# Patient Record
Sex: FEMALE | Race: WHITE | NOT HISPANIC OR LATINO | Employment: FULL TIME | URBAN - METROPOLITAN AREA
[De-identification: names, ages, dates, MRNs, and addresses within clinical notes are randomized per-mention and may not be internally consistent; named-entity substitution may affect disease eponyms.]

---
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
## 2018-01-10 NOTE — PROGRESS NOTES
Assessment    1  Encounter for preventive health examination (V70 0) (Z00 00)    Plan  Health Maintenance    · Follow-up visit in 1 year Evaluation and Treatment  Follow-up  Status: Hold For -  Scheduling  Requested for: 63Bax5540   · Be sure to get at least 8 hours of sleep every night ; Status:Complete;   Done: 89UYD7271   · Drink plenty of fluids ; Status:Complete;   Done: 76CUS6399   · Eat a low fat and low cholesterol diet ; Status:Complete;   Done: 56KSG2528   · There are many ways to reduce your risk of catching or spreading a sexually transmitted  Infection ; Status:Complete;   Done: 68MVM0976   · We encourage all of our patients to exercise regularly  30 minutes of exercise or physical  activity five or more days a week is recommended for children and adults ;  Status:Complete;   Done: 35AHR7143   · You need to stop smoking  Though it is not easy, more than half of all adult smokers  have quit  We encourage you to write down all the reasons you should quit smoking and  set a quit date for yourself  Ask us how we can help  You may also call  [facility] for free resources and assistance ; Status:Complete;   Done:  31QDJ5875   · Call [phone] if: You find a new or different kind of lump in your breast ;  Status:Complete;   Done: 88LVO5343   · Call [phone] if: You have any warning signs of skin cancer ; Status:Complete;    Done: 40KVS7889    Discussion/Summary  health maintenance visit Currently, she eats an adequate diet and has an adequate exercise regimen  has appt with GYN in [place], [place] next week Breast cancer screening: monthly self breast exam was advised  Advice and education were given regarding nutrition, aerobic exercise, weight loss and sunscreen use  Patient discussion: discussed with the patient  Fatoumata Zavala in good health  college forms completed     The patient was counseled regarding instructions for management, risk factor reductions, impressions, risks and benefits of treatment options, importance of compliance with treatment  The treatment plan was reviewed with the patient/guardian  The patient/guardian understands and agrees with the treatment plan      Chief Complaint  PE with form see's eye dr mora  ck/lpn      History of Present Illness  HM, Adult Female: The patient is being seen for a health maintenance evaluation  The last health maintenance visit was 3 year(s) ago  General Health: The patient's health since the last visit is described as good  She has regular dental visits  She denies vision problems  She denies hearing loss  Immunizations status: up to date  Lifestyle:  She consumes a diverse and healthy diet  She does not have any weight concerns  She exercises regularly  She does not use tobacco  She denies alcohol use  She denies drug use  Reproductive health: the patient is perimenopausal   she reports normal menses  she is not sexually active  Screening:   HPI: here for college PE  Georgina Odor doing well today  attends [address] [place] [address] U[employer]-Called former pediatrician (Dr Charanjit Bosch) for record  allergic cough better since taking singulair  no new c/o today      Review of Systems    Constitutional: No fever, no chills, feels well, no tiredness, no recent weight gain or weight loss  Eyes: No complaints of eye pain, no red eyes, no eyesight problems, no discharge, no dry eyes, no itching of eyes  ENT: no complaints of earache, no loss of hearing, no nose bleeds, no nasal discharge, no sore throat, no hoarseness  Cardiovascular: No complaints of slow heart rate, no fast heart rate, no chest pain, no palpitations, no leg claudication, no lower extremity edema  Respiratory: cough, but as noted in HPI, no shortness of breath and no wheezing  Gastrointestinal: No complaints of abdominal pain, no constipation, no nausea or vomiting, no diarrhea, no bloody stools     Genitourinary: No complaints of dysuria, no incontinence, no pelvic pain, no dysmenorrhea, no vaginal discharge or bleeding  Musculoskeletal: No complaints of arthralgias, no myalgias, no joint swelling or stiffness, no limb pain or swelling  Integumentary: No complaints of skin rash or lesions, no itching, no skin wounds, no breast pain or lump  Neurological: No complaints of headache, no confusion, no convulsions, no numbness, no dizziness or fainting, no tingling, no limb weakness, no difficulty walking  Psychiatric: Not suicidal, no sleep disturbance, no anxiety or depression, no change in personality, no emotional problems  Endocrine: No complaints of proptosis, no hot flashes, no muscle weakness, no deepening of the voice, no feelings of weakness  Hematologic/Lymphatic: No complaints of swollen glands, no swollen glands in the neck, does not bleed easily, does not bruise easily  Active Problems    1  Allergic cough (786 2) (R05)   2  Bladder pain (788 99) (R39 89)   3  Body mass index (BMI) 24 0-24 9, adult (V85 1) (Z68 24)   4   Mild intermittent asthma with acute exacerbation (493 92) (J45 21)    Past Medical History    · History of A Premature Birth   · History of Muscle Aches   · History of Shoulder joint pain, unspecified laterality   · History of Thrombophlebitis (V12 52)    Surgical History    · History of Bladder Surgery    Family History  Mother    · Family history of hypertension (V17 49) (Z82 49)   · Family history of High cholesterol  Grandparent    · Family history of Diabetes Mellitus (V18 0)  Maternal Grandmother    · Family history of malignant neoplasm of uterus (V16 49) (Z80 49)  Family History    · Family history of Arthritis (V17 7)   · Family history of Asthma (V17 5)   · Family history of Cancer   · Family history of Cervical Cancer   · Family history of Congestive Heart Failure   · Family history of Hyperlipidemia   · Family history of Hypertension (V17 49)    Social History    · Denied: History of Alcohol Use (History)   · Never A Smoker    Current Meds   1  Advair Diskus 100-50 MCG/DOSE Inhalation Aerosol Powder Breath Activated; INHALE   1 PUFF EVERY 12 HOURS; Therapy: 83HDZ0908 to (Last Rx:17Sed6764)  Requested for: 13Wof3142 Ordered   2  Bromfed DM 30-2-10 MG/5ML Oral Syrup; TAKE 5 ML Bedtime; Therapy: 10CWW1299 to (Evaluate:12Obc9823)  Requested for: 96RSL1403; Last   Rx:49Nje0432 Ordered   3  Maxair Autohaler AERB Recorded   4  Montelukast Sodium 10 MG Oral Tablet; TAKE 1 TABLET DAILY; Therapy: 54BXZ5212 to (Ghada Brass)  Requested for: 16TWP3838; Last   Rx:17Gmp9458 Ordered   5  ZyrTEC Allergy 10 MG Oral Tablet; Take 1 tablet daily; Therapy: 82XKZ3095 to (Evaluate:83Ceu2119); Last Rx:28Csg8255 Ordered    Allergies    1  Amoxicillin TABS    Vitals   Recorded: 03Aug2017 12:57PM   Temperature 98 5 F, Tympanic   Heart Rate 84, L Radial   Pulse Quality Regular, L Radial   Respiration Quality Normal   Respiration 14   Systolic 745, LUE, Sitting   Diastolic 70, LUE, Sitting   Height 5 ft 5 in   Weight 187 lb    BMI Calculated 31 12   BSA Calculated 1 92   BMI Percentile 95 %   2-20 Stature Percentile 61 %   2-20 Weight Percentile 96 %     Physical Exam    Constitutional   General appearance: No acute distress, well appearing and well nourished  Head and Face   Head and face: Normal     Eyes   Conjunctiva and lids: No swelling, erythema or discharge  Pupils and irises: Equal, round, reactive to light  Ears, Nose, Mouth, and Throat   Otoscopic examination: Tympanic membranes translucent with normal light reflex  Canals patent without erythema  Hearing: Normal     Nasal mucosa, septum, and turbinates: Normal without edema or erythema  Lips, teeth, and gums: Normal, good dentition  Oropharynx: Normal with no erythema, edema, exudate or lesions  Neck   Thyroid: Normal, no thyromegaly  Pulmonary   Respiratory effort: No increased work of breathing or signs of respiratory distress      Auscultation of lungs: Clear to auscultation  Cardiovascular   Auscultation of heart: Normal rate and rhythm, normal S1 and S2, no murmurs  Pedal pulses: 2+ bilaterally  Peripheral vascular exam: Normal     Examination of extremities for edema and/or varicosities: Normal     Abdomen   Abdomen: Non-tender, no masses  Liver and spleen: No hepatomegaly or splenomegaly  Lymphatic   Palpation of lymph nodes in neck: No lymphadenopathy  Musculoskeletal   Gait and station: Normal     Range of motion: Normal     Stability: Normal     Muscle strength/tone: Normal     Skin   Skin and subcutaneous tissue: Normal without rashes or lesions  Neurologic   Cranial nerves: Cranial nerves II-XII intact  Psychiatric   Judgment and insight: Normal     Mood and affect: Normal        Attending Note  Collaborating Physician Note: Collaborating Physician: I agree with the Advanced Practitioner note  Signatures   Electronically signed by : TYRA Hurtado;  Aug  3 2017  2:51PM EST                       (Author)    Electronically signed by : Raghu Ward DO; Aug  3 2017  4:57PM EST                       (Author)

## 2018-01-11 NOTE — RESULT NOTES
Verified Results  (1) CBC/ PLT (NO DIFF) 29HLP2339 12:00AM [employer]     Test Name Result Flag Reference   WBC 10 2 x10E3/uL  3 4-10 8   RBC 4 49 x10E6/uL  3 77-5 28   Hemoglobin 14 1 g/dL  11 1-15 9   **Effective December 4, 2017 the reference interval**                   for Hemoglobin MALES only will be changing to: Males 13-15 years: 12 6 - 17 7                                         Males   >15 years: 13 0 - 17 7   Hematocrit 41 5 %  34 0-46  6   MCV 92 fL  79-97   MCH 31 4 pg  26 6-33 0   MCHC 34 0 g/dL  31 5-35 7   RDW 13 2 %  12 3-15 4   Platelets 369 H95S7/EH  150-379     (1) COMPREHENSIVE METABOLIC PANEL 60LEL3069 51:05TN [employer]     Test Name Result Flag Reference   Glucose, Serum 92 mg/dL  65-99   BUN 5 mg/dL L 6-20   Creatinine, Serum 0 77 mg/dL  0 57-1 00   BUN/Creatinine Ratio 6 L 9-23   Sodium, Serum 139 mmol/L  134-144   Potassium, Serum 4 8 mmol/L  3 5-5 2   Chloride, Serum 103 mmol/L     Carbon Dioxide, Total 25 mmol/L  18-29   Calcium, Serum 9 2 mg/dL  8 7-10 2   Protein, Total, Serum 6 9 g/dL  6 0-8 5   Albumin, Serum 4 1 g/dL  3 5-5 5   Globulin, Total 2 8 g/dL  1 5-4 5   A/G Ratio 1 5  1 2-2 2   Bilirubin, Total 0 3 mg/dL  0 0-1 2   Alkaline Phosphatase, S 86 IU/L     AST (SGOT) 14 IU/L  0-40   ALT (SGPT) 13 IU/L  0-32   eGFR If NonAfricn Am 112 mL/min/1 73  >59   eGFR If Africn Am 129 mL/min/1 73  >59     (1) MONO TEST 01HJC5746 12:00AM [employer]     Test Name Result Flag Reference   Mononucleosis Test, Qual Negative  Negative   The sensitivity of Heterophile antibody testing is 80-90%  [place] IgM testing offers higher sensitivity       (1) THROAT CULTURE (CULTURE, UPPER RESPIRATORY) 37VHQ8142 12:00AM Louana Lesia     Test Name Result Flag Reference   Upper Respiratory Culture Final report A    Result 1 (Report) A    Beta hemolytic Streptococcus, group C  Moderate growth  Penicillin and ampicillin are drugs of choice for treatment of  beta-hemolytic streptococcal infections  Susceptibility testing of  penicillins and other beta-lactam agents approved by the FDA for  treatment of beta-hemolytic streptococcal infections need not be  performed routinely because nonsusceptible isolates are extremely  rare in any beta-hemolytic streptococcus and have not been reported  for Streptococcus pyogenes (group A)  (CLSI 2011)   Beta hemolytic Streptococcus, group C  Moderate growth  Penicillin and ampicillin are drugs of choice for treatment of  beta-hemolytic streptococcal infections  Susceptibility testing of  penicillins and other beta-lactam agents approved by the FDA for  treatment of beta-hemolytic streptococcal infections need not be  performed routinely because nonsusceptible isolates are extremely  rare in any beta-hemolytic streptococcus and have not been reported  for Streptococcus pyogenes (group A)   (CLSI 2011)       Signatures   Electronically signed by : TYRA Segura; Nov 27 2017 10:16AM EST                       (Author)

## 2018-01-12 VITALS
HEIGHT: 65 IN | RESPIRATION RATE: 16 BRPM | BODY MASS INDEX: 31.49 KG/M2 | WEIGHT: 189 LBS | TEMPERATURE: 99.2 F | SYSTOLIC BLOOD PRESSURE: 108 MMHG | HEART RATE: 66 BPM | DIASTOLIC BLOOD PRESSURE: 70 MMHG

## 2018-01-13 VITALS
HEART RATE: 72 BPM | RESPIRATION RATE: 14 BRPM | HEIGHT: 65 IN | WEIGHT: 186 LBS | DIASTOLIC BLOOD PRESSURE: 70 MMHG | TEMPERATURE: 98.5 F | SYSTOLIC BLOOD PRESSURE: 110 MMHG | BODY MASS INDEX: 30.99 KG/M2

## 2018-01-14 VITALS
BODY MASS INDEX: 31.16 KG/M2 | HEIGHT: 65 IN | DIASTOLIC BLOOD PRESSURE: 70 MMHG | SYSTOLIC BLOOD PRESSURE: 110 MMHG | TEMPERATURE: 98.5 F | RESPIRATION RATE: 14 BRPM | HEART RATE: 84 BPM | WEIGHT: 187 LBS

## 2018-01-14 NOTE — RESULT NOTES
Verified Results  * XR ABDOMEN 1 VIEW KUB 29Apr2016 08:30AM Tyron Thurman Order Number: [id number]     Test Name Result Flag Reference   XR ABDOMEN 1 VIEW KUB (Report)     ABDOMEN     INDICATION: Lower abdominal pain     COMPARISON: Ultrasound April 29, 2016     VIEWS: AP supine; 2 images     FINDINGS:     There is a nonobstructive bowel gas pattern  No discernible free air on this supine study  Upright or left lateral decubitus imaging is more sensitive to detect subtle free air in the appropriate setting  No abnormal calcifications are seen projected over the kidneys  There is no evidence of ureteric calculi  Visualized lung bases are clear  Visualized osseous structures are unremarkable for the patient's age  IMPRESSION:     Unremarkable examination         Workstation performed: [id number]     Signed by:   Jesus Grey MD   4/29/16

## 2018-01-15 NOTE — RESULT NOTES
Verified Results  * US ABDOMEN COMPLETE 29Apr2016 08:26AM Gracia Box Order Number: [id number]     Test Name Result Flag Reference   US ABDOMEN COMPLETE (Report)     ABDOMEN ULTRASOUND, COMPLETE     INDICATION: Abdominal pain  Partial left nephrectomy     COMPARISON: 09/20/2010     TECHNIQUE:  Real-time ultrasound of the abdomen was performed with a curvilinear transducer with both volumetric sweeps and still imaging techniques  FINDINGS:     PANCREAS: Visualized portions of the pancreas are within normal limits  AORTA AND IVC: Visualized portions are normal for patient age  LIVER:   Size: Within normal range  The liver measures 13 4 cm in the midclavicular line  Contour: Surface contour is smooth  Parenchyma: Echogenicity and echotexture are within normal limits  No evidence of suspicious mass  The main portal vein is patent and hepatopetal       BILIARY:   The gallbladder is normal in caliber  No wall thickening or pericholecystic fluid  No stones or sludge identified  Sonographic Dwana Nose sign is negative  No intrahepatic biliary dilatation  CBD measures 4 mm  No choledocholithiasis  KIDNEY:    Right kidney measures 12 2 x 5 6 cm  The renal cortex measures 1 5 cm  Within normal limits  Left kidney measures 6 2 x 4 8 cm  The renal cortex measures 1 4 cm  Within normal limits  SPLEEN:    Measures 11 3 x 11 8 x 5 1 cm  Splenic volume is 355 mL   Within normal limits  ASCITES: None  IMPRESSION:     Left kidney is smaller than the right  Presumably this is postoperative in origin  Mild prominence of the spleen         Workstation performed: [id number]     Signed by:   Chato Thompson MD   4/29/16

## 2018-01-16 NOTE — RESULT NOTES
Verified Results  (1) URINE CULTURE 27Apr2016 12:00AM Efraín Hardy     Test Name Result Flag Reference   Urine Culture,Comprehensive Final report     Result 1 Comment     No growth in 36 - 48 hours

## 2018-01-22 VITALS
SYSTOLIC BLOOD PRESSURE: 92 MMHG | WEIGHT: 192 LBS | HEART RATE: 78 BPM | RESPIRATION RATE: 14 BRPM | DIASTOLIC BLOOD PRESSURE: 60 MMHG | BODY MASS INDEX: 31.99 KG/M2 | HEIGHT: 65 IN | TEMPERATURE: 98.3 F

## 2018-10-25 ENCOUNTER — TELEPHONE (OUTPATIENT)
Dept: FAMILY MEDICINE CLINIC | Facility: CLINIC | Age: 18
End: 2018-10-25

## 2018-10-25 DIAGNOSIS — J45.21 MILD INTERMITTENT ASTHMA WITH ACUTE EXACERBATION: Primary | ICD-10-CM

## 2018-10-25 PROBLEM — R05.8 ALLERGIC COUGH: Status: ACTIVE | Noted: 2017-07-31

## 2018-10-25 PROBLEM — N13.70 VUR (VESICOURETERIC REFLUX): Status: ACTIVE | Noted: 2017-10-10

## 2018-10-25 PROBLEM — N39.46 MIXED STRESS AND URGE URINARY INCONTINENCE: Status: ACTIVE | Noted: 2017-07-14

## 2018-10-25 PROBLEM — D68.51 FACTOR V LEIDEN (HCC): Status: ACTIVE | Noted: 2017-10-10

## 2018-10-25 PROBLEM — D68.51 FACTOR V LEIDEN MUTATION (HCC): Status: ACTIVE | Noted: 2017-08-22

## 2018-10-25 RX ORDER — ALBUTEROL SULFATE 90 UG/1
1 AEROSOL, METERED RESPIRATORY (INHALATION) EVERY 6 HOURS
Qty: 1 INHALER | Refills: 0 | Status: SHIPPED | OUTPATIENT
Start: 2018-10-25 | End: 2020-07-08

## 2018-10-25 RX ORDER — MONTELUKAST SODIUM 10 MG/1
1 TABLET ORAL DAILY
COMMUNITY
Start: 2017-07-31 | End: 2020-07-08

## 2018-10-25 RX ORDER — ALBUTEROL SULFATE 90 UG/1
1 AEROSOL, METERED RESPIRATORY (INHALATION) EVERY 6 HOURS
COMMUNITY
End: 2018-10-25 | Stop reason: SDUPTHER

## 2018-10-25 NOTE — TELEPHONE ENCOUNTER
Kal Moreland,     Patient is requesting that her inhaler be refilled but I do not see it on her med list  I actually do not see a med list at all  Please refill for her when you can  She needs it to go [employer] in [facility]  Thank you

## 2019-03-11 ENCOUNTER — OFFICE VISIT (OUTPATIENT)
Dept: FAMILY MEDICINE CLINIC | Facility: CLINIC | Age: 19
End: 2019-03-11
Payer: COMMERCIAL

## 2019-03-11 VITALS
RESPIRATION RATE: 14 BRPM | HEIGHT: 65 IN | WEIGHT: 207 LBS | HEART RATE: 72 BPM | DIASTOLIC BLOOD PRESSURE: 70 MMHG | SYSTOLIC BLOOD PRESSURE: 102 MMHG | BODY MASS INDEX: 34.49 KG/M2 | TEMPERATURE: 98.9 F

## 2019-03-11 DIAGNOSIS — J02.9 SORE THROAT: Primary | ICD-10-CM

## 2019-03-11 DIAGNOSIS — B37.0 THRUSH: ICD-10-CM

## 2019-03-11 DIAGNOSIS — J02.9 EXUDATIVE PHARYNGITIS: ICD-10-CM

## 2019-03-11 LAB — S PYO AG THROAT QL: NEGATIVE

## 2019-03-11 PROCEDURE — 99213 OFFICE O/P EST LOW 20 MIN: CPT | Performed by: NURSE PRACTITIONER

## 2019-03-11 PROCEDURE — 87880 STREP A ASSAY W/OPTIC: CPT | Performed by: NURSE PRACTITIONER

## 2019-03-11 RX ORDER — SERTRALINE HYDROCHLORIDE 100 MG/1
150 TABLET, FILM COATED ORAL DAILY
COMMUNITY
Start: 2018-10-19 | End: 2019-06-24 | Stop reason: SDUPTHER

## 2019-03-11 RX ORDER — BUSPIRONE HYDROCHLORIDE 10 MG/1
10 TABLET ORAL AS NEEDED
Refills: 0 | COMMUNITY
Start: 2019-01-31 | End: 2021-02-05 | Stop reason: SDUPTHER

## 2019-03-11 RX ORDER — SULFAMETHOXAZOLE AND TRIMETHOPRIM 400; 80 MG/1; MG/1
80 TABLET ORAL DAILY
COMMUNITY
Start: 2019-02-15 | End: 2020-07-01

## 2019-03-11 NOTE — PROGRESS NOTES
Assessment:     Exudative pharyngitis    Query thrush of throat/tonsils r/t frequent antibiotic use and steroid inhaler       Plan:     1  Nystatin swish and swallow 4 times per day   2  Throat culture to confirm that yeast is causative agent; sent out----will call with results  3  POCT strep A swab negative today in office  4  Follow up on Saturday 3/16/2019     The patient was counseled regarding instructions for management,-- risk factor reductions,-- prognosis,-- impressions,-- risks and benefits of treatment options,-- importance of compliance with treatment  I have reviewed the instructions with the patient, answering all questions to her satisfaction  Subjective:       History was provided by the patient  Bladimir Driscoll is a 22 y o  female who presents for evaluation of a sore throat  Associated symptoms include nasal blockage, sinus and nasal congestion, sore throat, white spots in throat and fever  Onset of symptoms was 1 month ago  The fever, nasal congestion, and sinus symptoms have improved but the sore throat and exudate remain since that time  Since onset she was treated by her [facility] clinic for presumed strep A infection with a course of azithromycin and clindamycin which did not completely resolve symptoms  She denies cough, chest pain, shortness of breath, or syncope  She denies nausea, vomiting, or diarrhea  She is drinking plenty of fluids  She has not had recent close exposure to someone with proven streptococcal pharyngitis   On chronic ICS for asthma, chronic low dose bactrim for uti prophylaxis    The following portions of the patient's history were reviewed and updated as appropriate: allergies, current medications, past family history, past medical history, past social history, past surgical history and problem list     Review of Systems  Constitutional: negative for fatigue and fevers  Eyes: negative for visual disturbance  Ears, nose, mouth, throat, and face: positive for sore throat, negative for ear drainage, hearing loss and nasal congestion  Respiratory: negative for cough, dyspnea on exertion and sputum  Cardiovascular: negative for chest pain and orthopnea  Gastrointestinal: negative for diarrhea, nausea and vomiting      Objective:      /70 (BP Location: Left arm, Patient Position: Sitting, Cuff Size: Adult)   Pulse 72   Temp 98 9 °F (37 2 °C) (Tympanic)   Resp 14   Ht 5' 5" (1 651 m)   Wt 93 9 kg (207 lb)   BMI 34 45 kg/m²   General appearance: alert and oriented, in no acute distress  Head: Normocephalic, without obvious abnormality, atraumatic  Eyes: conjunctivae and corneas are clear  Ears: normal TM's and external ear canals both ears  Nose: mild congestion, turbinates pink, inflamed  Throat: abnormal findings: thrush--white exudate to tonsils and pharynx   Neck: no adenopathy and supple, symmetrical, trachea midline  Lungs: clear to auscultation bilaterally  Heart: regular rate and rhythm, S1, S2 normal, no murmur, click, rub or gallop  Abdomen: soft, non-tender; bowel sounds normal; no masses,  no organomegaly

## 2019-03-11 NOTE — PATIENT INSTRUCTIONS
Oral Candidiasis   WHAT YOU NEED TO KNOW:   Oral candidiasis, or thrush, is a fungal infection that affects the inside of your mouth  DISCHARGE INSTRUCTIONS:   Return to the emergency department if:   · You have trouble swallowing and your jaw and neck are stiff  · You are dizzy, thirsty, or have a dry mouth  · You are urinating little or not at all  · You cannot eat or drink because of the pain  Contact your healthcare provider if:   · You have a fever  · You have nausea, vomiting, or diarrhea  · Your signs and symptoms get worse, even after treatment  · You have questions or concerns about your condition or care  Medicines:   · Antifungal medicine  helps kill the fungus that caused your oral candidiasis  This medicine may be a pill or a solution that you gargle  Remove dentures before you gargle  · Take your medicine as directed  Contact your healthcare provider if you think your medicine is not helping or if you have side effects  Tell him of her if you are allergic to any medicine  Keep a list of the medicines, vitamins, and herbs you take  Include the amounts, and when and why you take them  Bring the list or the pill bottles to follow-up visits  Carry your medicine list with you in case of an emergency  Prevent oral candidiasis:  Brush your teeth, gums, and tongue after you eat and before you go to sleep  Use a toothbrush with soft bristles  See your dentist for regular exams  Remove your dentures when you sleep, or at least 6 hours each day  Clean your dentures and soak them in denture   Let them air dry after soaking  Follow up with your healthcare provider as directed:  Write down your questions so you remember to ask them during your visits  © 2017 [address] [employer] Information is for End User's use only and may not be sold, redistributed or otherwise used for commercial purposes   All illustrations and images included in CareNotes® are the copyrighted property of [employer]  or Georges Tamayo  The above information is an  only  It is not intended as medical advice for individual conditions or treatments  Talk to your doctor, nurse or pharmacist before following any medical regimen to see if it is safe and effective for you

## 2019-03-13 LAB
BACTERIA SPEC RESP CULT: NORMAL
Lab: NORMAL

## 2019-03-16 ENCOUNTER — OFFICE VISIT (OUTPATIENT)
Dept: FAMILY MEDICINE CLINIC | Facility: CLINIC | Age: 19
End: 2019-03-16
Payer: COMMERCIAL

## 2019-03-16 VITALS
DIASTOLIC BLOOD PRESSURE: 62 MMHG | RESPIRATION RATE: 14 BRPM | TEMPERATURE: 98.8 F | BODY MASS INDEX: 34.99 KG/M2 | WEIGHT: 210 LBS | HEART RATE: 72 BPM | SYSTOLIC BLOOD PRESSURE: 102 MMHG | HEIGHT: 65 IN

## 2019-03-16 DIAGNOSIS — B37.0 THRUSH: Primary | ICD-10-CM

## 2019-03-16 PROCEDURE — 3008F BODY MASS INDEX DOCD: CPT | Performed by: NURSE PRACTITIONER

## 2019-03-16 PROCEDURE — 99213 OFFICE O/P EST LOW 20 MIN: CPT | Performed by: NURSE PRACTITIONER

## 2019-03-16 PROCEDURE — 1036F TOBACCO NON-USER: CPT | Performed by: NURSE PRACTITIONER

## 2019-03-16 NOTE — PROGRESS NOTES
Assessment:     Exudative pharyngitis-->>thrush-->> resolved after 2 days of nystatin    Swollen glands likely reactive 2' thrush   Plan:     Cont plan of care  Use nystatin for one more week  Call with update in 1 week  Will consider ENT referral if glands no better, she wants to wait for now    The patient was counseled regarding instructions for management,-- risk factor reductions,-- prognosis,-- impressions,-- risks and benefits of treatment options,-- importance of compliance with treatment  I have reviewed the instructions with the patient, answering all questions to her satisfaction  Subjective:       History was provided by the patient  Ari Canales is a 22 y o  female who presents for recheck of thrush  Symptoms resolved since started nystatin  "I feel good"  Only c/o swollen tender glands in neck  Onset of symptoms was 1 month ago  The fever, nasal congestion, and sinus symptoms have improved but the sore throat and exudate remain since that time  Since onset she was treated by her [facility] clinic for presumed strep A infection with a course of azithromycin and clindamycin which did not completely resolve symptoms  She denies cough, chest pain, shortness of breath, or syncope  She denies nausea, vomiting, or diarrhea  She is drinking plenty of fluids  She has not had recent close exposure to someone with proven streptococcal pharyngitis   On chronic ICS for asthma, chronic low dose bactrim for uti prophylaxis    The following portions of the patient's history were reviewed and updated as appropriate: allergies, current medications, past family history, past medical history, past social history, past surgical history and problem list     Review of Systems  Constitutional: negative for fatigue and fevers  Eyes: negative for visual disturbance  Ears, nose, mouth, throat, and face: positive for sore throat, negative for ear drainage, hearing loss and nasal congestion  Respiratory: negative for cough, dyspnea on exertion and sputum  Cardiovascular: negative for chest pain and orthopnea  Gastrointestinal: negative for diarrhea, nausea and vomiting      Objective:      /62 (BP Location: Left arm, Patient Position: Sitting, Cuff Size: Adult)   Pulse 72   Temp 98 8 °F (37 1 °C) (Tympanic)   Resp 14   Ht 5' 5" (1 651 m)   Wt 95 3 kg (210 lb)   BMI 34 95 kg/m²   General appearance: alert and oriented, in no acute distress  Head: Normocephalic, without obvious abnormality, atraumatic  Eyes: conjunctivae and corneas are clear  Ears: normal TM's and external ear canals both ears  Nose: mild congestion, turbinates pink, inflamed  Throat: clear  Neck: + cervical adenopathy  Lungs: clear to auscultation bilaterally  Heart: regular rate and rhythm, S1, S2 normal, no murmur, click, rub or gallop  Abdomen: soft, non-tender; bowel sounds normal; no masses,  no organomegaly

## 2019-06-17 ENCOUNTER — OFFICE VISIT (OUTPATIENT)
Dept: URGENT CARE | Facility: CLINIC | Age: 19
End: 2019-06-17
Payer: COMMERCIAL

## 2019-06-17 VITALS
OXYGEN SATURATION: 97 % | BODY MASS INDEX: 31.65 KG/M2 | TEMPERATURE: 99.3 F | WEIGHT: 190 LBS | HEIGHT: 65 IN | DIASTOLIC BLOOD PRESSURE: 72 MMHG | SYSTOLIC BLOOD PRESSURE: 157 MMHG | RESPIRATION RATE: 16 BRPM | HEART RATE: 70 BPM

## 2019-06-17 DIAGNOSIS — K11.20 SIALOADENITIS, UNSPECIFIED: Primary | ICD-10-CM

## 2019-06-17 PROCEDURE — 99213 OFFICE O/P EST LOW 20 MIN: CPT | Performed by: FAMILY MEDICINE

## 2019-06-17 RX ORDER — TIZANIDINE HYDROCHLORIDE 2 MG/1
2 CAPSULE, GELATIN COATED ORAL 3 TIMES DAILY
COMMUNITY
End: 2020-07-08

## 2019-06-17 RX ORDER — CLINDAMYCIN HYDROCHLORIDE 300 MG/1
300 CAPSULE ORAL 3 TIMES DAILY
Qty: 16 CAPSULE | Refills: 0 | Status: SHIPPED | OUTPATIENT
Start: 2019-06-17 | End: 2019-06-22

## 2019-06-24 ENCOUNTER — OFFICE VISIT (OUTPATIENT)
Dept: FAMILY MEDICINE CLINIC | Facility: CLINIC | Age: 19
End: 2019-06-24
Payer: COMMERCIAL

## 2019-06-24 VITALS
SYSTOLIC BLOOD PRESSURE: 118 MMHG | TEMPERATURE: 97.4 F | WEIGHT: 201.2 LBS | HEIGHT: 65 IN | BODY MASS INDEX: 33.52 KG/M2 | RESPIRATION RATE: 14 BRPM | HEART RATE: 78 BPM | DIASTOLIC BLOOD PRESSURE: 82 MMHG

## 2019-06-24 DIAGNOSIS — N39.0 RECURRENT UTI: ICD-10-CM

## 2019-06-24 DIAGNOSIS — K11.20 SIALADENITIS: Primary | ICD-10-CM

## 2019-06-24 DIAGNOSIS — F32.89 OTHER DEPRESSION: ICD-10-CM

## 2019-06-24 LAB
S PYO AG THROAT QL: NEGATIVE
SL AMB  POCT GLUCOSE, UA: NORMAL
SL AMB LEUKOCYTE ESTERASE,UA: NORMAL
SL AMB POCT BILIRUBIN,UA: NORMAL
SL AMB POCT BLOOD,UA: NORMAL
SL AMB POCT CLARITY,UA: NORMAL
SL AMB POCT COLOR,UA: YELLOW
SL AMB POCT KETONES,UA: NORMAL
SL AMB POCT NITRITE,UA: NORMAL
SL AMB POCT PH,UA: 8
SL AMB POCT SPECIFIC GRAVITY,UA: 1.01
SL AMB POCT URINE PROTEIN: NORMAL
SL AMB POCT UROBILINOGEN: NORMAL

## 2019-06-24 PROCEDURE — 81003 URINALYSIS AUTO W/O SCOPE: CPT | Performed by: NURSE PRACTITIONER

## 2019-06-24 PROCEDURE — 87880 STREP A ASSAY W/OPTIC: CPT | Performed by: NURSE PRACTITIONER

## 2019-06-24 PROCEDURE — 99213 OFFICE O/P EST LOW 20 MIN: CPT | Performed by: NURSE PRACTITIONER

## 2019-06-24 RX ORDER — CEFDINIR 300 MG/1
300 CAPSULE ORAL EVERY 12 HOURS SCHEDULED
Qty: 20 CAPSULE | Refills: 0 | Status: SHIPPED | OUTPATIENT
Start: 2019-06-24 | End: 2019-06-24 | Stop reason: SDUPTHER

## 2019-06-24 RX ORDER — SERTRALINE HYDROCHLORIDE 100 MG/1
150 TABLET, FILM COATED ORAL DAILY
Qty: 45 TABLET | Refills: 1 | Status: SHIPPED | OUTPATIENT
Start: 2019-06-24 | End: 2020-07-16

## 2019-06-24 RX ORDER — METHYLPREDNISOLONE 4 MG/1
TABLET ORAL
Qty: 21 EACH | Refills: 0 | Status: SHIPPED | OUTPATIENT
Start: 2019-06-24 | End: 2019-07-01

## 2019-06-24 RX ORDER — METHYLPREDNISOLONE 4 MG/1
TABLET ORAL
Qty: 21 EACH | Refills: 0 | Status: SHIPPED | OUTPATIENT
Start: 2019-06-24 | End: 2019-06-24 | Stop reason: SDUPTHER

## 2019-06-24 RX ORDER — CEFDINIR 300 MG/1
300 CAPSULE ORAL EVERY 12 HOURS SCHEDULED
Qty: 20 CAPSULE | Refills: 0 | Status: SHIPPED | OUTPATIENT
Start: 2019-06-24 | End: 2019-07-01

## 2019-06-24 RX ORDER — BUSPIRONE HYDROCHLORIDE 10 MG/1
TABLET ORAL 3 TIMES DAILY
COMMUNITY
End: 2019-06-24 | Stop reason: SDUPTHER

## 2019-07-01 ENCOUNTER — OFFICE VISIT (OUTPATIENT)
Dept: FAMILY MEDICINE CLINIC | Facility: CLINIC | Age: 19
End: 2019-07-01
Payer: COMMERCIAL

## 2019-07-01 VITALS
RESPIRATION RATE: 14 BRPM | SYSTOLIC BLOOD PRESSURE: 124 MMHG | HEIGHT: 65 IN | DIASTOLIC BLOOD PRESSURE: 86 MMHG | BODY MASS INDEX: 33.32 KG/M2 | HEART RATE: 88 BPM | TEMPERATURE: 98.8 F | WEIGHT: 200 LBS

## 2019-07-01 DIAGNOSIS — J03.90 TONSILLITIS WITH EXUDATE: Primary | ICD-10-CM

## 2019-07-01 PROBLEM — Q62.5 DUPLICATED LEFT RENAL COLLECTING SYSTEM: Status: ACTIVE | Noted: 2019-07-01

## 2019-07-01 PROBLEM — N32.81 OVERACTIVE BLADDER: Status: ACTIVE | Noted: 2019-07-01

## 2019-07-01 PROBLEM — N39.0 RECURRENT UTI: Status: ACTIVE | Noted: 2019-07-01

## 2019-07-01 PROBLEM — N20.0 KIDNEY STONE: Status: ACTIVE | Noted: 2019-07-01

## 2019-07-01 PROCEDURE — 99214 OFFICE O/P EST MOD 30 MIN: CPT | Performed by: FAMILY MEDICINE

## 2019-07-01 RX ORDER — HYDROXYZINE 50 MG/1
1 TABLET, FILM COATED ORAL 3 TIMES DAILY PRN
Refills: 1 | COMMUNITY
Start: 2019-06-26 | End: 2020-07-08

## 2019-07-01 NOTE — PROGRESS NOTES
Chief Complaint   Patient presents with    Sore Throat    Earache        Patient ID: Bill Hendricks is a 22 y o  female  HPI  Pt is seeing for recurrent episodes of tonsillitis  -  Last 3 m ago -  Was seen by school provider  - this episode lasting 2 wks -  Was Tx by UC with Clindamycin first, then with PCP 1 wk ago -  Ab was changed to MARISSA IQBALIS, was also Rx oral steroids  -  Minimally improved -  Was advised to see ENT after last OV-  Did not call for saida yet     The following portions of the patient's history were reviewed and updated as appropriate: allergies, current medications, past family history, past medical history, past social history, past surgical history and problem list     Review of Systems   Constitutional: Negative  HENT: Positive for sore throat and trouble swallowing  Negative for voice change  Respiratory: Negative  Gastrointestinal: Negative          Current Outpatient Medications   Medication Sig Dispense Refill    albuterol (PROVENTIL HFA,VENTOLIN HFA) 90 mcg/act inhaler Inhale 1 puff every 6 (six) hours 1 Inhaler 0    busPIRone (BUSPAR) 10 mg tablet Take 10 mg by mouth 3 (three) times a day   0    levonorgestrel (KYLEENA) 19 5 MG intrauterine device 1 each by Intrauterine route      methylPREDNISolone 4 MG tablet therapy pack Use as directed on package 21 each 0    montelukast (SINGULAIR) 10 mg tablet Take 1 tablet by mouth daily      nystatin (MYCOSTATIN) 100,000 units/mL suspension Apply 5 mL (500,000 Units total) to the mouth or throat 4 (four) times a day 473 mL 1    sertraline (ZOLOFT) 100 mg tablet Take 1 5 tablets (150 mg total) by mouth daily 45 tablet 1    sulfamethoxazole-trimethoprim (BACTRIM) 400-80 mg per tablet Take 80 mg by mouth daily       TiZANidine (ZANAFLEX) 2 MG capsule Take 2 mg by mouth 3 (three) times a day      fluticasone-salmeterol (ADVAIR DISKUS) 100-50 mcg/dose inhaler Inhale 1 puff every 12 (twelve) hours (Patient not taking: Reported on 7/1/2019) 1 Inhaler 0    fluticasone-salmeterol (ADVAIR DISKUS) 100-50 mcg/dose inhaler Inhale       No current facility-administered medications for this visit  Objective:    /86 (BP Location: Right arm, Patient Position: Sitting, Cuff Size: Large)   Pulse 88   Temp 98 8 °F (37 1 °C) (Tympanic)   Resp 14   Ht 5' 5" (1 651 m)   Wt 90 7 kg (200 lb)   LMP 06/03/2019 (Approximate)   BMI 33 28 kg/m²        Physical Exam   Constitutional: She does not appear ill  HENT:   Mouth/Throat: Mucous membranes are not pale  No oropharyngeal exudate, posterior oropharyngeal erythema or tonsillar abscesses  Tonsils are 4+ on the right  Tonsils are 4+ on the left  Tonsillar exudate  Neck: No thyromegaly present  Cardiovascular: Normal rate  Lymphadenopathy:     She has no cervical adenopathy  Assessment/Plan:         Diagnoses and all orders for this visit:    Tonsillitis with exudate  -     Ambulatory Referral to Otolaryngology; Future  -     EBV acute panel;  Future            rto patel Naik MD

## 2019-07-02 ENCOUNTER — OFFICE VISIT (OUTPATIENT)
Dept: OTOLARYNGOLOGY | Facility: CLINIC | Age: 19
End: 2019-07-02
Payer: COMMERCIAL

## 2019-07-02 VITALS
DIASTOLIC BLOOD PRESSURE: 80 MMHG | WEIGHT: 200 LBS | BODY MASS INDEX: 33.32 KG/M2 | HEIGHT: 65 IN | SYSTOLIC BLOOD PRESSURE: 128 MMHG

## 2019-07-02 DIAGNOSIS — J35.01 CHRONIC TONSILLITIS: Primary | ICD-10-CM

## 2019-07-02 DIAGNOSIS — J35.1 CHRONIC TONSILLAR HYPERTROPHY: ICD-10-CM

## 2019-07-02 PROCEDURE — 99242 OFF/OP CONSLTJ NEW/EST SF 20: CPT | Performed by: SPECIALIST

## 2019-07-02 RX ORDER — METHYLPREDNISOLONE 4 MG/1
TABLET ORAL
Qty: 1 EACH | Refills: 0 | Status: SHIPPED | OUTPATIENT
Start: 2019-07-02 | End: 2019-12-24 | Stop reason: ALTCHOICE

## 2019-07-02 NOTE — PROGRESS NOTES
Assessment/Plan:    Chronic tonsillitis  On exam noted enlarged 4+ tonsils with white exudate  Approx 10 episodes of tonsillitis over past year, worsening over past 3 years  Offered options of acceptance, or surgical intervention of T&A  Reviewed the procedure of tonsillectomy and possible adenoidectomy including risks of infection, bleeding, anesthesia  Discussed post operative expectations including bad breath, diet, and pain  Discussed concerns with breathing and bleeding risk (2 8%) during post operative period  Agreed another dose of Medrol due to persistent inflammation  To call if develops another acute episode  Follow up with surgical scheduling if they choose  Diagnoses and all orders for this visit:    Chronic tonsillitis  -     methylPREDNISolone 4 MG tablet therapy pack; Use as directed on package    Chronic tonsillar hypertrophy  -     methylPREDNISolone 4 MG tablet therapy pack; Use as directed on package          Subjective:      Patient ID: Cathy White is a 22 y o  female  Presents today as a new patient consultation per Dr Senait Cannon due to sore throats  Frequent sore throat episodes that occur about 10 times this past year, 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  Current episode lasting for past two weeks  Current voice raspy and weak  Painful sore throat currently with white spots on tonsils  Difficulty sleeping due choking sensation when lying down  Seen by PCP twice and an urgent care over past two weeks  The following portions of the patient's history were reviewed and updated as appropriate: allergies, current medications, past family history, past medical history, past social history, past surgical history and problem list     Review of Systems   Constitutional: Negative  HENT: Positive for sore throat   Negative for congestion, ear discharge, ear pain, hearing loss, nosebleeds, postnasal drip, rhinorrhea, sinus pressure, sinus pain, tinnitus and voice change  Eyes: Negative  Respiratory: Negative for chest tightness and shortness of breath  Cardiovascular: Negative  Gastrointestinal: Negative  Endocrine: Negative  Musculoskeletal: Negative  Skin: Negative for color change  Neurological: Negative for dizziness, numbness and headaches  Psychiatric/Behavioral: Negative  Objective:      /80 (BP Location: Left arm, Patient Position: Sitting, Cuff Size: Adult)   Ht 5' 5" (1 651 m)   Wt 90 7 kg (200 lb)   LMP 06/03/2019 (Approximate)   BMI 33 28 kg/m²          Physical Exam   Constitutional: She is oriented to person, place, and time  She appears well-developed and well-nourished  She is cooperative  HENT:   Head: Normocephalic  Right Ear: Hearing, tympanic membrane, external ear and ear canal normal  No drainage or tenderness  Tympanic membrane is not perforated and not erythematous  No decreased hearing is noted  Left Ear: Hearing, tympanic membrane, external ear and ear canal normal  No drainage or tenderness  Tympanic membrane is not perforated and not erythematous  No decreased hearing is noted  Nose: Nose normal  No sinus tenderness, nasal deformity or septal deviation  Mouth/Throat: Uvula is midline, oropharynx is clear and moist and mucous membranes are normal  Mucous membranes are not pale and not dry  No oral lesions  Normal dentition  No oropharyngeal exudate  Tonsils are 4+ on the right  Tonsils are 4+ on the left  Tonsillar exudate  Neck: Normal range of motion and full passive range of motion without pain  Neck supple  No tracheal deviation present  Cardiovascular: Normal rate  Pulmonary/Chest: Effort normal  No accessory muscle usage  No respiratory distress  Musculoskeletal:        Right shoulder: She exhibits normal range of motion  Lymphadenopathy:     She has no cervical adenopathy  Neurological: She is alert and oriented to person, place, and time   No cranial nerve deficit or sensory deficit  Skin: Skin is warm, dry and intact  Psychiatric: She has a normal mood and affect

## 2019-07-02 NOTE — ASSESSMENT & PLAN NOTE
On exam noted enlarged 4+ tonsils with white exudate  Approx 10 episodes of tonsillitis over past year, worsening over past 3 years  Offered options of acceptance, or surgical intervention of T&A  Reviewed the procedure of tonsillectomy and possible adenoidectomy including risks of infection, bleeding, anesthesia  Discussed post operative expectations including bad breath, diet, and pain  Discussed concerns with breathing and bleeding risk (2 8%) during post operative period  Agreed another dose of Medrol due to persistent inflammation  To call if develops another acute episode  Follow up with surgical scheduling if they choose

## 2019-07-10 LAB
EBV VCA IGG SER IA-ACNC: <18 U/ML (ref 0–17.9)
EBV VCA IGM SER IA-ACNC: >160 U/ML (ref 0–35.9)

## 2019-07-23 ENCOUNTER — OFFICE VISIT (OUTPATIENT)
Dept: OTOLARYNGOLOGY | Facility: CLINIC | Age: 19
End: 2019-07-23

## 2019-07-23 VITALS
BODY MASS INDEX: 33.28 KG/M2 | WEIGHT: 200 LBS | SYSTOLIC BLOOD PRESSURE: 118 MMHG | TEMPERATURE: 98.5 F | DIASTOLIC BLOOD PRESSURE: 78 MMHG

## 2019-07-23 DIAGNOSIS — J35.01 CHRONIC TONSILLITIS: Primary | ICD-10-CM

## 2019-07-23 PROCEDURE — 99024 POSTOP FOLLOW-UP VISIT: CPT | Performed by: SPECIALIST

## 2019-07-23 RX ORDER — OXYCODONE HCL 5 MG/5 ML
SOLUTION, ORAL ORAL
Refills: 0 | COMMUNITY
Start: 2019-07-10 | End: 2020-07-01

## 2019-07-23 NOTE — PROGRESS NOTES
Assessment/Plan:    Chronic tonsillitis   presents today for post operative visit due to T&A on 07/16/2019  Overall she is doing very well  No evidence of bleeding on exam today  Encouraged to continue normal diet and activity  Discussed risk of bleeding and potential strep infections status post T&A  Discussed follow up as needed  Diagnoses and all orders for this visit:    Chronic tonsillitis    Other orders  -     oxyCODONE (ROXICODONE) 5 mg/5 mL solution          Subjective:      Patient ID: Eddie Diaz is a 22 y o  female  Presents today for POV due to tonsillectomy two weeks ago  Overall doing well  Slight pain left ear and throat  Worsens with yawning and sneezing  No bleeding  Tolerating regular diet  The following portions of the patient's history were reviewed and updated as appropriate: allergies, current medications, past family history, past medical history, past social history, past surgical history and problem list     Review of Systems   Constitutional: Negative  HENT: Positive for sore throat  Negative for congestion, ear discharge, ear pain, hearing loss, nosebleeds, postnasal drip, rhinorrhea, sinus pressure, sinus pain, tinnitus and voice change  Eyes: Negative  Respiratory: Negative for chest tightness and shortness of breath  Cardiovascular: Negative  Gastrointestinal: Negative  Endocrine: Negative  Musculoskeletal: Negative  Skin: Negative for color change  Neurological: Negative for dizziness, numbness and headaches  Psychiatric/Behavioral: Negative  Objective:      /78 (BP Location: Left arm, Patient Position: Sitting, Cuff Size: Adult)   Temp 98 5 °F (36 9 °C) (Oral)   Wt 90 7 kg (200 lb)   BMI 33 28 kg/m²          Physical Exam   Constitutional: She is oriented to person, place, and time  She appears well-developed  HENT:   Head: Normocephalic     Right Ear: Tympanic membrane, external ear and ear canal normal    Left Ear: Tympanic membrane, external ear and ear canal normal    Nose: Nose normal    Mouth/Throat: Mucous membranes are normal  Oropharyngeal exudate, posterior oropharyngeal edema and posterior oropharyngeal erythema present  No tonsillar abscesses  Normal post tonsillectomy exudate in oropharynx, no active bleeding     Neck: Normal range of motion  Cardiovascular: Normal rate  Pulmonary/Chest: Effort normal    Neurological: She is alert and oriented to person, place, and time  Skin: Skin is warm and dry

## 2019-07-23 NOTE — ASSESSMENT & PLAN NOTE
presents today for post operative visit due to T&A on 07/16/2019  Overall she is doing very well  No evidence of bleeding on exam today  Encouraged to continue normal diet and activity  Discussed risk of bleeding and potential strep infections status post T&A  Discussed follow up as needed

## 2019-12-24 ENCOUNTER — OFFICE VISIT (OUTPATIENT)
Dept: FAMILY MEDICINE CLINIC | Facility: CLINIC | Age: 19
End: 2019-12-24

## 2019-12-24 VITALS
DIASTOLIC BLOOD PRESSURE: 64 MMHG | BODY MASS INDEX: 35.32 KG/M2 | SYSTOLIC BLOOD PRESSURE: 98 MMHG | HEART RATE: 64 BPM | TEMPERATURE: 97.3 F | HEIGHT: 65 IN | WEIGHT: 212 LBS

## 2019-12-24 DIAGNOSIS — J02.9 SORE THROAT: Primary | ICD-10-CM

## 2019-12-24 DIAGNOSIS — J45.21 MILD INTERMITTENT ASTHMA WITH ACUTE EXACERBATION: ICD-10-CM

## 2019-12-24 PROCEDURE — 99212 OFFICE O/P EST SF 10 MIN: CPT | Performed by: FAMILY MEDICINE

## 2019-12-24 RX ORDER — PREDNISONE 20 MG/1
TABLET ORAL
Qty: 10 TABLET | Refills: 0 | Status: SHIPPED | OUTPATIENT
Start: 2019-12-24 | End: 2020-07-01

## 2019-12-27 ENCOUNTER — OFFICE VISIT (OUTPATIENT)
Dept: FAMILY MEDICINE CLINIC | Facility: CLINIC | Age: 19
End: 2019-12-27

## 2019-12-27 VITALS
WEIGHT: 213.2 LBS | RESPIRATION RATE: 16 BRPM | SYSTOLIC BLOOD PRESSURE: 108 MMHG | HEART RATE: 80 BPM | DIASTOLIC BLOOD PRESSURE: 86 MMHG | TEMPERATURE: 99.4 F | BODY MASS INDEX: 35.52 KG/M2 | HEIGHT: 65 IN | OXYGEN SATURATION: 97 %

## 2019-12-27 DIAGNOSIS — R68.89 FLU-LIKE SYMPTOMS: Primary | ICD-10-CM

## 2019-12-27 PROCEDURE — 99213 OFFICE O/P EST LOW 20 MIN: CPT | Performed by: NURSE PRACTITIONER

## 2019-12-27 RX ORDER — AZITHROMYCIN 250 MG/1
TABLET, FILM COATED ORAL
Qty: 6 TABLET | Refills: 0 | Status: SHIPPED | OUTPATIENT
Start: 2019-12-27 | End: 2019-12-31

## 2019-12-27 RX ORDER — AZITHROMYCIN 250 MG/1
TABLET, FILM COATED ORAL
Qty: 6 TABLET | Refills: 0 | Status: SHIPPED | OUTPATIENT
Start: 2019-12-27 | End: 2019-12-27 | Stop reason: SDUPTHER

## 2019-12-27 NOTE — PROGRESS NOTES
Suad Ledbetter is a 22 y o  female who presents for evaluation of influenza like symptoms  Symptoms include chills, headache, myalgias, productive cough, sinus and nasal congestion and fever and have been present for 5 days  She has tried to alleviate the symptoms with acetaminophen and rest with minimal relief  High risk factors for influenza complications: none  +sick contacts to similar illness  The following portions of the patient's history were reviewed and updated as appropriate: allergies, current medications, past family history, past medical history, past social history, past surgical history and problem list     Review of Systems  Pertinent items are noted in HPI       Objective     /86 (BP Location: Left arm, Patient Position: Sitting, Cuff Size: Large)   Pulse 80   Temp 99 4 °F (37 4 °C)   Resp 16   Ht 5' 5" (1 651 m)   Wt 96 7 kg (213 lb 3 2 oz)   SpO2 97%   BMI 35 48 kg/m²   General appearance: alert and oriented, in no acute distress  Head: Normocephalic, without obvious abnormality, sinuses nontender to percussion  Ears: normal TM's and external ear canals both ears  Nose: no discharge, turbinates red  Throat: abnormal findings: moderate oropharyngeal erythema  Lungs: rhonchi  Heart: regular rate and rhythm, S1, S2 normal, no murmur, click, rub or gallop  Lymph nodes: Cervical, supraclavicular, and axillary nodes normal     Assessment/Plan     Influenza like syndrome  The case discussed with patient using patient centered shared decision making  The patient was counseled regarding instructions for management,-- risk factor reductions,-- prognosis,-- impressions,-- risks and benefits of treatment options,-- importance of compliance with treatment  I have reviewed the instructions with the patient, answering all questions to her satisfaction  Supportive care with appropriate antipyretics and fluids  Educational material distributed and questions answered    Follow up in 1 week or as needed  Bart Greenberg BMI Counseling: Body mass index is 35 48 kg/m²  The BMI is above normal  Nutrition recommendations include reducing portion sizes, decreasing overall calorie intake, consuming healthier snacks and moderation in carbohydrate intake  Exercise recommendations include exercising 3-5 times per week

## 2019-12-27 NOTE — LETTER
December 27, 2019     Patient: Geremias Zepeda   YOB: 2000   Date of Visit: 12/27/2019       To Whom it May Concern:    Norris Melendez is under my professional care  She was seen in my office on 12/27/2019  She may return to work on 12/30/19  excused for flu like illness  If you have any questions or concerns, please don't hesitate to call           Sincerely,          OLEG Sanchez        CC: No Recipients

## 2020-01-19 PROBLEM — J45.990 EXERCISE-INDUCED ASTHMA: Status: ACTIVE | Noted: 2019-08-29

## 2020-01-20 NOTE — PROGRESS NOTES
Assessment/Plan:   Diagnoses and all orders for this visit:    Sore throat  Comments:  probable viral origin  otc tylenol, throat lzenges/gargles  rx prednsione taper  Orders:  -     predniSONE 20 mg tablet; 2 tabs po x2d, 1 tab po x2d, 1/2 tab po x2d--take w food    Mild intermittent asthma with acute exacerbation  Comments:  rx Prednsione taper; Inhaler use as directed prn  Subjective:      Patient ID: Nahed Marcelo is a 23 y o  female  Chief Complaint   Patient presents with    Cough    Cold Like Symptoms       Cough   This is a new problem  The current episode started in the past 7 days  The cough is non-productive  Associated symptoms include nasal congestion, postnasal drip, rhinorrhea and a sore throat  Pertinent negatives include no fever, hemoptysis, rash or wheezing  She has tried rest and OTC cough suppressant for the symptoms  Her past medical history is significant for asthma, bronchitis and environmental allergies  The following portions of the patient's history were reviewed and updated as appropriate: allergies, current medications, past family history, past medical history, past social history, past surgical history and problem list      Review of Systems   Constitutional: Negative for fever  HENT: Positive for postnasal drip, rhinorrhea and sore throat  Respiratory: Positive for cough  Negative for hemoptysis and wheezing  Skin: Negative for rash  Allergic/Immunologic: Positive for environmental allergies  Objective:    BP 98/64 (BP Location: Left arm, Patient Position: Sitting, Cuff Size: Extra-Large)   Pulse 64   Temp (!) 97 3 °F (36 3 °C) (Tympanic)   Ht 5' 5" (1 651 m)   Wt 96 2 kg (212 lb)   BMI 35 28 kg/m²        Physical Exam   Constitutional: She is oriented to person, place, and time  OW, acutely ill   HENT:   Nasal bogginess, pngtt  Throat erythematous, no exudate   Eyes: Conjunctivae are normal  No scleral icterus  Neck: Neck supple  Cardiovascular: Normal rate and regular rhythm  Pulmonary/Chest: Effort normal  No respiratory distress  She has wheezes (occ, scattered exp wheeze, no localization)  Abdominal: Soft  There is no tenderness  Neurological: She is alert and oriented to person, place, and time  No cranial nerve deficit  Skin: Skin is warm and dry  No rash noted  Nursing note and vitals reviewed          Silvano Lea MD

## 2020-05-26 ENCOUNTER — TRANSITIONAL CARE MANAGEMENT (OUTPATIENT)
Dept: FAMILY MEDICINE CLINIC | Facility: CLINIC | Age: 20
End: 2020-05-26

## 2020-05-29 ENCOUNTER — TRANSCRIBE ORDERS (OUTPATIENT)
Dept: ADMINISTRATIVE | Facility: HOSPITAL | Age: 20
End: 2020-05-29

## 2020-05-29 DIAGNOSIS — Q61.4 CONGENITAL RENAL DYSPLASIA: Primary | ICD-10-CM

## 2020-06-08 ENCOUNTER — TRANSITIONAL CARE MANAGEMENT (OUTPATIENT)
Dept: FAMILY MEDICINE CLINIC | Facility: CLINIC | Age: 20
End: 2020-06-08

## 2020-06-30 ENCOUNTER — TELEPHONE (OUTPATIENT)
Dept: FAMILY MEDICINE CLINIC | Facility: CLINIC | Age: 20
End: 2020-06-30

## 2020-06-30 ENCOUNTER — TRANSITIONAL CARE MANAGEMENT (OUTPATIENT)
Dept: FAMILY MEDICINE CLINIC | Facility: CLINIC | Age: 20
End: 2020-06-30

## 2020-06-30 RX ORDER — LIDOCAINE 50 MG/G
1 PATCH TOPICAL
COMMUNITY
Start: 2020-06-27 | End: 2020-07-01 | Stop reason: SDUPTHER

## 2020-06-30 RX ORDER — GABAPENTIN 300 MG/1
300 CAPSULE ORAL 3 TIMES DAILY
COMMUNITY
Start: 2020-06-07 | End: 2020-07-01 | Stop reason: SDUPTHER

## 2020-06-30 RX ORDER — ACETAMINOPHEN 500 MG
500 TABLET ORAL EVERY 6 HOURS PRN
COMMUNITY
Start: 2020-06-07 | End: 2020-07-08

## 2020-06-30 RX ORDER — DOCUSATE SODIUM 100 MG/1
100 CAPSULE, LIQUID FILLED ORAL
COMMUNITY
Start: 2020-06-26 | End: 2020-07-08

## 2020-06-30 RX ORDER — LANOLIN ALCOHOL/MO/W.PET/CERES
6 CREAM (GRAM) TOPICAL
COMMUNITY
Start: 2020-06-26 | End: 2022-05-17

## 2020-06-30 RX ORDER — HYDROMORPHONE HYDROCHLORIDE 2 MG/1
TABLET ORAL
COMMUNITY
Start: 2020-06-26 | End: 2020-07-08

## 2020-06-30 NOTE — TELEPHONE ENCOUNTER
Patient called and scheduled TCM appointment for tomorrow with Dr Sanchez(pt requested Dr Caterina Hernandez as new PCP)  Patient was discharged 6/19 from [facility] where she was admitted for 8 days for lung clots  Please call patient to do TCM

## 2020-07-01 ENCOUNTER — TELEPHONE (OUTPATIENT)
Dept: FAMILY MEDICINE CLINIC | Facility: CLINIC | Age: 20
End: 2020-07-01

## 2020-07-01 ENCOUNTER — OFFICE VISIT (OUTPATIENT)
Dept: FAMILY MEDICINE CLINIC | Facility: CLINIC | Age: 20
End: 2020-07-01
Payer: COMMERCIAL

## 2020-07-01 ENCOUNTER — TELEPHONE (OUTPATIENT)
Dept: SURGICAL ONCOLOGY | Facility: CLINIC | Age: 20
End: 2020-07-01

## 2020-07-01 VITALS
WEIGHT: 198.8 LBS | HEIGHT: 65 IN | TEMPERATURE: 99.3 F | OXYGEN SATURATION: 98 % | BODY MASS INDEX: 33.12 KG/M2 | HEART RATE: 118 BPM | RESPIRATION RATE: 20 BRPM | DIASTOLIC BLOOD PRESSURE: 76 MMHG | SYSTOLIC BLOOD PRESSURE: 106 MMHG

## 2020-07-01 DIAGNOSIS — G89.29 OTHER CHRONIC PAIN: ICD-10-CM

## 2020-07-01 DIAGNOSIS — I26.99 OTHER ACUTE PULMONARY EMBOLISM WITHOUT ACUTE COR PULMONALE (HCC): Primary | ICD-10-CM

## 2020-07-01 DIAGNOSIS — F41.9 ANXIETY: ICD-10-CM

## 2020-07-01 DIAGNOSIS — D68.51 FACTOR V LEIDEN MUTATION (HCC): ICD-10-CM

## 2020-07-01 DIAGNOSIS — Q62.5 DUPLICATED LEFT RENAL COLLECTING SYSTEM: ICD-10-CM

## 2020-07-01 PROCEDURE — 99495 TRANSJ CARE MGMT MOD F2F 14D: CPT | Performed by: FAMILY MEDICINE

## 2020-07-01 RX ORDER — LIDOCAINE 50 MG/G
1 PATCH TOPICAL DAILY
Qty: 30 PATCH | Refills: 0 | Status: SHIPPED | OUTPATIENT
Start: 2020-07-01 | End: 2020-07-01 | Stop reason: SDUPTHER

## 2020-07-01 RX ORDER — GABAPENTIN 300 MG/1
300 CAPSULE ORAL 3 TIMES DAILY
Qty: 90 CAPSULE | Refills: 0 | Status: SHIPPED | OUTPATIENT
Start: 2020-07-01 | End: 2020-08-31

## 2020-07-01 RX ORDER — LIDOCAINE 50 MG/G
1 PATCH TOPICAL DAILY
Qty: 30 PATCH | Refills: 0 | Status: SHIPPED | OUTPATIENT
Start: 2020-07-01 | End: 2020-07-16 | Stop reason: SDUPTHER

## 2020-07-01 RX ORDER — GABAPENTIN 300 MG/1
300 CAPSULE ORAL 3 TIMES DAILY
Qty: 90 CAPSULE | Refills: 0 | Status: SHIPPED | OUTPATIENT
Start: 2020-07-01 | End: 2020-07-01 | Stop reason: SDUPTHER

## 2020-07-01 NOTE — TELEPHONE ENCOUNTER
New Patient Encounter    New Patient Intake Form   Patient Details:  Carolyn Farnsworth  2000  [id number]    Background Information:  [address] starts by opening a telephone encounter and gathering the following information   Who is calling to schedule? If not self, relationship to patient? self   Referring Provider Lupe Gutierrez   What is the diagnosis? PE's  Was in [facility]   Is this diagnosis confirmed? Yes   When was the diagnosis? 6/2020   Is there a confirmed diagnosis from a biopsy/tissue reviewed by pathology? Is patient aware of diagnosis? Yes   Is there a personal history of cancer and what kind? Is there a family history of cancer and what kind? Reason for visit? New Diagnosis   Have you had any imaging or labs done? If so: when, where? Yes     Are records in EPIC? Yes  In Care Everywhere   Was the patient told to bring a disk? no   Does the patient smoke or Vape? no   If yes, how many packs or cartridges per day? Scheduling Information:   Preferred [place]:  [facility] Haile     Are there any dates/time the patient cannot be seen? Miscellaneous:    After completing the above information, please route to Financial Counselor and the appropriate Nurse Navigator for review

## 2020-07-01 NOTE — PROGRESS NOTES
TRANSITION OF CARE OFFICE VISIT  [facility] - [facility]    NAME: Mary Ann Blackmon  AGE: 23 y o  SEX: female  : 2000     DATE: 2020     Assessment and Plan:     Diagnoses and all orders for this visit:    Other acute pulmonary embolism without acute cor pulmonale ([facility] [address] )  -     Ambulatory referral to Hematology / Oncology; Future    Duplicated left renal collecting system    Factor V Leiden mutation [facility])  -     Ambulatory referral to Hematology / Oncology; Future    Anxiety    Other chronic pain  -     Discontinue: gabapentin (NEURONTIN) 300 mg capsule; Take 1 capsule (300 mg total) by mouth 3 (three) times a day  -     Discontinue: lidocaine (LIDODERM) 5 %; Apply 1 patch topically daily  -     lidocaine (LIDODERM) 5 %; Apply 1 patch topically daily  -     gabapentin (NEURONTIN) 300 mg capsule; Take 1 capsule (300 mg total) by mouth 3 (three) times a day      At this time highly recommend that the patient establish care with a hematologist   Referral given to her today  Continue on Xarelto 15 mg twice a day and transition next week to 20 mg daily  Patient is to continue gabapentin 300 mg t i d  We will not be making any adjustments till her next follow-up  Continue lidocaine patch as well  Patient's anxiety was controlled on BuSpar 10 mg t i d  For some reason it was held  Would recommend her to discuss with her urologist if it is okay to resume this medication  From my standpoint I do believe the patient would be a better candidate on BuSpar  If patient has any worsening pain she is to report directly to emergency room  However at this time she is stable     Counseling:     · Medication Side Effects: Adverse side effects of medications were reviewed with the patient/guardian today    · I have spent 30 minutes with Patient  today in which greater than 50% of this time was spent in counseling/coordination of care regarding Diagnostic results, Prognosis, Risks and benefits of tx options, Intructions for management, Patient and family education, Importance of tx compliance, Risk factor reductions and Impressions  · Barriers to treatment include: No identified barriers     Transitional Care Management Review:     Nadir Witt is a 23 y o  female here for TCM follow-up    During the TCM phone call patient stated:    TCM Call (since 5/31/2020)     Date and time call was made  6/30/2020  3:45 PM    Patient was hospitialized at  Other (comment)    [address] [place] [address]  Date of Admission  06/11/20    Date of discharge  06/26/20    Diagnosis  Blood clots rt lung    Disposition  Home    Current Symptoms  Leg pain - left side; Leg pain - right side; Chest pain    Left side leg pain severity  Mild    Leg pain, left side, onset  Ongoing    Right side leg pain severity  Mild    Leg pain, right side, onset  Ongoing    Chest pain severity  Moderate    Chest pain onset  Sudden <img src='C:FILES (X86)      TCM Call (since 5/31/2020)     Scheduled for follow up?   Yes    Did you obtain your prescribed medications  Yes    Do you need help managing your prescriptions or medications  No    Is transportation to your appointment needed  No    I have advised the patient to call PCP with any new or worsening symptoms  [id number]    Living Arrangements  Spouse or Significiant other    Are you recieving any outpatient services  No    Are you recieving home care services  No    Are you using any community resources  No    Current waiver services  No    Have you fallen in the last 12 months  No    Interperter language line needed  No    Comments  Pt advised ER if leg pain/chest pain is worse           HPI:     3-year-old with a significant history of factor 5 Leiden, obesity, atrophic duplex left kidney and Ureterocele s/p multiple interventions that led to most recent left nephrectomy on May 21, 2020 at [facility]   Unfortunately the patient had a complication of the left nephrectomy of a left-sided retroperitoneal hematoma for which she was admitted from June 2nd to June 7th however she continued to have significant pain  Patient was receiving Lovenox prophylactically however was discontinued  Patient unfortunately reported back to the emergency room on June 18th through June 22nd for worsening shortness of breath and was found to have multiple right-sided pulmonary emboli  Her risk factors at that time were factor 5 laden, obesity, recent surgery and decreased mobility after surgery  She was started on Lovenox and transition to Xarelto  Currently taking Xarelto 15 mg twice a day and will transition to 20 mg daily  Patient had significant pain secondary to the hematoma and was placed on gabapentin 300 mg t i d  Lidocaine patches and was advised to taper down at her follow-up with her PCP  Patient was given rescue Dilaudid but has not needed any of the pain pills  Patient is set to follow-up with her urologist Dr Mariam Funez for transitional care  Currently patient only has left-sided pain that comes and goes and sometimes comes down her legs  Denies any fevers, chest pain  Does have some shortness of breath with scapula pain  The following portions of the patient's history were reviewed and updated as appropriate: allergies, current medications, past family history, past medical history, past social history, past surgical history and problem list      Review of Systems:     Review of Systems   Constitutional: Positive for activity change, appetite change and fatigue  Negative for chills and fever  HENT: Negative for congestion  Respiratory: Positive for shortness of breath  Negative for cough and chest tightness  Cardiovascular: Negative for chest pain and leg swelling  Gastrointestinal: Negative for abdominal distention, abdominal pain, constipation, diarrhea, nausea and vomiting     Musculoskeletal:        Shooting pain down bilateral legs    Pain between both scapulas   All other systems reviewed and are negative  Problem List:     Patient Active Problem List   Diagnosis    Allergic cough    Factor V Leiden ([facility] [address] )    Factor V Leiden mutation ([facility] [address] )    Mild intermittent asthma with acute exacerbation    Mixed stress and urge urinary incontinence    VUR (vesicoureteric reflux)    Recurrent UTI    Overactive bladder    Kidney stone    Duplicated left renal collecting system    Chronic tonsillitis    Chronic tonsillar hypertrophy    Exercise-induced asthma        Objective:     /76 (BP Location: Left arm, Patient Position: Sitting, Cuff Size: Standard)   Pulse (!) 118   Temp 99 3 °F (37 4 °C) (Tympanic)   Resp 20   Ht 5' 5" (1 651 m)   Wt 90 2 kg (198 lb 12 8 oz)   SpO2 98%   BMI 33 08 kg/m²     Physical Exam   Constitutional: She is oriented to person, place, and time  She appears well-developed and well-nourished  HENT:   Head: Normocephalic and atraumatic  Right Ear: External ear normal    Left Ear: External ear normal    Nose: Nose normal    Mouth/Throat: Oropharynx is clear and moist    Eyes: Pupils are equal, round, and reactive to light  Conjunctivae and EOM are normal    Neck: Normal range of motion  Neck supple  Cardiovascular: Normal rate, regular rhythm, normal heart sounds and intact distal pulses  Pulmonary/Chest: Effort normal and breath sounds normal  No respiratory distress  Abdominal:   Laparoscopic umbilical slight slight discharged with no signs of infection    Left flank completely healed   Musculoskeletal: Normal range of motion  She exhibits no edema  Neurological: She is alert and oriented to person, place, and time  Skin: Skin is warm  Capillary refill takes less than 2 seconds  Vitals reviewed  Laboratory Results: I have personally reviewed the pertinent laboratory results/reports     Radiology/Other Diagnostic Testing Results: I have personally reviewed pertinent reports        Fl Vcug Voiding Urethrocystogram    Result Date: 12/28/2016  VOIDING CYSTOURETHROGRAM INDICATION:  UTI  COMPARISON:  None IMAGES: 50 FLUOROSCOPY TIME:   1 43mft TECHNIQUE: After catheterizing the urinary bladder using sterile technique, approximately 250 mL Cysto-Conray II was instilled by gravity  Under fluoroscopic guidance, evaluation of the urinary tract was performed during bladder filling and voiding  FINDINGS: A preliminary radiograph of the abdomen is unremarkable  Early filling of the urinary bladder demonstrates no filling defect to suggest a ureterocele  Urinary bladder contour and capacity are normal    There is no vesicoureteral reflux with bladder filling or voiding  Voiding delineates normal urethra  There is a normal post-void residual volume  Normal VCUG   Workstation performed: [id number]        Current Medications:     Outpatient Medications Prior to Visit   Medication Sig Dispense Refill    acetaminophen (TYLENOL) 500 mg tablet Take 500 mg by mouth      albuterol (PROVENTIL HFA,VENTOLIN HFA) 90 mcg/act inhaler Inhale 1 puff every 6 (six) hours 1 Inhaler 0    busPIRone (BUSPAR) 10 mg tablet Take 10 mg by mouth 3 (three) times a day   0    docusate sodium (COLACE) 100 mg capsule Take 100 mg by mouth      gabapentin (NEURONTIN) 300 mg capsule Take 300 mg by mouth Three times a day      hydrOXYzine HCL (ATARAX) 50 mg tablet Take 1 tablet by mouth 3 (three) times a day as needed  1    levonorgestrel (KYLEENA) 19 5 MG intrauterine device 1 each by Intrauterine route      lidocaine (LIDODERM) 5 % Place 1 patch on the skin      melatonin 3 mg Take 6 mg by mouth      sertraline (ZOLOFT) 100 mg tablet Take 1 5 tablets (150 mg total) by mouth daily 45 tablet 1    fluticasone-salmeterol (ADVAIR DISKUS) 100-50 mcg/dose inhaler Inhale 1 puff every 12 (twelve) hours (Patient not taking: Reported on 7/1/2020) 1 Inhaler 0    HYDROmorphone (DILAUDID) 2 mg tablet       montelukast (SINGULAIR) 10 mg tablet Take 1 tablet by mouth daily      nystatin (MYCOSTATIN) 100,000 units/mL suspension Apply 5 mL (500,000 Units total) to the mouth or throat 4 (four) times a day (Patient not taking: Reported on 12/27/2019) 473 mL 1    oxyCODONE (ROXICODONE) 5 mg/5 mL solution   0    predniSONE 20 mg tablet 2 tabs po x2d, 1 tab po x2d, 1/2 tab po x2d--take w food (Patient not taking: Reported on 6/30/2020) 10 tablet 0    sulfamethoxazole-trimethoprim (BACTRIM) 400-80 mg per tablet Take 80 mg by mouth daily       TiZANidine (ZANAFLEX) 2 MG capsule Take 2 mg by mouth 3 (three) times a day       No facility-administered medications prior to visit          Stefan Montes De Oca MD  [facility]

## 2020-07-08 ENCOUNTER — APPOINTMENT (EMERGENCY)
Dept: RADIOLOGY | Facility: HOSPITAL | Age: 20
End: 2020-07-08
Payer: COMMERCIAL

## 2020-07-08 ENCOUNTER — HOSPITAL ENCOUNTER (EMERGENCY)
Facility: HOSPITAL | Age: 20
Discharge: HOME | End: 2020-07-08
Attending: EMERGENCY MEDICINE | Admitting: EMERGENCY MEDICINE
Payer: COMMERCIAL

## 2020-07-08 ENCOUNTER — TELEPHONE (OUTPATIENT)
Dept: FAMILY MEDICINE CLINIC | Facility: CLINIC | Age: 20
End: 2020-07-08

## 2020-07-08 VITALS
DIASTOLIC BLOOD PRESSURE: 55 MMHG | OXYGEN SATURATION: 97 % | RESPIRATION RATE: 24 BRPM | WEIGHT: 202 LBS | SYSTOLIC BLOOD PRESSURE: 104 MMHG | BODY MASS INDEX: 33.61 KG/M2 | TEMPERATURE: 99 F | HEART RATE: 70 BPM

## 2020-07-08 DIAGNOSIS — R42 DIZZINESS: Primary | ICD-10-CM

## 2020-07-08 LAB
ALBUMIN SERPL BCP-MCNC: 3.2 G/DL (ref 3.5–5)
ALP SERPL-CCNC: 83 U/L (ref 46–116)
ALT SERPL W P-5'-P-CCNC: 31 U/L (ref 12–78)
AMPHETAMINES SERPL QL SCN: NEGATIVE
ANION GAP SERPL CALCULATED.3IONS-SCNC: 8 MMOL/L (ref 4–13)
APTT PPP: 37 SECONDS (ref 23–37)
AST SERPL W P-5'-P-CCNC: 18 U/L (ref 5–45)
ATRIAL RATE: 67 BPM
BACTERIA UR QL AUTO: ABNORMAL /HPF
BARBITURATES UR QL: NEGATIVE
BASOPHILS # BLD AUTO: 0.02 THOUSANDS/ΜL (ref 0–0.1)
BASOPHILS NFR BLD AUTO: 0 % (ref 0–1)
BENZODIAZ UR QL: POSITIVE
BILIRUB SERPL-MCNC: 0.4 MG/DL (ref 0.2–1)
BILIRUB UR QL STRIP: NEGATIVE
BUN SERPL-MCNC: 7 MG/DL (ref 5–25)
CALCIUM SERPL-MCNC: 8.6 MG/DL (ref 8.3–10.1)
CHLORIDE SERPL-SCNC: 105 MMOL/L (ref 100–108)
CLARITY UR: ABNORMAL
CO2 SERPL-SCNC: 26 MMOL/L (ref 21–32)
COCAINE UR QL: NEGATIVE
COLOR UR: ABNORMAL
CREAT SERPL-MCNC: 0.79 MG/DL (ref 0.6–1.3)
EOSINOPHIL # BLD AUTO: 0.08 THOUSAND/ΜL (ref 0–0.61)
EOSINOPHIL NFR BLD AUTO: 1 % (ref 0–6)
ERYTHROCYTE [DISTWIDTH] IN BLOOD BY AUTOMATED COUNT: 14.9 % (ref 11.6–15.1)
EXT PREG TEST URINE: NEGATIVE
EXT. CONTROL ED NAV: NORMAL
GFR SERPL CREATININE-BSD FRML MDRD: 107 ML/MIN/1.73SQ M
GLUCOSE SERPL-MCNC: 110 MG/DL (ref 65–140)
GLUCOSE UR STRIP-MCNC: NEGATIVE MG/DL
HCT VFR BLD AUTO: 34.3 % (ref 34.8–46.1)
HGB BLD-MCNC: 11.2 G/DL (ref 11.5–15.4)
HGB UR QL STRIP.AUTO: ABNORMAL
IMM GRANULOCYTES # BLD AUTO: 0.02 THOUSAND/UL (ref 0–0.2)
IMM GRANULOCYTES NFR BLD AUTO: 0 % (ref 0–2)
INR PPP: 1.74 (ref 0.84–1.19)
KETONES UR STRIP-MCNC: NEGATIVE MG/DL
LEUKOCYTE ESTERASE UR QL STRIP: NEGATIVE
LYMPHOCYTES # BLD AUTO: 2.45 THOUSANDS/ΜL (ref 0.6–4.47)
LYMPHOCYTES NFR BLD AUTO: 33 % (ref 14–44)
MCH RBC QN AUTO: 30.9 PG (ref 26.8–34.3)
MCHC RBC AUTO-ENTMCNC: 32.7 G/DL (ref 31.4–37.4)
MCV RBC AUTO: 95 FL (ref 82–98)
METHADONE UR QL: NEGATIVE
MONOCYTES # BLD AUTO: 0.49 THOUSAND/ΜL (ref 0.17–1.22)
MONOCYTES NFR BLD AUTO: 7 % (ref 4–12)
NEUTROPHILS # BLD AUTO: 4.4 THOUSANDS/ΜL (ref 1.85–7.62)
NEUTS SEG NFR BLD AUTO: 59 % (ref 43–75)
NITRITE UR QL STRIP: NEGATIVE
NON-SQ EPI CELLS URNS QL MICRO: ABNORMAL /HPF
NRBC BLD AUTO-RTO: 0 /100 WBCS
OPIATES UR QL SCN: NEGATIVE
OXYCODONE+OXYMORPHONE UR QL SCN: NEGATIVE
P AXIS: 38 DEGREES
PCP UR QL: NEGATIVE
PH UR STRIP.AUTO: 6 [PH]
PLATELET # BLD AUTO: 301 THOUSANDS/UL (ref 149–390)
PMV BLD AUTO: 9.7 FL (ref 8.9–12.7)
POTASSIUM SERPL-SCNC: 4.2 MMOL/L (ref 3.5–5.3)
PR INTERVAL: 122 MS
PROT SERPL-MCNC: 6.8 G/DL (ref 6.4–8.2)
PROT UR STRIP-MCNC: NEGATIVE MG/DL
PROTHROMBIN TIME: 20.1 SECONDS (ref 11.6–14.5)
QRS AXIS: 32 DEGREES
QRSD INTERVAL: 64 MS
QT INTERVAL: 376 MS
QTC INTERVAL: 397 MS
RBC # BLD AUTO: 3.63 MILLION/UL (ref 3.81–5.12)
RBC #/AREA URNS AUTO: ABNORMAL /HPF
SODIUM SERPL-SCNC: 139 MMOL/L (ref 136–145)
SP GR UR STRIP.AUTO: 1.02 (ref 1–1.03)
T WAVE AXIS: 26 DEGREES
THC UR QL: NEGATIVE
TROPONIN I SERPL-MCNC: <0.02 NG/ML
TSH SERPL DL<=0.05 MIU/L-ACNC: 0.75 UIU/ML (ref 0.36–3.74)
UROBILINOGEN UR QL STRIP.AUTO: 0.2 E.U./DL
VENTRICULAR RATE: 67 BPM
WBC # BLD AUTO: 7.46 THOUSAND/UL (ref 4.31–10.16)
WBC #/AREA URNS AUTO: ABNORMAL /HPF

## 2020-07-08 PROCEDURE — 85025 COMPLETE CBC W/AUTO DIFF WBC: CPT | Performed by: EMERGENCY MEDICINE

## 2020-07-08 PROCEDURE — 85730 THROMBOPLASTIN TIME PARTIAL: CPT | Performed by: EMERGENCY MEDICINE

## 2020-07-08 PROCEDURE — 99284 EMERGENCY DEPT VISIT MOD MDM: CPT | Performed by: EMERGENCY MEDICINE

## 2020-07-08 PROCEDURE — 81001 URINALYSIS AUTO W/SCOPE: CPT | Performed by: EMERGENCY MEDICINE

## 2020-07-08 PROCEDURE — 36415 COLL VENOUS BLD VENIPUNCTURE: CPT | Performed by: EMERGENCY MEDICINE

## 2020-07-08 PROCEDURE — 99284 EMERGENCY DEPT VISIT MOD MDM: CPT

## 2020-07-08 PROCEDURE — 84443 ASSAY THYROID STIM HORMONE: CPT | Performed by: EMERGENCY MEDICINE

## 2020-07-08 PROCEDURE — 80307 DRUG TEST PRSMV CHEM ANLYZR: CPT | Performed by: EMERGENCY MEDICINE

## 2020-07-08 PROCEDURE — 84484 ASSAY OF TROPONIN QUANT: CPT | Performed by: EMERGENCY MEDICINE

## 2020-07-08 PROCEDURE — 70450 CT HEAD/BRAIN W/O DYE: CPT

## 2020-07-08 PROCEDURE — 93010 ELECTROCARDIOGRAM REPORT: CPT | Performed by: INTERNAL MEDICINE

## 2020-07-08 PROCEDURE — 81025 URINE PREGNANCY TEST: CPT | Performed by: EMERGENCY MEDICINE

## 2020-07-08 PROCEDURE — 93005 ELECTROCARDIOGRAM TRACING: CPT

## 2020-07-08 PROCEDURE — 80053 COMPREHEN METABOLIC PANEL: CPT | Performed by: EMERGENCY MEDICINE

## 2020-07-08 PROCEDURE — 85610 PROTHROMBIN TIME: CPT | Performed by: EMERGENCY MEDICINE

## 2020-07-08 RX ORDER — ALBUTEROL SULFATE 90 UG/1
2 AEROSOL, METERED RESPIRATORY (INHALATION) EVERY 6 HOURS PRN
COMMUNITY
End: 2020-12-09 | Stop reason: SDUPTHER

## 2020-07-08 NOTE — ED NOTES
Pt updated on plan of care  Questions reviewed  Verbalized understanding       Sheldon Maya RN  07/08/20 0998

## 2020-07-10 ENCOUNTER — TELEPHONE (OUTPATIENT)
Dept: FAMILY MEDICINE CLINIC | Facility: CLINIC | Age: 20
End: 2020-07-10

## 2020-07-10 NOTE — TELEPHONE ENCOUNTER
Michelle Martin is one day short on her xarelto 15mg bid  She is supposed to start taking one 20mg a day tomorrow  Should she start new dose or can someone call in 2 15mg pills? Please advise

## 2020-07-10 NOTE — TELEPHONE ENCOUNTER
Spoke with Dr Liz Riojas  Patient should start 20 mg daily today  Please call patient to let her know

## 2020-07-16 ENCOUNTER — OFFICE VISIT (OUTPATIENT)
Dept: FAMILY MEDICINE CLINIC | Facility: CLINIC | Age: 20
End: 2020-07-16
Payer: COMMERCIAL

## 2020-07-16 VITALS
WEIGHT: 202.2 LBS | DIASTOLIC BLOOD PRESSURE: 68 MMHG | OXYGEN SATURATION: 97 % | SYSTOLIC BLOOD PRESSURE: 92 MMHG | RESPIRATION RATE: 16 BRPM | HEART RATE: 80 BPM | HEIGHT: 65 IN | TEMPERATURE: 98.6 F | BODY MASS INDEX: 33.69 KG/M2

## 2020-07-16 DIAGNOSIS — Z90.5 S/P NEPHRECTOMY: ICD-10-CM

## 2020-07-16 DIAGNOSIS — I26.99 OTHER PULMONARY EMBOLISM WITHOUT ACUTE COR PULMONALE, UNSPECIFIED CHRONICITY (HCC): Primary | ICD-10-CM

## 2020-07-16 DIAGNOSIS — D68.51 FACTOR V LEIDEN MUTATION (HCC): ICD-10-CM

## 2020-07-16 DIAGNOSIS — G89.29 OTHER CHRONIC PAIN: ICD-10-CM

## 2020-07-16 DIAGNOSIS — F32.89 OTHER DEPRESSION: ICD-10-CM

## 2020-07-16 PROCEDURE — 1036F TOBACCO NON-USER: CPT | Performed by: FAMILY MEDICINE

## 2020-07-16 PROCEDURE — 99214 OFFICE O/P EST MOD 30 MIN: CPT | Performed by: FAMILY MEDICINE

## 2020-07-16 PROCEDURE — 3008F BODY MASS INDEX DOCD: CPT | Performed by: FAMILY MEDICINE

## 2020-07-16 RX ORDER — LIDOCAINE 50 MG/G
1 PATCH TOPICAL DAILY
Qty: 30 PATCH | Refills: 0 | Status: SHIPPED | OUTPATIENT
Start: 2020-07-16 | End: 2020-07-20 | Stop reason: SDUPTHER

## 2020-07-16 RX ORDER — LIDOCAINE 50 MG/G
1 PATCH TOPICAL DAILY
Qty: 30 PATCH | Refills: 0 | Status: SHIPPED | OUTPATIENT
Start: 2020-07-16 | End: 2020-07-16 | Stop reason: SDUPTHER

## 2020-07-16 RX ORDER — SERTRALINE HYDROCHLORIDE 100 MG/1
100 TABLET, FILM COATED ORAL DAILY
Start: 2020-07-16 | End: 2020-11-30

## 2020-07-17 NOTE — PROGRESS NOTES
Cassidy Bull 2000 female MRN: [id number]    [facility] PRACTICE OFFICE VISIT  [facility] - [address]      ASSESSMENT/PLAN  Cassidy Bull is a 23 y o  female presents to the office for    Diagnoses and all orders for this visit:    Other pulmonary embolism without acute cor pulmonale, unspecified chronicity (HCC)    Other chronic pain  -     Discontinue: lidocaine (LIDODERM) 5 %; Apply 1 patch topically daily  -     lidocaine (LIDODERM) 5 %; Apply 1 patch topically daily    Other depression  -     sertraline (ZOLOFT) 100 mg tablet; Take 1 tablet (100 mg total) by mouth daily    Factor V Leiden mutation [facility])    S/p nephrectomy    Other orders  -     rivaroxaban (XARELTO) 20 mg tablet; Take 20 mg by mouth daily     At this time continue the patient on Xarelto  I did advise her that chest tightness is usually demonstrated in pulmonary embolism setting  Would recommend her to follow-up with her hematologist as originally scheduled  As well as establish with a local hematologist   In the setting of factor 5 Leiden unsure if the patient will need long-term medications  Status post nephrectomy with hematoma being followed by [facility]  Called pharmacy to have medications released, error on my part by sending to the wrong pharmacy in the first place  Depression continue on medicaiton as prescribed  Future Appointments   Date Time Provider Renetta Zayas   8/19/2020 10:00 AM Leonard Mendoza MD [facility]          SUBJECTIVE  CC: Transition of Care Management (pt here for ER f/u for dizziness, does not feel herself and it continues on/off)      HPI:  Cassidy Bull is a 23 y o  female who presents for a follow up appointment  Dizziness comes and goes, doesn't feel liek herself since PEs  Patient being followed by [facility]  Hasn't established with Hematology here  Will be seeing [facility] this month   Pain radiaiton to legs thinks its 2/2 to hematoma of the left nehrectomy  Patient has been taking her medications as prescribed  Hx of Facto V leidien  Depression taking medications as prescribed, doesn't believe its fullycontrolled  Review of Systems   Constitutional: Negative for activity change, appetite change, chills, fatigue and fever  HENT: Negative for congestion  Respiratory: Positive for chest tightness  Negative for cough and shortness of breath  Cardiovascular: Negative for chest pain and leg swelling  Gastrointestinal: Negative for abdominal distention, abdominal pain, constipation, diarrhea, nausea and vomiting  Musculoskeletal: Positive for arthralgias (b/l leg pain with radation to ankle)  All other systems reviewed and are negative        Historical Information   The patient history was reviewed as follows:  Past Medical History:   Diagnosis Date    Anxiety and depression     Asthma     Clotting disorder (HCC)     Factor V Leiden ([facility] [address] )     Pulmonary embolism (HCC)     Thrombophlebitis     UTI (urinary tract infection) due to Enterococcus          Medications:     Current Outpatient Medications:     albuterol (PROVENTIL HFA,VENTOLIN HFA) 90 mcg/act inhaler, Inhale 2 puffs every 6 (six) hours as needed for wheezing, Disp: , Rfl:     busPIRone (BUSPAR) 10 mg tablet, Take 10 mg by mouth as needed , Disp: , Rfl: 0    gabapentin (NEURONTIN) 300 mg capsule, Take 1 capsule (300 mg total) by mouth 3 (three) times a day, Disp: 90 capsule, Rfl: 0    levonorgestrel (KYLEENA) 19 5 MG intrauterine device, 1 each by Intrauterine route, Disp: , Rfl:     lidocaine (LIDODERM) 5 %, Apply 1 patch topically daily, Disp: 30 patch, Rfl: 0    melatonin 3 mg, Take 6 mg by mouth, Disp: , Rfl:     rivaroxaban (XARELTO) 20 mg tablet, Take 20 mg by mouth daily, Disp: , Rfl:     sertraline (ZOLOFT) 100 mg tablet, Take 1 tablet (100 mg total) by mouth daily, Disp: , Rfl:     Allergies   Allergen Reactions    Amoxicillin Anaphylaxis    Cinnamon OBJECTIVE  Vitals:   Vitals:    07/16/20 1453   BP: 92/68   BP Location: Left arm   Patient Position: Sitting   Cuff Size: Standard   Pulse: 80   Resp: 16   Temp: 98 6 °F (37 °C)   TempSrc: Tympanic   SpO2: 97%   Weight: 91 7 kg (202 lb 3 2 oz)   Height: 5' 5" (1 651 m)         Physical Exam   Constitutional: She is oriented to person, place, and time  Vital signs are normal  She appears well-developed and well-nourished  HENT:   Head: Normocephalic and atraumatic  Eyes: Pupils are equal, round, and reactive to light  Conjunctivae and EOM are normal    Neck: Normal range of motion  Neck supple  Cardiovascular: Normal rate, regular rhythm, S1 normal, S2 normal, normal heart sounds and intact distal pulses  No murmur heard  Pulmonary/Chest: Effort normal and breath sounds normal  No respiratory distress  She has no wheezes  Musculoskeletal: Normal range of motion  She exhibits no edema  Neurological: She is alert and oriented to person, place, and time  She has normal strength  Skin: Skin is warm  Psychiatric: She has a normal mood and affect  Her speech is normal and behavior is normal  Judgment and thought content normal    Vitals reviewed                   Lui Paulino MD,   [facility]  7/16/2020

## 2020-07-20 DIAGNOSIS — G89.29 OTHER CHRONIC PAIN: ICD-10-CM

## 2020-07-20 RX ORDER — LIDOCAINE 50 MG/G
1 PATCH TOPICAL DAILY
Qty: 30 PATCH | Refills: 0 | Status: CANCELLED | OUTPATIENT
Start: 2020-07-20

## 2020-07-20 RX ORDER — LIDOCAINE 50 MG/G
1 PATCH TOPICAL DAILY
Qty: 30 PATCH | Refills: 0 | Status: SHIPPED | OUTPATIENT
Start: 2020-07-20 | End: 2020-07-29 | Stop reason: SDUPTHER

## 2020-07-29 DIAGNOSIS — I26.99 OTHER PULMONARY EMBOLISM WITHOUT ACUTE COR PULMONALE, UNSPECIFIED CHRONICITY (HCC): Primary | ICD-10-CM

## 2020-07-29 DIAGNOSIS — G89.29 OTHER CHRONIC PAIN: ICD-10-CM

## 2020-07-29 RX ORDER — LIDOCAINE 50 MG/G
1 PATCH TOPICAL DAILY
Qty: 30 PATCH | Refills: 0 | Status: SHIPPED | OUTPATIENT
Start: 2020-07-29 | End: 2020-08-05 | Stop reason: SDUPTHER

## 2020-07-29 NOTE — TELEPHONE ENCOUNTER
Elsi:    Patient needs a refill of rivaroxban and lidocaine patches sent to [address] [place] [address] in [place]

## 2020-08-03 ENCOUNTER — TELEPHONE (OUTPATIENT)
Dept: FAMILY MEDICINE CLINIC | Facility: CLINIC | Age: 20
End: 2020-08-03

## 2020-08-05 ENCOUNTER — TELEPHONE (OUTPATIENT)
Dept: FAMILY MEDICINE CLINIC | Facility: CLINIC | Age: 20
End: 2020-08-05

## 2020-08-05 DIAGNOSIS — G89.29 OTHER CHRONIC PAIN: ICD-10-CM

## 2020-08-05 RX ORDER — LIDOCAINE 50 MG/G
1 PATCH TOPICAL DAILY
Qty: 30 PATCH | Refills: 2 | Status: SHIPPED | OUTPATIENT
Start: 2020-08-05 | End: 2020-11-30

## 2020-08-18 ENCOUNTER — DOCUMENTATION (OUTPATIENT)
Dept: HEMATOLOGY ONCOLOGY | Facility: MEDICAL CENTER | Age: 20
End: 2020-08-18

## 2020-08-19 ENCOUNTER — CONSULT (OUTPATIENT)
Dept: HEMATOLOGY ONCOLOGY | Facility: MEDICAL CENTER | Age: 20
End: 2020-08-19
Payer: COMMERCIAL

## 2020-08-19 VITALS
OXYGEN SATURATION: 98 % | HEART RATE: 96 BPM | TEMPERATURE: 98.3 F | DIASTOLIC BLOOD PRESSURE: 84 MMHG | WEIGHT: 207.4 LBS | SYSTOLIC BLOOD PRESSURE: 120 MMHG | RESPIRATION RATE: 18 BRPM | HEIGHT: 65 IN | BODY MASS INDEX: 34.55 KG/M2

## 2020-08-19 DIAGNOSIS — I26.99 OTHER ACUTE PULMONARY EMBOLISM WITHOUT ACUTE COR PULMONALE (HCC): ICD-10-CM

## 2020-08-19 DIAGNOSIS — D68.51 FACTOR V LEIDEN MUTATION (HCC): ICD-10-CM

## 2020-08-19 PROCEDURE — 1036F TOBACCO NON-USER: CPT | Performed by: INTERNAL MEDICINE

## 2020-08-19 PROCEDURE — 99244 OFF/OP CNSLTJ NEW/EST MOD 40: CPT | Performed by: INTERNAL MEDICINE

## 2020-08-19 PROCEDURE — 3008F BODY MASS INDEX DOCD: CPT | Performed by: INTERNAL MEDICINE

## 2020-08-19 NOTE — PROGRESS NOTES
Harika Vargas  2000  [facility] [place] [facility] [address] [address]  HEMATOLOGY/ONCOLOGY CONSULTATION REPORT    DISCUSSION/SUMMARY:    18-year-old female with factor 5 Leiden mutation and recent PE after surgery  Presently Ms Blackmon states feeling okay, better than before, no acute respiratory issues  The plan is to continue with the Eliquis; patient will monitor for any excessive bruising or bleeding  Ms Tania Douglass understands that she needs to call the Hematology office if she is scheduled for any invasive procedure or surgery (anticoagulation manipulation)  We will eventually discuss the possibility of maintenance Xarelto in the future (after 6 months of treatment)  We also discussed what patient needs to monitor for as far as any acute pulmonary issues, lower extremity swelling, pain, cords etc   Ms Tania Douglass has no plans for children at this time but patient will let hematology know in the future when she plans to begin having children  Patient will continue to be active, exercising, taking walks etc   Patient also understands that she needs to lose weight  Patient will also follow up with renal/urology as directed  Patient is to return in 3 months  Afterwards, the follow-up interval can be lengthened  Patient knows to call the hematology/oncology office if there are any other questions or concerns  Carefully review your medication list and verify that the list is accurate and up-to-date  Please call the hematology/oncology office if there are medications missing from the list, medications on the list that you are not currently taking or if there is a dosage or instruction that is different from how you're taking that medication  Patient goals and areas of care:   Anticoagulation  Barriers to care:  None  Patient is able to self-care   ______________________________________________________________________________________    Chief Complaint   Patient presents with    Consult     Thrombophilia     Oncology History    No history exists  History of Present Illness:  22-year-old female with history of factor 5 Leiden mutation, obesity, atrophic duplex left kidney status post recent left-sided nephrectomy (05/21/2020) admitted to [facility] with chest pain and fevers  Workup demonstrated multiple pulmonary emboli  The left-sided nephrectomy was complicated by a left-sided retroperitoneal hematoma for which patient required readmission in early June 2020  Patient's mother was diagnosed with factor 5 Leiden years ago, patient was found to have the factor 5 mutation in 2017 but had never had any clotting issues  Although the specifics are not entirely clear, it is thought that the recent PE was due to immobility (from the recent surgical complications) as well as the factor 5 Leiden mutation  Patient is presently on Xarelto 20 mg a day  Ms Sully Scott states feeling okay, better than before  No shortness of breath, minimal dyspnea with significant exertion  No cough, sputum or hemoptysis  No headaches, blurred vision or dizziness  No lower extremity swelling or pain, no cords  Appetite is good, weight is stable  No other GI,  or GYN issues  Review of Systems   Constitutional: Negative  HENT: Negative  Eyes: Negative  Respiratory: Negative  Cardiovascular: Negative  Gastrointestinal: Negative  Endocrine: Negative  Genitourinary: Negative  Musculoskeletal: Negative  Skin: Negative  Allergic/Immunologic: Negative  Neurological: Negative  Hematological: Negative  Psychiatric/Behavioral: Negative  All other systems reviewed and are negative      Patient Active Problem List   Diagnosis    Allergic cough    Factor V Leiden ([address]    Factor V Leiden mutation ([address]    Mild intermittent asthma with acute exacerbation    Mixed stress and urge urinary incontinence    VUR (vesicoureteric reflux)    Recurrent UTI    Overactive bladder    Kidney stone    Duplicated left renal collecting system    Chronic tonsillitis    Chronic tonsillar hypertrophy    Exercise-induced asthma    Pulmonary embolus (HCC)    S/p nephrectomy     Past Medical History:   Diagnosis Date    Anxiety and depression     Asthma     Clotting disorder ([facility] )     Factor V Leiden ([facility] )     Pulmonary embolism (HCC)     Thrombophlebitis     UTI (urinary tract infection) due to Enterococcus      Past Surgical History:   Procedure Laterality Date    BLADDER SURGERY      NEPHRECTOMY Left     URETER REVISION     Past surgical history:  No prior blood transfusions    Ob gyn:  No breast pathology, patient has an IUD in place, has menstrual periods, interval and amount of bleeding seems to be normal    Family History   Problem Relation Age of Onset    Hypertension Mother     Hyperlipidemia Mother     Diabetes Mother     Factor V Leiden deficiency Mother     Ovarian cancer Maternal Grandmother     Arthritis Family     Asthma Family     Cancer Family     Cervical cancer Family     Heart failure Family     Hyperlipidemia Family     Hypertension Family     Diabetes Other     Heart disease Father    Family history:  No children, patient's mother with factor 5 Leiden    Social History     Socioeconomic History    Marital status: Single     Spouse name: Not on file    Number of children: Not on file    Years of education: Not on file    Highest education level: Not on file   Occupational History    Not on file   Social Needs    Financial resource strain: Not on file    Food insecurity     Worry: Not on file     Inability: Not on file    Transportation needs     Medical: Not on file     Non-medical: Not on file   Tobacco Use    Smoking status: Never Smoker    Smokeless tobacco: Never Used   Substance and Sexual Activity    Alcohol use: Yes     Frequency: 2-4 times a month     Comment: social    Drug use: Never    Sexual activity: Not on file   Lifestyle    Physical activity     Days per week: Not on file     Minutes per session: Not on file    Stress: Not on file   Relationships    Social connections     Talks on phone: Not on file     Gets together: Not on file     Attends Sikhism service: Not on file     Active member of club or organization: Not on file     Attends meetings of clubs or organizations: Not on file     Relationship status: Not on file    Intimate partner violence     Fear of current or ex partner: Not on file     Emotionally abused: Not on file     Physically abused: Not on file     Forced sexual activity: Not on file   Other Topics Concern    Not on file   Social History Narrative    Not on file   Social history:  Patient recently graduated from [employer] 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, no tobacco or drug abuse, social alcohol, no toxic exposure    Current Outpatient Medications:     albuterol (PROVENTIL HFA,VENTOLIN HFA) 90 mcg/act inhaler, Inhale 2 puffs every 6 (six) hours as needed for wheezing, Disp: , Rfl:     busPIRone (BUSPAR) 10 mg tablet, Take 10 mg by mouth as needed , Disp: , Rfl: 0    gabapentin (NEURONTIN) 300 mg capsule, Take 1 capsule (300 mg total) by mouth 3 (three) times a day, Disp: 90 capsule, Rfl: 0    levonorgestrel (KYLEENA) 19 5 MG intrauterine device, 1 each by Intrauterine route, Disp: , Rfl:     lidocaine (LIDODERM) 5 %, Apply 1 patch topically daily, Disp: 30 patch, Rfl: 2    melatonin 3 mg, Take 6 mg by mouth, Disp: , Rfl:     rivaroxaban (XARELTO) 20 mg tablet, Take 1 tablet (20 mg total) by mouth daily, Disp: 30 tablet, Rfl: 2    sertraline (ZOLOFT) 100 mg tablet, Take 1 tablet (100 mg total) by mouth daily, Disp: , Rfl:     Probiotic Product (PROBIOTIC-10 PO), Take by mouth, Disp: , Rfl:     Allergies   Allergen Reactions    Amoxicillin Anaphylaxis    Cinnamon      Vitals:    08/19/20 1023   BP: 120/84   Pulse: 96   Resp: 18   Temp: 98 3 °F (36 8 °C)   SpO2: 98%     Physical Exam  Constitutional:       Appearance: She is well-developed  HENT:      Head: Normocephalic and atraumatic  Right Ear: External ear normal       Left Ear: External ear normal    Eyes:      Conjunctiva/sclera: Conjunctivae normal       Pupils: Pupils are equal, round, and reactive to light  Neck:      Musculoskeletal: Normal range of motion and neck supple  Cardiovascular:      Rate and Rhythm: Normal rate and regular rhythm  Heart sounds: Normal heart sounds  Pulmonary:      Effort: Pulmonary effort is normal       Breath sounds: Normal breath sounds  Comments: Good air entry bilaterally, clear  Abdominal:      General: Bowel sounds are normal       Palpations: Abdomen is soft  Comments: Soft, nontender, obese, cannot palpate liver or spleen, well-healed suture line in umbilicus and left lower quadrant   Musculoskeletal: Normal range of motion  Skin:     General: Skin is warm  Comments: Warm, moist, good color, no petechiae or ecchymoses   Neurological:      Mental Status: She is alert and oriented to person, place, and time  Deep Tendon Reflexes: Reflexes are normal and symmetric  Psychiatric:         Behavior: Behavior normal          Thought Content:  Thought content normal          Judgment: Judgment normal      Extremities:  No lower extremity edema bilaterally, no cords, pulses are 1+  Lymphatics:  No adenopathy in the neck, supraclavicular region or axilla bilaterally    Labs    07/31/2020 WBC = 9 8 hemoglobin = 13 3 hematocrit = 40 7 MCV = 94 platelet = 017 neutrophil = 74% lymphocytes = 20% monocyte = 5% BUN = 10 creatinine = 0 7 calcium = 9 5  07/31/2020 serum pregnancy = negative    07/08/2020 rapid drug screen, urine:  Benzodiazepine positive    Imaging    07/31/2020 CTA chest    [facility]  [facility] of [facility] embolism in the right  lower lobe segmental arteries, now nonocclusive and mostly wall  adherent  2   Improved right lower lobe opacities with residual posterior  dependent consolidation, consistent with pulmonary infarct     06/19/2020 lower extremity Doppler    Sluggish flow and meniscus appearance of the greater saphenous  vein and common femoral vein was discussed with and acknowledged  by Dr Calderón Neighbours by telephone by Dr Jennifer Aguilar on 6/19/2020  12:14 PM     07/08/2020 CT scan head without contrast   Impression stated normal head CT     06/18/2020 CTA chest    1   Pulmonary embolism at the level of the right interlobar  pulmonary artery with likely associated pulmonary infarction  2   Postsurgical findings in the left renal fossa include fluid  and gas      Pathology    07/31/2020 D-dimer = 0 475    06/23/2020 thrombophilia evaluation ([facility])  Factor 2 = 140 %  Factor 5 = 120%  Factor 7 = 81%  Factor 9 = 171% = elevated  Factor 10 = 134%  Factor 8 = 385% = elevated  Protein C functional = 92%  Protein S functional = 100%  DRVVT screen = 56 7 seconds = elevated  DRVVT confirm = 1 1 = WNL  Antithrombin 3 functional = 94%  Prothrombin gene mutation negative    06/02/2020 PT = 18 6 INR = 1 56 PTT = 27 2    08/14/2017 factor 5 Leiden mutation heterozygous [id number]    Surgical Pathology CaseResulted: 5/28/2020 5:05 PM  [facility]  Component Name Value Ref Range   Pathology Surgical Left kidney and ureter, nephrectomy and total ureterectomy:  - 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, 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) with segmental   interstitial fibrosis, chronic inflammation, and calcification  - Nephrolithiasis  - Duplicated collecting system with mild chronic inflammation  Microscopic Description:  Microscopic sections of the kidney show normally developed renal parenchyma with   focal areas of interstitial fibrosis and calcification of the renal papillae     There is chronic inflammation and denuded urothelial mucosa at the renal pelvis  The lower pole contains areas of scar with cystic tubules containing   eosinophilic proteinaceous material and vessels with fibrointimal hypertrophy     The ureters are duplicated along their entire length and show patchy mild   chronic inflammation of the urothelial mucosa

## 2020-08-31 DIAGNOSIS — G89.29 OTHER CHRONIC PAIN: ICD-10-CM

## 2020-08-31 RX ORDER — GABAPENTIN 300 MG/1
CAPSULE ORAL
Qty: 90 CAPSULE | Refills: 0 | Status: SHIPPED | OUTPATIENT
Start: 2020-08-31 | End: 2020-11-30

## 2020-09-08 ENCOUNTER — HOSPITAL ENCOUNTER (EMERGENCY)
Facility: HOSPITAL | Age: 20
Discharge: HOME | End: 2020-09-08
Attending: EMERGENCY MEDICINE | Admitting: EMERGENCY MEDICINE
Payer: COMMERCIAL

## 2020-09-08 ENCOUNTER — APPOINTMENT (EMERGENCY)
Dept: CT IMAGING | Facility: HOSPITAL | Age: 20
End: 2020-09-08
Payer: COMMERCIAL

## 2020-09-08 ENCOUNTER — APPOINTMENT (EMERGENCY)
Dept: RADIOLOGY | Facility: HOSPITAL | Age: 20
End: 2020-09-08
Payer: COMMERCIAL

## 2020-09-08 ENCOUNTER — OFFICE VISIT (OUTPATIENT)
Dept: FAMILY MEDICINE CLINIC | Facility: CLINIC | Age: 20
End: 2020-09-08
Payer: COMMERCIAL

## 2020-09-08 VITALS
WEIGHT: 211.2 LBS | BODY MASS INDEX: 35.15 KG/M2 | DIASTOLIC BLOOD PRESSURE: 83 MMHG | SYSTOLIC BLOOD PRESSURE: 122 MMHG | RESPIRATION RATE: 17 BRPM | OXYGEN SATURATION: 98 % | TEMPERATURE: 98.1 F | HEART RATE: 77 BPM

## 2020-09-08 VITALS
WEIGHT: 211.2 LBS | OXYGEN SATURATION: 98 % | HEIGHT: 65 IN | BODY MASS INDEX: 35.19 KG/M2 | DIASTOLIC BLOOD PRESSURE: 86 MMHG | TEMPERATURE: 98.3 F | SYSTOLIC BLOOD PRESSURE: 116 MMHG | RESPIRATION RATE: 16 BRPM | HEART RATE: 69 BPM

## 2020-09-08 DIAGNOSIS — I26.99 OTHER PULMONARY EMBOLISM WITHOUT ACUTE COR PULMONALE, UNSPECIFIED CHRONICITY (HCC): ICD-10-CM

## 2020-09-08 DIAGNOSIS — R07.89 CHEST PRESSURE: Primary | ICD-10-CM

## 2020-09-08 DIAGNOSIS — R07.9 CHEST PAIN: Primary | ICD-10-CM

## 2020-09-08 DIAGNOSIS — F41.9 ANXIETY: ICD-10-CM

## 2020-09-08 LAB
ALBUMIN SERPL BCP-MCNC: 4 G/DL (ref 3.5–5)
ALP SERPL-CCNC: 82 U/L (ref 46–116)
ALT SERPL W P-5'-P-CCNC: 23 U/L (ref 12–78)
ANION GAP SERPL CALCULATED.3IONS-SCNC: 8 MMOL/L (ref 4–13)
APTT PPP: 33 SECONDS (ref 23–37)
AST SERPL W P-5'-P-CCNC: 13 U/L (ref 5–45)
BACTERIA UR QL AUTO: ABNORMAL /HPF
BASOPHILS # BLD AUTO: 0.01 THOUSANDS/ΜL (ref 0–0.1)
BASOPHILS NFR BLD AUTO: 0 % (ref 0–1)
BILIRUB SERPL-MCNC: 0.26 MG/DL (ref 0.2–1)
BILIRUB UR QL STRIP: NEGATIVE
BUN SERPL-MCNC: 9 MG/DL (ref 5–25)
CALCIUM SERPL-MCNC: 8.9 MG/DL (ref 8.3–10.1)
CHLORIDE SERPL-SCNC: 103 MMOL/L (ref 100–108)
CLARITY UR: CLEAR
CO2 SERPL-SCNC: 28 MMOL/L (ref 21–32)
COLOR UR: YELLOW
CREAT SERPL-MCNC: 0.88 MG/DL (ref 0.6–1.3)
EOSINOPHIL # BLD AUTO: 0.05 THOUSAND/ΜL (ref 0–0.61)
EOSINOPHIL NFR BLD AUTO: 1 % (ref 0–6)
ERYTHROCYTE [DISTWIDTH] IN BLOOD BY AUTOMATED COUNT: 12.9 % (ref 11.6–15.1)
EXT PREG TEST URINE: NEGATIVE
EXT. CONTROL ED NAV: NORMAL
GFR SERPL CREATININE-BSD FRML MDRD: 94 ML/MIN/1.73SQ M
GLUCOSE SERPL-MCNC: 95 MG/DL (ref 65–140)
GLUCOSE UR STRIP-MCNC: NEGATIVE MG/DL
HCT VFR BLD AUTO: 45.3 % (ref 34.8–46.1)
HGB BLD-MCNC: 14.7 G/DL (ref 11.5–15.4)
HGB UR QL STRIP.AUTO: ABNORMAL
IMM GRANULOCYTES # BLD AUTO: 0.02 THOUSAND/UL (ref 0–0.2)
IMM GRANULOCYTES NFR BLD AUTO: 0 % (ref 0–2)
INR PPP: 1.38 (ref 0.84–1.19)
KETONES UR STRIP-MCNC: NEGATIVE MG/DL
LEUKOCYTE ESTERASE UR QL STRIP: NEGATIVE
LYMPHOCYTES # BLD AUTO: 1.78 THOUSANDS/ΜL (ref 0.6–4.47)
LYMPHOCYTES NFR BLD AUTO: 27 % (ref 14–44)
MCH RBC QN AUTO: 30.6 PG (ref 26.8–34.3)
MCHC RBC AUTO-ENTMCNC: 32.5 G/DL (ref 31.4–37.4)
MCV RBC AUTO: 94 FL (ref 82–98)
MONOCYTES # BLD AUTO: 0.35 THOUSAND/ΜL (ref 0.17–1.22)
MONOCYTES NFR BLD AUTO: 5 % (ref 4–12)
NEUTROPHILS # BLD AUTO: 4.35 THOUSANDS/ΜL (ref 1.85–7.62)
NEUTS SEG NFR BLD AUTO: 67 % (ref 43–75)
NITRITE UR QL STRIP: NEGATIVE
NON-SQ EPI CELLS URNS QL MICRO: ABNORMAL /HPF
NRBC BLD AUTO-RTO: 0 /100 WBCS
PH UR STRIP.AUTO: 7 [PH] (ref 4.5–8)
PLATELET # BLD AUTO: 280 THOUSANDS/UL (ref 149–390)
PMV BLD AUTO: 10.1 FL (ref 8.9–12.7)
POTASSIUM SERPL-SCNC: 3.8 MMOL/L (ref 3.5–5.3)
PROT SERPL-MCNC: 8.2 G/DL (ref 6.4–8.2)
PROT UR STRIP-MCNC: NEGATIVE MG/DL
PROTHROMBIN TIME: 17.1 SECONDS (ref 11.6–14.5)
RBC # BLD AUTO: 4.8 MILLION/UL (ref 3.81–5.12)
RBC #/AREA URNS AUTO: ABNORMAL /HPF
SODIUM SERPL-SCNC: 139 MMOL/L (ref 136–145)
SP GR UR STRIP.AUTO: 1.02 (ref 1–1.03)
TROPONIN I SERPL-MCNC: <0.02 NG/ML
UROBILINOGEN UR QL STRIP.AUTO: 0.2 E.U./DL
WBC # BLD AUTO: 6.56 THOUSAND/UL (ref 4.31–10.16)
WBC #/AREA URNS AUTO: ABNORMAL /HPF

## 2020-09-08 PROCEDURE — 99214 OFFICE O/P EST MOD 30 MIN: CPT | Performed by: FAMILY MEDICINE

## 2020-09-08 PROCEDURE — 96361 HYDRATE IV INFUSION ADD-ON: CPT

## 2020-09-08 PROCEDURE — 96360 HYDRATION IV INFUSION INIT: CPT

## 2020-09-08 PROCEDURE — 85730 THROMBOPLASTIN TIME PARTIAL: CPT | Performed by: EMERGENCY MEDICINE

## 2020-09-08 PROCEDURE — G1004 CDSM NDSC: HCPCS

## 2020-09-08 PROCEDURE — 80053 COMPREHEN METABOLIC PANEL: CPT | Performed by: EMERGENCY MEDICINE

## 2020-09-08 PROCEDURE — 85025 COMPLETE CBC W/AUTO DIFF WBC: CPT | Performed by: EMERGENCY MEDICINE

## 2020-09-08 PROCEDURE — 81025 URINE PREGNANCY TEST: CPT | Performed by: PHYSICIAN ASSISTANT

## 2020-09-08 PROCEDURE — 99285 EMERGENCY DEPT VISIT HI MDM: CPT | Performed by: PHYSICIAN ASSISTANT

## 2020-09-08 PROCEDURE — 93005 ELECTROCARDIOGRAM TRACING: CPT

## 2020-09-08 PROCEDURE — 71275 CT ANGIOGRAPHY CHEST: CPT

## 2020-09-08 PROCEDURE — 36415 COLL VENOUS BLD VENIPUNCTURE: CPT | Performed by: EMERGENCY MEDICINE

## 2020-09-08 PROCEDURE — 1036F TOBACCO NON-USER: CPT | Performed by: FAMILY MEDICINE

## 2020-09-08 PROCEDURE — 81001 URINALYSIS AUTO W/SCOPE: CPT

## 2020-09-08 PROCEDURE — 84484 ASSAY OF TROPONIN QUANT: CPT | Performed by: EMERGENCY MEDICINE

## 2020-09-08 PROCEDURE — 85610 PROTHROMBIN TIME: CPT | Performed by: EMERGENCY MEDICINE

## 2020-09-08 PROCEDURE — 99285 EMERGENCY DEPT VISIT HI MDM: CPT

## 2020-09-08 PROCEDURE — 71045 X-RAY EXAM CHEST 1 VIEW: CPT

## 2020-09-08 RX ORDER — SODIUM CHLORIDE 9 MG/ML
125 INJECTION, SOLUTION INTRAVENOUS CONTINUOUS
Status: DISCONTINUED | OUTPATIENT
Start: 2020-09-08 | End: 2020-09-08 | Stop reason: HOSPADM

## 2020-09-08 RX ADMIN — SODIUM CHLORIDE 125 ML/HR: 0.9 INJECTION, SOLUTION INTRAVENOUS at 13:12

## 2020-09-08 RX ADMIN — IOHEXOL 95 ML: 350 INJECTION, SOLUTION INTRAVENOUS at 15:05

## 2020-09-08 NOTE — PROGRESS NOTES
Brett Correa 2000 female MRN: [id number]    [address] [place] [address]      ASSESSMENT/PLAN  Brett Correa is a 23 y o  female presents to the office for    Diagnoses and all orders for this visit:    Chest pressure  -     CTA chest pe study; Future  -     Basic metabolic panel; Future  -     Heparin level; Future    Other pulmonary embolism without acute cor pulmonale, unspecified chronicity (HCC)  -     CTA chest pe study; Future  -     Basic metabolic panel; Future  -     Heparin level; Future    Anxiety       Concerning patient's worsening chest pressure with chest tightness  EKG performed today was within normal limits  Will obtain a CT of the chest   Patient is to continue taking Xarelto 20 mg daily  Reached out to chop no return of call  Would like them to be in the loop of what is going on with her patient  Patient will obtain a BMP prior to see TIA  Currently patient's anxiety is very well controlled on Zoloft    ADDENDUM: [facility] called they want her to go to the ER for a full evaluation of CTA/ DDIMER, and Heparin level ( to be sure patient is taking her medications )  They stated they don't want her to go for any outpatient testing  Future Appointments   Date Time Provider Renetta Zayas   9/9/2020  8:00 AM BE [facility]   11/18/2020 11:00 AM Kari Banerjee MD [facility]          SUBJECTIVE  CC: Follow-up (pt here f/u anxiety, feels chest pressure, upper back pain, hx of clots) and Anxiety      HPI:  Brett Correa is a 23 y o  female past medical history of factor 5 laden mutation, atrophic duplex left kidney status post nephrectomy on May 21, 2020  Being managed fully by [facility]; and our local hematologist   Patient had a pulmonary embolism just recently diagnosed and was placed on Xarelto 20 mg daily    She had a scan that was performed on July 31st that showed improvement of the pulmonary embolism  Unfortunately the patient has had on and off for 2 weeks chest tightness and pressure  States that his become more persistent and she wakes up and goes to bed with the same pain  She states she has not missed any doses  Believes that her anxiety is very well controlled in takes her medications as prescribed  Patient states that she has not done anything exercising just to be sure that clot does not get worse  Review of Systems   Constitutional: Negative for activity change, appetite change, chills, fatigue and fever  HENT: Negative for congestion  Respiratory: Positive for chest tightness and shortness of breath (On exertion)  Negative for cough  Cardiovascular: Positive for chest pain  Negative for leg swelling  Gastrointestinal: Negative for abdominal distention, abdominal pain, constipation, diarrhea, nausea and vomiting  All other systems reviewed and are negative        Historical Information   The patient history was reviewed as follows:  Past Medical History:   Diagnosis Date    Anxiety and depression     Asthma     Clotting disorder (HCC)     [facility]     [facility]     Thrombophlebitis     UTI (urinary tract infection) due to Enterococcus          Medications:     Current Outpatient Medications:     albuterol (PROVENTIL HFA,VENTOLIN HFA) 90 mcg/act inhaler, Inhale 2 puffs every 6 (six) hours as needed for wheezing, Disp: , Rfl:     busPIRone (BUSPAR) 10 mg tablet, Take 10 mg by mouth as needed , Disp: , Rfl: 0    gabapentin (NEURONTIN) 300 mg capsule, take 1 capsule by mouth three times a day, Disp: 90 capsule, Rfl: 0    levonorgestrel (KYLEENA) 19 5 MG intrauterine device, 1 each by Intrauterine route, Disp: , Rfl:     lidocaine (LIDODERM) 5 %, Apply 1 patch topically daily, Disp: 30 patch, Rfl: 2    melatonin 3 mg, Take 6 mg by mouth, Disp: , Rfl:     Probiotic Product (PROBIOTIC-10 PO), Take by mouth, Disp: , Rfl:     rivaroxaban (XARELTO) 20 mg tablet, Take 1 tablet (20 mg total) by mouth daily, Disp: 30 tablet, Rfl: 2    sertraline (ZOLOFT) 100 mg tablet, Take 1 tablet (100 mg total) by mouth daily, Disp: , Rfl:     Allergies   Allergen Reactions    Amoxicillin Anaphylaxis    Cinnamon        OBJECTIVE  Vitals:   Vitals:    09/08/20 0842   BP: 116/86   BP Location: Left arm   Patient Position: Sitting   Cuff Size: Standard   Pulse: 69   Resp: 16   Temp: 98 3 °F (36 8 °C)   TempSrc: Temporal   SpO2: 98%   Weight: 95 8 kg (211 lb 3 2 oz)   Height: 5' 5" (1 651 m)         Physical Exam  Vitals signs reviewed  Constitutional:       Appearance: She is well-developed  HENT:      Head: Normocephalic and atraumatic  Eyes:      Conjunctiva/sclera: Conjunctivae normal       Pupils: Pupils are equal, round, and reactive to light  Neck:      Musculoskeletal: Normal range of motion and neck supple  Cardiovascular:      Rate and Rhythm: Normal rate and regular rhythm  Heart sounds: Normal heart sounds  Comments: Not reproducible chest pain  Pulmonary:      Effort: Pulmonary effort is normal  No respiratory distress  Breath sounds: Normal breath sounds  Musculoskeletal: Normal range of motion  Skin:     General: Skin is warm  Capillary Refill: Capillary refill takes less than 2 seconds  Neurological:      General: No focal deficit present  Mental Status: She is alert and oriented to person, place, and time  Mental status is at baseline  Psychiatric:         Mood and Affect: Mood normal          Behavior: Behavior normal          Thought Content:  Thought content normal          Judgment: Judgment normal                     Lv Muhammad MD,   [facility]  9/8/2020

## 2020-09-08 NOTE — ED PROVIDER NOTES
History  Chief Complaint   Patient presents with    Chest Pain     PT presents to ED with c/o "stabbing back pain and a feeling of someone sitting on my chest, and SOB when I do too much  Dr sent me here because I have 5 PEs in right lung since June" PT is taking xeralto     Patient presents emergency room with a 2 week history of intermittent episodes of sharp pleuritic chest pain  She has associated dyspnea as well  She recently had a nephrectomy performed in May and postoperatively developed pulmonary emboli  She was placed on Xarelto  She was also diagnosed with the factor 5 Leiden deficiency  She has been compliant with her medications  She denies any fever chills  She denies any coughing, postnasal drip, nasal congestion, sore throat  She denies any abdominal pain, black tarry stools, bright red blood per rectum  She denies any urinary symptoms of frequency, urgency, dysuria  She is on [place] birth control  Past medical history is positive for anxiety and depression, asthma, factor 5 Leiden deficiency, pulmonary emboli, thrombophlebitis, urinary tract infections        History provided by:  Patient  Chest Pain   Pain location:  Substernal area  Pain quality: sharp and stabbing    Pain radiates to:  Does not radiate  Pain radiates to the back: no    Pain severity:  Moderate  Onset quality:  Sudden  Duration:  2 weeks  Timing:  Intermittent  Progression:  Worsening  Chronicity:  New  Context: at rest    Context: not breathing, no drug use, not eating, not lifting, no movement, not raising an arm, no stress and no trauma    Relieved by:  Nothing  Worsened by:  Deep breathing and movement  Associated symptoms: shortness of breath    Associated symptoms: no abdominal pain, no AICD problem, no altered mental status, no anorexia, no anxiety, no back pain, no claudication, no cough, no diaphoresis, no dizziness, no dysphagia, no fatigue, no fever, no headache, no heartburn, no lower extremity edema, no nausea, no near-syncope, no numbness, no orthopnea, no palpitations, no PND, no syncope, not vomiting and no weakness    Risk factors: birth control    Risk factors: no aortic disease, no coronary artery disease, no diabetes mellitus, no James-Danlos syndrome, no high cholesterol, no hypertension, no immobilization, no Marfan's syndrome, not obese, not pregnant, no prior DVT/PE, no smoking and no surgery        Prior to Admission Medications   Prescriptions Last Dose Informant Patient Reported? Taking?    Probiotic Product (PROBIOTIC-10 PO)   Yes No   Sig: Take by mouth   albuterol (PROVENTIL HFA,VENTOLIN HFA) 90 mcg/act inhaler   Yes No   Sig: Inhale 2 puffs every 6 (six) hours as needed for wheezing   busPIRone (BUSPAR) 10 mg tablet  Self Yes No   Sig: Take 10 mg by mouth as needed    gabapentin (NEURONTIN) 300 mg capsule   No No   Sig: take 1 capsule by mouth three times a day   levonorgestrel (KYLEENA) 19 5 MG intrauterine device  Self Yes No   Si each by Intrauterine route   lidocaine (LIDODERM) 5 %   No No   Sig: Apply 1 patch topically daily   melatonin 3 mg   Yes No   Sig: Take 6 mg by mouth   rivaroxaban (XARELTO) 20 mg tablet   No No   Sig: Take 1 tablet (20 mg total) by mouth daily   sertraline (ZOLOFT) 100 mg tablet   No No   Sig: Take 1 tablet (100 mg total) by mouth daily      Facility-Administered Medications: None       Past Medical History:   Diagnosis Date    Anxiety and depression     Asthma     Clotting disorder (HCC)     Factor V Leiden ([address] )     Pulmonary embolism (HCC)     Thrombophlebitis     UTI (urinary tract infection) due to Enterococcus        Past Surgical History:   Procedure Laterality Date    BLADDER SURGERY      NEPHRECTOMY Left     URETER REVISION         Family History   Problem Relation Age of Onset    Hypertension Mother     Hyperlipidemia Mother     Diabetes Mother     Factor V Leiden deficiency Mother     Ovarian cancer Maternal Grandmother     Arthritis Family     Asthma Family     Cancer Family     Cervical cancer Family     Heart failure Family     Hyperlipidemia Family     Hypertension Family     Diabetes Other     Heart disease Father      I have reviewed and agree with the history as documented  E-Cigarette/Vaping    E-Cigarette Use Never User      E-Cigarette/Vaping Substances     Social History     Tobacco Use    Smoking status: Never Smoker    Smokeless tobacco: Never Used   Substance Use Topics    Alcohol use: Yes     Frequency: 2-4 times a month     Comment: social    Drug use: Never       Review of Systems   Constitutional: Positive for activity change  Negative for appetite change, chills, diaphoresis, fatigue and fever  HENT: Negative for congestion, dental problem, ear discharge, ear pain, postnasal drip, rhinorrhea, sore throat and trouble swallowing  Eyes: Negative for pain, discharge, redness and itching  Respiratory: Positive for shortness of breath  Negative for cough  Cardiovascular: Positive for chest pain  Negative for palpitations, orthopnea, claudication, syncope, PND and near-syncope  Gastrointestinal: Negative for abdominal pain, anorexia, heartburn, nausea and vomiting  Genitourinary: Negative for dysuria, frequency, hematuria and urgency  Musculoskeletal: Negative for back pain  Skin: Negative for color change, pallor, rash and wound  Neurological: Negative for dizziness, weakness, numbness and headaches  Psychiatric/Behavioral: Negative for confusion  All other systems reviewed and are negative  Physical Exam  Physical Exam  Vitals signs and nursing note reviewed  Constitutional:       General: She is not in acute distress  Appearance: She is well-developed  She is not ill-appearing, toxic-appearing or diaphoretic  HENT:      Head: Normocephalic and atraumatic  Neck:      Musculoskeletal: Neck supple  Cardiovascular:      Rate and Rhythm: Regular rhythm  Tachycardia present  Heart sounds: Normal heart sounds  No murmur  Pulmonary:      Effort: Pulmonary effort is normal  No tachypnea, accessory muscle usage or respiratory distress  Breath sounds: Normal breath sounds  No stridor  Abdominal:      Palpations: Abdomen is soft  There is no hepatomegaly  Tenderness: There is no guarding or rebound  Musculoskeletal:      Right lower leg: No edema  Left lower leg: No edema  Skin:     General: Skin is warm  Capillary Refill: Capillary refill takes less than 2 seconds  Neurological:      Mental Status: She is alert  She is disoriented     Psychiatric:         Mood and Affect: Mood normal          Behavior: Behavior normal          Vital Signs  ED Triage Vitals [09/08/20 1101]   Temperature Pulse Respirations Blood Pressure SpO2   98 1 °F (36 7 °C) 103 16 147/96 97 %      Temp Source Heart Rate Source Patient Position - Orthostatic VS BP Location FiO2 (%)   Oral Monitor Sitting Left arm --      Pain Score       6           Vitals:    09/08/20 1101 09/08/20 1322 09/08/20 1606   BP: 147/96 119/64 122/83   Pulse: 103 73 77   Patient Position - Orthostatic VS: Sitting Lying Sitting         Visual Acuity      ED Medications  Medications   sodium chloride 0 9 % infusion (0 mL/hr Intravenous Stopped 9/8/20 1608)   iohexol (OMNIPAQUE) 350 MG/ML injection (MULTI-DOSE) 95 mL (95 mL Intravenous Given 9/8/20 1505)       Diagnostic Studies  Results Reviewed     Procedure Component Value Units Date/Time    Urine Microscopic [[id number]]  (Abnormal) Collected:  09/08/20 1315    Lab Status:  Final result Specimen:  Urine, Clean Catch Updated:  09/08/20 1411     RBC, UA 1-2 /hpf      WBC, UA 2-4 /hpf      Epithelial Cells Occasional /hpf      Bacteria, UA Occasional /hpf     Comprehensive metabolic panel [[id number]] Collected:  09/08/20 1310    Lab Status:  Final result Specimen:  Blood from Arm, Right Updated:  09/08/20 1358     Sodium 139 mmol/L      Potassium 3 8 mmol/L Chloride 103 mmol/L      CO2 28 mmol/L      ANION GAP 8 mmol/L      BUN 9 mg/dL      Creatinine 0 88 mg/dL      Glucose 95 mg/dL      Calcium 8 9 mg/dL      AST 13 U/L      ALT 23 U/L      Alkaline Phosphatase 82 U/L      Total Protein 8 2 g/dL      Albumin 4 0 g/dL      Total Bilirubin 0 26 mg/dL      eGFR 94 ml/min/1 73sq m     Narrative:       National Kidney Disease Foundation guidelines for Chronic Kidney Disease (CKD):     Stage 1 with normal or high GFR (GFR > 90 mL/min/1 73 square meters)    Stage 2 Mild CKD (GFR = 60-89 mL/min/1 73 square meters)    Stage 3A Moderate CKD (GFR = 45-59 mL/min/1 73 square meters)    Stage 3B Moderate CKD (GFR = 30-44 mL/min/1 73 square meters)    Stage 4 Severe CKD (GFR = 15-29 mL/min/1 73 square meters)    Stage 5 End Stage CKD (GFR <15 mL/min/1 73 square meters)  Note: GFR calculation is accurate only with a steady state creatinine    Troponin I [[id number]]  (Normal) Collected:  09/08/20 1310    Lab Status:  Final result Specimen:  Blood from Arm, Right Updated:  09/08/20 1350     Troponin I <0 02 ng/mL     Protime-INR [[id number]]  (Abnormal) Collected:  09/08/20 1310    Lab Status:  Final result Specimen:  Blood from Arm, Right Updated:  09/08/20 1343     Protime 17 1 seconds      INR 1 38    APTT [[id number]]  (Normal) Collected:  09/08/20 1310    Lab Status:  Final result Specimen:  Blood from Arm, Right Updated:  09/08/20 1343     PTT 33 seconds     CBC and differential [[id number]] Collected:  09/08/20 1310    Lab Status:  Final result Specimen:  Blood from Arm, Right Updated:  09/08/20 1327     WBC 6 56 Thousand/uL      RBC 4 80 Million/uL      Hemoglobin 14 7 g/dL      Hematocrit 45 3 %      MCV 94 fL      MCH 30 6 pg      MCHC 32 5 g/dL      RDW 12 9 %      MPV 10 1 fL      Platelets 316 Thousands/uL      nRBC 0 /100 WBCs      Neutrophils Relative 67 %      Immat GRANS % 0 %      Lymphocytes Relative 27 %      Monocytes Relative 5 %      Eosinophils Relative 1 % Basophils Relative 0 %      Neutrophils Absolute 4 35 Thousands/µL      Immature Grans Absolute 0 02 Thousand/uL      Lymphocytes Absolute 1 78 Thousands/µL      Monocytes Absolute 0 35 Thousand/µL      Eosinophils Absolute 0 05 Thousand/µL      Basophils Absolute 0 01 Thousands/µL     POCT pregnancy, urine [[id number]]  (Normal) Resulted:  09/08/20 1322    Lab Status:  Final result Updated:  09/08/20 1322     EXT PREG TEST UR (Ref: Negative) NEGATIVE     Control VALID    Urine Macroscopic, POC [[id number]]  (Abnormal) Collected:  09/08/20 1315    Lab Status:  Final result Specimen:  Urine Updated:  09/08/20 1317     Color, UA Yellow     Clarity, UA Clear     pH, UA 7 0     Leukocytes, UA Negative     Nitrite, UA Negative     Protein, UA Negative mg/dl      Glucose, UA Negative mg/dl      Ketones, UA Negative mg/dl      Urobilinogen, UA 0 2 E U /dl      Bilirubin, UA Negative     Blood, UA Trace     Specific Concepcion, UA 1 025    Narrative:       CLINITEK RESULT                 CTA ED chest PE Study   ED Interpretation by Princess Morales PA-C (09/08 1621)   No acute pulmonary embolism  Resolving pulmonary infarct in the right lower lobe, corresponding with report from the chest CT from July 2020  Final Result by Gregory Shankar MD (09/08 1537)      No pulmonary embolus  Resolving pulmonary infarct in the right lower lobe, corresponding with report from the chest CT from July 2020 from the [facility]  Workstation performed: [id number]         XR chest 1 view portable   ED Interpretation by Princess Morales PA-C (09/08 1331)   No acute cardiopulmonary disease      Final Result by Gergory Shankar MD (09/08 1236)      No acute cardiopulmonary disease              Workstation performed: [id number]                    Procedures  ECG 12 Lead Documentation Only    Date/Time: 9/8/2020 1:54 PM  Performed by: Princess Morales PA-C  Authorized by: Princess Morales PA-C Indications / Diagnosis:  Pleuritic chest pain  ECG reviewed by me, the ED Provider: yes    Patient location:  ED  Previous ECG:     Previous ECG:  Compared to current    Comparison ECG info:  7/8/20    Similarity:  No change    Comparison to cardiac monitor: Yes    Interpretation:     Interpretation: non-specific    Rate:     ECG rate:  95    ECG rate assessment: normal    Rhythm:     Rhythm: sinus rhythm    Ectopy:     Ectopy: none    QRS:     QRS axis:  Normal    QRS intervals:  Normal  Conduction:     Conduction: normal    ST segments:     ST segments:  Normal  T waves:     T waves: normal    Comments:      Poor R-wave progression, cannot rule out anterior infarct, age undetermined  ED Course                               Wells' Criteria for PE      Most Recent Value   Wells' Criteria for PE   Clinical signs and symptoms of DVT  0 Filed at: 09/08/2020 1310   PE is primary diagnosis or equally likely  3 Filed at: 09/08/2020 1310   HR >100  1 5 Filed at: 09/08/2020 1310   Immobilization at least 3 days or Surgery in the previous 4 weeks  0 Filed at: 09/08/2020 1310   Previous, objectively diagnosed PE or DVT  1 5 Filed at: 09/08/2020 1310   Hemoptysis  0 Filed at: 09/08/2020 1310   Malignancy with treatment within 6 months or palliative  0 Filed at: 09/08/2020 1310   Wells' Criteria Total  6 Filed at: 09/08/2020 1310              MDM  Number of Diagnoses or Management Options  Chest pain: new and requires workup     Amount and/or Complexity of Data Reviewed  Clinical lab tests: ordered and reviewed  Tests in the radiology section of CPT®: ordered and reviewed  Tests in the medicine section of CPT®: ordered and reviewed    Risk of Complications, Morbidity, and/or Mortality  Presenting problems: high  Diagnostic procedures: high  Management options: high  General comments: Medical decision making-patient presented to the emergency room with complaints of pleuritic chest pain    She was recently diagnosed with pulmonary emboli and is on Xarelto  She complains of associated is shortness of breath  She was seen and examined  Laboratory studies were taken and were reviewed  An EKG did not demonstrate any acute signs of ischemia or evidence of a right heart strain  A repeat CTA of her chest was performed which demonstrated a resolving right lower lobe pulmonary embolism but no acute pulmonary emboli  Patient was Silvio Miranda sure that her scan was negative  She was instructed that she may take Tylenol 650 mg every 6 hours as needed for her pain  She will make a follow-up appointment with her family physician in 2 days for recheck exam   She was given reasons to return to the emergency room should her symptoms worsen  Patient Progress  Patient progress: stable      Disposition  Final diagnoses:   Chest pain     Time reflects when diagnosis was documented in both MDM as applicable and the Disposition within this note     Time User Action Codes Description Comment    9/8/2020  4:21 PM Wilmra Kirby Add [R07 9] Chest pain       ED Disposition     ED Disposition Condition Date/Time Comment    Discharge Stable Tue Sep 8, 2020  4:21 PM Val[place] 26 discharge to home/self care              Follow-up Information     Follow up With Specialties Details Why Contact Info    Moe Romero MD Family Medicine Schedule an appointment as soon as possible for a visit   [address] [place] [address]  [phone]            Discharge Medication List as of 9/8/2020  4:22 PM      CONTINUE these medications which have NOT CHANGED    Details   albuterol (PROVENTIL HFA,VENTOLIN HFA) 90 mcg/act inhaler Inhale 2 puffs every 6 (six) hours as needed for wheezing, Historical Med      busPIRone (BUSPAR) 10 mg tablet Take 10 mg by mouth as needed , Starting Thu 1/31/2019, Historical Med      gabapentin (NEURONTIN) 300 mg capsule take 1 capsule by mouth three times a day, Normal      levonorgestrel (KYLEENA) 19 5 MG intrauterine device 1 each by Intrauterine route, Historical Med      lidocaine (LIDODERM) 5 % Apply 1 patch topically daily, Starting Wed 8/5/2020, Normal      melatonin 3 mg Take 6 mg by mouth, Starting Fri 6/26/2020, Historical Med      Probiotic Product (PROBIOTIC-10 PO) Take by mouth, Historical Med      rivaroxaban (XARELTO) 20 mg tablet Take 1 tablet (20 mg total) by mouth daily, Starting Wed 7/29/2020, Normal      sertraline (ZOLOFT) 100 mg tablet Take 1 tablet (100 mg total) by mouth daily, Starting Thu 7/16/2020, No Print           No discharge procedures on file      PDMP Review     None          ED Provider  Electronically Signed by           Ariana Sutherland PA-C  09/08/20 5694

## 2020-09-09 ENCOUNTER — VBI (OUTPATIENT)
Dept: FAMILY MEDICINE CLINIC | Facility: CLINIC | Age: 20
End: 2020-09-09

## 2020-09-09 LAB
ATRIAL RATE: 95 BPM
P AXIS: 39 DEGREES
PR INTERVAL: 126 MS
QRS AXIS: 59 DEGREES
QRSD INTERVAL: 66 MS
QT INTERVAL: 320 MS
QTC INTERVAL: 402 MS
T WAVE AXIS: 33 DEGREES
VENTRICULAR RATE: 95 BPM

## 2020-09-09 PROCEDURE — 93010 ELECTROCARDIOGRAM REPORT: CPT | Performed by: INTERNAL MEDICINE

## 2020-09-09 NOTE — TELEPHONE ENCOUNTER
No she doesn't need to be seen because she is following up with hematology  So please advise her to continue as followed   Unless something worsens she can 100% come to us

## 2020-09-09 NOTE — TELEPHONE ENCOUNTER
Patient was sent to the ED yesterday by Dr Ketan Martin - ED instructions are to F/U with Dr Ketan Martin -    (I wanted to run this by the Dr  Before I call)      Please ask Dr  If patient should be called for a follow up appointment by [facility]  or Clinical staff      Thank you -

## 2020-09-10 ENCOUNTER — TRANSITIONAL CARE MANAGEMENT (OUTPATIENT)
Dept: FAMILY MEDICINE CLINIC | Facility: CLINIC | Age: 20
End: 2020-09-10

## 2020-11-30 ENCOUNTER — LAB (OUTPATIENT)
Dept: LAB | Facility: HOSPITAL | Age: 20
End: 2020-11-30
Attending: FAMILY MEDICINE
Payer: COMMERCIAL

## 2020-11-30 ENCOUNTER — OFFICE VISIT (OUTPATIENT)
Dept: FAMILY MEDICINE CLINIC | Facility: CLINIC | Age: 20
End: 2020-11-30
Payer: COMMERCIAL

## 2020-11-30 ENCOUNTER — HOSPITAL ENCOUNTER (OUTPATIENT)
Dept: RADIOLOGY | Facility: HOSPITAL | Age: 20
Discharge: HOME | End: 2020-11-30
Attending: FAMILY MEDICINE
Payer: COMMERCIAL

## 2020-11-30 ENCOUNTER — TRANSCRIBE ORDERS (OUTPATIENT)
Dept: ADMINISTRATIVE | Facility: HOSPITAL | Age: 20
End: 2020-11-30

## 2020-11-30 ENCOUNTER — TELEPHONE (OUTPATIENT)
Dept: FAMILY MEDICINE CLINIC | Facility: CLINIC | Age: 20
End: 2020-11-30

## 2020-11-30 VITALS
WEIGHT: 226 LBS | TEMPERATURE: 97.7 F | RESPIRATION RATE: 14 BRPM | HEART RATE: 72 BPM | OXYGEN SATURATION: 98 % | HEIGHT: 65 IN | BODY MASS INDEX: 37.65 KG/M2 | SYSTOLIC BLOOD PRESSURE: 110 MMHG | DIASTOLIC BLOOD PRESSURE: 72 MMHG

## 2020-11-30 DIAGNOSIS — D68.51 FACTOR V LEIDEN (HCC): ICD-10-CM

## 2020-11-30 DIAGNOSIS — R07.89 CHEST TIGHTNESS: ICD-10-CM

## 2020-11-30 DIAGNOSIS — R07.89 CHEST TIGHTNESS: Primary | ICD-10-CM

## 2020-11-30 DIAGNOSIS — Z86.711 HX OF PULMONARY EMBOLUS: ICD-10-CM

## 2020-11-30 LAB
ANION GAP SERPL CALCULATED.3IONS-SCNC: 9 MMOL/L (ref 4–13)
BUN SERPL-MCNC: 8 MG/DL (ref 5–25)
CALCIUM SERPL-MCNC: 8.2 MG/DL (ref 8.3–10.1)
CHLORIDE SERPL-SCNC: 101 MMOL/L (ref 100–108)
CO2 SERPL-SCNC: 24 MMOL/L (ref 21–32)
CREAT SERPL-MCNC: 0.78 MG/DL (ref 0.6–1.3)
D DIMER PPP FEU-MCNC: 0.33 UG/ML FEU
GFR SERPL CREATININE-BSD FRML MDRD: 108 ML/MIN/1.73SQ M
GLUCOSE SERPL-MCNC: 75 MG/DL (ref 65–140)
POTASSIUM SERPL-SCNC: 4.3 MMOL/L (ref 3.5–5.3)
SODIUM SERPL-SCNC: 134 MMOL/L (ref 136–145)

## 2020-11-30 PROCEDURE — 80048 BASIC METABOLIC PNL TOTAL CA: CPT

## 2020-11-30 PROCEDURE — G1004 CDSM NDSC: HCPCS

## 2020-11-30 PROCEDURE — 99214 OFFICE O/P EST MOD 30 MIN: CPT | Performed by: FAMILY MEDICINE

## 2020-11-30 PROCEDURE — 85379 FIBRIN DEGRADATION QUANT: CPT

## 2020-11-30 PROCEDURE — 71275 CT ANGIOGRAPHY CHEST: CPT

## 2020-11-30 PROCEDURE — 36415 COLL VENOUS BLD VENIPUNCTURE: CPT

## 2020-11-30 RX ADMIN — IOHEXOL 80 ML: 350 INJECTION, SOLUTION INTRAVENOUS at 12:48

## 2020-11-30 RX ADMIN — IOHEXOL 85 ML: 350 INJECTION, SOLUTION INTRAVENOUS at 12:35

## 2020-12-09 ENCOUNTER — TELEMEDICINE (OUTPATIENT)
Dept: FAMILY MEDICINE CLINIC | Facility: CLINIC | Age: 20
End: 2020-12-09
Payer: COMMERCIAL

## 2020-12-09 DIAGNOSIS — Z82.49 FAMILY HISTORY OF PULMONARY EMBOLISM: ICD-10-CM

## 2020-12-09 DIAGNOSIS — R06.2 WHEEZING: ICD-10-CM

## 2020-12-09 DIAGNOSIS — D68.51 FACTOR V LEIDEN MUTATION (HCC): ICD-10-CM

## 2020-12-09 DIAGNOSIS — R07.89 CHEST TIGHTNESS: ICD-10-CM

## 2020-12-09 DIAGNOSIS — R07.89 CHEST TIGHTNESS: Primary | ICD-10-CM

## 2020-12-09 PROCEDURE — U0003 INFECTIOUS AGENT DETECTION BY NUCLEIC ACID (DNA OR RNA); SEVERE ACUTE RESPIRATORY SYNDROME CORONAVIRUS 2 (SARS-COV-2) (CORONAVIRUS DISEASE [COVID-19]), AMPLIFIED PROBE TECHNIQUE, MAKING USE OF HIGH THROUGHPUT TECHNOLOGIES AS DESCRIBED BY CMS-2020-01-R: HCPCS | Performed by: FAMILY MEDICINE

## 2020-12-09 PROCEDURE — 99214 OFFICE O/P EST MOD 30 MIN: CPT | Performed by: FAMILY MEDICINE

## 2020-12-09 RX ORDER — ALBUTEROL SULFATE 90 UG/1
2 AEROSOL, METERED RESPIRATORY (INHALATION) EVERY 6 HOURS PRN
Qty: 1 INHALER | Refills: 2 | Status: SHIPPED | OUTPATIENT
Start: 2020-12-09 | End: 2022-03-23 | Stop reason: SDUPTHER

## 2020-12-09 RX ORDER — METHYLPREDNISOLONE 4 MG/1
TABLET ORAL
Qty: 21 EACH | Refills: 0 | Status: SHIPPED | OUTPATIENT
Start: 2020-12-09 | End: 2021-02-23

## 2020-12-09 RX ORDER — AZITHROMYCIN 250 MG/1
TABLET, FILM COATED ORAL
Qty: 6 TABLET | Refills: 0 | Status: SHIPPED | OUTPATIENT
Start: 2020-12-09 | End: 2020-12-14

## 2020-12-10 ENCOUNTER — TELEPHONE (OUTPATIENT)
Dept: FAMILY MEDICINE CLINIC | Facility: CLINIC | Age: 20
End: 2020-12-10

## 2020-12-10 LAB — SARS-COV-2 RNA SPEC QL NAA+PROBE: NOT DETECTED

## 2020-12-11 ENCOUNTER — TELEPHONE (OUTPATIENT)
Dept: HEMATOLOGY ONCOLOGY | Facility: MEDICAL CENTER | Age: 20
End: 2020-12-11

## 2020-12-11 ENCOUNTER — TELEMEDICINE (OUTPATIENT)
Dept: HEMATOLOGY ONCOLOGY | Facility: MEDICAL CENTER | Age: 20
End: 2020-12-11
Payer: COMMERCIAL

## 2020-12-11 DIAGNOSIS — D68.51 FACTOR V LEIDEN (HCC): Primary | ICD-10-CM

## 2020-12-11 PROCEDURE — 99213 OFFICE O/P EST LOW 20 MIN: CPT | Performed by: INTERNAL MEDICINE

## 2021-02-05 ENCOUNTER — TELEMEDICINE (OUTPATIENT)
Dept: FAMILY MEDICINE CLINIC | Facility: CLINIC | Age: 21
End: 2021-02-05
Payer: COMMERCIAL

## 2021-02-05 ENCOUNTER — TELEPHONE (OUTPATIENT)
Dept: FAMILY MEDICINE CLINIC | Facility: CLINIC | Age: 21
End: 2021-02-05

## 2021-02-05 DIAGNOSIS — F41.8 DEPRESSION WITH ANXIETY: Primary | ICD-10-CM

## 2021-02-05 PROCEDURE — 99214 OFFICE O/P EST MOD 30 MIN: CPT | Performed by: FAMILY MEDICINE

## 2021-02-05 RX ORDER — BUSPIRONE HYDROCHLORIDE 10 MG/1
10 TABLET ORAL AS NEEDED
Qty: 90 TABLET | Refills: 0 | Status: SHIPPED | OUTPATIENT
Start: 2021-02-05 | End: 2021-04-19 | Stop reason: SDUPTHER

## 2021-02-05 NOTE — PROGRESS NOTES
Virtual Regular Visit      Assessment/Plan:    Problem List Items Addressed This Visit     None      Visit Diagnoses     Depression with anxiety    -  Primary    Relevant Medications    sertraline (ZOLOFT) 50 mg tablet    busPIRone (BUSPAR) 10 mg tablet        Patient has uncontrolled anxiety  We have restarted her on  Zoloft 50 mg  Patient has a history of taking BuSpar as needed for anxiety  At this time recommend her to use it the same way she was on a prior but in the future there might be a chance that we will schedule her to have 7 5 mg of BuSpar twice a day  At this time monitor for 4 weeks    BMI Counseling: There is no height or weight on file to calculate BMI  The BMI is above normal  Nutrition recommendations include decreasing portion sizes, encouraging healthy choices of fruits and vegetables and consuming healthier snacks  Exercise recommendations include exercising 3-5 times per week  Reason for visit is   Chief Complaint   Patient presents with    Virtual Regular Visit        Encounter provider Endy Steinberg MD    Provider located at [address] [place] [phone]      Recent Visits  No visits were found meeting these conditions  Showing recent visits within past 7 days and meeting all other requirements     Today's Visits  Date Type Provider Dept   02/05/21 Telemedicine Endy Steinberg MD [address] [place] [address] today's visits and meeting all other requirements     Future Appointments  No visits were found meeting these conditions  Showing future appointments within next 150 days and meeting all other requirements        The patient was identified by name and date of birth  Wilder Patel was informed that this is a telemedicine visit and that the visit is being conducted through [facility] and patient was informed that this is a secure, HIPAA-compliant platform  She agrees to proceed     My office door was closed  No one else was in the room  She acknowledged consent and understanding of privacy and security of the video platform  The patient has agreed to participate and understands they can discontinue the visit at any time  Patient is aware this is a billable service  Subjective  Landon Clark is a 19 y o  female  Presents via video  Patient has a history of being on anxiety medications  She was on Zoloft 100 mg and BuSpar 10 mg as needed  Patient states she is requesting to be put back on given that she feels that her anxiety has only been worsening  And prefers to be started on medication before becomes a concern  History of therapy  Does have a script for atarx as needed for insomnia but hasn't used it yet  Just feels that she would be better with medications on board  Past Medical History:   Diagnosis Date    Anxiety and depression     Asthma     Clotting disorder ([facility] )     Factor V Leiden ([facility] )     Pulmonary embolism (HCC)     Thrombophlebitis     UTI (urinary tract infection) due to Enterococcus        Past Surgical History:   Procedure Laterality Date    BLADDER SURGERY      NEPHRECTOMY Left     URETER REVISION         Current Outpatient Medications   Medication Sig Dispense Refill    albuterol (PROVENTIL HFA,VENTOLIN HFA) 90 mcg/act inhaler Inhale 2 puffs every 6 (six) hours as needed for wheezing 1 Inhaler 2    busPIRone (BUSPAR) 10 mg tablet Take 1 tablet (10 mg total) by mouth as needed (anxiety) 90 tablet 0    levonorgestrel (KYLEENA) 19 5 MG intrauterine device 1 each by Intrauterine route      melatonin 3 mg Take 6 mg by mouth      methylPREDNISolone 4 MG tablet therapy pack Use as directed on package 21 each 0    Probiotic Product (PROBIOTIC-10 PO) Take by mouth      sertraline (ZOLOFT) 50 mg tablet Take 1 tablet (50 mg total) by mouth daily at bedtime for 7 days, THEN 2 tablets (100 mg total) daily at bedtime   67 tablet 1     No current facility-administered medications for this visit  Allergies   Allergen Reactions    Amoxicillin Anaphylaxis    Cinnamon        Review of Systems   Constitutional: Negative for activity change, appetite change, chills, fatigue and fever  HENT: Negative for congestion  Respiratory: Negative for cough, chest tightness and shortness of breath  Cardiovascular: Negative for chest pain and leg swelling  Gastrointestinal: Negative for abdominal distention, abdominal pain, constipation, diarrhea, nausea and vomiting  Psychiatric/Behavioral: Positive for sleep disturbance  The patient is nervous/anxious  All other systems reviewed and are negative  Video Exam    There were no vitals filed for this visit  Physical Exam  Constitutional:       Appearance: Normal appearance  Pulmonary:      Effort: Pulmonary effort is normal    Musculoskeletal: Normal range of motion  Neurological:      General: No focal deficit present  Mental Status: She is alert and oriented to person, place, and time  Psychiatric:         Mood and Affect: Mood normal          Behavior: Behavior normal          Thought Content: Thought content normal          Judgment: Judgment normal           I spent 15 minutes directly with the patient during this visit      [facility] acknowledges that she has consented to an online visit or consultation  She understands that the online visit is based solely on information provided by her, and that, in the absence of a face-to-face physical evaluation by the physician, the diagnosis she receives is both limited and provisional in terms of accuracy and completeness  This is not intended to replace a full medical face-to-face evaluation by the physician  Jason Aguillon understands and accepts these terms

## 2021-02-06 ENCOUNTER — TELEPHONE (OUTPATIENT)
Dept: FAMILY MEDICINE CLINIC | Facility: CLINIC | Age: 21
End: 2021-02-06

## 2021-02-06 NOTE — TELEPHONE ENCOUNTER
----- Message from Tiara Johnson sent at 2/5/2021 11:59 AM EST -----    ----- Message -----  From: Jaskaran Bryant MD  Sent: 2/5/2021  10:08 AM EST  To: Justine Taylor [place]    Please schedule virtual f/u for anxiety in 4 week

## 2021-02-06 NOTE — TELEPHONE ENCOUNTER
----- Message from Ruiz Le MD sent at 2/5/2021 10:53 PM EST -----      ----- Message -----  From: Tera Abraham MA  Sent: 2/5/2021  11:59 AM EST  To: Ruiz Le MD      ----- Message -----  From: Ruiz Le MD  Sent: 2/5/2021  10:08 AM EST  To: Justine Taylor Clerical    Please schedule virtual f/u for anxiety in 4 week

## 2021-02-09 ENCOUNTER — TELEPHONE (OUTPATIENT)
Dept: FAMILY MEDICINE CLINIC | Facility: CLINIC | Age: 21
End: 2021-02-09

## 2021-02-09 NOTE — TELEPHONE ENCOUNTER
Was she note responding to Atarax? That is what I was going to start her on at nighttime first? If no improvement was going to add on trazodone

## 2021-02-09 NOTE — TELEPHONE ENCOUNTER
Dr Donald Lemus      Patient is calling to see if you can call in a medication for sleeping that she spoke to you about on her last virtual    Patient also wanted to let you know that she found her old pill bottle of Zoloft and it was 200mg  Pharmacy is [address] [place] [address]

## 2021-02-10 DIAGNOSIS — F41.8 DEPRESSION WITH ANXIETY: Primary | ICD-10-CM

## 2021-02-10 RX ORDER — HYDROXYZINE 50 MG/1
50 TABLET, FILM COATED ORAL
Qty: 30 TABLET | Refills: 0 | Status: SHIPPED | OUTPATIENT
Start: 2021-02-10 | End: 2021-03-17 | Stop reason: SDUPTHER

## 2021-02-10 NOTE — TELEPHONE ENCOUNTER
Pt returned call, explained Dr Trip Ellington note, she is ok with starting Atarax, will give 4 weeks and let us know how she is doing, please send med to [facility] aide in [place]

## 2021-02-20 ENCOUNTER — TELEPHONE (OUTPATIENT)
Dept: FAMILY MEDICINE CLINIC | Facility: CLINIC | Age: 21
End: 2021-02-20

## 2021-02-20 ENCOUNTER — OFFICE VISIT (OUTPATIENT)
Dept: URGENT CARE | Facility: CLINIC | Age: 21
End: 2021-02-20
Payer: COMMERCIAL

## 2021-02-20 VITALS
HEIGHT: 65 IN | RESPIRATION RATE: 16 BRPM | HEART RATE: 70 BPM | SYSTOLIC BLOOD PRESSURE: 127 MMHG | TEMPERATURE: 98.2 F | OXYGEN SATURATION: 100 % | BODY MASS INDEX: 39.15 KG/M2 | WEIGHT: 235 LBS | DIASTOLIC BLOOD PRESSURE: 72 MMHG

## 2021-02-20 DIAGNOSIS — R30.0 DYSURIA: Primary | ICD-10-CM

## 2021-02-20 LAB
SL AMB  POCT GLUCOSE, UA: ABNORMAL
SL AMB LEUKOCYTE ESTERASE,UA: ABNORMAL
SL AMB POCT BILIRUBIN,UA: ABNORMAL
SL AMB POCT BLOOD,UA: ABNORMAL
SL AMB POCT CLARITY,UA: ABNORMAL
SL AMB POCT COLOR,UA: YELLOW
SL AMB POCT KETONES,UA: 40
SL AMB POCT NITRITE,UA: ABNORMAL
SL AMB POCT PH,UA: 5
SL AMB POCT SPECIFIC GRAVITY,UA: 1030
SL AMB POCT URINE PROTEIN: ABNORMAL
SL AMB POCT UROBILINOGEN: ABNORMAL

## 2021-02-20 PROCEDURE — 81002 URINALYSIS NONAUTO W/O SCOPE: CPT | Performed by: PHYSICIAN ASSISTANT

## 2021-02-20 PROCEDURE — 99213 OFFICE O/P EST LOW 20 MIN: CPT | Performed by: PHYSICIAN ASSISTANT

## 2021-02-20 PROCEDURE — 87086 URINE CULTURE/COLONY COUNT: CPT | Performed by: PHYSICIAN ASSISTANT

## 2021-02-20 RX ORDER — SULFAMETHOXAZOLE AND TRIMETHOPRIM 800; 160 MG/1; MG/1
1 TABLET ORAL EVERY 12 HOURS SCHEDULED
Qty: 6 TABLET | Refills: 0 | Status: SHIPPED | OUTPATIENT
Start: 2021-02-20 | End: 2021-02-22 | Stop reason: SDUPTHER

## 2021-02-20 NOTE — PROGRESS NOTES
[facility]        NAME: Landon Clark is a 19 y o  female  : 2000    MRN: [id number]  DATE: 2021  TIME: 3:54 PM    Assessment and Plan   Dysuria [R30 0]  1  Dysuria  Urine culture    sulfamethoxazole-trimethoprim (BACTRIM DS) 800-160 mg per tablet    POCT urine dip     Patient Instructions   Take the antibiotic as directed with food and water  Take a probiotic while taking this medication  Drink plenty of water  Call in 48-72 hours for the result of your urine culture  Changes to your treatment plan will be made at this time if necessary  Follow up with your family doctor in 3-5 days  Proceed to the ER if symptoms worsen  Chief Complaint     Chief Complaint   Patient presents with    Possible UTI     hematuria, dysuria, pessure     History of Present Illness       18 y/o female with hx of vesicoureteric reflux, recurrent UTIs, overactive bladder, and s/p left sided nephrectomy presenting with c/o dysuria x 3 days  She reports burning with urination, urinary urgency, and frequency  Two episodes of blood on tissue when wiping  Denies any vaginal pain, bleeding, or discharge  Reports intermittent suprapubic pressure  No F/C/S, nausea, vomiting, or flank pain  No tx tried  Unsure of last date of UTI, but denies any recent  Denies hx of antibiotic resistance or complications relating to UTIs  Review of Systems   Review of Systems   Constitutional: Negative for chills, diaphoresis and fever  Gastrointestinal: Positive for abdominal pain  Negative for nausea and vomiting  Genitourinary: Positive for dysuria, frequency and urgency  Negative for decreased urine volume, difficulty urinating, flank pain, vaginal bleeding, vaginal discharge and vaginal pain  Neurological: Negative for headaches       Current Medications       Current Outpatient Medications:     busPIRone (BUSPAR) 10 mg tablet, Take 1 tablet (10 mg total) by mouth as needed (anxiety), Disp: 90 tablet, Rfl: 0   hydrOXYzine HCL (ATARAX) 50 mg tablet, Take 1 tablet (50 mg total) by mouth daily at bedtime, Disp: 30 tablet, Rfl: 0    levonorgestrel (KYLEENA) 19 5 MG intrauterine device, 1 each by Intrauterine route, Disp: , Rfl:     Probiotic Product (PROBIOTIC-10 PO), Take by mouth, Disp: , Rfl:     sertraline (ZOLOFT) 50 mg tablet, Take 1 tablet (50 mg total) by mouth daily at bedtime for 7 days, THEN 2 tablets (100 mg total) daily at bedtime  , Disp: 67 tablet, Rfl: 1    albuterol (PROVENTIL HFA,VENTOLIN HFA) 90 mcg/act inhaler, Inhale 2 puffs every 6 (six) hours as needed for wheezing (Patient not taking: Reported on 2/20/2021), Disp: 1 Inhaler, Rfl: 2    melatonin 3 mg, Take 6 mg by mouth, Disp: , Rfl:     methylPREDNISolone 4 MG tablet therapy pack, Use as directed on package (Patient not taking: Reported on 2/20/2021), Disp: 21 each, Rfl: 0    sulfamethoxazole-trimethoprim (BACTRIM DS) 800-160 mg per tablet, Take 1 tablet by mouth every 12 (twelve) hours for 3 days, Disp: 6 tablet, Rfl: 0    Current Allergies     Allergies as of 02/20/2021 - Reviewed 02/20/2021   Allergen Reaction Noted    Amoxicillin Anaphylaxis 06/21/2017    Cinnamon  07/01/2020            The following portions of the patient's history were reviewed and updated as appropriate: allergies, current medications, past family history, past medical history, past social history, past surgical history and problem list      Past Medical History:   Diagnosis Date    Anxiety and depression     Asthma     [facility]     [facility] Leiden [facility]     [facility]     Thrombophlebitis     UTI (urinary tract infection) due to Enterococcus        Past Surgical History:   Procedure Laterality Date    BLADDER SURGERY      NEPHRECTOMY Left     URETER REVISION         Family History   Problem Relation Age of Onset    Hypertension Mother     Hyperlipidemia Mother     Diabetes Mother    Megan Pruitt Factor V Leiden deficiency Mother     Ovarian cancer Maternal Grandmother     Arthritis Family     Asthma Family     Cancer Family     Cervical cancer Family     Heart failure Family     Hyperlipidemia Family     Hypertension Family     Diabetes Other     Heart disease Father          Medications have been verified  Objective   /72   Pulse 70   Temp 98 2 °F (36 8 °C)   Resp 16   Ht 5' 5" (1 651 m)   Wt 107 kg (235 lb)   SpO2 100%   BMI 39 11 kg/m²   No LMP recorded  Patient has had an implant  Urine Dip:   Component 2/20/21 3:46 PM   LEUKOCYTE ESTERASE,UA neg    NITRITE,UA neg    SL AMB POCT UROBILINOGEN neg    POCT URINE PROTEIN neg     PH,UA 5    BLOOD,UA neg    SPECIFIC GRAVITY,UA 1,030    KETONES,UA 40    BILIRUBIN,UA neg    GLUCOSE, UA neg     COLOR,UA yellow    CLARITY,UA hazy      Physical Exam     Physical Exam  Vitals signs and nursing note reviewed  Constitutional:       General: She is not in acute distress  Appearance: She is well-developed  She is not ill-appearing or diaphoretic  HENT:      Head: Normocephalic and atraumatic  Eyes:      General: Lids are normal       Conjunctiva/sclera: Conjunctivae normal    Cardiovascular:      Rate and Rhythm: Normal rate and regular rhythm  Pulmonary:      Effort: Pulmonary effort is normal  No respiratory distress  Breath sounds: Normal breath sounds  No decreased breath sounds, wheezing, rhonchi or rales  Abdominal:      General: Bowel sounds are normal  There is no distension  Palpations: Abdomen is soft  Abdomen is not rigid  There is no mass  Tenderness: There is abdominal tenderness in the suprapubic area  There is no right CVA tenderness, left CVA tenderness, guarding or rebound  Skin:     General: Skin is warm and dry  Neurological:      General: No focal deficit present  Mental Status: She is alert  She is not disoriented  Cranial Nerves: Cranial nerves are intact        Coordination: Coordination normal       Gait: Gait normal    Psychiatric:         Behavior: Behavior normal  Behavior is cooperative  Thought Content:  Thought content normal          Judgment: Judgment normal

## 2021-02-20 NOTE — PATIENT INSTRUCTIONS
Take the antibiotic as directed with food and water  Take a probiotic while taking this medication  Drink plenty of water  Call in 48-72 hours for the result of your urine culture  Changes to your treatment plan will be made at this time if necessary  Follow up with your family doctor in 3-5 days  Proceed to the ER if symptoms worsen

## 2021-02-20 NOTE — TELEPHONE ENCOUNTER
Pt of dr Valencia Salomon  has uti and would like something called in drug store cant come in today but willing to come in [address] [facility]

## 2021-02-20 NOTE — TELEPHONE ENCOUNTER
Is patient able to go to urgent care for urine testing? Ours are open until 4  Some are open longer  With all her urinary problems, I'd like her to have a culture prior to taking an antibiotic   Will her history, I'd like to be cautious ty

## 2021-02-20 NOTE — TELEPHONE ENCOUNTER
Spoke with pt advised urgent care in [place] for urine dip and culture she will f/u with DR Sanchez on Monday 11:15

## 2021-02-22 ENCOUNTER — OFFICE VISIT (OUTPATIENT)
Dept: FAMILY MEDICINE CLINIC | Facility: CLINIC | Age: 21
End: 2021-02-22
Payer: COMMERCIAL

## 2021-02-22 VITALS
RESPIRATION RATE: 16 BRPM | OXYGEN SATURATION: 96 % | BODY MASS INDEX: 39.29 KG/M2 | DIASTOLIC BLOOD PRESSURE: 72 MMHG | HEART RATE: 65 BPM | TEMPERATURE: 97.8 F | HEIGHT: 65 IN | WEIGHT: 235.8 LBS | SYSTOLIC BLOOD PRESSURE: 118 MMHG

## 2021-02-22 DIAGNOSIS — D68.51 FACTOR V LEIDEN MUTATION (HCC): ICD-10-CM

## 2021-02-22 DIAGNOSIS — J45.21 MILD INTERMITTENT ASTHMA WITH ACUTE EXACERBATION: ICD-10-CM

## 2021-02-22 DIAGNOSIS — Z90.5 S/P NEPHRECTOMY: ICD-10-CM

## 2021-02-22 DIAGNOSIS — R10.9 ABDOMINAL PAIN, UNSPECIFIED ABDOMINAL LOCATION: ICD-10-CM

## 2021-02-22 DIAGNOSIS — R30.0 DYSURIA: Primary | ICD-10-CM

## 2021-02-22 PROCEDURE — 3725F SCREEN DEPRESSION PERFORMED: CPT | Performed by: FAMILY MEDICINE

## 2021-02-22 PROCEDURE — 99214 OFFICE O/P EST MOD 30 MIN: CPT | Performed by: FAMILY MEDICINE

## 2021-02-22 RX ORDER — SULFAMETHOXAZOLE AND TRIMETHOPRIM 800; 160 MG/1; MG/1
1 TABLET ORAL EVERY 12 HOURS SCHEDULED
Qty: 4 TABLET | Refills: 0 | Status: SHIPPED | OUTPATIENT
Start: 2021-02-22 | End: 2021-02-24

## 2021-02-22 RX ORDER — FLUCONAZOLE 150 MG/1
150 TABLET ORAL
Qty: 2 TABLET | Refills: 0 | Status: SHIPPED | OUTPATIENT
Start: 2021-02-22 | End: 2021-02-26

## 2021-02-22 RX ORDER — PANTOPRAZOLE SODIUM 40 MG/1
40 TABLET, DELAYED RELEASE ORAL
Qty: 30 TABLET | Refills: 0 | Status: SHIPPED | OUTPATIENT
Start: 2021-02-22 | End: 2021-03-30 | Stop reason: SDUPTHER

## 2021-02-22 RX ORDER — NYSTATIN 100000 U/G
CREAM TOPICAL 2 TIMES DAILY
Qty: 30 G | Refills: 0 | Status: SHIPPED | OUTPATIENT
Start: 2021-02-22 | End: 2021-04-21

## 2021-02-22 NOTE — PROGRESS NOTES
Saintclair Konig 2000 female MRN: [id number]    [address] [place] [address]      ASSESSMENT/PLAN  Saintclair Konig is a 19 y o  female presents to the office for    1  Dysuria   pending urine cultures however extend Bactrim for total of 5 days  Recommend starting Diflucan every 3 days for 2 doses  Nystatin cream given  I do believe the patient has a mixed to be yeast infection with the urinary cystitis  - fluconazole (DIFLUCAN) 150 mg tablet; Take 1 tablet (150 mg total) by mouth every 3 (three) days for 2 doses  Dispense: 2 tablet; Refill: 0  - nystatin (MYCOSTATIN) cream; Apply topically 2 (two) times a day  Dispense: 30 g; Refill: 0  - sulfamethoxazole-trimethoprim (BACTRIM DS) 800-160 mg per tablet; Take 1 tablet by mouth every 12 (twelve) hours for 2 days  Dispense: 4 tablet; Refill: 0    2  Factor V Leiden mutation ([address] )    Currently off blood thinners  Will be seeing her hematologist in the future  3  Abdominal pain, unspecified abdominal location  - Gluten Sensitivity Screen; Future  - pantoprazole (PROTONIX) 40 mg tablet; Take 1 tablet (40 mg total) by mouth daily before breakfast  Dispense: 30 tablet; Refill: 0  - Ambulatory referral to Gastroenterology; Future    4  Mild intermittent asthma with acute exacerbation    Currently well controlled  Continue inhalers as prescribed    5   S/p nephrectomy       return to the office if symptoms do not improve            Future Appointments   Date Time Provider Renetta Zayas   3/9/2021  9:15 AM Parish Ramos MD [facility]   3/30/2021  1:20 PM Rosanna Daley MD [facility]   6/11/2021  8:20 AM Benjy Paulino MD [facility]          SUBJECTIVE  CC: Urinary Tract Infection (pt here w/ uti sx's for 1 week, frequency, burning and some blood)      HPI:  Saintclair Konig is a 19 y o  female who presents for   1 week of dsyuria, frequency, some blood, pain in right flank, No vagina discharge  But uncomfortable when just sitting  Has had a history of yeast infections in the past     Patient complicated with a history of nephrectomy was also found to have factor 5 Leiden at that time  Which led to pulmonary embolisms  She was on a short course of blood thinners and now has been off of it  She is keeping follow-up with her specialist   Patient has abdominal pain that has come and gone  She has never been evaluated by specialist   Patient believe she might have a gluten allergy given that it is very strong in her family  Does complain of epigastric pain  Does not drink a lot of NSAIDs  Review of Systems   Constitutional: Negative for activity change, appetite change, chills, fatigue and fever  HENT: Negative for congestion  Respiratory: Negative for cough, chest tightness and shortness of breath  Cardiovascular: Negative for chest pain and leg swelling  Gastrointestinal: Positive for abdominal pain  Negative for abdominal distention and vomiting  Genitourinary: Positive for dysuria and vaginal pain  All other systems reviewed and are negative        Historical Information   The patient history was reviewed as follows:  Past Medical History:   Diagnosis Date    Anxiety and depression     Asthma     Clotting disorder (HCC)     [facility]     [facility]     Thrombophlebitis     UTI (urinary tract infection) due to Enterococcus          Medications:     Current Outpatient Medications:     albuterol (PROVENTIL HFA,VENTOLIN HFA) 90 mcg/act inhaler, Inhale 2 puffs every 6 (six) hours as needed for wheezing, Disp: 1 Inhaler, Rfl: 2    busPIRone (BUSPAR) 10 mg tablet, Take 1 tablet (10 mg total) by mouth as needed (anxiety), Disp: 90 tablet, Rfl: 0    hydrOXYzine HCL (ATARAX) 50 mg tablet, Take 1 tablet (50 mg total) by mouth daily at bedtime, Disp: 30 tablet, Rfl: 0    levonorgestrel (KYLEENA) 19 5 MG intrauterine device, 1 each by Intrauterine route, Disp: , Rfl:     Probiotic Product (PROBIOTIC-10 PO), Take by mouth, Disp: , Rfl:     sertraline (ZOLOFT) 50 mg tablet, Take 1 tablet (50 mg total) by mouth daily at bedtime for 7 days, THEN 2 tablets (100 mg total) daily at bedtime  , Disp: 67 tablet, Rfl: 1    sulfamethoxazole-trimethoprim (BACTRIM DS) 800-160 mg per tablet, Take 1 tablet by mouth every 12 (twelve) hours for 2 days, Disp: 4 tablet, Rfl: 0    fluconazole (DIFLUCAN) 150 mg tablet, Take 1 tablet (150 mg total) by mouth every 3 (three) days for 2 doses, Disp: 2 tablet, Rfl: 0    melatonin 3 mg, Take 6 mg by mouth, Disp: , Rfl:     nystatin (MYCOSTATIN) cream, Apply topically 2 (two) times a day, Disp: 30 g, Rfl: 0    pantoprazole (PROTONIX) 40 mg tablet, Take 1 tablet (40 mg total) by mouth daily before breakfast, Disp: 30 tablet, Rfl: 0    Allergies   Allergen Reactions    Amoxicillin Anaphylaxis    Cinnamon        OBJECTIVE  Vitals:   Vitals:    02/22/21 1113   BP: 118/72   BP Location: Right arm   Patient Position: Sitting   Cuff Size: Standard   Pulse: 65   Resp: 16   Temp: 97 8 °F (36 6 °C)   TempSrc: Temporal   SpO2: 96%   Weight: 107 kg (235 lb 12 8 oz)   Height: 5' 5" (1 651 m)         Physical Exam  Vitals signs reviewed  Constitutional:       Appearance: She is well-developed  HENT:      Head: Normocephalic and atraumatic  Eyes:      Conjunctiva/sclera: Conjunctivae normal       Pupils: Pupils are equal, round, and reactive to light  Neck:      Musculoskeletal: Normal range of motion and neck supple  Cardiovascular:      Rate and Rhythm: Normal rate and regular rhythm  Heart sounds: Normal heart sounds  Pulmonary:      Effort: Pulmonary effort is normal  No respiratory distress  Breath sounds: Normal breath sounds  Musculoskeletal: Normal range of motion  Skin:     General: Skin is warm  Capillary Refill: Capillary refill takes less than 2 seconds     Neurological:      Mental Status: She is alert and oriented to person, place, and time                      Endy Steinberg MD,   [facility]  2/23/2021

## 2021-02-23 LAB
BACTERIA UR CULT: ABNORMAL
BACTERIA UR CULT: ABNORMAL

## 2021-02-26 ENCOUNTER — TELEPHONE (OUTPATIENT)
Dept: FAMILY MEDICINE CLINIC | Facility: CLINIC | Age: 21
End: 2021-02-26

## 2021-03-01 ENCOUNTER — TELEPHONE (OUTPATIENT)
Dept: FAMILY MEDICINE CLINIC | Facility: CLINIC | Age: 21
End: 2021-03-01

## 2021-03-01 DIAGNOSIS — R30.0 DYSURIA: Primary | ICD-10-CM

## 2021-03-01 RX ORDER — FLUCONAZOLE 150 MG/1
150 TABLET ORAL
Qty: 2 TABLET | Refills: 0 | Status: SHIPPED | OUTPATIENT
Start: 2021-03-01 | End: 2021-03-05

## 2021-03-01 NOTE — TELEPHONE ENCOUNTER
Called in [facility] [facility]   Please let me know if any improvement if non then she will need another UA collection

## 2021-03-01 NOTE — TELEPHONE ENCOUNTER
Pt  Sam Nettles to say that yeast infection has not improved and requesting more medication to treat

## 2021-03-05 LAB
GLIADIN IGG SER IA-ACNC: 2 UNITS (ref 0–19)
GLIADIN PEPTIDE+TTG IGA+IGG SER QL IA: NEGATIVE
Lab: NORMAL
NOTE: NORMAL
WHEAT IGE QN: <0.1 KU/L

## 2021-03-09 ENCOUNTER — TELEMEDICINE (OUTPATIENT)
Dept: FAMILY MEDICINE CLINIC | Facility: CLINIC | Age: 21
End: 2021-03-09
Payer: COMMERCIAL

## 2021-03-09 ENCOUNTER — TELEPHONE (OUTPATIENT)
Dept: FAMILY MEDICINE CLINIC | Facility: CLINIC | Age: 21
End: 2021-03-09

## 2021-03-09 DIAGNOSIS — F41.8 DEPRESSION WITH ANXIETY: ICD-10-CM

## 2021-03-09 PROCEDURE — 99214 OFFICE O/P EST MOD 30 MIN: CPT | Performed by: FAMILY MEDICINE

## 2021-03-09 NOTE — TELEPHONE ENCOUNTER
----- Message from Kaity Bo MD sent at 3/9/2021  8:21 AM EST -----  Advise patient that Gluten allergy was not detached

## 2021-03-10 NOTE — PROGRESS NOTES
Virtual Regular Visit      Assessment/Plan:    Problem List Items Addressed This Visit     None      Visit Diagnoses     Depression with anxiety        Relevant Medications    sertraline (ZOLOFT) 50 mg tablet    Other Relevant Orders    Comprehensive metabolic panel          We will be increasing the patient's Zoloft to 150 mg  Advised her to continue BuSpar at the same dosage  I recommend that the patient establish with a therapist to help give her dual therapy  If there is no improvement then will need to make adjustments to medications  Would like her to get a liver enzymes checked to be sure that her LFTs stay stable  Reason for visit is No chief complaint on file  Encounter provider Eliu Mosqueda MD    Provider located at [address] [place] [id number]      Recent Visits  No visits were found meeting these conditions  Showing recent visits within past 7 days and meeting all other requirements     Today's Visits  Date Type Provider Dept   03/09/21 Telephone Yue Parks, [facility] today's visits and meeting all other requirements     Future Appointments  No visits were found meeting these conditions  Showing future appointments within next 150 days and meeting all other requirements        The patient was identified by name and date of birth  Landon Clark was informed that this is a telemedicine visit and that the visit is being conducted through [facility] and patient was informed that this is a secure, HIPAA-compliant platform  She agrees to proceed     My office door was closed  No one else was in the room  She acknowledged consent and understanding of privacy and security of the video platform  The patient has agreed to participate and understands they can discontinue the visit at any time  Patient is aware this is a billable service       Subjective  Landon Clark is a 19 y o  female Presents via video  Patient states that she has been taking her medication as prescribed  She states that her anxiety is improving but not 100% feeling like herself  She can not quite explain it  She states that she has a history with seeing a therapist but has not seen 1 in years  Is willing to see 1 after discussion today  Patient does not feel like she is able to act like herself like she normally used to  Is sleeping okay with the medication has not had any side effects with it            Past Medical History:   Diagnosis Date    Anxiety and depression     Asthma     [facility] ([facility] )     Factor V Leiden ([facility] )     Pulmonary embolism (HCC)     Thrombophlebitis     UTI (urinary tract infection) due to Enterococcus        Past Surgical History:   Procedure Laterality Date    BLADDER SURGERY      NEPHRECTOMY Left     URETER REVISION         Current Outpatient Medications   Medication Sig Dispense Refill    albuterol (PROVENTIL HFA,VENTOLIN HFA) 90 mcg/act inhaler Inhale 2 puffs every 6 (six) hours as needed for wheezing 1 Inhaler 2    busPIRone (BUSPAR) 10 mg tablet Take 1 tablet (10 mg total) by mouth as needed (anxiety) 90 tablet 0    hydrOXYzine HCL (ATARAX) 50 mg tablet Take 1 tablet (50 mg total) by mouth daily at bedtime 30 tablet 0    levonorgestrel (KYLEENA) 19 5 MG intrauterine device 1 each by Intrauterine route      melatonin 3 mg Take 6 mg by mouth      nystatin (MYCOSTATIN) cream Apply topically 2 (two) times a day 30 g 0    pantoprazole (PROTONIX) 40 mg tablet Take 1 tablet (40 mg total) by mouth daily before breakfast 30 tablet 0    Probiotic Product (PROBIOTIC-10 PO) Take by mouth      sertraline (ZOLOFT) 50 mg tablet Take 3 tablets (150 mg total) by mouth daily at bedtime 90 tablet 0     No current facility-administered medications for this visit           Allergies   Allergen Reactions    Amoxicillin Anaphylaxis    Cinnamon        Review of Systems   Constitutional: Negative for activity change, appetite change, chills, fatigue and fever  HENT: Negative for congestion  Respiratory: Negative for cough, chest tightness and shortness of breath  Cardiovascular: Negative for chest pain and leg swelling  Gastrointestinal: Negative for abdominal distention, abdominal pain, constipation, diarrhea, nausea and vomiting  Psychiatric/Behavioral: The patient is nervous/anxious  All other systems reviewed and are negative  Video Exam    There were no vitals filed for this visit  Physical Exam  Constitutional:       General: She is not in acute distress  Appearance: She is well-developed  She is not ill-appearing  Pulmonary:      Effort: Pulmonary effort is normal    Musculoskeletal: Normal range of motion  Skin:     Capillary Refill: Capillary refill takes less than 2 seconds  Neurological:      Mental Status: She is alert and oriented to person, place, and time  Psychiatric:         Behavior: Behavior normal          Thought Content: Thought content normal          Judgment: Judgment normal           I spent 15 minutes directly with the patient during this visit      [id number] acknowledges that she has consented to an online visit or consultation  She understands that the online visit is based solely on information provided by her, and that, in the absence of a face-to-face physical evaluation by the physician, the diagnosis she receives is both limited and provisional in terms of accuracy and completeness  This is not intended to replace a full medical face-to-face evaluation by the physician  Wendy Lr understands and accepts these terms

## 2021-03-17 DIAGNOSIS — F41.8 DEPRESSION WITH ANXIETY: ICD-10-CM

## 2021-03-17 RX ORDER — HYDROXYZINE 50 MG/1
50 TABLET, FILM COATED ORAL
Qty: 30 TABLET | Refills: 0 | Status: SHIPPED | OUTPATIENT
Start: 2021-03-17 | End: 2021-04-19 | Stop reason: SDUPTHER

## 2021-03-17 NOTE — TELEPHONE ENCOUNTER
Dr Lamont Barone,    Patient needs a refill on hydroxyzine 50mg sent to [facility] in [place]

## 2021-03-30 ENCOUNTER — OFFICE VISIT (OUTPATIENT)
Dept: GASTROENTEROLOGY | Facility: CLINIC | Age: 21
End: 2021-03-30
Payer: COMMERCIAL

## 2021-03-30 VITALS
SYSTOLIC BLOOD PRESSURE: 130 MMHG | BODY MASS INDEX: 39.65 KG/M2 | WEIGHT: 238 LBS | TEMPERATURE: 96.8 F | DIASTOLIC BLOOD PRESSURE: 72 MMHG | HEIGHT: 65 IN

## 2021-03-30 DIAGNOSIS — Z23 ENCOUNTER FOR IMMUNIZATION: ICD-10-CM

## 2021-03-30 DIAGNOSIS — R10.9 ABDOMINAL PAIN, UNSPECIFIED ABDOMINAL LOCATION: ICD-10-CM

## 2021-03-30 DIAGNOSIS — R10.13 EPIGASTRIC ABDOMINAL PAIN: Primary | ICD-10-CM

## 2021-03-30 PROCEDURE — 3008F BODY MASS INDEX DOCD: CPT | Performed by: INTERNAL MEDICINE

## 2021-03-30 PROCEDURE — 99244 OFF/OP CNSLTJ NEW/EST MOD 40: CPT | Performed by: INTERNAL MEDICINE

## 2021-03-30 PROCEDURE — 1036F TOBACCO NON-USER: CPT | Performed by: INTERNAL MEDICINE

## 2021-03-30 RX ORDER — PANTOPRAZOLE SODIUM 40 MG/1
40 TABLET, DELAYED RELEASE ORAL
Qty: 30 TABLET | Refills: 3 | Status: SHIPPED | OUTPATIENT
Start: 2021-03-30 | End: 2021-05-19

## 2021-03-30 RX ORDER — DIPHENOXYLATE HYDROCHLORIDE AND ATROPINE SULFATE 2.5; .025 MG/1; MG/1
1 TABLET ORAL DAILY
COMMUNITY

## 2021-03-30 NOTE — LETTER
March 30, 2021     Jeff Ko MD  [address] [facility]    Patient: Sanjana De La Vega   YOB: 2000   Date of Visit: 3/30/2021       Dear Dr Diogo Walsh: Thank you for referring Mery Ace to me for evaluation  Below are my notes for this consultation  If you have questions, please do not hesitate to call me  I look forward to following your patient along with you  Sincerely,        Slaly Rasmussen MD        CC: No Recipients  Sally Rasmussen MD  3/30/2021  1:42 PM  Sign when Signing Visit  Consultation - [facility]  Sanjana De La Vega 19 y o  female MRN: [id number]          Assessment & Plan:     13-year-old female, status post nephrectomy due to ureteral reflux, blood cot post operatively with pulmonary embolism, diagnosed with factor 5 Leiden, with several months of postprandial epigastric pain, improved with PPI therapy  1  Postprandial epigastric pain:  Given NSAID use and symptoms, most likely secondary to peptic ulcer disease versus reflux esophagitis   -celiac panel was negative, she has a history of celiac in her brother   -continue PPI therapy as she is improving, refills were sent   -if symptoms completely resolved for 1 to 2 months, most likely secondary to peptic ulcer disease, no further workup needed   -if symptoms persist we will consider endoscopic evaluation after 2 to 3 months of PPI therapy  -at this point we will also check a right upper quadrant ultrasound to exclude biliary etiology for her pain  -she was instructed to give us call with any change or worsening symptoms            _____________________________________________________________        CC:   Epigastric pain    HPI:  Sanjana De La Vega is a 19 y  o female who was referred for evaluation of  Epigastric pain    This is a 13-year-old female, has a history of urinary reflux, underwent nephrectomy, complicated by pulmonary embolism, was on anticoagulation, recently diagnosed with factor 5 Leiden mutation  She is off anticoagulation  She graduated college, 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  Patient has had postprandial epigastric pain since August occurring about 1 to 2 times per week  No specific foods which exacerbate her stomach symptoms  She was placed on PPI therapy in the last 4 weeks and reports that the frequency of symptoms has reduced though the severity still about the same  There is some associated nausea but no vomiting  Denies any typical acid reflux symptoms, denies any dysphagia, melena, rectal bleeding  No significant change in appetite or change in weight  Patient does take NSAIDs occasionally for headaches  Patient surgical history is notable for nephrectomy, tonsillectomy, wisdom teeth surgery  Family history is notable for brother with celiac sprue  Patient denies any tobacco, rarely drinks  She is a teacher  ROS:  The remainder of the ROS was negative except for the pertinent positives mentioned in HPI           Allergies: Amoxicillin and Cinnamon - food allergy    Medications:   Current Outpatient Medications:     albuterol (PROVENTIL HFA,VENTOLIN HFA) 90 mcg/act inhaler, Inhale 2 puffs every 6 (six) hours as needed for wheezing, Disp: 1 Inhaler, Rfl: 2    busPIRone (BUSPAR) 10 mg tablet, Take 1 tablet (10 mg total) by mouth as needed (anxiety), Disp: 90 tablet, Rfl: 0    hydrOXYzine HCL (ATARAX) 50 mg tablet, Take 1 tablet (50 mg total) by mouth daily at bedtime, Disp: 30 tablet, Rfl: 0    levonorgestrel (KYLEENA) 19 5 MG intrauterine device, 1 each by Intrauterine route, Disp: , Rfl:     multivitamin (THERAGRAN) TABS, Take 1 tablet by mouth daily, Disp: , Rfl:     pantoprazole (PROTONIX) 40 mg tablet, Take 1 tablet (40 mg total) by mouth daily before breakfast, Disp: 30 tablet, Rfl: 3    Probiotic Product (PROBIOTIC-10 PO), Take by mouth, Disp: , Rfl:     sertraline (ZOLOFT) 50 mg tablet, Take 3 tablets (150 mg total) by mouth daily at bedtime, Disp: 90 tablet, Rfl: 0    melatonin 3 mg, Take 6 mg by mouth, Disp: , Rfl:     nystatin (MYCOSTATIN) cream, Apply topically 2 (two) times a day (Patient not taking: Reported on 3/30/2021), Disp: 30 g, Rfl: 0'    Past Medical History:   Diagnosis Date    Anxiety and depression     Asthma     Clotting disorder ([facility] )     Factor V Leiden ([facility] )     Pulmonary embolism ([place] [address] )     Thrombophlebitis     UTI (urinary tract infection) due to Enterococcus        Past Surgical History:   Procedure Laterality Date    BLADDER SURGERY      NEPHRECTOMY Left     URETER REVISION         Family History   Problem Relation Age of Onset    Hypertension Mother     Hyperlipidemia Mother     Diabetes Mother     Factor V Leiden deficiency Mother     Ovarian cancer Maternal Grandmother     Arthritis Family     Asthma Family     Cancer Family     Cervical cancer Family     Heart failure Family     Hyperlipidemia Family     Hypertension Family     Diabetes Other     Heart disease Father         reports that she has never smoked  She has never used smokeless tobacco  She reports current alcohol use  She reports that she does not use drugs            Physical Exam:     /72 (BP Location: Right arm, Patient Position: Sitting, Cuff Size: Standard)   Temp (!) 96 8 °F (36 °C) (Temporal)   Ht 5' 5" (1 651 m)   Wt 108 kg (238 lb)   BMI 39 61 kg/m²     Gen: wn/wd, NAD , obese  HEENT: anicteric, MMM, no cervical LAD  CVS: RRR, no m/r/g  CHEST: CTA b/l  ABD: +BS, soft,  Mild right upper quadrant discomfort with deep palpation, no hepatosplenomegaly  EXT: no c/c/e  NEURO: aaox3  SKIN: NO rashes

## 2021-04-07 ENCOUNTER — HOSPITAL ENCOUNTER (OUTPATIENT)
Dept: RADIOLOGY | Facility: HOSPITAL | Age: 21
Discharge: HOME | End: 2021-04-07
Attending: INTERNAL MEDICINE
Payer: COMMERCIAL

## 2021-04-07 ENCOUNTER — TELEPHONE (OUTPATIENT)
Dept: GASTROENTEROLOGY | Facility: AMBULARY SURGERY CENTER | Age: 21
End: 2021-04-07

## 2021-04-07 DIAGNOSIS — R10.13 EPIGASTRIC ABDOMINAL PAIN: ICD-10-CM

## 2021-04-07 DIAGNOSIS — R10.9 ABDOMINAL PAIN, UNSPECIFIED ABDOMINAL LOCATION: ICD-10-CM

## 2021-04-07 PROCEDURE — 76705 ECHO EXAM OF ABDOMEN: CPT

## 2021-04-07 NOTE — TELEPHONE ENCOUNTER
Patients GI provider:  Dr Valencia Garcia    Number to return call: (    Reason for call: [place] [facility] [place] [facility] dept just wanted to notify Dr Destiny Guevara to order RUQ ultrasound with doppler when he wants to vein hepatic vein because it requires more time    Scheduled procedure/appointment date if applicable: Apt/procedure 6-24-21

## 2021-04-19 DIAGNOSIS — F41.8 DEPRESSION WITH ANXIETY: ICD-10-CM

## 2021-04-19 RX ORDER — BUSPIRONE HYDROCHLORIDE 10 MG/1
10 TABLET ORAL AS NEEDED
Qty: 90 TABLET | Refills: 0 | Status: SHIPPED | OUTPATIENT
Start: 2021-04-19 | End: 2021-05-19 | Stop reason: SDUPTHER

## 2021-04-19 RX ORDER — HYDROXYZINE 50 MG/1
50 TABLET, FILM COATED ORAL
Qty: 30 TABLET | Refills: 0 | Status: SHIPPED | OUTPATIENT
Start: 2021-04-19 | End: 2021-05-19 | Stop reason: SDUPTHER

## 2021-04-21 ENCOUNTER — TELEMEDICINE (OUTPATIENT)
Dept: FAMILY MEDICINE CLINIC | Facility: CLINIC | Age: 21
End: 2021-04-21
Payer: COMMERCIAL

## 2021-04-21 ENCOUNTER — TELEPHONE (OUTPATIENT)
Dept: FAMILY MEDICINE CLINIC | Facility: CLINIC | Age: 21
End: 2021-04-21

## 2021-04-21 VITALS — WEIGHT: 238 LBS | BODY MASS INDEX: 39.65 KG/M2 | TEMPERATURE: 98.3 F | HEIGHT: 65 IN

## 2021-04-21 DIAGNOSIS — R07.89 CHEST TIGHTNESS: ICD-10-CM

## 2021-04-21 DIAGNOSIS — F41.8 DEPRESSION WITH ANXIETY: Primary | ICD-10-CM

## 2021-04-21 PROCEDURE — 1036F TOBACCO NON-USER: CPT | Performed by: FAMILY MEDICINE

## 2021-04-21 PROCEDURE — 3008F BODY MASS INDEX DOCD: CPT | Performed by: FAMILY MEDICINE

## 2021-04-21 PROCEDURE — 99213 OFFICE O/P EST LOW 20 MIN: CPT | Performed by: FAMILY MEDICINE

## 2021-04-21 NOTE — TELEPHONE ENCOUNTER
----- Message from Deysi Ball MD sent at 4/21/2021  4:05 PM EDT -----  6 months f/u virtually or person for anxiety

## 2021-04-21 NOTE — PROGRESS NOTES
Virtual Regular Visit      Assessment/Plan:    Problem List Items Addressed This Visit        Other    Anxiety - Primary      Other Visit Diagnoses     Chest tightness           anxiety very pleased with with the patient's that today  We are going to continue on all medications and not make any adjustments  Patient is to get blood work in 6 months to be sure that the levels are normal   Chest tightness just recently started would recommend using Zyrtec if no improvement would recommend contacting our office for an evaluation  If persistent would recommend ED eval given history  Reason for visit is   Chief Complaint   Patient presents with    Follow-up     follow up on meds, patient feels much better    Virtual Regular Visit        Encounter provider Jacklyn Krishnan MD    Provider located at [address] [place] [id number]      Recent Visits  No visits were found meeting these conditions  Showing recent visits within past 7 days and meeting all other requirements     Today's Visits  Date Type Provider Dept   04/21/21 Telephone [address] [facility]   04/21/21 Telemedicine Jacklyn Krishnan MD [facility] today's visits and meeting all other requirements     Future Appointments  No visits were found meeting these conditions  Showing future appointments within next 150 days and meeting all other requirements        The patient was identified by name and date of birth  Charly Colon was informed that this is a telemedicine visit and that the visit is being conducted through [facility] and patient was informed that this is a secure, HIPAA-compliant platform  She agrees to proceed     My office door was closed  No one else was in the room  She acknowledged consent and understanding of privacy and security of the video platform   The patient has agreed to participate and understands they can discontinue the visit at any time  Patient is aware this is a billable service  Subjective  Carolyn Farnsworth is a 19 y o  female    Presents for an follow-up appointment  Patient states that she has been doing extremely well on her depression anxiety medications  She states she has not felt this good in a very long time  Started w/ chest tightness; does not have any short of breath  Patient does not know if it is allergy induced  She is not taking any allergy medications  Patient has a history of pulmonary embolism  Currently no longer on blood thinners per her hematologist         Past Medical History:   Diagnosis Date    Anxiety and depression     Asthma     Clotting disorder ([place] [address] )     Factor V Leiden ([place] [address] )     Pulmonary embolism ([place] [address] )     Thrombophlebitis     UTI (urinary tract infection) due to Enterococcus        Past Surgical History:   Procedure Laterality Date    BLADDER SURGERY      NEPHRECTOMY Left     URETER REVISION         Current Outpatient Medications   Medication Sig Dispense Refill    albuterol (PROVENTIL HFA,VENTOLIN HFA) 90 mcg/act inhaler Inhale 2 puffs every 6 (six) hours as needed for wheezing 1 Inhaler 2    busPIRone (BUSPAR) 10 mg tablet Take 1 tablet (10 mg total) by mouth as needed (anxiety) 90 tablet 0    hydrOXYzine HCL (ATARAX) 50 mg tablet Take 1 tablet (50 mg total) by mouth daily at bedtime 30 tablet 0    levonorgestrel (KYLEENA) 19 5 MG intrauterine device 1 each by Intrauterine route      multivitamin (THERAGRAN) TABS Take 1 tablet by mouth daily      pantoprazole (PROTONIX) 40 mg tablet Take 1 tablet (40 mg total) by mouth daily before breakfast 30 tablet 3    Probiotic Product (PROBIOTIC-10 PO) Take by mouth      sertraline (ZOLOFT) 50 mg tablet Take 3 tablets (150 mg total) by mouth daily at bedtime 90 tablet 0    melatonin 3 mg Take 6 mg by mouth       No current facility-administered medications for this visit           Allergies   Allergen Reactions    Amoxicillin Anaphylaxis    Cinnamon - Food Allergy        Review of Systems   Constitutional: Negative for activity change, appetite change, chills, fatigue and fever  HENT: Negative for congestion  Respiratory: Positive for chest tightness  Negative for cough and shortness of breath  Cardiovascular: Negative for chest pain and leg swelling  Gastrointestinal: Negative for abdominal distention, abdominal pain, constipation, diarrhea, nausea and vomiting  Psychiatric/Behavioral: Negative for sleep disturbance  The patient is not nervous/anxious  All other systems reviewed and are negative  Video Exam    Vitals:    04/21/21 1529   Temp: 98 3 °F (36 8 °C)   TempSrc: Temporal   Weight: 108 kg (238 lb)   Height: 5' 5" (1 651 m)       Physical Exam  Constitutional:       General: She is not in acute distress  Appearance: She is well-developed  She is not ill-appearing  Pulmonary:      Effort: Pulmonary effort is normal    Musculoskeletal: Normal range of motion  Skin:     Capillary Refill: Capillary refill takes less than 2 seconds  Neurological:      Mental Status: She is alert and oriented to person, place, and time  Psychiatric:         Behavior: Behavior normal          Thought Content: Thought content normal          Judgment: Judgment normal           I spent 15 minutes directly with the patient during this visit      [address] [place] [address] acknowledges that she has consented to an online visit or consultation  She understands that the online visit is based solely on information provided by her, and that, in the absence of a face-to-face physical evaluation by the physician, the diagnosis she receives is both limited and provisional in terms of accuracy and completeness  This is not intended to replace a full medical face-to-face evaluation by the physician  Harris Souza understands and accepts these terms

## 2021-04-23 ENCOUNTER — TELEPHONE (OUTPATIENT)
Dept: GASTROENTEROLOGY | Facility: AMBULARY SURGERY CENTER | Age: 21
End: 2021-04-23

## 2021-04-23 NOTE — TELEPHONE ENCOUNTER
----- Message from Jacques Parker MD sent at 4/15/2021  1:54 PM EDT -----  Please inform the patient that recent ultrasound was normal, no evidence of gallstones, normal appearing liver  Please have her follow up as needed, continue PPI therapy, if she has continued symptoms we will proceed with an upper endoscopy

## 2021-05-19 ENCOUNTER — OFFICE VISIT (OUTPATIENT)
Dept: FAMILY MEDICINE CLINIC | Facility: CLINIC | Age: 21
End: 2021-05-19
Payer: COMMERCIAL

## 2021-05-19 VITALS
TEMPERATURE: 98.8 F | DIASTOLIC BLOOD PRESSURE: 74 MMHG | OXYGEN SATURATION: 98 % | WEIGHT: 242.2 LBS | RESPIRATION RATE: 16 BRPM | HEART RATE: 92 BPM | SYSTOLIC BLOOD PRESSURE: 108 MMHG | HEIGHT: 65 IN | BODY MASS INDEX: 40.35 KG/M2

## 2021-05-19 DIAGNOSIS — R10.9 ABDOMINAL PAIN, UNSPECIFIED ABDOMINAL LOCATION: ICD-10-CM

## 2021-05-19 DIAGNOSIS — F41.8 DEPRESSION WITH ANXIETY: Primary | ICD-10-CM

## 2021-05-19 DIAGNOSIS — D68.51 FACTOR V LEIDEN (HCC): ICD-10-CM

## 2021-05-19 PROCEDURE — 99214 OFFICE O/P EST MOD 30 MIN: CPT | Performed by: FAMILY MEDICINE

## 2021-05-19 PROCEDURE — 3725F SCREEN DEPRESSION PERFORMED: CPT | Performed by: FAMILY MEDICINE

## 2021-05-19 RX ORDER — HYDROXYZINE 50 MG/1
50 TABLET, FILM COATED ORAL
Qty: 90 TABLET | Refills: 0 | Status: SHIPPED | OUTPATIENT
Start: 2021-05-19 | End: 2021-08-19 | Stop reason: SDUPTHER

## 2021-05-19 RX ORDER — BUSPIRONE HYDROCHLORIDE 10 MG/1
10 TABLET ORAL 3 TIMES DAILY
Qty: 270 TABLET | Refills: 0 | Status: SHIPPED | OUTPATIENT
Start: 2021-05-19 | End: 2022-01-18 | Stop reason: SDUPTHER

## 2021-05-19 NOTE — PROGRESS NOTES
Brett Correa 2000 female MRN: [id number]    [address] [facility]  Follow-up Visit    ASSESSMENT/PLAN  Brett Correa is a 19 y o  female  presents to the office for     Diagnoses and all orders for this visit:    Depression with anxiety  -     busPIRone (BUSPAR) 10 mg tablet; Take 1 tablet (10 mg total) by mouth 3 (three) times a day  -     hydrOXYzine HCL (ATARAX) 50 mg tablet; Take 1 tablet (50 mg total) by mouth daily at bedtime  -     sertraline (ZOLOFT) 50 mg tablet; Take 3 tablets (150 mg total) by mouth daily at bedtime    Factor V Leiden ([address] )    Abdominal pain, unspecified abdominal location       would like to trial the patient on increased dose of BuSpar to 10 mg 3 times a day rather than twice a day  If there is no improvement would recommend increasing to 15 mg  I do believe that maybe if this does not work then would like her to have a formal evaluation by Psychiatry  Continue Zoloft at 150 mg     Recommend her speaking with her hematologist   Patient is very uncomfortable knowing that she has factor 5 Leiden with a history of pulmonary embolism  Currently she is very active in walking daily  Advised her to ask hematologist if it would be okay for her to restart her anticoagulation to make her feel more comfortable  However if it is not medical necessary then would recommend not to use it  Abdominal pain continues to have epigastric pain with no improvement with Protonix    Already scheduled to see Gastroenterology    Disposition: Return to the office in 2 months    Health Maintence:             Future Appointments   Date Time Provider Renetta Zayas   6/11/2021  8:20 AM Kari Banerjee  [facility]   6/24/2021 11:00 AM Gladys Valencia   10/20/2021  2:45 PM Frank Willis MD [address] [place] [address] [facility]          SUBJECTIVE  CC: Follow-up (patient here to follow up med check, and she has been getting random chest pains and wants testing due to her blood clots history)      HPI:  Maxie Phoenix is a 19 y o  female presenting to the office for a follow up on  Epigastric pain after she eats and the rest of the day  Not responding to Protonix  Clot History->  Patient has a history of pulmonary embolism status post surgery  She was on blood thinners for short period of time  Patient states that she gets very concerned every time she has chest tightness  She worries that she will develop another clogged given her history of factor 5 Leiden  She saw her hematologist several months ago and stated that she did not require  Has an upcoming appointment with him next month  Patient currently has a little bit of chest tightness that would like to be evaluated  Depression with anxiety improving but not at the patient's baseline  Patient is taking Zoloft and BuSpar as prescribed without any difficulties  Does have multiple years of therapy which she felt was not successful  Review of Systems   Constitutional: Negative for activity change, appetite change, chills, fatigue and fever  HENT: Negative for congestion  Respiratory: Positive for chest tightness  Negative for cough and shortness of breath  Cardiovascular: Negative for chest pain and leg swelling  Gastrointestinal: Positive for abdominal pain  Negative for abdominal distention, constipation, diarrhea, nausea and vomiting  Psychiatric/Behavioral: The patient is nervous/anxious  Depression   All other systems reviewed and are negative        Historical Information   The patient history was reviewed as follows:    Past Medical History:   Diagnosis Date    Anxiety and depression     Asthma     [facility] disorder ([facility] )     Factor V Leiden ([facility] )     Pulmonary embolism ([facility])     Thrombophlebitis     UTI (urinary tract infection) due to Enterococcus      Past Surgical History:   Procedure Laterality Date    BLADDER SURGERY      NEPHRECTOMY Left     URETER REVISION       Family History   Problem Relation Age of Onset    Hypertension Mother     Hyperlipidemia Mother     Diabetes Mother     Factor V Leiden deficiency Mother     Ovarian cancer Maternal Grandmother     Arthritis Family     Asthma Family     Cancer Family     Cervical cancer Family     Heart failure Family     Hyperlipidemia Family     Hypertension Family     Diabetes Other     Heart disease Father       Social History   Social History     Substance and Sexual Activity   Alcohol Use Yes    Frequency: 2-4 times a month    Comment: social     Social History     Substance and Sexual Activity   Drug Use Never     Social History     Tobacco Use   Smoking Status Never Smoker   Smokeless Tobacco Never Used       Medications:     Current Outpatient Medications:     albuterol (PROVENTIL HFA,VENTOLIN HFA) 90 mcg/act inhaler, Inhale 2 puffs every 6 (six) hours as needed for wheezing, Disp: 1 Inhaler, Rfl: 2    busPIRone (BUSPAR) 10 mg tablet, Take 1 tablet (10 mg total) by mouth as needed (anxiety), Disp: 90 tablet, Rfl: 0    hydrOXYzine HCL (ATARAX) 50 mg tablet, Take 1 tablet (50 mg total) by mouth daily at bedtime, Disp: 30 tablet, Rfl: 0    levonorgestrel (KYLEENA) 19 5 MG intrauterine device, 1 each by Intrauterine route, Disp: , Rfl:     multivitamin (THERAGRAN) TABS, Take 1 tablet by mouth daily, Disp: , Rfl:     sertraline (ZOLOFT) 50 mg tablet, Take 3 tablets (150 mg total) by mouth daily at bedtime, Disp: 90 tablet, Rfl: 0    melatonin 3 mg, Take 6 mg by mouth, Disp: , Rfl:     pantoprazole (PROTONIX) 40 mg tablet, Take 1 tablet (40 mg total) by mouth daily before breakfast (Patient not taking: Reported on 5/19/2021), Disp: 30 tablet, Rfl: 3    Probiotic Product (PROBIOTIC-10 PO), Take by mouth, Disp: , Rfl:   Allergies   Allergen Reactions    Amoxicillin Anaphylaxis    Cinnamon - Food Allergy        OBJECTIVE    Vitals:   Vitals:    05/19/21 1557   BP: 108/74   BP Location: Left arm   Patient Position: Sitting   Cuff Size: Large   Pulse: 92   Resp: 16   Temp: 98 8 °F (37 1 °C)   TempSrc: Temporal   SpO2: 98%   Weight: 110 kg (242 lb 3 2 oz)   Height: 5' 5" (1 651 m)           Physical Exam  Vitals signs reviewed  Constitutional:       Appearance: She is well-developed  HENT:      Head: Normocephalic and atraumatic  Eyes:      Conjunctiva/sclera: Conjunctivae normal       Pupils: Pupils are equal, round, and reactive to light  Neck:      Musculoskeletal: Normal range of motion and neck supple  Cardiovascular:      Rate and Rhythm: Normal rate and regular rhythm  Heart sounds: Normal heart sounds  Pulmonary:      Effort: Pulmonary effort is normal  No respiratory distress  Breath sounds: Normal breath sounds  Abdominal:      Tenderness: There is abdominal tenderness (points to the epigastric with tenderness)  Musculoskeletal: Normal range of motion  Skin:     General: Skin is warm  Capillary Refill: Capillary refill takes less than 2 seconds  Neurological:      Mental Status: She is alert and oriented to person, place, and time              Brigitte Petit MD  [facility] [address]

## 2021-06-02 ENCOUNTER — OFFICE VISIT (OUTPATIENT)
Dept: FAMILY MEDICINE CLINIC | Facility: CLINIC | Age: 21
End: 2021-06-02
Payer: COMMERCIAL

## 2021-06-02 ENCOUNTER — TELEPHONE (OUTPATIENT)
Dept: NEUROLOGY | Facility: CLINIC | Age: 21
End: 2021-06-02

## 2021-06-02 VITALS
SYSTOLIC BLOOD PRESSURE: 128 MMHG | HEART RATE: 79 BPM | WEIGHT: 243 LBS | HEIGHT: 65 IN | DIASTOLIC BLOOD PRESSURE: 82 MMHG | TEMPERATURE: 98.2 F | BODY MASS INDEX: 40.48 KG/M2 | OXYGEN SATURATION: 98 % | RESPIRATION RATE: 16 BRPM

## 2021-06-02 DIAGNOSIS — D68.51 FACTOR V LEIDEN MUTATION (HCC): ICD-10-CM

## 2021-06-02 DIAGNOSIS — Z90.5 S/P NEPHRECTOMY: ICD-10-CM

## 2021-06-02 DIAGNOSIS — G43.109 MIGRAINE WITH AURA AND WITHOUT STATUS MIGRAINOSUS, NOT INTRACTABLE: Primary | ICD-10-CM

## 2021-06-02 PROCEDURE — 99213 OFFICE O/P EST LOW 20 MIN: CPT | Performed by: FAMILY MEDICINE

## 2021-06-02 PROCEDURE — 3008F BODY MASS INDEX DOCD: CPT | Performed by: FAMILY MEDICINE

## 2021-06-02 RX ORDER — BUTALBITAL, ACETAMINOPHEN AND CAFFEINE 50; 325; 40 MG/1; MG/1; MG/1
TABLET ORAL
Qty: 30 TABLET | Refills: 0 | Status: SHIPPED | OUTPATIENT
Start: 2021-06-02

## 2021-06-02 NOTE — TELEPHONE ENCOUNTER
Best contact number for patient:      confirmed  Emergency Contact name and number:    Referring provider and telephone number:       PCP    Primary Care Provider Name and if affiliated with [facility]:       [facility]    Reason for Appointment/Dx:      Migraines    Have you seen and followed up with a pediatric Neurologist for this disease in the past?      NO   (If yes ok to schedule with Dr Jon Saxena)    Neurology Location patient would like to be seen:     [facility] or Cordie Levels received? Yes                                                Records Received? PCP notes    Have you ever seen another Neurologist?       [address] [place] [address] Name:      Kallie Sanchez  ID/Policy #:    Secondary Insurance:    ID/Policy#: Workman's Comp/ Accident/ School  Information      Workman's Comp/Accident/School related?            NO    If yes name of Insurance company:    Claim #:    Date of Injury:    Type of Injury:     Name and Telephone Number:    Notes:                   Appointment date:   Thursday 7/1/21  8a  Suzy barrett Resident  Rao ANDUJAR packet    Put on a WL for SOONER

## 2021-06-02 NOTE — PROGRESS NOTES
Danie Moss 2000 female MRN: [id number]    [address]      ASSESSMENT/PLAN  Danie Moss is a 19 y o  female presents to the office for     Diagnoses and all orders for this visit:    Migraine with aura and without status migrainosus, not intractable  -     Ambulatory referral to Neurology; Future  -     butalbital-acetaminophen-caffeine (FIORICET,ESGIC) -40 mg per tablet; Take 1 tablet now, and then repeat in 4 hrs if no relief  Don't take more then 3 in a week  S/p nephrectomy    Factor V Leiden mutation ([facility] )    Other orders  -     calcium-vitamin D (OSCAL) 250-125 MG-UNIT per tablet; Take 1 tablet by mouth 2 (two) times a day        Unable to give patient Toradol injection today given that the patient took ibuprofen 800 mg  She is status post nephrectomy  Will give the patient Fioricet to be used 1 dose now and 1 dose in 4 hours  Avoid more then 3 in a week to avoid rebound headache  If no improvement call us in the AM and Toradol will be given  But avoid NSAIDs  Neurology referral given  Recommend talking to Heme about IUD placement given risk of factor V       Health Maintence:         Disposition: Return to the office in 1 week if no improvement    Future Appointments   Date Time Provider Renetta Zayas   6/2/2021  4:00 PM Sonia Mckeon MD [address] [place] [address]   6/11/2021  8:20 AM [facility]   6/24/2021 11:00 AM Piyush Salgado PA-C [facility]STR[facility]   8/19/2021  9:45 AM Sonia Mckeon MD [address] [place] [address]   10/20/2021  2:45 PM Sonia Mckeon MD [address] [place] [address]          SUBJECTIVE  CC: Headache (patient here with migraine on/off since Friday, reminded patient she is due for p e  & pap)      HPI:  Danie Moss is a 19 y o  femalepresenting to the office for an acute appointment  Patient since Friday has had a migraine    She states it has been persistent with over-the-counter Advil 800 mg with no improvement  Patient has been having more frequent migraines in the last several months  Patient states that she does have photosensitivity at times  History of nephrectomy  With occult normal kidney function  Patient does have a history of factor 5 Leiden currently not on any blood thinners  Patient has an IUD placed she doesn't know if with her history of clots if she should continue with IUD  Review of Systems   Constitutional: Negative for activity change, appetite change, chills, fatigue and fever  HENT: Negative for congestion  Respiratory: Negative for cough, chest tightness and shortness of breath  Cardiovascular: Negative for chest pain and leg swelling  Gastrointestinal: Negative for abdominal distention, abdominal pain, constipation, diarrhea, nausea and vomiting  Neurological: Positive for headaches  All other systems reviewed and are negative        Historical Information   The patient history was reviewed as follows:    Past Medical History:   Diagnosis Date    Anxiety and depression     Asthma     Cl[facility] disorder ([facility] )     Factor V Leiden ([facility] )     Pulmonary embolism (HCC)     Thrombophlebitis     UTI (urinary tract infection) due to Enterococcus      Past Surgical History:   Procedure Laterality Date    BLADDER SURGERY      NEPHRECTOMY Left     URETER REVISION       Family History   Problem Relation Age of Onset    Hypertension Mother     Hyperlipidemia Mother     Diabetes Mother     Factor V Leiden deficiency Mother     Ovarian cancer Maternal Grandmother     Arthritis Family     Asthma Family     Cancer Family     Cervical cancer Family     Heart failure Family     Hyperlipidemia Family     Hypertension Family     Diabetes Other     Heart disease Father       Social History   Social History     Substance and Sexual Activity   Alcohol Use Yes    Frequency: 2-4 times a month    Comment: social     Social History     Substance and Sexual Activity   Drug Use Never     Social History     Tobacco Use   Smoking Status Never Smoker   Smokeless Tobacco Never Used       Medications:     Current Outpatient Medications:     albuterol (PROVENTIL HFA,VENTOLIN HFA) 90 mcg/act inhaler, Inhale 2 puffs every 6 (six) hours as needed for wheezing, Disp: 1 Inhaler, Rfl: 2    busPIRone (BUSPAR) 10 mg tablet, Take 1 tablet (10 mg total) by mouth 3 (three) times a day, Disp: 270 tablet, Rfl: 0    calcium-vitamin D (OSCAL) 250-125 MG-UNIT per tablet, Take 1 tablet by mouth 2 (two) times a day, Disp: , Rfl:     hydrOXYzine HCL (ATARAX) 50 mg tablet, Take 1 tablet (50 mg total) by mouth daily at bedtime, Disp: 90 tablet, Rfl: 0    levonorgestrel (KYLEENA) 19 5 MG intrauterine device, 1 each by Intrauterine route, Disp: , Rfl:     multivitamin (THERAGRAN) TABS, Take 1 tablet by mouth daily, Disp: , Rfl:     Probiotic Product (PROBIOTIC-10 PO), Take by mouth, Disp: , Rfl:     sertraline (ZOLOFT) 50 mg tablet, Take 3 tablets (150 mg total) by mouth daily at bedtime, Disp: 90 tablet, Rfl: 6    melatonin 3 mg, Take 6 mg by mouth, Disp: , Rfl:   Allergies   Allergen Reactions    Amoxicillin Anaphylaxis    Cinnamon - Food Allergy        OBJECTIVE    Vitals:   Vitals:    06/02/21 1546   BP: 128/82   BP Location: Left arm   Patient Position: Sitting   Cuff Size: Standard   Pulse: 79   Resp: 16   Temp: 98 2 °F (36 8 °C)   TempSrc: Temporal   SpO2: 98%   Weight: 110 kg (243 lb)   Height: 5' 5" (1 651 m)           Physical Exam  Vitals signs reviewed  Constitutional:       Appearance: She is well-developed  HENT:      Head: Normocephalic and atraumatic  Eyes:      Conjunctiva/sclera: Conjunctivae normal       Pupils: Pupils are equal, round, and reactive to light  Neck:      Musculoskeletal: Normal range of motion and neck supple  Cardiovascular:      Rate and Rhythm: Normal rate and regular rhythm  Heart sounds: Normal heart sounds     Pulmonary:      Effort: Pulmonary effort is normal  No respiratory distress  Breath sounds: Normal breath sounds  Musculoskeletal: Normal range of motion  Skin:     General: Skin is warm  Capillary Refill: Capillary refill takes less than 2 seconds  Neurological:      General: No focal deficit present  Mental Status: She is alert and oriented to person, place, and time  Mental status is at baseline  Cranial Nerves: No cranial nerve deficit              Ofelia Wallace MD  [place] [address]

## 2021-06-03 ENCOUNTER — CLINICAL SUPPORT (OUTPATIENT)
Dept: FAMILY MEDICINE CLINIC | Facility: CLINIC | Age: 21
End: 2021-06-03
Payer: COMMERCIAL

## 2021-06-03 DIAGNOSIS — G43.109 MIGRAINE WITH AURA AND WITHOUT STATUS MIGRAINOSUS, NOT INTRACTABLE: Primary | ICD-10-CM

## 2021-06-03 RX ORDER — KETOROLAC TROMETHAMINE 30 MG/ML
30 INJECTION, SOLUTION INTRAMUSCULAR; INTRAVENOUS ONCE
Status: COMPLETED | OUTPATIENT
Start: 2021-06-03 | End: 2021-06-03

## 2021-06-03 RX ADMIN — KETOROLAC TROMETHAMINE 30 MG: 30 INJECTION, SOLUTION INTRAMUSCULAR; INTRAVENOUS at 16:01

## 2021-06-04 ENCOUNTER — TELEPHONE (OUTPATIENT)
Dept: HEMATOLOGY ONCOLOGY | Facility: CLINIC | Age: 21
End: 2021-06-04

## 2021-06-04 NOTE — TELEPHONE ENCOUNTER
Reschedule Appointment     Who is calling in Patient   Doctor Appointment Scheduled with Dr Tomas Cain date and time 06/11 at 8:20am   New date and time 06/30 at 9:20am   Location [facility]   Patient verbalized understanding    yes

## 2021-06-08 ENCOUNTER — TELEMEDICINE (OUTPATIENT)
Dept: FAMILY MEDICINE CLINIC | Facility: CLINIC | Age: 21
End: 2021-06-08
Payer: COMMERCIAL

## 2021-06-08 ENCOUNTER — HOSPITAL ENCOUNTER (OUTPATIENT)
Dept: VASCULAR ULTRASOUND | Facility: HOSPITAL | Age: 21
Discharge: HOME | End: 2021-06-08
Payer: COMMERCIAL

## 2021-06-08 ENCOUNTER — TELEPHONE (OUTPATIENT)
Dept: FAMILY MEDICINE CLINIC | Facility: CLINIC | Age: 21
End: 2021-06-08

## 2021-06-08 DIAGNOSIS — M79.662 PAIN OF LEFT CALF: Primary | ICD-10-CM

## 2021-06-08 DIAGNOSIS — M79.662 PAIN OF LEFT CALF: ICD-10-CM

## 2021-06-08 DIAGNOSIS — D68.51 FACTOR V LEIDEN MUTATION (HCC): ICD-10-CM

## 2021-06-08 PROCEDURE — 99213 OFFICE O/P EST LOW 20 MIN: CPT | Performed by: FAMILY MEDICINE

## 2021-06-08 PROCEDURE — 1036F TOBACCO NON-USER: CPT | Performed by: FAMILY MEDICINE

## 2021-06-08 PROCEDURE — 93971 EXTREMITY STUDY: CPT

## 2021-06-08 PROCEDURE — 93971 EXTREMITY STUDY: CPT | Performed by: SURGERY

## 2021-06-08 NOTE — PROGRESS NOTES
Virtual Regular Visit      Assessment/Plan:    Problem List Items Addressed This Visit        Hematopoietic and Hemostatic    Factor V Leiden mutation ([address] )      Other Visit Diagnoses     Pain of left calf    -  Primary    Relevant Orders    VAS lower limb venous duplex study, unilateral/limited        Left leg pain:  Given patient's history of factor 5 Leiden I am concerned that this might be a clot  We are sending her for stat ultrasound unfortunately the patient is unable to get to the hospital till after 3:00 p m  therefore I have personally scheduled it for her  Reason for visit is   Chief Complaint   Patient presents with    Leg Pain        Encounter provider Johnson Garcia MD    Provider located at [address] [place] [id number]      Recent Visits  Date Type Provider Dept   06/02/21 Office Visit Johnson Garcia MD [address] recent visits within past 7 days and meeting all other requirements     Today's Visits  Date Type Provider Dept   06/08/21 Telemedicine Johnson Garcia MD [address] [place] [address] Road today's visits and meeting all other requirements     Future Appointments  No visits were found meeting these conditions  Showing future appointments within next 150 days and meeting all other requirements        The patient was identified by name and date of birth  Cassidy Bull was informed that this is a telemedicine visit and that the visit is being conducted through [address] [place] [address] Now and patient was informed that this is a secure, HIPAA-compliant platform  She agrees to proceed     My office door was closed  No one else was in the room  She acknowledged consent and understanding of privacy and security of the video platform  The patient has agreed to participate and understands they can discontinue the visit at any time  Patient is aware this is a billable service       Subjective  Cassidy Bull is a 19 y o  female present with acute appointment   patient has a history of factor 5 Leiden mutation  She was taken off of anticoagulation per her hematologist   2 weeks Left calf pain, really bad sharp pain ( rochelle horse),  Warm to touch with limited range of motion secondary to pain      Past Medical History:   Diagnosis Date    Anxiety and depression     Asthma     Clotting disorder ([facility] )     Factor V Leiden ([facility] )     Pulmonary embolism ([place] [address] )     Thrombophlebitis     UTI (urinary tract infection) due to Enterococcus        Past Surgical History:   Procedure Laterality Date    BLADDER SURGERY      NEPHRECTOMY Left     URETER REVISION         Current Outpatient Medications   Medication Sig Dispense Refill    albuterol (PROVENTIL HFA,VENTOLIN HFA) 90 mcg/act inhaler Inhale 2 puffs every 6 (six) hours as needed for wheezing 1 Inhaler 2    busPIRone (BUSPAR) 10 mg tablet Take 1 tablet (10 mg total) by mouth 3 (three) times a day 270 tablet 0    butalbital-acetaminophen-caffeine (FIORICET,ESGIC) -40 mg per tablet Take 1 tablet now, and then repeat in 4 hrs if no relief  Don't take more then 3 in a week  30 tablet 0    calcium-vitamin D (OSCAL) 250-125 MG-UNIT per tablet Take 1 tablet by mouth 2 (two) times a day      hydrOXYzine HCL (ATARAX) 50 mg tablet Take 1 tablet (50 mg total) by mouth daily at bedtime 90 tablet 0    levonorgestrel (KYLEENA) 19 5 MG intrauterine device 1 each by Intrauterine route      melatonin 3 mg Take 6 mg by mouth      multivitamin (THERAGRAN) TABS Take 1 tablet by mouth daily      Probiotic Product (PROBIOTIC-10 PO) Take by mouth      sertraline (ZOLOFT) 50 mg tablet Take 3 tablets (150 mg total) by mouth daily at bedtime 90 tablet 6     No current facility-administered medications for this visit  Allergies   Allergen Reactions    Amoxicillin Anaphylaxis    Cinnamon - Food Allergy        Review of Systems   Constitutional: Negative for fatigue  Respiratory: Negative for chest tightness and shortness of breath  Cardiovascular: Negative for chest pain  Musculoskeletal:        Left leg pain         Video Exam    There were no vitals filed for this visit  Physical Exam  Constitutional:       General: She is not in acute distress  Appearance: She is well-developed  She is not ill-appearing  Pulmonary:      Effort: Pulmonary effort is normal    Musculoskeletal: Normal range of motion  Comments: Left leg tender over calf per patient   Skin:     Capillary Refill: Capillary refill takes less than 2 seconds  Neurological:      Mental Status: She is alert and oriented to person, place, and time  Psychiatric:         Behavior: Behavior normal          Thought Content: Thought content normal          Judgment: Judgment normal           I spent 15 minutes directly with the patient during this visit      [facility] acknowledges that she has consented to an online visit or consultation  She understands that the online visit is based solely on information provided by her, and that, in the absence of a face-to-face physical evaluation by the physician, the diagnosis she receives is both limited and provisional in terms of accuracy and completeness  This is not intended to replace a full medical face-to-face evaluation by the physician  Baciloi Clark understands and accepts these terms

## 2021-06-08 NOTE — TELEPHONE ENCOUNTER
Dr Monique Burch:    Originally wrote in mother's chart in error  Call came in on 6/7/2021:       Patient called stating that she feels she has a blood clot in her leg  I tried to offer her an appointment with any provider in office, patient refused and only wanted to see Dr Monique Burch  Advised patient if symptoms got worse that she needs to go to ED

## 2021-06-09 ENCOUNTER — TELEPHONE (OUTPATIENT)
Dept: FAMILY MEDICINE CLINIC | Facility: CLINIC | Age: 21
End: 2021-06-09

## 2021-06-28 ENCOUNTER — TELEPHONE (OUTPATIENT)
Dept: NEUROLOGY | Facility: CLINIC | Age: 21
End: 2021-06-28

## 2021-06-29 ENCOUNTER — TELEPHONE (OUTPATIENT)
Dept: HEMATOLOGY ONCOLOGY | Facility: CLINIC | Age: 21
End: 2021-06-29

## 2021-06-29 NOTE — TELEPHONE ENCOUNTER
Appointment Cancellation Or Reschedule     Person calling in Patient     Provider Dr Miah Dunn    Office Visit Date and Time 6/30   Office Visit Location [facility]    Did patient want to reschedule their office appointment? If so, when was it scheduled to? Yes 8/11 9am    Is this patient calling to reschedule an infusion appointment? no   Is this patient a Chemo patient? no   When is their next infusion visit? N/a    Reason for Cancellation or Reschedule Scheduling conflict      If the patient is a treatment patient, please route this to the office nurse  If the patient is not on treatment, please route to the office MA

## 2021-07-21 ENCOUNTER — OFFICE VISIT (OUTPATIENT)
Dept: GASTROENTEROLOGY | Facility: CLINIC | Age: 21
End: 2021-07-21
Payer: COMMERCIAL

## 2021-07-21 VITALS
TEMPERATURE: 96 F | DIASTOLIC BLOOD PRESSURE: 74 MMHG | HEART RATE: 77 BPM | BODY MASS INDEX: 40.89 KG/M2 | SYSTOLIC BLOOD PRESSURE: 119 MMHG | HEIGHT: 65 IN | WEIGHT: 245.4 LBS

## 2021-07-21 DIAGNOSIS — R10.13 POSTPRANDIAL EPIGASTRIC PAIN: Primary | ICD-10-CM

## 2021-07-21 DIAGNOSIS — K21.9 GASTROESOPHAGEAL REFLUX DISEASE, UNSPECIFIED WHETHER ESOPHAGITIS PRESENT: ICD-10-CM

## 2021-07-21 PROCEDURE — 99214 OFFICE O/P EST MOD 30 MIN: CPT | Performed by: PHYSICIAN ASSISTANT

## 2021-07-21 PROCEDURE — 3008F BODY MASS INDEX DOCD: CPT | Performed by: PHYSICIAN ASSISTANT

## 2021-07-21 PROCEDURE — 1036F TOBACCO NON-USER: CPT | Performed by: PHYSICIAN ASSISTANT

## 2021-07-21 RX ORDER — PANTOPRAZOLE SODIUM 40 MG/1
40 TABLET, DELAYED RELEASE ORAL DAILY
COMMUNITY
End: 2021-08-19 | Stop reason: SDUPTHER

## 2021-07-21 NOTE — ASSESSMENT & PLAN NOTE
Clinically most likely represents gastritis/peptic ulcer disease given the response to PPI therapy, may or may not be with H pylori infection; her ultrasound showed no evidence of biliary pathology although dyskinesia of the gallbladder is in the differential, if felt less likely     She also endorses more recent onset of GERD symptomatology, rule out hiatal hernia, Luis lesions, Lane's esophagus or malignancy    -will schedule patient for EGD     - patient was advised regarding risks and benefits of the procedure, risks including but not limited to infection, perforation and bleeding     - I encouraged patient to take the pantoprazole on a daily basis rather than on an as needed basis, and continue this consistently at least until her upcoming procedure, at which point we can decide about continuation verses alteration of therapy pending her clinical course and findings from the procedure     - I also advised patient regarding dietary lifestyle modification strategies for the mitigation of GERD, also advised about avoiding NSAIDs or alcohol as these are gastric irritants    -  If EGD is unremarkable and symptomatology persists despite PPI therapy, then we can consider CCK HIDA scan to evaluate for biliary dyskinesia

## 2021-07-21 NOTE — PROGRESS NOTES
Follow-up Note -  [facility]  Coni Carolina 2000 19 y o  female         Reason:    Follow-up; postprandial epigastric pain, new GERD symptoms    HPI:   To be 1year-old female with history of factor 5 Leiden and pulmonary embolism, following with Hematology and not currently on anticoagulation, who presents for follow-up; she was seen in our office with Dr Kristie Marcos in March for evaluation of postprandial epigastric pain, which was noted to be improving somewhat with recent introduction of PPI therapy  She was ordered for right upper quadrant ultrasound which was normal, she was encouraged to continue PPI therapy and follow up in a couple of months  At this time, the patient admits that she has only been using the pantoprazole sporadically, she says that she does still occasionally get stabbing epigastric pain which tends to get worse about half an hour after eating, she also says that more recently she has developed acid reflux with burning sensation in the throat, she does not specifically tie this to any time of day or with any specific aggravating factors  She says that these symptoms are better on the days that she does take the pantoprazole  She says she occasionally uses NSAIDs but not regularly  Reports social use of alcohol, denies daily use of alcohol  Minimal caffeine intake  She otherwise denies any changes to her bowel habits, any diarrhea or constipation, any blood or mucus in her stools  REVIEW OF SYSTEMS:      CONSTITUTIONAL: Denies any fever, chills, or rigors  Good appetite, and no recent weight loss  HEENT: No earache or tinnitus  Denies hearing loss or visual disturbances  CARDIOVASCULAR: No chest pain or palpitations  RESPIRATORY: Denies any cough, hemoptysis, shortness of breath or dyspnea on exertion  GASTROINTESTINAL: As noted in the History of Present Illness  GENITOURINARY: No problems with urination  Denies any hematuria or dysuria    NEUROLOGIC: No dizziness or vertigo, denies headaches  MUSCULOSKELETAL: Denies any muscle or joint pain  SKIN: Denies skin rashes or itching  ENDOCRINE: Denies excessive thirst  Denies intolerance to heat or cold  PSYCHOSOCIAL: Denies depression or anxiety  Denies any recent memory loss  Past Medical History:   Diagnosis Date    Anxiety and depression     Asthma     Clotting disorder ([place] [address] )     Factor V Leiden ([place] [address] )     Pulmonary embolism (HCC)     Thrombophlebitis     UTI (urinary tract infection) due to Enterococcus       Past Surgical History:   Procedure Laterality Date    BLADDER SURGERY      NEPHRECTOMY Left     URETER REVISION       Social History     Socioeconomic History    Marital status: Single     Spouse name: Not on file    Number of children: Not on file    Years of education: Not on file    Highest education level: Not on file   Occupational History    Not on file   Tobacco Use    Smoking status: Never Smoker    Smokeless tobacco: Never Used   Vaping Use    Vaping Use: Never used   Substance and Sexual Activity    Alcohol use: Yes     Comment: social    Drug use: Never    Sexual activity: Not on file   Other Topics Concern    Not on file   Social History Narrative    Not on file     Social Determinants of Health     Financial Resource Strain:     Difficulty of Paying Living Expenses:    Food Insecurity:     Worried About Running Out of Food in the Last Year:     Ran Out of Food in the Last Year:    Transportation Needs:     Lack of Transportation (Medical):      Lack of Transportation (Non-Medical):    Physical Activity:     Days of Exercise per Week:     Minutes of Exercise per Session:    Stress:     Feeling of Stress :    Social Connections:     Frequency of Communication with Friends and Family:     Frequency of Social Gatherings with Friends and Family:     Attends [employer] Services:     Active Member of Clubs or Organizations:     Attends Club or Organization Meetings:     Marital Status:    Intimate Partner Violence:     Fear of Current or Ex-Partner:     Emotionally Abused:     Physically Abused:     Sexually Abused:      Family History   Problem Relation Age of Onset    Hypertension Mother     Hyperlipidemia Mother     Diabetes Mother     Factor V Leiden deficiency Mother     Ovarian cancer Maternal Grandmother     Arthritis Family     Asthma Family     Cancer Family     Cervical cancer Family     Heart failure Family     Hyperlipidemia Family     Hypertension Family     Diabetes Other     Heart disease Father      Amoxicillin and Cinnamon - food allergy  Current Outpatient Medications   Medication Sig Dispense Refill    albuterol (PROVENTIL HFA,VENTOLIN HFA) 90 mcg/act inhaler Inhale 2 puffs every 6 (six) hours as needed for wheezing 1 Inhaler 2    busPIRone (BUSPAR) 10 mg tablet Take 1 tablet (10 mg total) by mouth 3 (three) times a day 270 tablet 0    butalbital-acetaminophen-caffeine (FIORICET,ESGIC) -40 mg per tablet Take 1 tablet now, and then repeat in 4 hrs if no relief  Don't take more then 3 in a week  30 tablet 0    calcium-vitamin D (OSCAL) 250-125 MG-UNIT per tablet Take 1 tablet by mouth 2 (two) times a day      hydrOXYzine HCL (ATARAX) 50 mg tablet Take 1 tablet (50 mg total) by mouth daily at bedtime 90 tablet 0    levonorgestrel (KYLEENA) 19 5 MG intrauterine device 1 each by Intrauterine route      melatonin 3 mg Take 6 mg by mouth      multivitamin (THERAGRAN) TABS Take 1 tablet by mouth daily      pantoprazole (PROTONIX) 40 mg tablet Take 40 mg by mouth daily      Probiotic Product (PROBIOTIC-10 PO) Take by mouth      sertraline (ZOLOFT) 50 mg tablet Take 3 tablets (150 mg total) by mouth daily at bedtime 90 tablet 6     No current facility-administered medications for this visit         Blood pressure 119/74, pulse 77, temperature (!) 96 °F (35 6 °C), temperature source Temporal, height 5' 5" (1 651 m), weight 111 kg (245 lb 6 4 oz)  PHYSICAL EXAM:      General Appearance:   Alert, cooperative, no distress, appears stated age    HEENT:   Normocephalic, atraumatic, anicteric      Neck:  Supple, symmetrical, trachea midline, no adenopathy;    thyroid: no enlargement/tenderness/nodules; no carotid  bruit or JVD    Lungs:   Clear to auscultation bilaterally; no rales, rhonchi or wheezing; respirations unlabored    Heart[de-identified]   S1 and S2 normal; regular rate and rhythm; no murmur, rub, or gallop  Abdomen:   Soft, non-tender, non-distended; normal bowel sounds; no masses, no organomegaly    Extremities: No edema, erythema, wounds, rashes   Rectal:   Deferred                      Lab Results   Component Value Date    WBC 6 56 09/08/2020    HGB 14 7 09/08/2020    HCT 45 3 09/08/2020    MCV 94 09/08/2020     09/08/2020     Lab Results   Component Value Date    GLUCOSE 92 11/24/2017    CALCIUM 8 2 (L) 11/30/2020     11/24/2017    K 4 3 11/30/2020    CO2 24 11/30/2020     11/30/2020    BUN 8 11/30/2020    CREATININE 0 78 11/30/2020     Lab Results   Component Value Date    ALT 23 09/08/2020    AST 13 09/08/2020    ALKPHOS 82 09/08/2020    BILITOT 0 3 11/24/2017     Lab Results   Component Value Date    INR 1 38 (H) 09/08/2020    INR 1 74 (H) 07/08/2020    PROTIME 17 1 (H) 09/08/2020    PROTIME 20 1 (H) 07/08/2020       No results found  ASSESSMENT & PLAN:    Gastroesophageal reflux disease  See "postprandial epigastric pain"    Postprandial epigastric pain  Clinically most likely represents gastritis/peptic ulcer disease given the response to PPI therapy, may or may not be with H pylori infection; her ultrasound showed no evidence of biliary pathology although dyskinesia of the gallbladder is in the differential, if felt less likely     She also endorses more recent onset of GERD symptomatology, rule out hiatal hernia, Luis lesions, Lane's esophagus or malignancy    -will schedule patient for EGD     - patient was advised regarding risks and benefits of the procedure, risks including but not limited to infection, perforation and bleeding     - I encouraged patient to take the pantoprazole on a daily basis rather than on an as needed basis, and continue this consistently at least until her upcoming procedure, at which point we can decide about continuation verses alteration of therapy pending her clinical course and findings from the procedure     - I also advised patient regarding dietary lifestyle modification strategies for the mitigation of GERD, also advised about avoiding NSAIDs or alcohol as these are gastric irritants    -  If EGD is unremarkable and symptomatology persists despite PPI therapy, then we can consider CCK HIDA scan to evaluate for biliary dyskinesia

## 2021-08-11 ENCOUNTER — OFFICE VISIT (OUTPATIENT)
Dept: HEMATOLOGY ONCOLOGY | Facility: MEDICAL CENTER | Age: 21
End: 2021-08-11
Payer: COMMERCIAL

## 2021-08-11 VITALS
RESPIRATION RATE: 16 BRPM | HEART RATE: 80 BPM | WEIGHT: 246 LBS | HEIGHT: 65 IN | TEMPERATURE: 97.9 F | BODY MASS INDEX: 40.98 KG/M2 | SYSTOLIC BLOOD PRESSURE: 122 MMHG | DIASTOLIC BLOOD PRESSURE: 78 MMHG

## 2021-08-11 DIAGNOSIS — I26.99 PULMONARY EMBOLISM, UNSPECIFIED CHRONICITY, UNSPECIFIED PULMONARY EMBOLISM TYPE, UNSPECIFIED WHETHER ACUTE COR PULMONALE PRESENT (HCC): Primary | ICD-10-CM

## 2021-08-11 PROCEDURE — 99214 OFFICE O/P EST MOD 30 MIN: CPT | Performed by: INTERNAL MEDICINE

## 2021-08-11 NOTE — PROGRESS NOTES
Eyal Carpenter  2000  [facility] [address] [address]    DISCUSSION/SUMMARY:    52-year-old female with factor 5 Leiden mutation and recent PE after surgery  Ms Miriam Wu feels well and clinically there are no concerning signs  Patient has asthma, some wheezing especially in the hot weather but no acute pulmonary issues  No lower extremity swelling or pain  Patient continues to walk 5 miles a day  We rediscussed options  After suffering the PE at [facility] (after the nephrectomy), patient was treated with Xarelto for 3 months - anticoagulation was then discontinued by the hematologist at [address]  Patient has a history of irregular menstrual periods and has a Haroldine Redo IUD in place  Drugs  com lists arterial and venous thrombotic events, PEs and DVTs as well as strokes as post marketing complications ([employer]  com/sfx/kyleena-side-effects  html#professional)  As discussed previously, patient has a number of risk factors for another thrombotic complication (obesity, factor 5 Leiden, prior PE)  I have asked the patient to return to her gynecologist [facility]) to see if there are any other options besides the Haroldine Redo  We rediscussed what to monitor for as far as lower extremity swelling, cords, pain, acute respiratory issues  Patient will call the office if she is scheduled for any invasive procedure or surgery  Patient is to return in 6 months but this may change depending upon the above  Patient will also follow up with renal/urology as directed  Patient knows to call the hematology/oncology office if there are any other questions or concerns  Carefully review your medication list and verify that the list is accurate and up-to-date   Please call the hematology/oncology office if there are medications missing from the list, medications on the list that you are not currently taking or if there is a dosage or instruction that is different from how you're taking that medication  Patient goals and areas of care: Follow up with Gynecology  Barriers to care:  None  Patient is able to self-care   ______________________________________________________________________________________    Chief Complaint   Patient presents with    Follow-up     Prior PE     Oncology History    No history exists  History of Present Illness:  78-year-old female with history of factor 5 Leiden mutation, obesity, atrophic duplex left kidney status post recent left-sided nephrectomy (05/21/2020) admitted to [facility] with chest pain and fevers  Workup demonstrated multiple pulmonary emboli  The left-sided nephrectomy was complicated by a left-sided retroperitoneal hematoma for which patient required readmission in early June 2020  Patient's mother was diagnosed with factor 5 Leiden years ago, patient was found to have the factor 5 mutation in 2017 but had never had any clotting issues  Although the specifics are not entirely clear, it is thought that the recent PE was due to immobility (from the recent surgical complications) as well as the factor 5 Leiden mutation  Patient was treated with Xarelto for approximately 3 months; discontinued by a [facility] hematologist in the outpatient setting  The plan since that time has been surveillance  Patient returns for follow-up  Ms Glenys Robertson states feeling okay, baseline  No lower extremity swelling or any acute respiratory issues  Asthma is the same as before but patient continues to be active including walking every day  No problems with excessive bruising or bleeding  No other active medical issues other than irregular menstrual periods  Patient has a [place] IUD in place - gyn issues have been stable recently  Patient has received her COVID vaccination  Review of Systems   Constitutional: Negative  HENT: Negative  Eyes: Negative  Respiratory: Negative  Cardiovascular: Negative  Gastrointestinal: Negative  Endocrine: Negative  Genitourinary: Negative  Musculoskeletal: Negative  Skin: Negative  Allergic/Immunologic: Negative  Neurological: Negative  Hematological: Negative  Psychiatric/Behavioral: Negative  All other systems reviewed and are negative      Patient Active Problem List   Diagnosis    Allergic cough    Factor V Leiden ([facility] )    Factor V Leiden mutation ([facility] )    Mild intermittent asthma with acute exacerbation    Mixed stress and urge urinary incontinence    VUR (vesicoureteric reflux)    Recurrent UTI    Overactive bladder    Kidney stone    Duplicated left renal collecting system    Chronic tonsillitis    Chronic tonsillar hypertrophy    Exercise-induced asthma    Pulmonary embolus (HCC)    S/p nephrectomy    Anxiety    Postprandial epigastric pain    Gastroesophageal reflux disease     Past Medical History:   Diagnosis Date    Anxiety and depression     Asthma     Clotting disorder ([place] [address] )     Factor V Leiden ([place] [address] )     Pulmonary embolism (HCC)     Thrombophlebitis     UTI (urinary tract infection) due to Enterococcus      Past Surgical History:   Procedure Laterality Date    BLADDER SURGERY      NEPHRECTOMY Left     URETER REVISION     Past surgical history:  No prior blood transfusions    Ob gyn:  No breast pathology, patient has an IUD in place, has menstrual periods, interval and amount of bleeding seems to be normal    Family History   Problem Relation Age of Onset    Hypertension Mother     Hyperlipidemia Mother     Diabetes Mother     Factor V Leiden deficiency Mother     Ovarian cancer Maternal Grandmother     Arthritis Family     Asthma Family     Cancer Family     Cervical cancer Family     Heart failure Family     Hyperlipidemia Family     Hypertension Family     Diabetes Other     Heart disease Father    Family history:  No children, patient's mother with factor 5 Twyla    Social History     Socioeconomic History    Marital status: Single     Spouse name: Not on file    Number of children: Not on file    Years of education: Not on file    Highest education level: Not on file   Occupational History    Not on file   Tobacco Use    Smoking status: Never Smoker    Smokeless tobacco: Never Used   Vaping Use    Vaping Use: Never used   Substance and Sexual Activity    Alcohol use: Yes     Comment: social    Drug use: Never    Sexual activity: Not on file   Other Topics Concern    Not on file   Social History Narrative    Not on file     Social Determinants of Health     Financial Resource Strain:     Difficulty of Paying Living Expenses:    Food Insecurity:     Worried About Running Out of Food in the Last Year:     [address] in the Last Year:    Transportation Needs:     Lack of Transportation (Medical):      Lack of Transportation (Non-Medical):    Physical Activity:     Days of Exercise per Week:     Minutes of Exercise per Session:    Stress:     Feeling of Stress :    Social Connections:     Frequency of Communication with Friends and Family:     Frequency of Social Gatherings with Friends and Family:     Attends Sabianism Services:     Active Member of Clubs or Organizations:     Attends Club or Organization Meetings:     Marital Status:    Intimate Partner Violence:     Fear of Current or Ex-Partner:     Emotionally Abused:     Physically Abused:     Sexually Abused:    Social history:  Patient recently graduated from [employer] 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, no tobacco or drug abuse, social alcohol, no toxic exposure    Current Outpatient Medications:     albuterol (PROVENTIL HFA,VENTOLIN HFA) 90 mcg/act inhaler, Inhale 2 puffs every 6 (six) hours as needed for wheezing, Disp: 1 Inhaler, Rfl: 2    busPIRone (BUSPAR) 10 mg tablet, Take 1 tablet (10 mg total) by mouth 3 (three) times a day, Disp: 270 tablet, Rfl: 0    butalbital-acetaminophen-caffeine (FIORICET,ESGIC) -40 mg per tablet, Take 1 tablet now, and then repeat in 4 hrs if no relief  Don't take more then 3 in a week , Disp: 30 tablet, Rfl: 0    calcium-vitamin D (OSCAL) 250-125 MG-UNIT per tablet, Take 1 tablet by mouth 2 (two) times a day, Disp: , Rfl:     hydrOXYzine HCL (ATARAX) 50 mg tablet, Take 1 tablet (50 mg total) by mouth daily at bedtime, Disp: 90 tablet, Rfl: 0    levonorgestrel (KYLEENA) 19 5 MG intrauterine device, 1 each by Intrauterine route, Disp: , Rfl:     melatonin 3 mg, Take 6 mg by mouth, Disp: , Rfl:     multivitamin (THERAGRAN) TABS, Take 1 tablet by mouth daily, Disp: , Rfl:     pantoprazole (PROTONIX) 40 mg tablet, Take 40 mg by mouth daily, Disp: , Rfl:     Probiotic Product (PROBIOTIC-10 PO), Take by mouth, Disp: , Rfl:     sertraline (ZOLOFT) 50 mg tablet, Take 3 tablets (150 mg total) by mouth daily at bedtime, Disp: 90 tablet, Rfl: 6    Allergies   Allergen Reactions    Amoxicillin Anaphylaxis    Cinnamon - Food Allergy      Vitals:    08/11/21 0857   BP: 122/78   Pulse: 80   Resp: 16   Temp: 97 9 °F (36 6 °C)     Physical Exam  Constitutional:       Appearance: She is well-developed  HENT:      Head: Normocephalic and atraumatic  Right Ear: External ear normal       Left Ear: External ear normal    Eyes:      Conjunctiva/sclera: Conjunctivae normal       Pupils: Pupils are equal, round, and reactive to light  Cardiovascular:      Rate and Rhythm: Normal rate and regular rhythm  Heart sounds: Normal heart sounds  Pulmonary:      Effort: Pulmonary effort is normal       Breath sounds: Normal breath sounds  Comments: Good air entry bilaterally, clear  Abdominal:      General: Bowel sounds are normal       Palpations: Abdomen is soft        Comments: Soft, nontender, obese, cannot palpate liver or spleen, well-healed suture line in umbilicus and left lower quadrant   Musculoskeletal: General: Normal range of motion  Cervical back: Normal range of motion and neck supple  Skin:     General: Skin is warm  Comments: Warm, moist, good color, no petechiae or ecchymoses   Neurological:      Mental Status: She is alert and oriented to person, place, and time  Deep Tendon Reflexes: Reflexes are normal and symmetric  Psychiatric:         Behavior: Behavior normal          Thought Content: Thought content normal          Judgment: Judgment normal      Extremities:  No lower extremity edema bilaterally, no cords, pulses are 1+  Lymphatics:  No adenopathy in the neck, supraclavicular region or axilla bilaterally    Labs    07/31/2020 WBC = 9 8 hemoglobin = 13 3 hematocrit = 40 7 MCV = 94 platelet = 416 neutrophil = 74% lymphocytes = 20% monocyte = 5% BUN = 10 creatinine = 0 7 calcium = 9 5  07/31/2020 serum pregnancy = negative    07/08/2020 rapid drug screen, urine:  Benzodiazepine positive    Imaging    07/31/2020 CTA chest    1   Markedly decreased burden of pulmonary embolism in the right  lower lobe segmental arteries, now nonocclusive and mostly wall  adherent  2   Improved right lower lobe opacities with residual posterior  dependent consolidation, consistent with pulmonary infarct     06/19/2020 lower extremity Doppler    Sluggish flow and meniscus appearance of the greater saphenous  vein and common femoral vein was discussed with and acknowledged  by Dr Hosea Mckeon by telephone by Dr Vicki Thomas on 6/19/2020  12:14 PM     07/08/2020 CT scan head without contrast   Impression stated normal head CT     06/18/2020 CTA chest    1   Pulmonary embolism at the level of the right interlobar  pulmonary artery with likely associated pulmonary infarction  2   Postsurgical findings in the left renal fossa include fluid  and gas      Pathology    07/31/2020 D-dimer = 0 475    06/23/2020 thrombophilia evaluation ([facility])  Factor 2 = 140 %  Factor 5 = 120%  Factor 7 = 81%  Factor 9 = 171% = elevated  Factor 10 = 134%  Factor 8 = 385% = elevated  Protein C functional = 92%  Protein S functional = 100%  DRVVT screen = 56 7 seconds = elevated  DRVVT confirm = 1 1 = WNL  Antithrombin 3 functional = 94%  Prothrombin gene mutation negative    06/02/2020 PT = 18 6 INR = 1 56 PTT = 27 2    08/14/2017 factor 5 Leiden mutation heterozygous [id number]    Surgical Pathology CaseResulted: 5/28/2020 5:05 PM  [facility] [address] [facility]  Component Name Value Ref Range   Pathology Surgical Left kidney and ureter, nephrectomy and total ureterectomy:  - 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, 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) with segmental   interstitial fibrosis, chronic inflammation, and calcification  - Nephrolithiasis  - Duplicated collecting system with mild chronic inflammation  Microscopic Description:  Microscopic sections of the kidney show normally developed renal parenchyma with   focal areas of interstitial fibrosis and calcification of the renal papillae     There is chronic inflammation and denuded urothelial mucosa at the renal pelvis  The lower pole contains areas of scar with cystic tubules containing   eosinophilic proteinaceous material and vessels with fibrointimal hypertrophy     The ureters are duplicated along their entire length and show patchy mild   chronic inflammation of the urothelial mucosa

## 2021-08-19 ENCOUNTER — OFFICE VISIT (OUTPATIENT)
Dept: FAMILY MEDICINE CLINIC | Facility: CLINIC | Age: 21
End: 2021-08-19
Payer: COMMERCIAL

## 2021-08-19 VITALS
TEMPERATURE: 98 F | SYSTOLIC BLOOD PRESSURE: 110 MMHG | WEIGHT: 245 LBS | RESPIRATION RATE: 14 BRPM | BODY MASS INDEX: 40.82 KG/M2 | HEART RATE: 78 BPM | HEIGHT: 65 IN | DIASTOLIC BLOOD PRESSURE: 78 MMHG

## 2021-08-19 DIAGNOSIS — D68.51 FACTOR V LEIDEN MUTATION (HCC): ICD-10-CM

## 2021-08-19 DIAGNOSIS — G47.00 INSOMNIA, UNSPECIFIED TYPE: ICD-10-CM

## 2021-08-19 DIAGNOSIS — F41.8 DEPRESSION WITH ANXIETY: ICD-10-CM

## 2021-08-19 DIAGNOSIS — Z86.711 HX OF PULMONARY EMBOLUS: ICD-10-CM

## 2021-08-19 DIAGNOSIS — K21.9 GASTROESOPHAGEAL REFLUX DISEASE, UNSPECIFIED WHETHER ESOPHAGITIS PRESENT: Primary | ICD-10-CM

## 2021-08-19 PROCEDURE — 99214 OFFICE O/P EST MOD 30 MIN: CPT | Performed by: FAMILY MEDICINE

## 2021-08-19 RX ORDER — PANTOPRAZOLE SODIUM 40 MG/1
40 TABLET, DELAYED RELEASE ORAL DAILY
Qty: 90 TABLET | Refills: 1 | Status: SHIPPED | OUTPATIENT
Start: 2021-08-19 | End: 2021-09-29 | Stop reason: SDUPTHER

## 2021-08-19 RX ORDER — HYDROXYZINE 50 MG/1
50 TABLET, FILM COATED ORAL
Qty: 90 TABLET | Refills: 0 | Status: SHIPPED | OUTPATIENT
Start: 2021-08-19 | End: 2021-12-17 | Stop reason: SDUPTHER

## 2021-08-19 NOTE — PROGRESS NOTES
Nathan Jiménez 2000 female MRN: [id number]    [address] [place] [address]      ASSESSMENT/PLAN  Nathan Jiménez is a 19 y o  female presents to the office for    1  Gastroesophageal reflux disease, unspecified whether esophagitis present   continue on Protonix till endoscopy is performed  - pantoprazole (PROTONIX) 40 mg tablet; Take 1 tablet (40 mg total) by mouth daily  Dispense: 90 tablet; Refill: 1    2  Depression with anxiety    Refilled Atarax  Recommend that the patient take this with melatonin to see if there is improvement with insomnia  - hydrOXYzine HCL (ATARAX) 50 mg tablet; Take 1 tablet (50 mg total) by mouth daily at bedtime  Dispense: 90 tablet; Refill: 0    3  Insomnia, unspecified type      4  Hx of pulmonary embolus with a history of factor 5 Leiden    Currently not on any anticoagulation per Hematology; given she has been very well controlled  Return to the office in 6 months              Future Appointments   Date Time Provider Renetta Zayas   8/24/2021  4:00 PM Anton Bautista [facility]   9/29/2021 11:30 AM [address]   2/10/2022  8:40 AM Cherry Valencia MD [facility]          SUBJECTIVE  CC: Follow-up (med renewal hydroxyzine and prtonix )      HPI:  Nathan Jiménez is a 19 y o  female who presents for  A follow-up appointment  Patient would like a renewal of her hydroxyzine to Protonix  Patient states that she does not believe that the hydroxyzine is fully helping her sleep  Has not been taking melatonin with it  Patient is scheduled for an endoscopy  Was advised to continue the Protonix till the endoscopy on September 29th  Patient is scheduled to see her neurologist for her migraines  Patient just recently saw her  Hematologist who advised her that she does not need to be on blood thinners given that she has been stable    Patient is taking all her medications as prescribed without any difficulties  Denies any other concerns today        Review of Systems   Constitutional: Negative for activity change, appetite change, chills, fatigue and fever  HENT: Negative for congestion  Respiratory: Negative for cough, chest tightness and shortness of breath  Cardiovascular: Negative for chest pain and leg swelling  Gastrointestinal: Negative for abdominal distention, abdominal pain, constipation, diarrhea, nausea and vomiting  Acid reflux   Psychiatric/Behavioral: Positive for sleep disturbance  All other systems reviewed and are negative  Historical Information   The patient history was reviewed as follows:  Past Medical History:   Diagnosis Date    Anxiety and depression     Asthma     Clotting disorder ([place] [address] )     Factor V Leiden ([place] [address] )     Pulmonary embolism (HCC)     Thrombophlebitis     UTI (urinary tract infection) due to Enterococcus          Medications:     Current Outpatient Medications:     albuterol (PROVENTIL HFA,VENTOLIN HFA) 90 mcg/act inhaler, Inhale 2 puffs every 6 (six) hours as needed for wheezing, Disp: 1 Inhaler, Rfl: 2    butalbital-acetaminophen-caffeine (FIORICET,ESGIC) -40 mg per tablet, Take 1 tablet now, and then repeat in 4 hrs if no relief   Don't take more then 3 in a week , Disp: 30 tablet, Rfl: 0    calcium-vitamin D (OSCAL) 250-125 MG-UNIT per tablet, Take 1 tablet by mouth 2 (two) times a day, Disp: , Rfl:     hydrOXYzine HCL (ATARAX) 50 mg tablet, Take 1 tablet (50 mg total) by mouth daily at bedtime, Disp: 90 tablet, Rfl: 0    levonorgestrel (KYLEENA) 19 5 MG intrauterine device, 1 each by Intrauterine route, Disp: , Rfl:     multivitamin (THERAGRAN) TABS, Take 1 tablet by mouth daily, Disp: , Rfl:     Probiotic Product (PROBIOTIC-10 PO), Take by mouth, Disp: , Rfl:     sertraline (ZOLOFT) 50 mg tablet, Take 3 tablets (150 mg total) by mouth daily at bedtime, Disp: 90 tablet, Rfl: 6   busPIRone (BUSPAR) 10 mg tablet, Take 1 tablet (10 mg total) by mouth 3 (three) times a day, Disp: 270 tablet, Rfl: 0    melatonin 3 mg, Take 6 mg by mouth (Patient not taking: Reported on 8/19/2021), Disp: , Rfl:     pantoprazole (PROTONIX) 40 mg tablet, Take 1 tablet (40 mg total) by mouth daily, Disp: 90 tablet, Rfl: 1    Allergies   Allergen Reactions    Amoxicillin Anaphylaxis    Cinnamon - Food Allergy        OBJECTIVE  Vitals:   Vitals:    08/19/21 0959   BP: 110/78   BP Location: Left arm   Patient Position: Sitting   Cuff Size: Adult   Pulse: 78   Resp: 14   Temp: 98 °F (36 7 °C)   TempSrc: Temporal   Weight: 111 kg (245 lb)   Height: 5' 5" (1 651 m)         Physical Exam  Vitals reviewed  Constitutional:       Appearance: She is well-developed  HENT:      Head: Normocephalic and atraumatic  Eyes:      Conjunctiva/sclera: Conjunctivae normal       Pupils: Pupils are equal, round, and reactive to light  Cardiovascular:      Rate and Rhythm: Normal rate and regular rhythm  Heart sounds: Normal heart sounds  Pulmonary:      Effort: Pulmonary effort is normal  No respiratory distress  Breath sounds: Normal breath sounds  Musculoskeletal:         General: Normal range of motion  Cervical back: Normal range of motion and neck supple  Skin:     General: Skin is warm  Capillary Refill: Capillary refill takes less than 2 seconds  Neurological:      Mental Status: She is alert and oriented to person, place, and time     Psychiatric:         Mood and Affect: Mood normal                     Jose Bravo MD,   [facility]  8/19/2021

## 2021-08-24 ENCOUNTER — CONSULT (OUTPATIENT)
Dept: NEUROLOGY | Facility: CLINIC | Age: 21
End: 2021-08-24
Payer: COMMERCIAL

## 2021-08-24 VITALS
DIASTOLIC BLOOD PRESSURE: 70 MMHG | HEART RATE: 105 BPM | SYSTOLIC BLOOD PRESSURE: 110 MMHG | WEIGHT: 246.6 LBS | HEIGHT: 65 IN | BODY MASS INDEX: 41.09 KG/M2

## 2021-08-24 DIAGNOSIS — G43.109 MIGRAINE WITH AURA AND WITHOUT STATUS MIGRAINOSUS, NOT INTRACTABLE: Primary | ICD-10-CM

## 2021-08-24 PROCEDURE — 99204 OFFICE O/P NEW MOD 45 MIN: CPT | Performed by: PSYCHIATRY & NEUROLOGY

## 2021-08-24 PROCEDURE — 3008F BODY MASS INDEX DOCD: CPT | Performed by: PSYCHIATRY & NEUROLOGY

## 2021-08-24 PROCEDURE — 1036F TOBACCO NON-USER: CPT | Performed by: PSYCHIATRY & NEUROLOGY

## 2021-08-24 RX ORDER — SUMATRIPTAN 100 MG/1
100 TABLET, FILM COATED ORAL ONCE AS NEEDED
Qty: 15 TABLET | Refills: 0 | Status: SHIPPED | OUTPATIENT
Start: 2021-08-24 | End: 2022-06-27

## 2021-08-24 RX ORDER — TOPIRAMATE 50 MG/1
50 TABLET, FILM COATED ORAL
Qty: 90 TABLET | Refills: 0 | Status: SHIPPED | OUTPATIENT
Start: 2021-08-24 | End: 2021-12-01

## 2021-08-24 NOTE — PROGRESS NOTES
Patient ID: Deberah Hashimoto is a 19 y o  female  Assessment/Plan:    Migraine with aura and without status migrainosus, not intractable  Deberah Hashimoto is a 19 y o  female who presents for conultation for  worsening migraines over the last 1 5 years  While the patient reports that she gets a headache every couple of weeks, the duration of her headaches are significant as they can last up to a week and a half  She has been prescribed Fioricet but this only helps if taken right away and headache comes back the next day  No family history of migraines  The patient is on contraceptives, and has a history of factor V Leiden deficiency which does predispose her to thrombi formation and headaches could be due to sinus thrombosis but the timeline does not fit this diagnosis  The patient is obese and this does raise concern for idiopathic intracranial hypertension as a possible cause of her headaches  Plan  - Start topamax 50mg daily  - Start Sumatriptan for abortive therapy  - MRI Brain       Diagnoses and all orders for this visit:    Migraine with aura and without status migrainosus, not intractable  -     Ambulatory referral to Neurology  -     SUMAtriptan (IMITREX) 100 mg tablet; Take 1 tablet (100 mg total) by mouth once as needed for migraine for up to 15 doses May repeat one dose 6 hours later, but no more 3 days a week  -     topiramate (TOPAMAX) 50 MG tablet; Take 1 tablet (50 mg total) by mouth daily at bedtime  -     Ambulatory referral to Physical Therapy; Future  -     MRI brain without contrast; Future         Subjective:  Deberah Hashimoto is a 19 y o  female with PMHx of factor V Leiden deficiency and PE (not currently on [facility]) who presents for consultation for migraines  Of note the patient reports that she sees double but that this has been present her whole life  Pt reports that she has experienced worsening migraines over the last 1 5 years   She reports that they are associated with pain bilaterally in her temple that then shoots to the back of her head  Described as a dull pain at first and becomes a sharper pain as it worsens  The patient reports that her headaches are also associated with black spot, nausea, sleepiness, light sensitivity, dizziness, and photophobia  The patient reports that she gets a headache every couple of weeks, however her headaches can last up to a week and a half  Sleep does not help, as the last time she had one she slept for 18 hours straight and it did not help her  She has been prescribed Fioricet but this only helps if taken right away and headache comes back the next day  No family history of migraines  works as a   The following portions of the patient's history were reviewed and updated as appropriate: allergies, current medications, past family history, past medical history, past social history, past surgical history and problem list     Ros reviewed and as per below  Objective:    Blood pressure 110/70, pulse 105, height 5' 5" (1 651 m), weight 112 kg (246 lb 9 6 oz)  Physical Exam  Vitals and nursing note reviewed  Constitutional:       General: She is not in acute distress  Appearance: She is obese  She is not ill-appearing, toxic-appearing or diaphoretic  HENT:      Head: Normocephalic and atraumatic  Nose: Nose normal       Mouth/Throat:      Mouth: Mucous membranes are moist       Pharynx: Oropharynx is clear  No oropharyngeal exudate  Eyes:      General: Lids are normal  No scleral icterus  Right eye: No discharge  Left eye: No discharge  Extraocular Movements: Extraocular movements intact  Pupils: Pupils are equal, round, and reactive to light  Cardiovascular:      Rate and Rhythm: Normal rate  Pulses: Normal pulses  Pulmonary:      Effort: Pulmonary effort is normal    Abdominal:      General: Abdomen is flat  Musculoskeletal:         General: No swelling or deformity     Skin: General: Skin is warm and moist    Psychiatric:         Mood and Affect: Mood normal          Speech: Speech normal          Behavior: Behavior normal          Neurological Exam  Mental Status  Awake, alert and oriented to person, place and time  Recent and remote memory are intact  Speech is normal  Language is fluent with no aphasia  Attention and concentration are normal     Cranial Nerves  CN II: Visual acuity is normal  Visual fields full to confrontation  CN III, IV, VI: Extraocular movements intact bilaterally  Normal lids and orbits bilaterally  Pupils equal round and reactive to light bilaterally  CN V: Facial sensation is normal   CN VII: Full and symmetric facial movement  CN VIII: Hearing is normal   CN IX, X: Palate elevates symmetrically  Normal gag reflex  CN XI: Shoulder shrug strength is normal   CN XII: Tongue midline without atrophy or fasciculations  Motor  Normal muscle bulk throughout  Normal muscle tone  No abnormal involuntary movements  Strength is 5/5 in all four extremities except as noted  Sensory  Sensation is intact to light touch, pinprick, vibration and proprioception in all four extremities  Reflexes  Deep tendon reflexes are 2+ and symmetric except as noted  Coordination  Right: Finger-to-nose normal  Heel-to-shin normal   Left: Finger-to-nose normal  Heel-to-shin normal         ROS:    Review of Systems   Constitutional: Negative  Negative for appetite change and fever  HENT: Negative  Negative for hearing loss, tinnitus, trouble swallowing and voice change  Eyes: Negative  Negative for photophobia and pain  Respiratory: Negative  Negative for shortness of breath  Cardiovascular: Negative  Negative for palpitations  Gastrointestinal: Negative  Negative for nausea and vomiting  Endocrine: Negative  Negative for cold intolerance  Genitourinary: Negative  Negative for dysuria, frequency and urgency     Musculoskeletal: Positive for myalgias and neck pain  Skin: Negative  Negative for rash  Neurological: Positive for dizziness, light-headedness (Sometimes) and headaches  Negative for tremors, seizures, syncope, facial asymmetry, speech difficulty, weakness and numbness  Hematological: Bruises/bleeds easily (Bruises easily)  Psychiatric/Behavioral: Positive for sleep disturbance  Negative for confusion and hallucinations

## 2021-08-25 NOTE — ASSESSMENT & PLAN NOTE
Stacey Arellano is a 19 y o  female who presents for conultation for  worsening migraines over the last 1 5 years  While the patient reports that she gets a headache every couple of weeks, the duration of her headaches are significant as they can last up to a week and a half  She has been prescribed Fioricet but this only helps if taken right away and headache comes back the next day  No family history of migraines  The patient is on contraceptives, and has a history of factor V Leiden deficiency which does predispose her to thrombi formation and headaches could be due to sinus thrombosis but the timeline does not fit this diagnosis  The patient is obese and this does raise concern for idiopathic intracranial hypertension as a possible cause of her headaches       Plan  - Start topamax 50mg daily  - Start Sumatriptan for abortive therapy  - MRI Brain

## 2021-09-09 ENCOUNTER — HOSPITAL ENCOUNTER (OUTPATIENT)
Dept: RADIOLOGY | Facility: HOSPITAL | Age: 21
Discharge: HOME | End: 2021-09-09
Payer: COMMERCIAL

## 2021-09-09 DIAGNOSIS — G43.109 MIGRAINE WITH AURA AND WITHOUT STATUS MIGRAINOSUS, NOT INTRACTABLE: ICD-10-CM

## 2021-09-09 PROCEDURE — 70551 MRI BRAIN STEM W/O DYE: CPT

## 2021-09-09 PROCEDURE — G1004 CDSM NDSC: HCPCS

## 2021-09-14 ENCOUNTER — TELEPHONE (OUTPATIENT)
Dept: NEUROLOGY | Facility: CLINIC | Age: 21
End: 2021-09-14

## 2021-09-14 NOTE — TELEPHONE ENCOUNTER
----- Message from Ronald Craig DO sent at 9/14/2021  7:59 AM EDT -----  Would you please call the patient and let her know that the MRI was normal which is expected and we will follow up with her regarding her migraines at the next visit    ----- Message -----  From: Annette, Radiology Results In  Sent: 9/13/2021  12:50 PM EDT  To: Ronald Craig DO

## 2021-09-16 NOTE — TELEPHONE ENCOUNTER
Called patient, made her aware of MRI results   Patient verbalized understanding and denies further questions

## 2021-09-23 ENCOUNTER — TELEPHONE (OUTPATIENT)
Dept: PREADMISSION TESTING | Facility: HOSPITAL | Age: 21
End: 2021-09-23

## 2021-09-25 ENCOUNTER — TELEMEDICINE (OUTPATIENT)
Dept: FAMILY MEDICINE CLINIC | Facility: CLINIC | Age: 21
End: 2021-09-25
Payer: COMMERCIAL

## 2021-09-25 ENCOUNTER — OFFICE VISIT (OUTPATIENT)
Dept: FAMILY MEDICINE CLINIC | Facility: CLINIC | Age: 21
End: 2021-09-25
Payer: COMMERCIAL

## 2021-09-25 ENCOUNTER — TELEPHONE (OUTPATIENT)
Dept: FAMILY MEDICINE CLINIC | Facility: CLINIC | Age: 21
End: 2021-09-25

## 2021-09-25 VITALS
SYSTOLIC BLOOD PRESSURE: 100 MMHG | DIASTOLIC BLOOD PRESSURE: 62 MMHG | OXYGEN SATURATION: 97 % | WEIGHT: 242.2 LBS | BODY MASS INDEX: 40.35 KG/M2 | HEART RATE: 99 BPM | HEIGHT: 65 IN | TEMPERATURE: 97.8 F | RESPIRATION RATE: 16 BRPM

## 2021-09-25 DIAGNOSIS — J06.9 URI, ACUTE: Primary | ICD-10-CM

## 2021-09-25 DIAGNOSIS — J06.9 UPPER RESPIRATORY TRACT INFECTION, UNSPECIFIED TYPE: Primary | ICD-10-CM

## 2021-09-25 PROCEDURE — 1036F TOBACCO NON-USER: CPT | Performed by: FAMILY MEDICINE

## 2021-09-25 PROCEDURE — 99213 OFFICE O/P EST LOW 20 MIN: CPT | Performed by: FAMILY MEDICINE

## 2021-09-25 PROCEDURE — 3008F BODY MASS INDEX DOCD: CPT | Performed by: FAMILY MEDICINE

## 2021-09-25 NOTE — TELEPHONE ENCOUNTER
Pt mother called upset that the pt was told to take tylenol for her URI  She stated pt has medical issues

## 2021-09-25 NOTE — TELEPHONE ENCOUNTER
D/w pt and her mother -  was offered in office saida for reeval -  pt is at work until 12 pm - was advised to go to [facility] after office is closed

## 2021-09-25 NOTE — PROGRESS NOTES
Virtual Brief Visit    Verification of patient location:    Patient is located in the following state in which I hold an active license [place]      Assessment/Plan:    Problem List Items Addressed This Visit     None      Visit Diagnoses     URI, acute    -  Primary      was offered  COVID test -  Declined     OTC Tylenol/Motrin     rto prn           Reason for visit is   Chief Complaint   Patient presents with    URI        Encounter provider Dusty Tate MD    Provider located at [address] [place] [id number]    Recent Visits  No visits were found meeting these conditions  Showing recent visits within past 7 days and meeting all other requirements  Today's Visits  Date Type Provider Dept   09/25/21 Telemedicine Dusty Tate  [facility] today's visits and meeting all other requirements  Future Appointments  No visits were found meeting these conditions  Showing future appointments within next 150 days and meeting all other requirements       After connecting through Telephone, the patient was identified by name and date of birth  Cathy White was informed that this is a telemedicine visit and that the visit is being conducted through Telephone  My office door was closed  No one else was in the room  She acknowledged consent and understanding of privacy and security of the platform  The patient has agreed to participate and understands she can discontinue the visit at any time  Patient is aware this is a billable service  Subjective    Cathy White is a 19 y o  female   URI   This is a new problem  The current episode started yesterday  The problem has been gradually worsening  There has been no fever  The fever has been present for less than 1 day  Associated symptoms include congestion, rhinorrhea and a sore throat   Pertinent negatives include no abdominal pain, chest pain, coughing, diarrhea, dysuria, ear pain, headaches, joint pain, joint swelling, nausea, neck pain, plugged ear sensation, rash, sinus pain, sneezing, swollen glands, vomiting or wheezing  She has tried nothing for the symptoms  The treatment provided no relief  fully vaccinated with COVID vaccine, no COVID exposure    Past Medical History:   Diagnosis Date    Anxiety and depression     Asthma     Clotting disorder (HCC)     Factor V Leiden ([address] )     Pulmonary embolism (HCC)     Thrombophlebitis     UTI (urinary tract infection) due to Enterococcus        Past Surgical History:   Procedure Laterality Date    BLADDER SURGERY      NEPHRECTOMY Left     URETER REVISION         Current Outpatient Medications   Medication Sig Dispense Refill    albuterol (PROVENTIL HFA,VENTOLIN HFA) 90 mcg/act inhaler Inhale 2 puffs every 6 (six) hours as needed for wheezing 1 Inhaler 2    busPIRone (BUSPAR) 10 mg tablet Take 1 tablet (10 mg total) by mouth 3 (three) times a day 270 tablet 0    butalbital-acetaminophen-caffeine (FIORICET,ESGIC) -40 mg per tablet Take 1 tablet now, and then repeat in 4 hrs if no relief  Don't take more then 3 in a week   30 tablet 0    calcium-vitamin D (OSCAL) 250-125 MG-UNIT per tablet Take 1 tablet by mouth 2 (two) times a day      hydrOXYzine HCL (ATARAX) 50 mg tablet Take 1 tablet (50 mg total) by mouth daily at bedtime 90 tablet 0    levonorgestrel (KYLEENA) 19 5 MG intrauterine device 1 each by Intrauterine route      melatonin 3 mg Take 6 mg by mouth (Patient not taking: Reported on 8/19/2021)      multivitamin (THERAGRAN) TABS Take 1 tablet by mouth daily      pantoprazole (PROTONIX) 40 mg tablet Take 1 tablet (40 mg total) by mouth daily 90 tablet 1    Probiotic Product (PROBIOTIC-10 PO) Take by mouth      sertraline (ZOLOFT) 50 mg tablet Take 3 tablets (150 mg total) by mouth daily at bedtime 90 tablet 6    SUMAtriptan (IMITREX) 100 mg tablet Take 1 tablet (100 mg total) by mouth once as needed for migraine for up to 15 doses May repeat one dose 6 hours later, but no more 3 days a week  15 tablet 0    topiramate (TOPAMAX) 50 MG tablet Take 1 tablet (50 mg total) by mouth daily at bedtime 90 tablet 0     No current facility-administered medications for this visit  Allergies   Allergen Reactions    Amoxicillin Anaphylaxis    Cinnamon - Food Allergy        Review of Systems   HENT: Positive for congestion, rhinorrhea and sore throat  Negative for ear pain, sinus pain and sneezing  Respiratory: Negative for cough and wheezing  Cardiovascular: Negative for chest pain  Gastrointestinal: Negative for abdominal pain, diarrhea, nausea and vomiting  Genitourinary: Negative for dysuria  Musculoskeletal: Negative for joint pain and neck pain  Skin: Negative for rash  Neurological: Negative for headaches  There were no vitals filed for this visit  I spent 5 minutes directly with the patient during this visit    [address] verbally agrees to participate in [employer]  Pt is aware that [employer] could be limited without vital signs or the ability to perform a full hands-on physical Vipin Toro understands she or the provider may request at any time to terminate the video visit and request the patient to seek care or treatment in person

## 2021-09-25 NOTE — PROGRESS NOTES
Chief Complaint   Patient presents with    Cough     pt c/o productive cough and says i hurts to breath    Sore Throat        Patient ID: Avtar Vasquez is a 19 y o  female  URI   This is a new problem  The current episode started yesterday  The problem has been unchanged  There has been no fever  The fever has been present for less than 1 day  Associated symptoms include congestion, coughing, rhinorrhea and a sore throat  Pertinent negatives include no abdominal pain, chest pain, diarrhea, dysuria, ear pain, headaches, joint pain, joint swelling, nausea, neck pain, plugged ear sensation, rash, sinus pain, sneezing, swollen glands, vomiting or wheezing  She has tried nothing for the symptoms  The treatment provided no relief  fully vaccinated with COVID vaccines -  Works in  -  Was exposed to RSV ( 2 children )       The following portions of the patient's history were reviewed and updated as appropriate: allergies, current medications, past family history, past medical history, past social history, past surgical history and problem list     Review of Systems   HENT: Positive for congestion, rhinorrhea and sore throat  Negative for ear pain, sinus pain and sneezing  Respiratory: Positive for cough  Negative for wheezing  Cardiovascular: Negative for chest pain  Gastrointestinal: Negative for abdominal pain, diarrhea, nausea and vomiting  Genitourinary: Negative for dysuria  Musculoskeletal: Negative for joint pain and neck pain  Skin: Negative for rash  Neurological: Negative for headaches  Current Outpatient Medications   Medication Sig Dispense Refill    albuterol (PROVENTIL HFA,VENTOLIN HFA) 90 mcg/act inhaler Inhale 2 puffs every 6 (six) hours as needed for wheezing 1 Inhaler 2    butalbital-acetaminophen-caffeine (FIORICET,ESGIC) -40 mg per tablet Take 1 tablet now, and then repeat in 4 hrs if no relief  Don't take more then 3 in a week   30 tablet 0    calcium-vitamin D (OSCAL) 250-125 MG-UNIT per tablet Take 1 tablet by mouth 2 (two) times a day      hydrOXYzine HCL (ATARAX) 50 mg tablet Take 1 tablet (50 mg total) by mouth daily at bedtime 90 tablet 0    levonorgestrel (KYLEENA) 19 5 MG intrauterine device 1 each by Intrauterine route      multivitamin (THERAGRAN) TABS Take 1 tablet by mouth daily      pantoprazole (PROTONIX) 40 mg tablet Take 1 tablet (40 mg total) by mouth daily 90 tablet 1    Probiotic Product (PROBIOTIC-10 PO) Take by mouth      SUMAtriptan (IMITREX) 100 mg tablet Take 1 tablet (100 mg total) by mouth once as needed for migraine for up to 15 doses May repeat one dose 6 hours later, but no more 3 days a week  15 tablet 0    topiramate (TOPAMAX) 50 MG tablet Take 1 tablet (50 mg total) by mouth daily at bedtime 90 tablet 0    busPIRone (BUSPAR) 10 mg tablet Take 1 tablet (10 mg total) by mouth 3 (three) times a day 270 tablet 0    melatonin 3 mg Take 6 mg by mouth (Patient not taking: Reported on 8/19/2021)      sertraline (ZOLOFT) 50 mg tablet Take 3 tablets (150 mg total) by mouth daily at bedtime 90 tablet 6     No current facility-administered medications for this visit  Objective:    /62 (BP Location: Left arm, Patient Position: Sitting, Cuff Size: Large)   Pulse 99   Temp 97 8 °F (36 6 °C)   Resp 16   Ht 5' 5" (1 651 m)   Wt 110 kg (242 lb 3 2 oz)   LMP 07/27/2021 (LMP Unknown)   SpO2 97%   BMI 40 30 kg/m²        Physical Exam  Constitutional:       Appearance: She is obese  She is not ill-appearing  HENT:      Right Ear: Tympanic membrane and ear canal normal       Left Ear: Tympanic membrane and ear canal normal       Nose: Congestion present  Mouth/Throat:      Pharynx: Posterior oropharyngeal erythema (minimal posterior pharynx ) present  No oropharyngeal exudate  Cardiovascular:      Rate and Rhythm: Normal rate and regular rhythm  Heart sounds: No murmur heard       Pulmonary:      Effort: Pulmonary effort is normal  No respiratory distress  Breath sounds: No wheezing, rhonchi or rales  Neurological:      Mental Status: She is alert                   Assessment/Plan:         Diagnoses and all orders for this visit:    Upper respiratory tract infection, unspecified type        Supportive care     rto prmorena Mohan MD

## 2021-09-29 ENCOUNTER — HOSPITAL ENCOUNTER (OUTPATIENT)
Dept: GASTROENTEROLOGY | Facility: AMBULARY SURGERY CENTER | Age: 21
Setting detail: OUTPATIENT SURGERY
Discharge: HOME | End: 2021-09-29
Attending: INTERNAL MEDICINE | Admitting: INTERNAL MEDICINE
Payer: COMMERCIAL

## 2021-09-29 ENCOUNTER — ANESTHESIA EVENT (OUTPATIENT)
Dept: GASTROENTEROLOGY | Facility: AMBULARY SURGERY CENTER | Age: 21
End: 2021-09-29

## 2021-09-29 ENCOUNTER — ANESTHESIA (OUTPATIENT)
Dept: GASTROENTEROLOGY | Facility: AMBULARY SURGERY CENTER | Age: 21
End: 2021-09-29

## 2021-09-29 VITALS
TEMPERATURE: 97.5 F | HEART RATE: 77 BPM | DIASTOLIC BLOOD PRESSURE: 59 MMHG | SYSTOLIC BLOOD PRESSURE: 123 MMHG | OXYGEN SATURATION: 99 % | RESPIRATION RATE: 18 BRPM

## 2021-09-29 DIAGNOSIS — R10.13 POSTPRANDIAL EPIGASTRIC PAIN: ICD-10-CM

## 2021-09-29 DIAGNOSIS — K21.9 GASTROESOPHAGEAL REFLUX DISEASE, UNSPECIFIED WHETHER ESOPHAGITIS PRESENT: ICD-10-CM

## 2021-09-29 PROCEDURE — 43239 EGD BIOPSY SINGLE/MULTIPLE: CPT | Performed by: INTERNAL MEDICINE

## 2021-09-29 PROCEDURE — 88342 IMHCHEM/IMCYTCHM 1ST ANTB: CPT | Performed by: PATHOLOGY

## 2021-09-29 PROCEDURE — 88305 TISSUE EXAM BY PATHOLOGIST: CPT | Performed by: PATHOLOGY

## 2021-09-29 RX ORDER — SODIUM CHLORIDE, SODIUM LACTATE, POTASSIUM CHLORIDE, CALCIUM CHLORIDE 600; 310; 30; 20 MG/100ML; MG/100ML; MG/100ML; MG/100ML
125 INJECTION, SOLUTION INTRAVENOUS CONTINUOUS
Status: DISCONTINUED | OUTPATIENT
Start: 2021-09-29 | End: 2021-10-03 | Stop reason: HOSPADM

## 2021-09-29 RX ORDER — GLYCOPYRROLATE 0.2 MG/ML
INJECTION INTRAMUSCULAR; INTRAVENOUS AS NEEDED
Status: DISCONTINUED | OUTPATIENT
Start: 2021-09-29 | End: 2021-09-29

## 2021-09-29 RX ORDER — SODIUM CHLORIDE, SODIUM LACTATE, POTASSIUM CHLORIDE, CALCIUM CHLORIDE 600; 310; 30; 20 MG/100ML; MG/100ML; MG/100ML; MG/100ML
INJECTION, SOLUTION INTRAVENOUS CONTINUOUS PRN
Status: DISCONTINUED | OUTPATIENT
Start: 2021-09-29 | End: 2021-09-29

## 2021-09-29 RX ORDER — PANTOPRAZOLE SODIUM 40 MG/1
40 TABLET, DELAYED RELEASE ORAL DAILY
Qty: 90 TABLET | Refills: 3 | Status: SHIPPED | OUTPATIENT
Start: 2021-09-29

## 2021-09-29 RX ORDER — LIDOCAINE HYDROCHLORIDE 10 MG/ML
0.5 INJECTION, SOLUTION EPIDURAL; INFILTRATION; INTRACAUDAL; PERINEURAL ONCE AS NEEDED
Status: DISCONTINUED | OUTPATIENT
Start: 2021-09-29 | End: 2021-10-03 | Stop reason: HOSPADM

## 2021-09-29 RX ORDER — PROPOFOL 10 MG/ML
INJECTION, EMULSION INTRAVENOUS AS NEEDED
Status: DISCONTINUED | OUTPATIENT
Start: 2021-09-29 | End: 2021-09-29

## 2021-09-29 RX ORDER — FENTANYL CITRATE 50 UG/ML
INJECTION, SOLUTION INTRAMUSCULAR; INTRAVENOUS AS NEEDED
Status: DISCONTINUED | OUTPATIENT
Start: 2021-09-29 | End: 2021-09-29

## 2021-09-29 RX ADMIN — FENTANYL CITRATE 75 MCG: 50 INJECTION, SOLUTION INTRAMUSCULAR; INTRAVENOUS at 10:44

## 2021-09-29 RX ADMIN — SODIUM CHLORIDE, SODIUM LACTATE, POTASSIUM CHLORIDE, AND CALCIUM CHLORIDE: .6; .31; .03; .02 INJECTION, SOLUTION INTRAVENOUS at 10:41

## 2021-09-29 RX ADMIN — PROPOFOL 50 MG: 10 INJECTION, EMULSION INTRAVENOUS at 10:45

## 2021-09-29 RX ADMIN — GLYCOPYRROLATE 0.2 MG: 0.2 INJECTION, SOLUTION INTRAMUSCULAR; INTRAVENOUS at 10:42

## 2021-09-29 RX ADMIN — PROPOFOL 100 MG: 10 INJECTION, EMULSION INTRAVENOUS at 10:44

## 2021-09-29 RX ADMIN — FENTANYL CITRATE 25 MCG: 50 INJECTION, SOLUTION INTRAMUSCULAR; INTRAVENOUS at 10:48

## 2021-09-29 NOTE — ANESTHESIA POSTPROCEDURE EVALUATION
Post-Op Assessment Note    CV Status:  Stable  Pain Score: 0    Pain management: adequate     Mental Status:  Alert and awake   Hydration Status:  Euvolemic   PONV Controlled:  Controlled   Airway Patency:  Patent      Post Op Vitals Reviewed: Yes      Staff: CRNA         No complications documented      BP   118/76   Temp      Pulse 68   Resp 12   SpO2 99%

## 2021-09-29 NOTE — ANESTHESIA PREPROCEDURE EVALUATION
Procedure:  EGD    Relevant Problems   CARDIO   (+) Migraine with aura and without status migrainosus, not intractable      GI/HEPATIC   (+) Gastroesophageal reflux disease      /RENAL   (+) Duplicated left renal collecting system   (+) Kidney stone      NEURO/PSYCH   (+) Anxiety   (+) Migraine with aura and without status migrainosus, not intractable      PULMONARY   (+) Exercise-induced asthma   (+) Mild intermittent asthma with acute exacerbation      Other   (+) Chronic tonsillar hypertrophy   (+) Chronic tonsillitis      Morbid obesity, BMI 40    Woke up during wisdom tooth extraction (pt only received sedation)  Physical Exam    Airway    Mallampati score: III  TM Distance: >3 FB  Neck ROM: full     Dental   No notable dental hx     Cardiovascular  Cardiovascular exam normal    Pulmonary  Pulmonary exam normal     Other Findings        Anesthesia Plan  ASA Score- 3     Anesthesia Type- IV sedation with anesthesia with ASA Monitors  Additional Monitors:   Airway Plan:           Plan Factors-    Chart reviewed  Patient summary reviewed  Induction- intravenous  Postoperative Plan-     Informed Consent- Anesthetic plan and risks discussed with patient  I personally reviewed this patient with the CRNA  Discussed and agreed on the Anesthesia Plan with the CRNA  Nova Hernandez

## 2021-10-19 ENCOUNTER — OFFICE VISIT (OUTPATIENT)
Dept: FAMILY MEDICINE CLINIC | Facility: CLINIC | Age: 21
End: 2021-10-19
Payer: COMMERCIAL

## 2021-10-19 VITALS
SYSTOLIC BLOOD PRESSURE: 108 MMHG | RESPIRATION RATE: 18 BRPM | HEIGHT: 65 IN | HEART RATE: 87 BPM | OXYGEN SATURATION: 98 % | DIASTOLIC BLOOD PRESSURE: 72 MMHG | WEIGHT: 241.2 LBS | TEMPERATURE: 98 F | BODY MASS INDEX: 40.19 KG/M2

## 2021-10-19 DIAGNOSIS — R10.9 FLANK PAIN: Primary | ICD-10-CM

## 2021-10-19 DIAGNOSIS — Z90.5 S/P NEPHRECTOMY: ICD-10-CM

## 2021-10-19 DIAGNOSIS — R31.9 HEMATURIA, UNSPECIFIED TYPE: ICD-10-CM

## 2021-10-19 DIAGNOSIS — T78.40XS ALLERGY, SEQUELA: ICD-10-CM

## 2021-10-19 LAB
SL AMB  POCT GLUCOSE, UA: ABNORMAL
SL AMB LEUKOCYTE ESTERASE,UA: 75
SL AMB POCT BILIRUBIN,UA: ABNORMAL
SL AMB POCT BLOOD,UA: 250
SL AMB POCT CLARITY,UA: ABNORMAL
SL AMB POCT COLOR,UA: YELLOW
SL AMB POCT KETONES,UA: ABNORMAL
SL AMB POCT NITRITE,UA: ABNORMAL
SL AMB POCT PH,UA: 6
SL AMB POCT SPECIFIC GRAVITY,UA: 1.02
SL AMB POCT URINE PROTEIN: ABNORMAL
SL AMB POCT UROBILINOGEN: ABNORMAL

## 2021-10-19 PROCEDURE — 3008F BODY MASS INDEX DOCD: CPT | Performed by: FAMILY MEDICINE

## 2021-10-19 PROCEDURE — 1036F TOBACCO NON-USER: CPT | Performed by: FAMILY MEDICINE

## 2021-10-19 PROCEDURE — 99214 OFFICE O/P EST MOD 30 MIN: CPT | Performed by: FAMILY MEDICINE

## 2021-10-19 PROCEDURE — 81002 URINALYSIS NONAUTO W/O SCOPE: CPT | Performed by: FAMILY MEDICINE

## 2021-10-19 RX ORDER — SULFAMETHOXAZOLE AND TRIMETHOPRIM 800; 160 MG/1; MG/1
1 TABLET ORAL EVERY 12 HOURS SCHEDULED
Qty: 6 TABLET | Refills: 0 | Status: SHIPPED | OUTPATIENT
Start: 2021-10-19 | End: 2021-10-22

## 2021-10-19 RX ORDER — LIDOCAINE 4 G/G
1 PATCH TOPICAL EVERY 12 HOURS PRN
Qty: 15 PATCH | Refills: 2 | Status: SHIPPED | OUTPATIENT
Start: 2021-10-19 | End: 2022-05-17

## 2021-10-19 RX ORDER — EPINEPHRINE 0.3 MG/.3ML
0.3 INJECTION SUBCUTANEOUS ONCE
Qty: 0.6 ML | Refills: 0 | Status: SHIPPED | OUTPATIENT
Start: 2021-10-19 | End: 2022-05-23

## 2021-10-22 LAB
BACTERIA UR CULT: NORMAL
Lab: NORMAL

## 2021-10-26 ENCOUNTER — HOSPITAL ENCOUNTER (OUTPATIENT)
Dept: RADIOLOGY | Facility: HOSPITAL | Age: 21
Discharge: HOME | End: 2021-10-26
Attending: FAMILY MEDICINE
Payer: COMMERCIAL

## 2021-10-26 DIAGNOSIS — R10.9 FLANK PAIN: ICD-10-CM

## 2021-10-26 PROCEDURE — 76770 US EXAM ABDO BACK WALL COMP: CPT

## 2021-11-01 ENCOUNTER — TELEPHONE (OUTPATIENT)
Dept: FAMILY MEDICINE CLINIC | Facility: CLINIC | Age: 21
End: 2021-11-01

## 2021-11-19 ENCOUNTER — OFFICE VISIT (OUTPATIENT)
Dept: FAMILY MEDICINE CLINIC | Facility: CLINIC | Age: 21
End: 2021-11-19
Payer: COMMERCIAL

## 2021-11-19 VITALS
TEMPERATURE: 98 F | DIASTOLIC BLOOD PRESSURE: 70 MMHG | HEIGHT: 65 IN | SYSTOLIC BLOOD PRESSURE: 110 MMHG | WEIGHT: 244 LBS | HEART RATE: 78 BPM | BODY MASS INDEX: 40.65 KG/M2 | RESPIRATION RATE: 16 BRPM

## 2021-11-19 DIAGNOSIS — J45.21 MILD INTERMITTENT ASTHMA WITH ACUTE EXACERBATION: Primary | ICD-10-CM

## 2021-11-19 PROCEDURE — U0005 INFEC AGEN DETEC AMPLI PROBE: HCPCS | Performed by: FAMILY MEDICINE

## 2021-11-19 PROCEDURE — 1036F TOBACCO NON-USER: CPT | Performed by: FAMILY MEDICINE

## 2021-11-19 PROCEDURE — 99214 OFFICE O/P EST MOD 30 MIN: CPT | Performed by: FAMILY MEDICINE

## 2021-11-19 PROCEDURE — U0003 INFECTIOUS AGENT DETECTION BY NUCLEIC ACID (DNA OR RNA); SEVERE ACUTE RESPIRATORY SYNDROME CORONAVIRUS 2 (SARS-COV-2) (CORONAVIRUS DISEASE [COVID-19]), AMPLIFIED PROBE TECHNIQUE, MAKING USE OF HIGH THROUGHPUT TECHNOLOGIES AS DESCRIBED BY CMS-2020-01-R: HCPCS | Performed by: FAMILY MEDICINE

## 2021-11-19 PROCEDURE — 3008F BODY MASS INDEX DOCD: CPT | Performed by: FAMILY MEDICINE

## 2021-11-19 RX ORDER — AZITHROMYCIN 250 MG/1
TABLET, FILM COATED ORAL
Qty: 6 TABLET | Refills: 0 | Status: SHIPPED | OUTPATIENT
Start: 2021-11-19 | End: 2021-11-23

## 2021-11-19 RX ORDER — METHYLPREDNISOLONE 4 MG/1
TABLET ORAL
Qty: 21 EACH | Refills: 0 | Status: SHIPPED | OUTPATIENT
Start: 2021-11-19 | End: 2021-12-29 | Stop reason: SDUPTHER

## 2021-11-20 LAB — SARS-COV-2 RNA RESP QL NAA+PROBE: NEGATIVE

## 2021-12-09 ENCOUNTER — OFFICE VISIT (OUTPATIENT)
Dept: UROLOGY | Facility: CLINIC | Age: 21
End: 2021-12-09
Payer: COMMERCIAL

## 2021-12-09 VITALS — WEIGHT: 246 LBS | BODY MASS INDEX: 40.98 KG/M2 | HEIGHT: 65 IN | RESPIRATION RATE: 18 BRPM

## 2021-12-09 DIAGNOSIS — N39.0 URINARY TRACT INFECTION WITHOUT HEMATURIA, SITE UNSPECIFIED: Primary | ICD-10-CM

## 2021-12-09 DIAGNOSIS — N32.81 OVERACTIVE BLADDER: ICD-10-CM

## 2021-12-09 DIAGNOSIS — R10.2 PELVIC PAIN: ICD-10-CM

## 2021-12-09 DIAGNOSIS — R31.0 GROSS HEMATURIA: ICD-10-CM

## 2021-12-09 LAB — POST-VOID RESIDUAL VOLUME, ML POC: 0 ML

## 2021-12-09 PROCEDURE — 99203 OFFICE O/P NEW LOW 30 MIN: CPT | Performed by: PHYSICIAN ASSISTANT

## 2021-12-09 PROCEDURE — 1036F TOBACCO NON-USER: CPT | Performed by: PHYSICIAN ASSISTANT

## 2021-12-09 PROCEDURE — 51798 US URINE CAPACITY MEASURE: CPT | Performed by: PHYSICIAN ASSISTANT

## 2021-12-09 PROCEDURE — 3008F BODY MASS INDEX DOCD: CPT | Performed by: PHYSICIAN ASSISTANT

## 2021-12-17 ENCOUNTER — APPOINTMENT (OUTPATIENT)
Dept: LAB | Facility: HOSPITAL | Age: 21
End: 2021-12-17
Payer: COMMERCIAL

## 2021-12-17 ENCOUNTER — HOSPITAL ENCOUNTER (OUTPATIENT)
Dept: RADIOLOGY | Facility: HOSPITAL | Age: 21
Discharge: HOME | End: 2021-12-17
Payer: COMMERCIAL

## 2021-12-17 DIAGNOSIS — R31.0 GROSS HEMATURIA: ICD-10-CM

## 2021-12-17 DIAGNOSIS — F41.8 DEPRESSION WITH ANXIETY: ICD-10-CM

## 2021-12-17 LAB
ALBUMIN SERPL BCP-MCNC: 3.6 G/DL (ref 3.5–5)
ALP SERPL-CCNC: 80 U/L (ref 46–116)
ALT SERPL W P-5'-P-CCNC: 75 U/L (ref 12–78)
ANION GAP SERPL CALCULATED.3IONS-SCNC: 9 MMOL/L (ref 4–13)
AST SERPL W P-5'-P-CCNC: 32 U/L (ref 5–45)
BILIRUB SERPL-MCNC: 0.24 MG/DL (ref 0.2–1)
BUN SERPL-MCNC: 9 MG/DL (ref 5–25)
CALCIUM SERPL-MCNC: 8.7 MG/DL (ref 8.3–10.1)
CHLORIDE SERPL-SCNC: 107 MMOL/L (ref 100–108)
CO2 SERPL-SCNC: 25 MMOL/L (ref 21–32)
CREAT SERPL-MCNC: 0.9 MG/DL (ref 0.6–1.3)
GFR SERPL CREATININE-BSD FRML MDRD: 90 ML/MIN/1.73SQ M
GLUCOSE SERPL-MCNC: 108 MG/DL (ref 65–140)
POTASSIUM SERPL-SCNC: 3.9 MMOL/L (ref 3.5–5.3)
PROT SERPL-MCNC: 7 G/DL (ref 6.4–8.2)
SODIUM SERPL-SCNC: 141 MMOL/L (ref 136–145)

## 2021-12-17 PROCEDURE — G1004 CDSM NDSC: HCPCS

## 2021-12-17 PROCEDURE — 36415 COLL VENOUS BLD VENIPUNCTURE: CPT

## 2021-12-17 PROCEDURE — 74178 CT ABD&PLV WO CNTR FLWD CNTR: CPT

## 2021-12-17 PROCEDURE — 80053 COMPREHEN METABOLIC PANEL: CPT

## 2021-12-17 RX ORDER — HYDROXYZINE 50 MG/1
50 TABLET, FILM COATED ORAL
Qty: 90 TABLET | Refills: 2 | Status: SHIPPED | OUTPATIENT
Start: 2021-12-17

## 2021-12-17 RX ADMIN — IOHEXOL 120 ML: 350 INJECTION, SOLUTION INTRAVENOUS at 15:10

## 2021-12-20 ENCOUNTER — TELEPHONE (OUTPATIENT)
Dept: HEMATOLOGY ONCOLOGY | Facility: MEDICAL CENTER | Age: 21
End: 2021-12-20

## 2021-12-22 ENCOUNTER — TELEPHONE (OUTPATIENT)
Dept: UROLOGY | Facility: CLINIC | Age: 21
End: 2021-12-22

## 2021-12-27 DIAGNOSIS — F41.8 DEPRESSION WITH ANXIETY: ICD-10-CM

## 2021-12-29 ENCOUNTER — TELEMEDICINE (OUTPATIENT)
Dept: FAMILY MEDICINE CLINIC | Facility: CLINIC | Age: 21
End: 2021-12-29
Payer: COMMERCIAL

## 2021-12-29 DIAGNOSIS — R07.89 CHEST TIGHTNESS: Primary | ICD-10-CM

## 2021-12-29 DIAGNOSIS — J45.21 MILD INTERMITTENT ASTHMA WITH ACUTE EXACERBATION: ICD-10-CM

## 2021-12-29 PROCEDURE — 99213 OFFICE O/P EST LOW 20 MIN: CPT | Performed by: FAMILY MEDICINE

## 2021-12-29 RX ORDER — METHYLPREDNISOLONE 4 MG/1
TABLET ORAL
Qty: 21 EACH | Refills: 0 | Status: SHIPPED | OUTPATIENT
Start: 2021-12-29 | End: 2022-01-15

## 2021-12-29 NOTE — PROGRESS NOTES
Virtual Regular Visit    Verification of patient location:    Patient is located in the following state in which I hold an active license [place]      Assessment/Plan:    Problem List Items Addressed This Visit        Respiratory    Mild intermittent asthma with acute exacerbation    Relevant Medications    methylPREDNISolone 4 MG tablet therapy pack    fluticasone-salmeterol (Advair Diskus) 100-50 mcg/dose inhaler      Other Visit Diagnoses     Chest tightness    -  Primary    Relevant Orders    Ambulatory referral to Pulmonology          If patient's symptoms do not improve recommend COVID testing  Treatment shown above  Reason for visit is   Chief Complaint   Patient presents with    Virtual Regular Visit        Encounter provider Nerissa Benavides MD    Provider located at [address] [place] [id number]      Recent Visits  Date Type Provider Dept   12/29/21 Telemedicine Nerissa Benavides  [facility] recent visits within past 7 days and meeting all other requirements  Future Appointments  No visits were found meeting these conditions  Showing future appointments within next 150 days and meeting all other requirements       The patient was identified by name and date of birth  Marlen Rolon was informed that this is a telemedicine visit and that the visit is being conducted through [address] [place] [address] Now and patient was informed that this is a secure, HIPAA-compliant platform  She agrees to proceed     My office door was closed  No one else was in the room  She acknowledged consent and understanding of privacy and security of the video platform  The patient has agreed to participate and understands they can discontinue the visit at any time  Patient is aware this is a billable service  Subjective  Marlen Rolon is a 19 y o  female presents via video    Patient states that she has been having chest tightness and lungs hurting when she takes a big deep breath  She denies any fevers chills loss of taste or smell any GI or  symptoms  Patient states seems similar to the last time  Has never been seen by pulmonologist         Past Medical History:   Diagnosis Date    Anxiety and depression     Asthma     Clotting disorder ([place] [address] )     Factor V Leiden ([place] [address] )     Pulmonary embolism ([place] [address] )     Thrombophlebitis     UTI (urinary tract infection) due to Enterococcus        Past Surgical History:   Procedure Laterality Date    BLADDER SURGERY      NEPHRECTOMY Left     URETER REVISION         Current Outpatient Medications   Medication Sig Dispense Refill    albuterol (PROVENTIL HFA,VENTOLIN HFA) 90 mcg/act inhaler Inhale 2 puffs every 6 (six) hours as needed for wheezing 1 Inhaler 2    busPIRone (BUSPAR) 10 mg tablet Take 1 tablet (10 mg total) by mouth 3 (three) times a day 270 tablet 0    butalbital-acetaminophen-caffeine (FIORICET,ESGIC) -40 mg per tablet Take 1 tablet now, and then repeat in 4 hrs if no relief  Don't take more then 3 in a week  30 tablet 0    calcium-vitamin D (OSCAL) 250-125 MG-UNIT per tablet Take 1 tablet by mouth 2 (two) times a day      EPINEPHrine (EPIPEN) 0 3 mg/0 3 mL SOAJ Inject 0 3 mL (0 3 mg total) into a muscle once for 1 dose 0 6 mL 0    fluticasone-salmeterol (Advair Diskus) 100-50 mcg/dose inhaler Inhale 1 puff 2 (two) times a day Rinse mouth after use   60 blister 0    hydrOXYzine HCL (ATARAX) 50 mg tablet Take 1 tablet (50 mg total) by mouth daily at bedtime 90 tablet 2    levonorgestrel (KYLEENA) 19 5 MG intrauterine device 1 each by Intrauterine route      Lidocaine (HM Lidocaine Patch) 4 % PTCH Apply 1 patch topically every 12 (twelve) hours as needed (muscle pain) 15 patch 2    melatonin 3 mg Take 6 mg by mouth       methylPREDNISolone 4 MG tablet therapy pack Use as directed on package 21 each 0    multivitamin (THERAGRAN) TABS Take 1 tablet by mouth daily      pantoprazole (PROTONIX) 40 mg tablet Take 1 tablet (40 mg total) by mouth daily 90 tablet 3    Probiotic Product (PROBIOTIC-10 PO) Take by mouth      sertraline (ZOLOFT) 50 mg tablet take 3 tablets by mouth once daily at bedtime 90 tablet 6    SUMAtriptan (IMITREX) 100 mg tablet Take 1 tablet (100 mg total) by mouth once as needed for migraine for up to 15 doses May repeat one dose 6 hours later, but no more 3 days a week  15 tablet 0    topiramate (TOPAMAX) 50 MG tablet take 1 tablet by mouth at bedtime 90 tablet 4     No current facility-administered medications for this visit  Allergies   Allergen Reactions    Amoxicillin Anaphylaxis    Cinnamon - Food Allergy Anaphylaxis       Review of Systems   Constitutional: Negative for activity change, appetite change, chills, fatigue and fever  HENT: Negative for congestion  Respiratory: Positive for chest tightness and shortness of breath  Negative for cough  Cardiovascular: Negative for chest pain and leg swelling  Gastrointestinal: Negative for abdominal distention, abdominal pain, constipation, diarrhea, nausea and vomiting  All other systems reviewed and are negative  Video Exam    There were no vitals filed for this visit  Physical Exam  Constitutional:       Appearance: Normal appearance  Pulmonary:      Effort: Pulmonary effort is normal    Musculoskeletal:         General: Normal range of motion  Neurological:      General: No focal deficit present  Mental Status: She is alert and oriented to person, place, and time  Psychiatric:         Mood and Affect: Mood normal          Behavior: Behavior normal          Thought Content: Thought content normal          Judgment: Judgment normal           I spent 15 minutes directly with the patient during this visit    [facility] verbally agrees to participate in [facility]   Pt is aware that Virtual Care Services could be limited without vital signs or the ability to perform a full hands-on physical Cony Ng understands she or the provider may request at any time to terminate the video visit and request the patient to seek care or treatment in person

## 2022-01-03 ENCOUNTER — EVALUATION (OUTPATIENT)
Dept: PHYSICAL THERAPY | Facility: CLINIC | Age: 22
End: 2022-01-03
Payer: COMMERCIAL

## 2022-01-03 DIAGNOSIS — N32.81 OVERACTIVE BLADDER: ICD-10-CM

## 2022-01-03 DIAGNOSIS — R10.2 PELVIC PAIN: ICD-10-CM

## 2022-01-03 PROCEDURE — 97162 PT EVAL MOD COMPLEX 30 MIN: CPT

## 2022-01-03 PROCEDURE — 97112 NEUROMUSCULAR REEDUCATION: CPT

## 2022-01-03 NOTE — PROGRESS NOTES
PT Evaluation     Today's date: 1/3/2022  Patient name: Rhoda Barrios  : 2000  MRN: [id number]  Referring provider: Alban Agee  Dx:   Encounter Diagnosis     ICD-10-CM    1  Overactive bladder  N32 81 Ambulatory referral to Physical Therapy   2  Pelvic pain  R10 2 Ambulatory referral to Physical Therapy                  Assessment  Assessment details: Pt presents with deficits that correlate to mixed stressed and urge incontinence  Pt deficits include decrease pelvic floor muscle strength, incontinence with light activity, pelvic pain with functional activities, decrease core strength, and increase muscle tension  Pt will benefit form skilled physical therapy in order to optimize strength, improve overall function and improve quality of life  Impairments: abnormal muscle firing, abnormal muscle tone, abnormal or restricted ROM, activity intolerance, impaired physical strength, lacks appropriate home exercise program, pain with function, poor posture  and poor body mechanics    Symptom irritability: highUnderstanding of Dx/Px/POC: good   Prognosis: good    Goals  STG:  -The patient will improve pelvic floor muscle strength 1 to 2 grade in 8 to 12 weeks  -The patient will improve pelvic floor muscle endurance to 2 to 3 seconds in 8 to 12 weeks  -The patient will reduce diastasis of the rectus abdominis by 1/2 to 1 finger widths in 8 to 12 weeks  -The patient will reduce number of pads to 1 per day  LT  The patient will be independent with HEP upon discharge  2  The patient will perform higher level activities and exercise without leaking upon discharge      Plan  Plan details:  HEP development, stretching, strengthening, A/AA/PROM, joint mobilizations, posture education, STM/MI as needed to reduce muscle tension, muscle reeducation, patient has been educated in Dx, prognosis and plan of care and is in agreement    Patient would benefit from: PT eval and skilled physical therapy  Planned modality interventions: cryotherapy and thermotherapy: hydrocollator packs  Planned therapy interventions: manual therapy, neuromuscular re-education, self care, therapeutic activities, therapeutic exercise and home exercise program  Frequency: 1x week  Duration in weeks: 6  Treatment plan discussed with: patient        PT Pelvic Floor Subjective:   History of Present Illness:   Pt reports that she has been dealing with incontinence issues since she was a child  States that about two years ago she did go to see a pelvic floor therapist which seemed to improve her symptoms  However, after following a recent procedure she noted symptoms return and get worse over the last year  Pt reports that she has leakage when laughing, coughing, sneezing, and doing light activity  Pt also reports that she is dealing with a shy bladder and increase urgency  She also has pelvic pain with sexual intercourse, sitting for long periods of time, standing for long periods of time, and with light activity  Occasionally, pain will radiate down her leg and shoot into her low back   Mechanism of injury: childbirth          Recurrent probem    Quality of life: fair    Social Support:     Lives in:  Multiple-level home    Lives with:  Parents    Work status: employed full time  Hand dominance:  Right  Diet and Exercise:    Diet:balanced nutrition    Exercise type: walking  OB/ gyn History    Gestational History:     Prior Pregnancy: No      Menstrual History:      Menstrual irregularities irregular menses    Painful periods:  Difficulty managing menstrual pain    Tolerates tampons: yes  Bladder Function:     Voiding Difficulties positive for: urgency, frequent urination, hesitancy and paruresis      Voiding Difficulties comments:     Voiding frequency: every 31-60 minutes    Urinary leakage: urine leakage    Urinary leakage aggravated by: coughing, sneezing, exercise, walking to the bathroom, seeing a toilet and post-void dribble    Nocturia (episodes per night): 4 or more    Fluid Intake Type: Water    Intake (ounces): Water: 24,   Incontinence Management:     Pads/Diaper Use:  Day  Bowel Function:     Voiding DIfficulties: urgency and stool frequency abnormal      [place] Stool Scale: type 3    Stool softener use: no stool softeners    Enema use: no enema    Uses "squatty potty": no Squatty Potty  Sexual Function:     Sexually Active:  Sexually active    Pain during intercourse: Yes      Lubrication Use: Yes    pain causes abstinence    Patient wishes to return to having intercourse: currently unable to have intercourse but wants to  Pain:     Current pain rating:  3    At best pain rating:  3    At worst pain ratin    Quality:  Dull ache, sharp and pressure    Aggravating factors:  [place], exercise, sit to stand transition, prolonged positions and menses    Relieving factors:  Heat, medications and relaxation  Treatments:     Previous treatment:  Physical therapy    Current treatment: physical therapy    Patient Goals:     Patient goals for therapy:  Improved pain management, improved quality of life, fully empty bladder or bowels, improved bladder or bowel function, improved comfort, decreased pain, return to sport/leisure activities and decreased difficulties at school and home      Objective     Postural Observations  Seated posture: fair  Standing posture: fair        Active Range of Motion     Lumbar   Normal active range of motion    Strength/Myotome Testing     Lumbar   Left   Normal strength    Right   Normal strength    Muscle Activation   Patient able to activate left transverse abdominals, left multifidus, right transverse abdominals and right multifidus  Tests     Lumbar   Negative SIJ compression, sacroiliac distraction, Gaenslen's  and sacral thrust       Left   Negative passive SLR and slump test      Right   Negative passive SLR and slump test      Left Hip   Negative LESLY       Right Hip   Negative LESLY  Flowsheet Rows      Most Recent Value   PT/OT G-Codes    Current Score 13   Projected Score 0             Precautions: pelvic floor assessment next session due to time restraint  Manuals 01/03/21             eval                                                    Neuro Re-Ed              reviewed and educated on pelvic floor anatomy, pelvic floor function, pelvic floor rehab                                                                                            Ther Ex                                                                                                                     Ther Activity                                       Gait Training                                       Modalities

## 2022-01-04 ENCOUNTER — TELEMEDICINE (OUTPATIENT)
Dept: NEUROLOGY | Facility: CLINIC | Age: 22
End: 2022-01-04
Payer: COMMERCIAL

## 2022-01-04 DIAGNOSIS — G43.109 MIGRAINE WITH AURA AND WITHOUT STATUS MIGRAINOSUS, NOT INTRACTABLE: Primary | ICD-10-CM

## 2022-01-04 PROCEDURE — 99214 OFFICE O/P EST MOD 30 MIN: CPT | Performed by: PSYCHIATRY & NEUROLOGY

## 2022-01-04 RX ORDER — ONDANSETRON 4 MG/1
4 TABLET, ORALLY DISINTEGRATING ORAL EVERY 6 HOURS PRN
Qty: 20 TABLET | Refills: 3 | Status: SHIPPED | OUTPATIENT
Start: 2022-01-04

## 2022-01-04 NOTE — PROGRESS NOTES
Virtual Regular Visit    Verification of patient location:    Patient is located in the following state in which I hold an active license [place]      Assessment/Plan:  Winston Dozier is a 19 y o  female with a pmhx of factor V Leiden deficiency and PE (not currently on [facility]) who presents for follow up for migraines  The patient was last seen in 8/2021  The patient was started on Topamax 50mg BID for migraine prophylaxis and she reports significant improvement since starting it  She is also on sumatriptan and reports nausea, Will continue the patient on topamax and sumatriptan but will start her on zofran  Pt instructed to contact the office is nausea persists despite zofran and we will switch to Reglan  Problem List Items Addressed This Visit        Cardiovascular and Mediastinum    Migraine with aura and without status migrainosus, not intractable - Primary    Relevant Medications    ondansetron (Zofran ODT) 4 mg disintegrating tablet               Reason for visit is   Chief Complaint   Patient presents with    Virtual Regular Visit        Encounter provider Keyur Tavarez DO    Provider located at [address] [place] [address]  [phone]      Recent Visits  Date Type Provider Dept   12/29/21 Telemedicine Dahlia Woody  [facility] recent visits within past 7 days and meeting all other requirements  Today's Visits  Date Type Provider Dept   01/04/22 Telemedicine Keyur Tavarez DO [facility] today's visits and meeting all other requirements  Future Appointments  No visits were found meeting these conditions  Showing future appointments within next 150 days and meeting all other requirements       The patient was identified by name and date of birth   Winston Dozier was informed that this is a telemedicine visit and that the visit is being conducted through [address] [place] [address] Now and patient was informed that this is a secure, HIPAA-compliant platform  She agrees to proceed     My office door was closed  No one else was in the room  She acknowledged consent and understanding of privacy and security of the video platform  The patient has agreed to participate and understands they can discontinue the visit at any time  Patient is aware this is a billable service  Subjective  Lakshmi Carmichael is a 19 y o  female with a pmhx of factor V Leiden deficiency and PE (not currently on [facility]) who presents for follow up for migraines  The patient was last seen in 8/2021  The pt reported at her intial visit that she "experienced worsening migraines over the last 1 5 years  She reports that they are associated with pain bilaterally in her temple that then shoots to the back of her head  Described as a dull pain at first and becomes a sharper pain as it worsens  The patient reports that her headaches are also associated with black spot, nausea, sleepiness, light sensitivity, dizziness, and photophobia  The patient reports that she gets a headache every couple of weeks, however her headaches can last up to a week and a half " The patient was started on Topamax 50mg BID for migraine prophylaxis and she reports that "the medicine has helped 110%"  She reports she gets a headache now every couple of weeks but they are not as severe as previously  She is also on sumatriptan and reports nausea  She reports the nausea is so bad that it makes her not want to take it sometimes       Of note, 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       Past Medical History:   Diagnosis Date    Anxiety and depression     Asthma     [facility] )     Factor V Leiden ([facility] )     Pulmonary embolism ([facility] )     Thrombophlebitis     UTI (urinary tract infection) due to Enterococcus        Past Surgical History:   Procedure Laterality Date    BLADDER SURGERY      NEPHRECTOMY Left     URETER REVISION         Current Outpatient Medications   Medication Sig Dispense Refill    albuterol (PROVENTIL HFA,VENTOLIN HFA) 90 mcg/act inhaler Inhale 2 puffs every 6 (six) hours as needed for wheezing 1 Inhaler 2    butalbital-acetaminophen-caffeine (FIORICET,ESGIC) -40 mg per tablet Take 1 tablet now, and then repeat in 4 hrs if no relief  Don't take more then 3 in a week  30 tablet 0    calcium-vitamin D (OSCAL) 250-125 MG-UNIT per tablet Take 1 tablet by mouth 2 (two) times a day      fluticasone-salmeterol (Advair Diskus) 100-50 mcg/dose inhaler Inhale 1 puff 2 (two) times a day Rinse mouth after use  60 blister 0    hydrOXYzine HCL (ATARAX) 50 mg tablet Take 1 tablet (50 mg total) by mouth daily at bedtime 90 tablet 2    levonorgestrel (KYLEENA) 19 5 MG intrauterine device 1 each by Intrauterine route      Lidocaine (HM Lidocaine Patch) 4 % PTCH Apply 1 patch topically every 12 (twelve) hours as needed (muscle pain) 15 patch 2    melatonin 3 mg Take 6 mg by mouth       methylPREDNISolone 4 MG tablet therapy pack Use as directed on package 21 each 0    multivitamin (THERAGRAN) TABS Take 1 tablet by mouth daily      pantoprazole (PROTONIX) 40 mg tablet Take 1 tablet (40 mg total) by mouth daily 90 tablet 3    Probiotic Product (PROBIOTIC-10 PO) Take by mouth      sertraline (ZOLOFT) 50 mg tablet take 3 tablets by mouth once daily at bedtime 90 tablet 6    SUMAtriptan (IMITREX) 100 mg tablet Take 1 tablet (100 mg total) by mouth once as needed for migraine for up to 15 doses May repeat one dose 6 hours later, but no more 3 days a week   15 tablet 0    topiramate (TOPAMAX) 50 MG tablet take 1 tablet by mouth at bedtime 90 tablet 4    busPIRone (BUSPAR) 10 mg tablet Take 1 tablet (10 mg total) by mouth 3 (three) times a day 270 tablet 0    EPINEPHrine (EPIPEN) 0 3 mg/0 3 mL SOAJ Inject 0 3 mL (0 3 mg total) into a muscle once for 1 dose 0 6 mL 0    ondansetron (Zofran ODT) 4 mg disintegrating tablet Take 1 tablet (4 mg total) by mouth every 6 (six) hours as needed for nausea or vomiting 20 tablet 3     No current facility-administered medications for this visit  Allergies   Allergen Reactions    Amoxicillin Anaphylaxis    Cinnamon - Food Allergy Anaphylaxis       Review of Systems   Constitutional: Negative  Negative for appetite change and fever  HENT: Negative  Negative for hearing loss, tinnitus, trouble swallowing and voice change  Eyes: Negative  Negative for photophobia and pain  Respiratory: Negative  Negative for shortness of breath  Cardiovascular: Negative  Negative for palpitations  Gastrointestinal: Negative  Negative for nausea and vomiting  Endocrine: Negative  Negative for cold intolerance  Genitourinary: Negative  Negative for dysuria, frequency and urgency  Musculoskeletal: Negative  Negative for myalgias and neck pain  Skin: Negative  Negative for rash  Neurological: Negative  Negative for dizziness, tremors, seizures, syncope, facial asymmetry, speech difficulty, weakness, light-headedness, numbness and headaches  Hematological: Negative  Does not bruise/bleed easily  Psychiatric/Behavioral: Negative  Negative for confusion, hallucinations and sleep disturbance  All other systems reviewed and are negative  Video Exam    There were no vitals filed for this visit  Physical Exam  Vitals and nursing note reviewed  Constitutional:       General: She is not in acute distress  Appearance: Normal appearance  She is not ill-appearing  HENT:      Head: Normocephalic and atraumatic  Nose: Nose normal    Eyes:      General:         Right eye: No discharge  Left eye: No discharge  Extraocular Movements: Extraocular movements intact  Pulmonary:      Effort: Pulmonary effort is normal    Abdominal:      General: There is no distension  Musculoskeletal:         General: Normal range of motion  Cervical back: Normal range of motion  Skin:     Coloration: Skin is not pale  Neurological:      General: No focal deficit present  Mental Status: She is alert and oriented to person, place, and time  Psychiatric:         Mood and Affect: Mood normal          Behavior: Behavior normal           I spent 17 minutes directly with the patient during this visit    [address] verbally agrees to participate in [employer]  Pt is aware that [employer] could be limited without vital signs or the ability to perform a full hands-on physical Adrienne Magness understands she or the provider may request at any time to terminate the video visit and request the patient to seek care or treatment in person

## 2022-01-11 ENCOUNTER — APPOINTMENT (OUTPATIENT)
Dept: PHYSICAL THERAPY | Facility: CLINIC | Age: 22
End: 2022-01-11
Payer: COMMERCIAL

## 2022-01-15 ENCOUNTER — OFFICE VISIT (OUTPATIENT)
Dept: FAMILY MEDICINE CLINIC | Facility: CLINIC | Age: 22
End: 2022-01-15
Payer: COMMERCIAL

## 2022-01-15 VITALS
SYSTOLIC BLOOD PRESSURE: 122 MMHG | DIASTOLIC BLOOD PRESSURE: 90 MMHG | TEMPERATURE: 97.6 F | OXYGEN SATURATION: 94 % | WEIGHT: 240 LBS | BODY MASS INDEX: 39.99 KG/M2 | HEIGHT: 65 IN | HEART RATE: 102 BPM | RESPIRATION RATE: 16 BRPM

## 2022-01-15 DIAGNOSIS — D68.51 FACTOR V LEIDEN MUTATION (HCC): ICD-10-CM

## 2022-01-15 DIAGNOSIS — R07.9 CHEST PAIN, UNSPECIFIED TYPE: Primary | ICD-10-CM

## 2022-01-15 DIAGNOSIS — R06.02 SHORTNESS OF BREATH: ICD-10-CM

## 2022-01-15 DIAGNOSIS — J45.21 MILD INTERMITTENT ASTHMA WITH ACUTE EXACERBATION: ICD-10-CM

## 2022-01-15 DIAGNOSIS — F41.9 ANXIETY: ICD-10-CM

## 2022-01-15 PROCEDURE — 1036F TOBACCO NON-USER: CPT | Performed by: FAMILY MEDICINE

## 2022-01-15 PROCEDURE — 3008F BODY MASS INDEX DOCD: CPT | Performed by: FAMILY MEDICINE

## 2022-01-15 PROCEDURE — 99214 OFFICE O/P EST MOD 30 MIN: CPT | Performed by: FAMILY MEDICINE

## 2022-01-15 RX ORDER — HYDROXYZINE PAMOATE 25 MG/1
25 CAPSULE ORAL 3 TIMES DAILY PRN
Qty: 30 CAPSULE | Refills: 0 | Status: SHIPPED | OUTPATIENT
Start: 2022-01-15 | End: 2022-02-06

## 2022-01-15 NOTE — PROGRESS NOTES
Greg Zarate 2000 female MRN: [id number]    [address]      ASSESSMENT/PLAN  Greg Zarate is a 23 y o  female presents to the office for     Diagnoses and all orders for this visit:    Chest pain, unspecified type  -     D-dimer, quantitative; Future  -     CBC and differential; Future  -     Comprehensive metabolic panel; Future  -     XR chest pa & lateral; Future    Anxiety  -     hydrOXYzine pamoate (VISTARIL) 25 mg capsule; Take 1 capsule (25 mg total) by mouth 3 (three) times a day as needed for anxiety    Factor V Leiden mutation (HCC)    Shortness of breath  -     D-dimer, quantitative; Future  -     CBC and differential; Future  -     Comprehensive metabolic panel; Future  -     XR chest pa & lateral; Future    Mild intermittent asthma with acute exacerbation      Will send patient for complete blood work as well as a chest x-ray  Depending if the patient's symptoms continue to persist then would likely refer her to the emergency room for evaluation  Health Maintence:         Disposition: Return to the office if no improvement  Future Appointments   Date Time Provider Renetta Zayas   1/18/2022  5:45 PM [facility]   1/25/2022  5:45 PM America Virk PT [facility] [address]   2/1/2022  5:30 PM America Virk PT [facility]   2/3/2022  8:40 AM Merna Kim MD HEM ONC [facility]   2/11/2022  8:00 AM Shavonne Berg MD [facility]          SUBJECTIVE  CC: Asthma, Shortness of Breath, and Chest Pain (for 2 weeks )      HPI:  Greg Zarate is a 23 y o  femalepresenting to the office for a follow up on acute conditions  Patient has had 2 weeks of chest pain and shortness of breath  She does not know if it is anxiety versus asthma  She is concerned given her history of factor 5 Leiden she does have a history of pulmonary embolisms    Patient was taken off her blood thinners per her pulmonologist/hematologist     Review of Systems   Constitutional: Negative for activity change, appetite change, chills, fatigue and fever  HENT: Negative for congestion  Respiratory: Positive for cough, chest tightness and shortness of breath  Cardiovascular: Positive for palpitations  Negative for chest pain and leg swelling  Gastrointestinal: Negative for abdominal distention, abdominal pain, constipation, diarrhea, nausea and vomiting  All other systems reviewed and are negative        Historical Information   The patient history was reviewed as follows:    Past Medical History:   Diagnosis Date    Anxiety and depression     Asthma     Clotting disorder ([place] [address] )     Factor V Leiden [place] [address] )     Pulmonary embolism (HCC)     Thrombophlebitis     UTI (urinary tract infection) due to Enterococcus      Past Surgical History:   Procedure Laterality Date    BLADDER SURGERY      NEPHRECTOMY Left     URETER REVISION       Family History   Problem Relation Age of Onset    Hypertension Mother     Hyperlipidemia Mother     Diabetes Mother     Factor V Leiden deficiency Mother     Ovarian cancer Maternal Grandmother     Arthritis Family     Asthma Family     Cancer Family     Cervical cancer Family     Heart failure Family     Hyperlipidemia Family     Hypertension Family     Diabetes Other     Heart disease Father       Social History   Social History     Substance and Sexual Activity   Alcohol Use Yes    Comment: social     Social History     Substance and Sexual Activity   Drug Use Never     Social History     Tobacco Use   Smoking Status Never Smoker   Smokeless Tobacco Never Used       Medications:     Current Outpatient Medications:     albuterol (PROVENTIL HFA,VENTOLIN HFA) 90 mcg/act inhaler, Inhale 2 puffs every 6 (six) hours as needed for wheezing, Disp: 1 Inhaler, Rfl: 2    busPIRone (BUSPAR) 10 mg tablet, Take 1 tablet (10 mg total) by mouth 3 (three) times a day, Disp: 270 tablet, Rfl: 0    butalbital-acetaminophen-caffeine (FIORICET,ESGIC) -40 mg per tablet, Take 1 tablet now, and then repeat in 4 hrs if no relief   Don't take more then 3 in a week , Disp: 30 tablet, Rfl: 0    calcium-vitamin D (OSCAL) 250-125 MG-UNIT per tablet, Take 1 tablet by mouth 2 (two) times a day, Disp: , Rfl:     EPINEPHrine (EPIPEN) 0 3 mg/0 3 mL SOAJ, Inject 0 3 mL (0 3 mg total) into a muscle once for 1 dose, Disp: 0 6 mL, Rfl: 0    fluticasone-salmeterol (Advair Diskus) 100-50 mcg/dose inhaler, Inhale 1 puff 2 (two) times a day Rinse mouth after use , Disp: 60 blister, Rfl: 0    hydrOXYzine HCL (ATARAX) 50 mg tablet, Take 1 tablet (50 mg total) by mouth daily at bedtime, Disp: 90 tablet, Rfl: 2    levonorgestrel (KYLEENA) 19 5 MG intrauterine device, 1 each by Intrauterine route, Disp: , Rfl:     Lidocaine (HM Lidocaine Patch) 4 % PTCH, Apply 1 patch topically every 12 (twelve) hours as needed (muscle pain), Disp: 15 patch, Rfl: 2    melatonin 3 mg, Take 6 mg by mouth , Disp: , Rfl:     multivitamin (THERAGRAN) TABS, Take 1 tablet by mouth daily, Disp: , Rfl:     ondansetron (Zofran ODT) 4 mg disintegrating tablet, Take 1 tablet (4 mg total) by mouth every 6 (six) hours as needed for nausea or vomiting, Disp: 20 tablet, Rfl: 3    pantoprazole (PROTONIX) 40 mg tablet, Take 1 tablet (40 mg total) by mouth daily, Disp: 90 tablet, Rfl: 3    Probiotic Product (PROBIOTIC-10 PO), Take by mouth, Disp: , Rfl:     sertraline (ZOLOFT) 50 mg tablet, take 3 tablets by mouth once daily at bedtime, Disp: 90 tablet, Rfl: 6    SUMAtriptan (IMITREX) 100 mg tablet, Take 1 tablet (100 mg total) by mouth once as needed for migraine for up to 15 doses May repeat one dose 6 hours later, but no more 3 days a week , Disp: 15 tablet, Rfl: 0    topiramate (TOPAMAX) 50 MG tablet, take 1 tablet by mouth at bedtime, Disp: 90 tablet, Rfl: 4    hydrOXYzine pamoate (VISTARIL) 25 mg capsule, Take 1 capsule (25 mg total) by mouth 3 (three) times a day as needed for anxiety, Disp: 30 capsule, Rfl: 0    methylPREDNISolone 4 MG tablet therapy pack, Use as directed on package, Disp: 21 each, Rfl: 0  Allergies   Allergen Reactions    Amoxicillin Anaphylaxis    Cinnamon - Food Allergy Anaphylaxis       OBJECTIVE    Vitals:   Vitals:    01/15/22 1020   BP: 122/90   BP Location: Left arm   Patient Position: Sitting   Cuff Size: Adult   Pulse: 102   Resp: 16   Temp: 97 6 °F (36 4 °C)   TempSrc: Temporal   SpO2: 94%   Weight: 109 kg (240 lb)   Height: 5' 5" (1 651 m)           Physical Exam  Vitals reviewed  Constitutional:       Appearance: She is well-developed  HENT:      Head: Normocephalic and atraumatic  Eyes:      Conjunctiva/sclera: Conjunctivae normal       Pupils: Pupils are equal, round, and reactive to light  Cardiovascular:      Rate and Rhythm: Normal rate and regular rhythm  Heart sounds: Normal heart sounds  Pulmonary:      Effort: Pulmonary effort is normal  No respiratory distress  Comments: Tight breath sounds  Musculoskeletal:         General: Normal range of motion  Cervical back: Normal range of motion and neck supple  Skin:     General: Skin is warm  Capillary Refill: Capillary refill takes less than 2 seconds  Neurological:      Mental Status: She is alert and oriented to person, place, and time              Nerissa Benavides MD  [place] [address]

## 2022-01-16 LAB
ALBUMIN SERPL-MCNC: 3.9 G/DL (ref 3.6–5.1)
ALBUMIN/GLOB SERPL: 1.6 (CALC) (ref 1–2.5)
ALP SERPL-CCNC: 73 U/L (ref 31–125)
ALT SERPL-CCNC: 27 U/L (ref 6–29)
AST SERPL-CCNC: 15 U/L (ref 10–30)
BASOPHILS # BLD AUTO: 18 CELLS/UL (ref 0–200)
BASOPHILS NFR BLD AUTO: 0.3 %
BILIRUB SERPL-MCNC: 0.3 MG/DL (ref 0.2–1.2)
BUN SERPL-MCNC: 10 MG/DL (ref 7–25)
BUN/CREAT SERPL: ABNORMAL (CALC) (ref 6–22)
CALCIUM SERPL-MCNC: 8.8 MG/DL (ref 8.6–10.2)
CHLORIDE SERPL-SCNC: 111 MMOL/L (ref 98–110)
CO2 SERPL-SCNC: 23 MMOL/L (ref 20–32)
CREAT SERPL-MCNC: 0.86 MG/DL (ref 0.5–1.1)
D DIMER PPP FEU-MCNC: 0.48 MCG/ML FEU
EOSINOPHIL # BLD AUTO: 100 CELLS/UL (ref 15–500)
EOSINOPHIL NFR BLD AUTO: 1.7 %
ERYTHROCYTE [DISTWIDTH] IN BLOOD BY AUTOMATED COUNT: 13 % (ref 11–15)
GLOBULIN SER CALC-MCNC: 2.4 G/DL (CALC) (ref 1.9–3.7)
GLUCOSE SERPL-MCNC: 78 MG/DL (ref 65–99)
HCT VFR BLD AUTO: 41 % (ref 35–45)
HGB BLD-MCNC: 13.8 G/DL (ref 11.7–15.5)
LYMPHOCYTES # BLD AUTO: 2018 CELLS/UL (ref 850–3900)
LYMPHOCYTES NFR BLD AUTO: 34.2 %
MCH RBC QN AUTO: 29.7 PG (ref 27–33)
MCHC RBC AUTO-ENTMCNC: 33.7 G/DL (ref 32–36)
MCV RBC AUTO: 88.4 FL (ref 80–100)
MONOCYTES # BLD AUTO: 401 CELLS/UL (ref 200–950)
MONOCYTES NFR BLD AUTO: 6.8 %
NEUTROPHILS # BLD AUTO: 3363 CELLS/UL (ref 1500–7800)
NEUTROPHILS NFR BLD AUTO: 57 %
PLATELET # BLD AUTO: 217 THOUSAND/UL (ref 140–400)
PMV BLD REES-ECKER: 10.7 FL (ref 7.5–12.5)
POTASSIUM SERPL-SCNC: 4.1 MMOL/L (ref 3.5–5.3)
PROT SERPL-MCNC: 6.3 G/DL (ref 6.1–8.1)
RBC # BLD AUTO: 4.64 MILLION/UL (ref 3.8–5.1)
SL AMB EGFR AFRICAN AMERICAN: 110 ML/MIN/1.73M2
SL AMB EGFR NON AFRICAN AMERICAN: 95 ML/MIN/1.73M2
SODIUM SERPL-SCNC: 141 MMOL/L (ref 135–146)
WBC # BLD AUTO: 5.9 THOUSAND/UL (ref 3.8–10.8)

## 2022-01-17 ENCOUNTER — TELEPHONE (OUTPATIENT)
Dept: FAMILY MEDICINE CLINIC | Facility: CLINIC | Age: 22
End: 2022-01-17

## 2022-01-17 ENCOUNTER — APPOINTMENT (OUTPATIENT)
Dept: RADIOLOGY | Facility: CLINIC | Age: 22
End: 2022-01-17
Payer: COMMERCIAL

## 2022-01-17 DIAGNOSIS — R06.02 SHORTNESS OF BREATH: Primary | ICD-10-CM

## 2022-01-17 DIAGNOSIS — R06.02 SHORTNESS OF BREATH: ICD-10-CM

## 2022-01-17 DIAGNOSIS — R07.9 CHEST PAIN, UNSPECIFIED TYPE: ICD-10-CM

## 2022-01-17 PROCEDURE — 71046 X-RAY EXAM CHEST 2 VIEWS: CPT

## 2022-01-17 RX ORDER — METHYLPREDNISOLONE 4 MG/1
TABLET ORAL
Qty: 21 EACH | Refills: 0 | Status: SHIPPED | OUTPATIENT
Start: 2022-01-17 | End: 2022-05-17

## 2022-01-17 NOTE — TELEPHONE ENCOUNTER
----- Message from Kelly Snellen, MD sent at 1/17/2022  9:24 AM EST -----  Please advise the patient that great news her D-dimer is negative therefore there is no indications of doing a CT at this time

## 2022-01-17 NOTE — PROGRESS NOTES
Referring the patient to the emergency room given that she has worsening chest pain to the left arm  Even in the setting of a normal chest x-ray and blood work I do want her to get evaluated given her history of blood clots   Patient [facility]

## 2022-01-17 NOTE — TELEPHONE ENCOUNTER
Dr Letitia Ware    Patient is calling for results of labs done 1/15 which have been scanned to chart under media

## 2022-01-18 ENCOUNTER — APPOINTMENT (OUTPATIENT)
Dept: PHYSICAL THERAPY | Facility: CLINIC | Age: 22
End: 2022-01-18
Payer: COMMERCIAL

## 2022-01-18 ENCOUNTER — TELEPHONE (OUTPATIENT)
Dept: FAMILY MEDICINE CLINIC | Facility: CLINIC | Age: 22
End: 2022-01-18

## 2022-01-18 NOTE — TELEPHONE ENCOUNTER
Dr Caterina Hernandez:    Patient called asking if you can place an order in her chart for CT? She went to ER last night waited 4 1/2 hours and ended up leaving

## 2022-01-18 NOTE — TELEPHONE ENCOUNTER
Spoke to patient about results  Recommend now that Pulmon/ Card work up negative  Recommend Buspar 15 mgs TID adjusted for anxiety   If  no improvement then would like a repeat eval

## 2022-01-18 NOTE — TELEPHONE ENCOUNTER
Patient went to [address] [place] [address] and waited 4 hours , she is about the same as yesterday    A lot of chest pain and left arm pain tc/cma----- Message from Lui Paulino MD sent at 1/18/2022  8:03 AM EST -----  Please call the patient and see if she went to the emergency room how she is feeling

## 2022-01-25 ENCOUNTER — OFFICE VISIT (OUTPATIENT)
Dept: PHYSICAL THERAPY | Facility: CLINIC | Age: 22
End: 2022-01-25
Payer: COMMERCIAL

## 2022-01-25 DIAGNOSIS — N32.81 OVERACTIVE BLADDER: Primary | ICD-10-CM

## 2022-01-25 DIAGNOSIS — R10.2 PELVIC PAIN: ICD-10-CM

## 2022-01-25 PROCEDURE — 97110 THERAPEUTIC EXERCISES: CPT

## 2022-01-25 PROCEDURE — 97112 NEUROMUSCULAR REEDUCATION: CPT

## 2022-01-25 NOTE — PROGRESS NOTES
Daily Note     Today's date: 2022  Patient name: Elmer Caballero  : 2000  MRN: [id number]  Referring provider: Connor Borrero  Dx:   Encounter Diagnosis     ICD-10-CM    1  Overactive bladder  N32 81    2  Pelvic pain  R10 2                   Subjective: Pt reports that she continues to have symptoms of overactive bladder and incontinence issues  Reports no pain this evening  Objective: See treatment diary below      Assessment: Tolerated treatment well  Patient demonstrated fatigue post treatment, exhibited good technique with therapeutic exercises and would benefit from continued PT      Plan: Continue per plan of care  Precautions: pelvic floor assessment next session due to time restraint  Manuals 21            eval                                                    Neuro Re-Ed              reviewed and educated on pelvic floor anatomy, pelvic floor function, pelvic floor rehab   Bridges x 20     Bridges w/ ball sq x 20   bridges w/ hip abduction x 20              Clamshells x 20 B                                                                             Ther Ex  Rere stretch 10s x 5              piriformis stretch 10s x 5              LTR stretch 10s x 5              Diaphragmatic breathing 3 min educational cues                                                                             Ther Activity                                       Gait Training                                       Modalities

## 2022-02-01 ENCOUNTER — OFFICE VISIT (OUTPATIENT)
Dept: PHYSICAL THERAPY | Facility: CLINIC | Age: 22
End: 2022-02-01
Payer: COMMERCIAL

## 2022-02-01 DIAGNOSIS — N32.81 OVERACTIVE BLADDER: Primary | ICD-10-CM

## 2022-02-01 DIAGNOSIS — R10.2 PELVIC PAIN: ICD-10-CM

## 2022-02-01 PROCEDURE — 97140 MANUAL THERAPY 1/> REGIONS: CPT

## 2022-02-01 PROCEDURE — 97112 NEUROMUSCULAR REEDUCATION: CPT

## 2022-02-01 NOTE — PROGRESS NOTES
Daily Note     Today's date: 2022  Patient name: Danie Moss  : 2000  MRN: [id number]  Referring provider: Vernon Brock  Dx:   Encounter Diagnosis     ICD-10-CM    1  Overactive bladder  N32 81    2  Pelvic pain  R10 2                   Subjective: Pt reports that feeling well  Objective: See treatment diary below    Objective   Pelvic Floor Exam   Position: supine exam    Diastatis   Diastasis recti present? no  Connective tissue integrity at linea alba: boggy  no tenderness at linea alba  unable to engage transverse abdominis     Skin inspection:   no scars present  General Perineum Exam:   perineum intact  Positive for no pelvic organ prolapse at rest and perianal erythema  Visual Inspection of Perineum:   Excursion of perineal body in cephalad direction with contraction of pelvic floor muscles (PFM): weak  Excursion of perineal body in caudal direction with relaxation of pelvic floor muscles (PFM): weak  Cotton swab test: non-tender  Cough reflex: cough reflex  Sphincter Tone Resting: increased  Sphincter Tone Squeeze: weak  Sensation: intact    Pelvic Organ Prolapse   no pelvic organ prolapse  Position: hook-lying  At rest: none    Pelvic Floor Muscle Exam     Palpation   Minimal increased muscle tension in the pubococcygeus, iliococcygeus and obturator internus  Moderate increased muscle tension in the bulbospongiosus, ischiocavernosus, super transverse perineal and puborectalis    Muscle Contraction: unable to perform  Breathing pattern with contraction: holding breath  Pelvic floor muscle relaxation is incomplete  PERFECT Score   Power right: 1/5  Power left: 1/5    SMEG Biofeedback   Holding ability: poorDerecruitment: poor      Assessment: Tolerated treatment well  Patient demonstrated fatigue post treatment, exhibited good technique with therapeutic exercises and would benefit from continued PT      Plan: Continue per plan of care        Precautions: pelvic floor assessment next session due to time restraint  Manuals 01/03/21 01/25/22 02/01/2022           eval   PFM with verbal and written consent                                                  Neuro Re-Ed              reviewed and educated on pelvic floor anatomy, pelvic floor function, pelvic floor rehab   Bridges x 20     Bridges w/ ball sq x 20   bridges w/ hip abduction x 20  X 20             Clamshells x 20 B  X 20                                                                            Ther Ex  Rere stretch 10s x 5  10s x 5             piriformis stretch 10s x 5  10s x 5            LTR stretch 10s x 5  10s x 5            Diaphragmatic breathing 3 min educational cues  3 min                                                                            Ther Activity                                       Gait Training                                       Modalities

## 2022-02-03 ENCOUNTER — TELEPHONE (OUTPATIENT)
Dept: HEMATOLOGY ONCOLOGY | Facility: CLINIC | Age: 22
End: 2022-02-03

## 2022-02-03 NOTE — TELEPHONE ENCOUNTER
Appointment Cancellation Or Reschedule     Person calling in Patient    Provider Dr Robert Tate   Office Visit Date and Time 2/3/22 @ 8:40am   Office Visit Location [facility]   Did patient want to reschedule their office appointment? If so, when was it scheduled to? Yes 2/9/22 @ 2:40pm   Is this patient calling to reschedule an infusion appointment? No   When is their next infusion appointment? N/a   Is this patient a Chemo patient? No   Reason for Cancellation or Reschedule Unable to keep appt     If the patient is a treatment patient, please route this to the office nurse  If the patient is not on treatment, please route to the office MA

## 2022-02-09 ENCOUNTER — OFFICE VISIT (OUTPATIENT)
Dept: PHYSICAL THERAPY | Facility: CLINIC | Age: 22
End: 2022-02-09
Payer: COMMERCIAL

## 2022-02-09 ENCOUNTER — OFFICE VISIT (OUTPATIENT)
Dept: HEMATOLOGY ONCOLOGY | Facility: MEDICAL CENTER | Age: 22
End: 2022-02-09
Payer: COMMERCIAL

## 2022-02-09 VITALS
HEIGHT: 65 IN | WEIGHT: 238 LBS | DIASTOLIC BLOOD PRESSURE: 70 MMHG | BODY MASS INDEX: 39.65 KG/M2 | RESPIRATION RATE: 18 BRPM | HEART RATE: 91 BPM | TEMPERATURE: 97.9 F | SYSTOLIC BLOOD PRESSURE: 118 MMHG | OXYGEN SATURATION: 98 %

## 2022-02-09 DIAGNOSIS — I26.99 PULMONARY EMBOLISM, UNSPECIFIED CHRONICITY, UNSPECIFIED PULMONARY EMBOLISM TYPE, UNSPECIFIED WHETHER ACUTE COR PULMONALE PRESENT (HCC): Primary | ICD-10-CM

## 2022-02-09 DIAGNOSIS — R10.2 PELVIC PAIN: ICD-10-CM

## 2022-02-09 DIAGNOSIS — N32.81 OVERACTIVE BLADDER: Primary | ICD-10-CM

## 2022-02-09 PROCEDURE — 97112 NEUROMUSCULAR REEDUCATION: CPT

## 2022-02-09 PROCEDURE — 97110 THERAPEUTIC EXERCISES: CPT

## 2022-02-09 PROCEDURE — 1036F TOBACCO NON-USER: CPT | Performed by: INTERNAL MEDICINE

## 2022-02-09 PROCEDURE — 99214 OFFICE O/P EST MOD 30 MIN: CPT | Performed by: INTERNAL MEDICINE

## 2022-02-09 PROCEDURE — 3008F BODY MASS INDEX DOCD: CPT | Performed by: INTERNAL MEDICINE

## 2022-02-09 NOTE — PROGRESS NOTES
Steven Christine  2000  [facility] [place] [address] [address]    DISCUSSION/SUMMARY:    58-year-old female with factor 5 Leiden mutation and recent PE after surgery  Ms Sharon Oquendo feels well and clinically there are no concerning signs  Patient has a history of asthma but no recent respiratory issues  As above, no lower extremity swelling either  After suffering the PE at [facility] (after the nephrectomy), patient was treated with Xarelto for 3 months - anticoagulation was then discontinued by the hematologist at [facility]  The plan is to continue with surveillance  We rediscussed what to monitor for as far as any acute respiratory issues and/or lower extremity swelling, cords, pain etc     Patient has a history of irregular menstrual periods and has a [place] IUD in place  Drugs  com lists arterial and venous thrombotic events, PEs and DVTs as well as strokes as post marketing complications   (DicNutrinsicament ca  [email or web address]-side-effects  html#professional)  Previously I had asked the patient to speak to her gynecologist about the possibility switching the [place] to another form of treatment  Gynecologist does not believe that there is an increased risk with this IUD  As discussed previously my concern is that patient is obese, has previously suffered a thrombotic complication (after surgery) and is heterozygous for factor 5 Leiden  Mrs Sharon Oquendo follow-up with Nephrology as directed  Patient is to return to this office in 6 months  Mrs Sharon Oquendo knows to call the hematology/oncology office if there are any other questions or concerns  Carefully review your medication list and verify that the list is accurate and up-to-date   Please call the hematology/oncology office if there are medications missing from the list, medications on the list that you are not currently taking or if there is a dosage or instruction that is different from how you're taking that medication  Patient goals and areas of care:  Monitor for any respiratory issues  Barriers to care:  None  Patient is able to self-care   ______________________________________________________________________________________    Chief Complaint   Patient presents with    Follow-up     Prior PE     Oncology History    No history exists  History of Present Illness:  76-year-old female with history of factor 5 Leiden mutation, obesity, atrophic duplex left kidney status post recent left-sided nephrectomy (05/21/2020) admitted to [facility] with chest pain and fevers  Workup demonstrated multiple pulmonary emboli  The left-sided nephrectomy was complicated by a left-sided retroperitoneal hematoma for which patient required readmission in early June 2020  Patient's mother was diagnosed with factor 5 Leiden years ago, patient was found to have the factor 5 mutation in 2017 but had never had any clotting issues  Although the specifics are not entirely clear, it is thought that the recent PE was due to immobility (from the recent surgical complications) as well as the factor 5 Leiden mutation  Patient was treated with Xarelto for approximately 3 months; discontinued by a [facility] hematologist in the outpatient setting  The plan since that time has been surveillance  Patient returns for follow-up  Ms Nandini Scott is feeling well, about the same as before  No lower extremity swelling, cords or pain  No respiratory issues, no shortness of breath or dyspnea on exertion, no chest pain  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, working full-time as a teacher  No bleeding or bruising issues  No recent renal issues, no pain control issues  As discussed previously, patient had a Nancy Chafe IUD placed - no gyn issues  Routine health maintenance and medical care up-to-date; patient has received the COVID vaccine including the booster  Review of Systems   Constitutional: Negative  HENT: Negative      Eyes: Negative  Respiratory: Negative  Cardiovascular: Negative  Gastrointestinal: Negative  Endocrine: Negative  Genitourinary: Negative  Musculoskeletal: Negative  Skin: Negative  Allergic/Immunologic: Negative  Neurological: Negative  Hematological: Negative  Psychiatric/Behavioral: The patient is nervous/anxious  All other systems reviewed and are negative      Patient Active Problem List   Diagnosis    Allergic cough    Factor V Leiden ([place] [address] )    Factor V Leiden mutation ([place] [address] )    Mild intermittent asthma with acute exacerbation    Mixed stress and urge urinary incontinence    VUR (vesicoureteric reflux)    Recurrent UTI    Overactive bladder    Kidney stone    Chronic tonsillitis    Chronic tonsillar hypertrophy    Exercise-induced asthma    Pulmonary embolus ([place] [address] )    S/p nephrectomy    Anxiety    Postprandial epigastric pain    Gastroesophageal reflux disease    Migraine with aura and without status migrainosus, not intractable     Past Medical History:   Diagnosis Date    Anxiety and depression     Asthma     Clotting disorder ([place] [address] )     Factor V Leiden ([place] [address] )     Pulmonary embolism ([place] [address] )     Thrombophlebitis      Past Surgical History:   Procedure Laterality Date    BLADDER SURGERY      NEPHRECTOMY Left     URETER REVISION     Past surgical history:  No prior blood transfusions    Ob gyn:  No breast pathology, patient has an IUD in place, has menstrual periods, interval and amount of bleeding seems to be normal    Family History   Problem Relation Age of Onset    Hypertension Mother     Hyperlipidemia Mother     Diabetes Mother     Factor V Leiden deficiency Mother     Ovarian cancer Maternal Grandmother     Arthritis Family     Asthma Family     Cancer Family     Cervical cancer Family     Heart failure Family     Hyperlipidemia Family     Hypertension Family     Diabetes Other     Heart disease Father    Family history:  No children, patient's mother with factor 5 Leiden    Social History     Socioeconomic History    Marital status: Single     Spouse name: Not on file    Number of children: Not on file    Years of education: Not on file    Highest education level: Not on file   Occupational History    Not on file   Tobacco Use    Smoking status: Never Smoker    Smokeless tobacco: Never Used   Vaping Use    Vaping Use: Never used   Substance and Sexual Activity    Alcohol use: Yes     Comment: social    Drug use: Never    Sexual activity: Not on file   Other Topics Concern    Not on file   Social History Narrative    Not on file     Social Determinants of Health     Financial Resource Strain: Not on file   Food Insecurity: Not on file   Transportation Needs: Not on file   Physical Activity: Not on file   Stress: Not on file   Social Connections: Not on file   Intimate Partner Violence: Not on file   Housing Stability: Not on file   Social history:  Patient recently graduated from [employer] 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, no tobacco or drug abuse, social alcohol, no toxic exposure    Current Outpatient Medications:     albuterol (PROVENTIL HFA,VENTOLIN HFA) 90 mcg/act inhaler, Inhale 2 puffs every 6 (six) hours as needed for wheezing, Disp: 1 Inhaler, Rfl: 2    busPIRone (BUSPAR) 15 mg tablet, Take 1 tablet (15 mg total) by mouth 3 (three) times a day, Disp: 270 tablet, Rfl: 0    butalbital-acetaminophen-caffeine (FIORICET,ESGIC) -40 mg per tablet, Take 1 tablet now, and then repeat in 4 hrs if no relief   Don't take more then 3 in a week , Disp: 30 tablet, Rfl: 0    calcium-vitamin D (OSCAL) 250-125 MG-UNIT per tablet, Take 1 tablet by mouth 2 (two) times a day, Disp: , Rfl:     EPINEPHrine (EPIPEN) 0 3 mg/0 3 mL SOAJ, Inject 0 3 mL (0 3 mg total) into a muscle once for 1 dose, Disp: 0 6 mL, Rfl: 0    fluticasone-salmeterol (Advair Diskus) 100-50 mcg/dose inhaler, Inhale 1 puff 2 (two) times a day Rinse mouth after use , Disp: 60 blister, Rfl: 0    hydrOXYzine HCL (ATARAX) 50 mg tablet, Take 1 tablet (50 mg total) by mouth daily at bedtime, Disp: 90 tablet, Rfl: 2    hydrOXYzine pamoate (VISTARIL) 25 mg capsule, take 1 capsule by mouth three times a day AS NEEDED FOR ANXIETY, Disp: 30 capsule, Rfl: 6    levonorgestrel (KYLEENA) 19 5 MG intrauterine device, 1 each by Intrauterine route, Disp: , Rfl:     Lidocaine (HM Lidocaine Patch) 4 % PTCH, Apply 1 patch topically every 12 (twelve) hours as needed (muscle pain), Disp: 15 patch, Rfl: 2    melatonin 3 mg, Take 6 mg by mouth , Disp: , Rfl:     methylPREDNISolone 4 MG tablet therapy pack, Use as directed on package, Disp: 21 each, Rfl: 0    multivitamin (THERAGRAN) TABS, Take 1 tablet by mouth daily, Disp: , Rfl:     ondansetron (Zofran ODT) 4 mg disintegrating tablet, Take 1 tablet (4 mg total) by mouth every 6 (six) hours as needed for nausea or vomiting, Disp: 20 tablet, Rfl: 3    pantoprazole (PROTONIX) 40 mg tablet, Take 1 tablet (40 mg total) by mouth daily, Disp: 90 tablet, Rfl: 3    Probiotic Product (PROBIOTIC-10 PO), Take by mouth, Disp: , Rfl:     sertraline (ZOLOFT) 50 mg tablet, take 3 tablets by mouth once daily at bedtime, Disp: 90 tablet, Rfl: 6    SUMAtriptan (IMITREX) 100 mg tablet, Take 1 tablet (100 mg total) by mouth once as needed for migraine for up to 15 doses May repeat one dose 6 hours later, but no more 3 days a week , Disp: 15 tablet, Rfl: 0    topiramate (TOPAMAX) 50 MG tablet, take 1 tablet by mouth at bedtime, Disp: 90 tablet, Rfl: 4    Allergies   Allergen Reactions    Amoxicillin Anaphylaxis    Cinnamon - Food Allergy Anaphylaxis     Vitals:    02/09/22 1442   BP: 118/70   Pulse: 91   Resp: 18   Temp: 97 9 °F (36 6 °C)   SpO2: 98%     Physical Exam  Constitutional:       Appearance: She is well-developed  HENT:      Head: Normocephalic and atraumatic        Right Ear: External ear normal       Left Ear: External ear normal    Eyes:      Conjunctiva/sclera: Conjunctivae normal       Pupils: Pupils are equal, round, and reactive to light  Cardiovascular:      Rate and Rhythm: Normal rate and regular rhythm  Heart sounds: Normal heart sounds  Pulmonary:      Effort: Pulmonary effort is normal       Breath sounds: Normal breath sounds  Comments: Good air entry bilaterally, clear  Abdominal:      General: Bowel sounds are normal       Palpations: Abdomen is soft  Comments: Soft, nontender, obese, cannot palpate liver or spleen, well-healed suture line in umbilicus and left lower quadrant   Musculoskeletal:         General: Normal range of motion  Cervical back: Normal range of motion and neck supple  Skin:     General: Skin is warm  Comments: Warm, moist, good color, no petechiae or ecchymoses   Neurological:      Mental Status: She is alert and oriented to person, place, and time  Deep Tendon Reflexes: Reflexes are normal and symmetric  Psychiatric:         Behavior: Behavior normal          Thought Content: Thought content normal          Judgment: Judgment normal      Extremities:  No lower extremity edema bilaterally, no cords, pulses are 1+  Lymphatics:  No adenopathy in the neck, supraclavicular region or axilla bilaterally    Labs    07/31/2020 WBC = 9 8 hemoglobin = 13 3 hematocrit = 40 7 MCV = 94 platelet = 651 neutrophil = 74% lymphocytes = 20% monocyte = 5% BUN = 10 creatinine = 0 7 calcium = 9 5  07/31/2020 serum pregnancy = negative    07/08/2020 rapid drug screen, urine:  Benzodiazepine positive    Imaging    07/31/2020 CTA chest    1   Markedly decreased burden of pulmonary embolism in the right  lower lobe segmental arteries, now nonocclusive and mostly wall  adherent    2   Improved right lower lobe opacities with residual posterior  dependent consolidation, consistent with pulmonary infarct     06/19/2020 lower extremity Doppler    Sluggish flow and meniscus appearance of the greater saphenous  vein and common femoral vein was discussed with and acknowledged  by Dr Beba Bradshaw by telephone by Dr Donna Laughlin on 6/19/2020  12:14 PM     07/08/2020 CT scan head without contrast   Impression stated normal head CT     06/18/2020 CTA chest    1   Pulmonary embolism at the level of the right interlobar  pulmonary artery with likely associated pulmonary infarction  2   Postsurgical findings in the left renal fossa include fluid  and gas  Pathology    07/31/2020 D-dimer = 0 475    06/23/2020 thrombophilia evaluation ([facility])  Factor 2 = 140 %  Factor 5 = 120%  Factor 7 = 81%  Factor 9 = 171% = elevated  Factor 10 = 134%  Factor 8 = 385% = elevated  Protein C functional = 92%  Protein S functional = 100%  DRVVT screen = 56 7 seconds = elevated  DRVVT confirm = 1 1 = WNL  Antithrombin 3 functional = 94%  Prothrombin gene mutation negative    06/02/2020 PT = 18 6 INR = 1 56 PTT = 27 2    08/14/2017 factor 5 Leiden mutation heterozygous [id number]    Surgical Pathology CaseResulted: 5/28/2020 5:05 PM  [facility] [address] [facility]  Component Name Value Ref Range   Pathology Surgical Left kidney and ureter, nephrectomy and total ureterectomy:  - 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, 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) with segmental   interstitial fibrosis, chronic inflammation, and calcification  - Nephrolithiasis  - 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

## 2022-02-09 NOTE — PROGRESS NOTES
Daily Note     Today's date: 2022  Patient name: Rhoda Barrios  : 2000  MRN: [id number]  Referring provider: Alban Agee  Dx:   Encounter Diagnosis     ICD-10-CM    1  Overactive bladder  N32 81    2  Pelvic pain  R10 2                   Subjective: No new complaints  Objective: See treatment diary below      Assessment: Tolerated treatment well  Patient demonstrated fatigue post treatment, exhibited good technique with therapeutic exercises and would benefit from continued PT      Plan: Continue per plan of care  Precautions: pelvic floor assessment next session due to time restraint  Manuals 21          eval   PFM with verbal and written consent                                                  Neuro Re-Ed              reviewed and educated on pelvic floor anatomy, pelvic floor function, pelvic floor rehab   Bridges x 20     Bridges w/ ball sq x 20   bridges w/ hip abduction x 20  X 20  X 20     Bridges w/ ball sq x 20   bridges w/ hip abduction x 20            Clamshells x 20 B  X 20  X 20                                                                           Ther Ex  Rere stretch 10s x 5  10s x 5  10s x 5            piriformis stretch 10s x 5  10s x 5 10s x 5            LTR stretch 10s x 5  10s x 5 10s x 5           Diaphragmatic breathing 3 min educational cues  3 min  3 min              Child pose 10s x 10              Cat camel 10s x 10              bridges w/ TA marches x 20                                    Ther Activity                                       Gait Training                                       Modalities

## 2022-02-15 ENCOUNTER — APPOINTMENT (OUTPATIENT)
Dept: PHYSICAL THERAPY | Facility: CLINIC | Age: 22
End: 2022-02-15
Payer: COMMERCIAL

## 2022-03-01 ENCOUNTER — EVALUATION (OUTPATIENT)
Dept: PHYSICAL THERAPY | Facility: CLINIC | Age: 22
End: 2022-03-01
Payer: COMMERCIAL

## 2022-03-01 DIAGNOSIS — N32.81 OVERACTIVE BLADDER: Primary | ICD-10-CM

## 2022-03-01 DIAGNOSIS — R10.2 PELVIC PAIN: ICD-10-CM

## 2022-03-01 PROCEDURE — 97112 NEUROMUSCULAR REEDUCATION: CPT

## 2022-03-01 PROCEDURE — 97164 PT RE-EVAL EST PLAN CARE: CPT

## 2022-03-01 PROCEDURE — 97110 THERAPEUTIC EXERCISES: CPT

## 2022-03-01 PROCEDURE — 97140 MANUAL THERAPY 1/> REGIONS: CPT

## 2022-03-01 NOTE — PROGRESS NOTES
PT Evaluation     Today's date: 3/1/2022  Patient name: Alton Vazquez  : 2000  MRN: [id number]  Referring provider: Mik Varma  Dx:   Encounter Diagnosis     ICD-10-CM    1  Overactive bladder  N32 81    2  Pelvic pain  R10 2                   Assessment  Assessment details: Update 3/1/22: Pt presents symptoms of mixed stress and urge incontinence, bowel leakage, and pain with functional/recreational activities  Pt deficits include decrease pelvic floor muscle endurance, decrease pelvic floor muscle strength, decrease strength in core stabilization and hip strength  Pt also presents with pain during sexual intercourse with deep penetration and insertion  Pt will continue to benefit from skilled physical therapy in order to optimize quality of life  Pt presents with deficits that correlate to mixed stressed and urge incontinence  Pt deficits include decrease pelvic floor muscle strength, incontinence with light activity, pelvic pain with functional activities, decrease core strength, and increase muscle tension  Pt will benefit form skilled physical therapy in order to optimize strength, improve overall function and improve quality of life  Impairments: abnormal muscle firing, abnormal muscle tone, abnormal or restricted ROM, activity intolerance, impaired physical strength, lacks appropriate home exercise program, pain with function, poor posture  and poor body mechanics    Symptom irritability: highUnderstanding of Dx/Px/POC: good   Prognosis: good    Goals  STG:  -The patient will improve pelvic floor muscle strength 1 to 2 grade in 8 to 12 weeks  -The patient will improve pelvic floor muscle endurance to 2 to 3 seconds in 8 to 12 weeks  -The patient will reduce diastasis of the rectus abdominis by 1/2 to 1 finger widths in 8 to 12 weeks  -The patient will reduce number of pads to 1 per day  LT  The patient will be independent with HEP upon discharge     2  The patient will perform higher level activities and exercise without leaking upon discharge  Plan  Plan details:  HEP development, stretching, strengthening, A/AA/PROM, joint mobilizations, posture education, STM/MI as needed to reduce muscle tension, muscle reeducation, patient has been educated in Dx, prognosis and plan of care and is in agreement    Patient would benefit from: PT eval and skilled physical therapy  Planned modality interventions: cryotherapy and thermotherapy: hydrocollator packs  Planned therapy interventions: manual therapy, neuromuscular re-education, self care, therapeutic activities, therapeutic exercise and home exercise program  Frequency: 1x week  Duration in weeks: 6  Treatment plan discussed with: patient        PT Pelvic Floor Subjective:   History of Present Illness:   Pt reports that she has been dealing with incontinence issues since she was a child  States that about two years ago she did go to see a pelvic floor therapist which seemed to improve her symptoms  However, after following a recent procedure she noted symptoms return and get worse over the last year  Pt reports that she has leakage when laughing, coughing, sneezing, and doing light activity  Pt also reports that she is dealing with a shy bladder and increase urgency  She also has pelvic pain with sexual intercourse, sitting for long periods of time, standing for long periods of time, and with light activity  Occasionally, pain will radiate down her leg and shoot into her low back   Mechanism of injury: childbirth          Recurrent probem    Quality of life: fair    Social Support:     Lives in:  Multiple-level home    Lives with:  Parents    Work status: employed full time  Hand dominance:  Right  Diet and Exercise:    Diet:balanced nutrition    Exercise type: walking  OB/ gyn History    Gestational History:     Prior Pregnancy: No      Menstrual History:      Menstrual irregularities irregular menses    Painful periods:  Difficulty managing menstrual pain    Tolerates tampons: yes  Bladder Function:     Voiding Difficulties positive for: urgency, frequent urination, hesitancy and paruresis      Voiding Difficulties comments:     Voiding frequency: every 31-60 minutes    Urinary leakage: urine leakage    Urinary leakage aggravated by: coughing, sneezing, exercise, walking to the bathroom, seeing a toilet and post-void dribble    Nocturia (episodes per night): 4 or more    Fluid Intake Type: Water    Intake (ounces):  Water: 24,   Incontinence Management:     Pads/Diaper Use:  Day  Bowel Function:     Voiding DIfficulties: urgency and stool frequency abnormal      [place] Stool Scale: type 3    Stool softener use: no stool softeners    Enema use: no enema    Uses "squatty potty": no Squatty Potty  Sexual Function:     Sexually Active:  Sexually active    Pain during intercourse: Yes      Lubrication Use: Yes    pain causes abstinence    Patient wishes to return to having intercourse: currently unable to have intercourse but wants to  Pain:     Current pain rating:  3    At best pain rating:  3    At worst pain ratin    Quality:  Dull ache, sharp and pressure    Aggravating factors:  [place], exercise, sit to stand transition, prolonged positions and menses    Relieving factors:  Heat, medications and relaxation  Treatments:     Previous treatment:  Physical therapy    Current treatment: physical therapy    Patient Goals:     Patient goals for therapy:  Improved pain management, improved quality of life, fully empty bladder or bowels, improved bladder or bowel function, improved comfort, decreased pain, return to sport/leisure activities and decreased difficulties at school and home      Objective     Postural Observations  Seated posture: fair  Standing posture: fair        Active Range of Motion     Lumbar   Normal active range of motion    Strength/Myotome Testing     Lumbar   Left   Normal strength    Right   Normal strength    Muscle Activation   Patient able to activate left transverse abdominals, left multifidus, right transverse abdominals and right multifidus  Tests     Lumbar   Negative SIJ compression, sacroiliac distraction, Gaenslen's  and sacral thrust       Left   Negative passive SLR and slump test      Right   Negative passive SLR and slump test      Left Hip   Negative LESLY  Right Hip   Negative LESLY  Pelvic Floor Exam   Position: supine exam    Diastatis   Diastasis recti present? no  Connective tissue integrity at linea alba: firm  no tenderness at linea alba  able to engage transverse abdominis     Skin inspection:   no scars present  General Perineum Exam:   perineum intact  Positive for no pelvic organ prolapse at rest and perianal erythema  Visual Inspection of Perineum:   Excursion of perineal body in cephalad direction with contraction of pelvic floor muscles (PFM): delayed  and weak  Excursion of perineal body in caudal direction with relaxation of pelvic floor muscles (PFM): delayed  and weak  [place] swab test: non-tender  Cough reflex: cough reflex  Sphincter Tone Resting: increased  Sphincter Tone Squeeze: weak  Sensation: intact    Pelvic Organ Prolapse   no pelvic organ prolapse  At rest: none  With bearing down: none    Pelvic Floor Muscle Exam     Palpation   No increased muscle tension in the bulbospongiosus, ischiocavernosus and super transverse perineal  Moderate increased muscle tension in the pubococcygeus, iliococcygeus, coccygeus and obturator internus    Muscle Contraction: overflow and substitution  Pelvic floor muscle relaxation is delayed and incomplete       PERFECT Score   Power right: 1+/5  Power left: 1+/5                  Manuals 01/03/21 01/25/22 02/01/2022 02/09/22 3/1/21         eval   PFM with verbal and written consent   PFM assessment verbal and written consent provided             Pelvic wand next session                                  Neuro Re-Ed reviewed and educated on pelvic floor anatomy, pelvic floor function, pelvic floor rehab   Bridges x 20     Bridges w/ ball sq x 20   bridges w/ hip abduction x 20  X 20  X 20     Bridges w/ ball sq x 20   bridges w/ hip abduction x 20  PFM iso w/ multiple cues required           Clamshells x 20 B  X 20  X 20  X 20                                                                          Ther Ex  Rere stretch 10s x 5  10s x 5  10s x 5  10s x 5           piriformis stretch 10s x 5  10s x 5 10s x 5  10s x 5           LTR stretch 10s x 5  10s x 5 10s x 5 10s x 5           Diaphragmatic breathing 3 min educational cues  3 min  3 min  3 min             Child pose 10s x 10  10s x 10             Cat camel 10s x 10  10s x 10             bridges w/ TA marches x 20  X 20                                   Ther Activity                                       Gait Training                                       Modalities

## 2022-03-08 ENCOUNTER — OFFICE VISIT (OUTPATIENT)
Dept: PHYSICAL THERAPY | Facility: CLINIC | Age: 22
End: 2022-03-08
Payer: COMMERCIAL

## 2022-03-08 DIAGNOSIS — R10.2 PELVIC PAIN: ICD-10-CM

## 2022-03-08 DIAGNOSIS — N32.81 OVERACTIVE BLADDER: Primary | ICD-10-CM

## 2022-03-08 PROCEDURE — 97140 MANUAL THERAPY 1/> REGIONS: CPT

## 2022-03-08 PROCEDURE — 97112 NEUROMUSCULAR REEDUCATION: CPT

## 2022-03-08 PROCEDURE — 97110 THERAPEUTIC EXERCISES: CPT

## 2022-03-08 NOTE — PROGRESS NOTES
Daily Note     Today's date: 3/8/2022  Patient name: Brett Correa  : 2000  MRN: [id number]  Referring provider: Lawrence Emmanuel  Dx:   Encounter Diagnosis     ICD-10-CM    1  Overactive bladder  N32 81    2  Pelvic pain  R10 2                   Subjective: Pt reports that she is doing well and denies any new changes since last session  Objective: See treatment diary below      Assessment: Tolerated treatment well  Patient demonstrated fatigue post treatment, exhibited good technique with therapeutic exercises and would benefit from continued PT      Plan: Continue per plan of care  Manuals 01/03/21 01/25/22 2022 02/09/22 3/1/21 3/8/22        eval   PFM with verbal and written consent   PFM assessment verbal and written consent provided PFM perineal massage ~ 15 min             Pelvic wand next session Pelvic floor wand instruction                                  Neuro Re-Ed              reviewed and educated on pelvic floor anatomy, pelvic floor function, pelvic floor rehab   Bridges x 20     Bridges w/ ball sq x 20   bridges w/ hip abduction x 20  X 20  X 20     Bridges w/ ball sq x 20   bridges w/ hip abduction x 20  PFM iso w/ multiple cues required  X 20     Bridges w/ ball sq x 20   bridges w/ hip abduction x 20         Clamshells x 20 B  X 20  X 20  X 20  X 20                                                                         Ther Ex  Rere stretch 10s x 5  10s x 5  10s x 5  10s x 5  10s x 5          piriformis stretch 10s x 5  10s x 5 10s x 5  10s x 5  10s x 5          LTR stretch 10s x 5  10s x 5 10s x 5 10s x 5  10s x 5          Diaphragmatic breathing 3 min educational cues  3 min  3 min  3 min  3 min            Child pose 10s x 10  10s x 10  10s x 10            Cat camel 10s x 10  10s x 10  10s x 10            bridges w/ TA marches x 20  X 20  X 20                                  Ther Activity                                       Gait Training Modalities

## 2022-03-14 ENCOUNTER — OFFICE VISIT (OUTPATIENT)
Dept: PHYSICAL THERAPY | Facility: CLINIC | Age: 22
End: 2022-03-14
Payer: COMMERCIAL

## 2022-03-14 DIAGNOSIS — N32.81 OVERACTIVE BLADDER: Primary | ICD-10-CM

## 2022-03-14 DIAGNOSIS — R10.2 PELVIC PAIN: ICD-10-CM

## 2022-03-14 PROCEDURE — 97140 MANUAL THERAPY 1/> REGIONS: CPT

## 2022-03-14 PROCEDURE — 97112 NEUROMUSCULAR REEDUCATION: CPT

## 2022-03-14 PROCEDURE — 97110 THERAPEUTIC EXERCISES: CPT

## 2022-03-15 NOTE — PROGRESS NOTES
Daily Note     Today's date: 3/14/2022  Patient name: Winston Dozier  : 2000  MRN: [id number]  Referring provider: Barry Burdick  Dx:   Encounter Diagnosis     ICD-10-CM    1  Overactive bladder  N32 81    2  Pelvic pain  R10 2                   Subjective: Pt brought pelvic wand with her for today  No changes reported  Objective: See treatment diary below      Assessment: Tolerated treatment well  Patient demonstrated fatigue post treatment, exhibited good technique with therapeutic exercises and would benefit from continued PT      Plan: Continue per plan of care  Manuals 01/03/21 01/25/22 2022 02/09/22 3/1/21 3/8/22 3/14/22       eval   PFM with verbal and written consent   PFM assessment verbal and written consent provided PFM perineal massage ~ 15 min  PFM perineal massage ~ 15 min            Pelvic wand next session Pelvic floor wand instruction  Pelvic floor instruction                                 Neuro Re-Ed              reviewed and educated on pelvic floor anatomy, pelvic floor function, pelvic floor rehab   Bridges x 20     Bridges w/ ball sq x 20   bridges w/ hip abduction x 20  X 20  X 20     Bridges w/ ball sq x 20   bridges w/ hip abduction x 20  PFM iso w/ multiple cues required  X 20     Bridges w/ ball sq x 20   bridges w/ hip abduction x 20 X 20     bridges w/ball sq x 20   bridges w/ hip abduction x 20         Clamshells x 20 B  X 20  X 20  X 20  X 20  X 20                                                                        Ther Ex  Rere stretch 10s x 5  10s x 5  10s x 5  10s x 5  10s x 5  10s x 5        piriformis stretch 10s x 5  10s x 5 10s x 5  10s x 5  10s x 5  10s x 5        LTR stretch 10s x 5  10s x 5 10s x 5 10s x 5  10s x 5  10s x 5        Diaphragmatic breathing 3 min educational cues  3 min  3 min  3 min  3 min  3 min           Child pose 10s x 10  10s x 10  10s x 10  10s x 10           Cat camel 10s x 10  10s x 10  10s x 10  10s x 10 christopher w/ [place]INO [place] x 20  X 20  X 20  X 20                                 Ther Activity                                       Gait Training                                       Modalities

## 2022-03-21 ENCOUNTER — APPOINTMENT (OUTPATIENT)
Dept: PHYSICAL THERAPY | Facility: CLINIC | Age: 22
End: 2022-03-21
Payer: COMMERCIAL

## 2022-03-23 ENCOUNTER — OFFICE VISIT (OUTPATIENT)
Dept: PULMONOLOGY | Facility: MEDICAL CENTER | Age: 22
End: 2022-03-23
Payer: COMMERCIAL

## 2022-03-23 VITALS
HEART RATE: 87 BPM | WEIGHT: 235 LBS | BODY MASS INDEX: 39.15 KG/M2 | DIASTOLIC BLOOD PRESSURE: 80 MMHG | OXYGEN SATURATION: 98 % | HEIGHT: 65 IN | SYSTOLIC BLOOD PRESSURE: 116 MMHG | RESPIRATION RATE: 12 BRPM | TEMPERATURE: 98.8 F

## 2022-03-23 DIAGNOSIS — R06.2 WHEEZING: ICD-10-CM

## 2022-03-23 DIAGNOSIS — J45.30 MILD PERSISTENT ASTHMA WITHOUT COMPLICATION: ICD-10-CM

## 2022-03-23 DIAGNOSIS — J30.1 SEASONAL ALLERGIC RHINITIS DUE TO POLLEN: ICD-10-CM

## 2022-03-23 DIAGNOSIS — R07.89 CHEST TIGHTNESS: ICD-10-CM

## 2022-03-23 DIAGNOSIS — R06.02 SHORTNESS OF BREATH: Primary | ICD-10-CM

## 2022-03-23 DIAGNOSIS — D68.51 FACTOR V LEIDEN MUTATION (HCC): ICD-10-CM

## 2022-03-23 PROCEDURE — 94010 BREATHING CAPACITY TEST: CPT | Performed by: INTERNAL MEDICINE

## 2022-03-23 PROCEDURE — 3008F BODY MASS INDEX DOCD: CPT | Performed by: INTERNAL MEDICINE

## 2022-03-23 PROCEDURE — 1036F TOBACCO NON-USER: CPT | Performed by: INTERNAL MEDICINE

## 2022-03-23 PROCEDURE — 99204 OFFICE O/P NEW MOD 45 MIN: CPT | Performed by: INTERNAL MEDICINE

## 2022-03-23 RX ORDER — ALBUTEROL SULFATE 90 UG/1
2 AEROSOL, METERED RESPIRATORY (INHALATION) EVERY 4 HOURS PRN
Qty: 18 G | Refills: 6 | Status: SHIPPED | OUTPATIENT
Start: 2022-03-23

## 2022-03-23 NOTE — PATIENT INSTRUCTIONS
Start Advair 250 mcg disc 1 puff twice a day  Rinse mouth with water after use    Can use Ventolin 2 puffs every 4 hours as needed or can take even 10 minutes before exercising    Blood test for allergies - [facility] allergy panel    Does not need to be fasting    Monitor peak flow rates - always do setting    Normal peak flow rate is 380-480 liters/minute for your height and age    Today her peak flow rate was 340 liters/minute    Call  [phone]

## 2022-03-23 NOTE — PROGRESS NOTES
Assessment/Plan:       Problem List Items Addressed This Visit        Respiratory    Mild persistent asthma without complication    Relevant Medications    fluticasone-salmeterol (Advair Diskus) 250-50 mcg/dose inhaler    albuterol (PROVENTIL HFA,VENTOLIN HFA) 90 mcg/act inhaler    Other Relevant Orders    [facility] Panel, Adult    Seasonal allergic rhinitis due to pollen     Filipe Lanes does have seasonal allergies  I did order [facility] allergy panel  A touch can use over-the-counter Claritin and Flonase as needed and also saline nasal spray  Relevant Orders    [facility] Panel, Adult       Hematopoietic and Hemostatic    Factor V Leiden mutation ([facility] [address] )     She does have history of factor 5 mutation  She was heterozygous for this  She did developed DVT in her right lower extremity and multiple acute pulmonary emboli in right pulmonary artery branches that occurred with a month after she had her left laparoscopic nephrectomy on 05/21/20  She was diagnosed with PE on 06/18/20  She was treated Xarelto for 3 months and this was discontinued around mid September 2020  She has not had any recurrence of DVT or PE since then            Other    Shortness of breath - Primary     Spirometry today shows normal lung volumes  Lung sounds were clear  I did start her on Advair 250 micrograms 1 puff b i d  to see if this helps with her shortness of breath  She could also use Ventolin inhaler as needed    Pulse oximetry testing did not show any oxygen desaturation:      Room air O2 saturation rest was 98% and after ambulating 250 ft lowest O2 saturation was 97%    She is overweight which is pie contributing to some of her shortness of breath  She denies any excessive daytime somnolence           Relevant Orders    POCT spirometry (Completed)      Other Visit Diagnoses     Chest tightness        Relevant Medications    albuterol (PROVENTIL HFA,VENTOLIN HFA) 90 mcg/act inhaler    Wheezing Relevant Medications    albuterol (PROVENTIL HFA,VENTOLIN HFA) 90 mcg/act inhaler            Plan for follow up:    -Return in about 2 months (around 5/23/2022)  All questions are answered to the patient's satisfaction and understanding  She verbalizes understanding  She is encouraged to call with any further questions or concerns  Portions of the record may have been created with voice recognition software  Occasional wrong word or "sound a like" substitutions may have occurred due to the inherent limitations of voice recognition software  Read the chart carefully and recognize, using context, where substitutions have occurred  a    Electronically Signed by Dorie Goltz, DO    ______________________________________________________________________    Chief Complaint:   Chief Complaint   Patient presents with   [address]     Dr Roman [facility]        Patient ID: Loring Lanes is a 23 y o  y o  female has a past medical history of Anxiety and depression, Asthma, Clotting disorder ([facility] [address] ), Factor V Leiden ([facility] [address] ), Pulmonary embolism ([facility] [address] ), Pulmonary embolism ([facility] [address] ), and Thrombophlebitis  3/23/2022  Patient presents today for initial visit for shortness of breath and asthma    HPI     Loring Lanes has history of mild intermittent asthma for several years  She does use Ventolin inhaler periodically  She had not been on any maintenance inhaler recently  She previously in the past have been on Advair  Then around December she was done notice more shortness of breath and was started on Advair 100 mcg dose 1 puff b i d   This did help her  She then ran out of the 1 month supply  She knows she is still having some periodic wheezing shortness of breath  She does go to gym 3 times a week and does work out on elliptical   Sometimes will experience shortness had of breath during this  Not having any chest pain  Occasionaly she will have some shortness of breath at night    She does have some seasonal allergic rhinitis  Does get some nasal congestion and postnasal drainage mostly in the spring    She does have history of factor 5 Leiden mutation did have pulmonary embolism in June after her left nephrectomy surgery in late May of 2020  She was on Xarelto for 3 months and finished his sometime in September of 2020  She was found to be heterozygous for factor 5 Leiden  Her CT a PE study of the chest which was done 6/18 showed multiple acute pulmonary emboli in the right interlobar pulmonary artery and branches  She also had right lower extremity DVT  On 05/21/2020 she had laparoscopic left nephroureterectomy  She has history of left upper pole ureterocele, 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 and had bilateral ureteral reimplantation, reconstruction of bladder neck with incision of left upper ureterocele 10/2003  Her left nephrectomy was done at [facility]  He did have pulmonary embolism after her nephrectomy S when she was treated with Xarelto for 3 months  Occupational/Exposure history:  She is a teacher and teaches special Ed  Pets/Enviroment: 2 cats and 2 dogs  Travel history:  Review of Systems   Constitutional: Negative for chills, fever and unexpected weight change  HENT: Negative for congestion, rhinorrhea and sore throat  Eyes: Negative for discharge and redness  Respiratory: Positive for chest tightness and shortness of breath  Cardiovascular: Negative for chest pain, palpitations and leg swelling  Gastrointestinal: Negative for abdominal distention, abdominal pain and nausea  Endocrine: Negative for polydipsia and polyphagia  Genitourinary: Negative for dysuria  Musculoskeletal: Negative for joint swelling and myalgias  Skin: Negative for rash  Neurological: Negative for light-headedness  Psychiatric/Behavioral: Negative for decreased concentration  Social history: She reports that she has never smoked   She has never used smokeless tobacco  She reports current alcohol use  She reports that she does not use drugs  Past surgical history:   Past Surgical History:   Procedure Laterality Date    BLADDER SURGERY      NEPHRECTOMY Left 05/21/2020    Done laparoscopically at [place] [id number]       Family history:   Family History   Problem Relation Age of Onset    Hypertension Mother     Hyperlipidemia Mother     Diabetes Mother     Factor V Leiden deficiency Mother     Ovarian cancer Maternal Grandmother     Arthritis Family     Asthma Family     Cancer Family     Cervical cancer Family     Heart failure Family     Hyperlipidemia Family     Hypertension Family     Diabetes Other     Heart disease Father        Immunization History   Administered Date(s) Administered    COVID-19 PFIZER VACCINE 0 3 ML IM 04/08/2021, 04/29/2021    DTaP 5 1998, 1998, 1998, 07/26/1999, 01/29/2003    HPV Quadrivalent 09/08/2009, 04/09/2010, 07/13/2010    Hep B, adult 1998, 1998, 1998    Hib (PRP-OMP) 1998, 1998, 1998, 07/26/1999    IPV 1998, 1998, 07/26/1999, 01/29/2003    MMR 05/12/1999, 01/29/2003    Meningococcal 09/08/2009    Meningococcal, Unknown Serogroups 09/08/2009    Tdap 09/08/2009    Varicella 03/11/1999, 09/08/2009     Current Outpatient Medications   Medication Sig Dispense Refill    albuterol (PROVENTIL HFA,VENTOLIN HFA) 90 mcg/act inhaler Inhale 2 puffs every 4 (four) hours as needed for wheezing or shortness of breath 18 g 6    busPIRone (BUSPAR) 15 mg tablet Take 1 tablet (15 mg total) by mouth 3 (three) times a day 270 tablet 0    butalbital-acetaminophen-caffeine (FIORICET,ESGIC) -40 mg per tablet Take 1 tablet now, and then repeat in 4 hrs if no relief  Don't take more then 3 in a week   30 tablet 0    calcium-vitamin D (OSCAL) 250-125 MG-UNIT per tablet Take 1 tablet by mouth 2 (two) times a day      EPINEPHrine (EPIPEN) 0 3 mg/0 3 mL SOAJ Inject 0 3 mL (0 3 mg total) into a muscle once for 1 dose 0 6 mL 0    hydrOXYzine HCL (ATARAX) 50 mg tablet Take 1 tablet (50 mg total) by mouth daily at bedtime 90 tablet 2    hydrOXYzine pamoate (VISTARIL) 25 mg capsule take 1 capsule by mouth three times a day AS NEEDED FOR ANXIETY 30 capsule 6    levonorgestrel (KYLEENA) 19 5 MG intrauterine device 1 each by Intrauterine route      Lidocaine (HM Lidocaine Patch) 4 % PTCH Apply 1 patch topically every 12 (twelve) hours as needed (muscle pain) 15 patch 2    multivitamin (THERAGRAN) TABS Take 1 tablet by mouth daily      ondansetron (Zofran ODT) 4 mg disintegrating tablet Take 1 tablet (4 mg total) by mouth every 6 (six) hours as needed for nausea or vomiting 20 tablet 3    pantoprazole (PROTONIX) 40 mg tablet Take 1 tablet (40 mg total) by mouth daily 90 tablet 3    Probiotic Product (PROBIOTIC-10 PO) Take by mouth      sertraline (ZOLOFT) 50 mg tablet take 3 tablets by mouth once daily at bedtime 90 tablet 6    SUMAtriptan (IMITREX) 100 mg tablet Take 1 tablet (100 mg total) by mouth once as needed for migraine for up to 15 doses May repeat one dose 6 hours later, but no more 3 days a week  15 tablet 0    topiramate (TOPAMAX) 50 MG tablet take 1 tablet by mouth at bedtime 90 tablet 4    fluticasone-salmeterol (Advair Diskus) 250-50 mcg/dose inhaler Inhale 1 puff 2 (two) times a day Rinse mouth after use  60 blister 5    melatonin 3 mg Take 6 mg by mouth  (Patient not taking: Reported on 3/23/2022 )      methylPREDNISolone 4 MG tablet therapy pack Use as directed on package (Patient not taking: Reported on 3/23/2022 ) 21 each 0     No current facility-administered medications for this visit       Allergies: Amoxicillin and Cinnamon - food allergy    Objective:  Vitals:    03/23/22 0849   BP: 116/80   BP Location: Left arm   Patient Position: Sitting   Cuff Size: Large   Pulse: 87   Resp: 12   Temp: 98 8 °F (37 1 °C)   TempSrc: Temporal SpO2: 98%   Weight: 107 kg (235 lb)   Height: 5' 5" (1 651 m)   Oxygen Therapy  SpO2: 98 %    Wt Readings from Last 3 Encounters:   03/23/22 107 kg (235 lb)   02/09/22 108 kg (238 lb)   01/15/22 109 kg (240 lb)     Body mass index is 39 11 kg/m²  Physical Exam  Constitutional:       General: She is not in acute distress  Appearance: She is well-developed  HENT:      Head: Normocephalic  Nose: Nose normal       Mouth/Throat:      Mouth: Mucous membranes are moist       Pharynx: Oropharynx is clear  No oropharyngeal exudate  Comments: Mallampati score is 2  Eyes:      Conjunctiva/sclera: Conjunctivae normal       Pupils: Pupils are equal, round, and reactive to light  Neck:      Vascular: No JVD  Trachea: No tracheal deviation  Cardiovascular:      Rate and Rhythm: Normal rate and regular rhythm  Heart sounds: Normal heart sounds  Pulmonary:      Effort: Pulmonary effort is normal       Comments: Lung sounds are clear  No wheezes, crackles or rhonchi  Abdominal:      General: There is no distension  Palpations: Abdomen is soft  Tenderness: There is no abdominal tenderness  There is no guarding  Musculoskeletal:      Cervical back: Neck supple  Comments: No edema, cyanosis or clubbing   Lymphadenopathy:      Cervical: No cervical adenopathy  Skin:     General: Skin is warm and dry  Findings: No rash  Neurological:      Mental Status: She is alert and oriented to person, place, and time  Psychiatric:         Behavior: Behavior normal          Thought Content:  Thought content normal          Lab Review:   Lab Results   Component Value Date     11/24/2017    K 4 1 01/15/2022     (H) 01/15/2022    CO2 23 01/15/2022    BUN 10 01/15/2022    CREATININE 0 86 01/15/2022    CREATININE 0 90 12/17/2021    CREATININE 0 77 11/24/2017    GLUCOSE 92 11/24/2017    CALCIUM 8 8 01/15/2022     Lab Results   Component Value Date    WBC 5 9 01/15/2022    WBC 6 56 09/08/2020    WBC 10 2 11/24/2017    HGB 13 8 01/15/2022    HGB 14 7 09/08/2020    HGB 14 1 11/24/2017    HCT 41 0 01/15/2022    HCT 45 3 09/08/2020    HCT 41 5 11/24/2017    MCV 88 4 01/15/2022    MCV 94 09/08/2020    MCV 92 11/24/2017     01/15/2022     09/08/2020     11/24/2017       Diagnostics:  I have personally reviewed pertinent films in PACS  Chest x-ray: done 1/17/22   Chest x-ray is normal    Office Spirometry Results: done today    FVC - 3 61   L    92%  FEV1 - 2 98 L    89%  FEV1/FVC% - 83%    Lung volumes are normal     Peak flow rate is 340 liters/minute    Predicted range for peak flow rate is 380-480 liters/minute

## 2022-03-27 PROBLEM — R06.02 SHORTNESS OF BREATH: Status: ACTIVE | Noted: 2022-03-27

## 2022-03-27 PROBLEM — J30.1 SEASONAL ALLERGIC RHINITIS DUE TO POLLEN: Status: ACTIVE | Noted: 2022-03-27

## 2022-03-27 PROBLEM — J45.20 MILD INTERMITTENT ASTHMA WITHOUT COMPLICATION: Status: ACTIVE | Noted: 2017-07-24

## 2022-03-27 PROBLEM — J45.30 MILD PERSISTENT ASTHMA WITHOUT COMPLICATION: Status: ACTIVE | Noted: 2022-03-27

## 2022-03-27 NOTE — ASSESSMENT & PLAN NOTE
Spirometry today is normal   She does complain some increased shortness of breath  Her peak flow rate is 340 liters/minute predicted range is 380-480 liters/minute  She will restart her Advair 250 mcg 1 puff b i d  to see if this helps  She monitor peak flow rates at home  She also can use Ventolin inhaler 2 puffs 4 times a day as needed  She can try taking 2 puffs before she exercises see if this helps her shortness of breath

## 2022-03-27 NOTE — ASSESSMENT & PLAN NOTE
Spirometry today shows normal lung volumes  Lung sounds were clear  I did start her on Advair 250 micrograms 1 puff b i d  to see if this helps with her shortness of breath  She could also use Ventolin inhaler as needed    Pulse oximetry testing did not show any oxygen desaturation:      Room air O2 saturation rest was 98% and after ambulating 250 ft lowest O2 saturation was 97%    She is overweight which is pie contributing to some of her shortness of breath  She denies any excessive daytime somnolence

## 2022-03-27 NOTE — ASSESSMENT & PLAN NOTE
She does have history of factor 5 mutation  She was heterozygous for this  She did developed DVT in her right lower extremity and multiple acute pulmonary emboli in right pulmonary artery branches that occurred with a month after she had her left laparoscopic nephrectomy on 05/21/20  She was diagnosed with PE on 06/18/20  She was treated Xarelto for 3 months and this was discontinued around mid September 2020    She has not had any recurrence of DVT or PE since then

## 2022-03-28 ENCOUNTER — OFFICE VISIT (OUTPATIENT)
Dept: PHYSICAL THERAPY | Facility: CLINIC | Age: 22
End: 2022-03-28
Payer: COMMERCIAL

## 2022-03-28 DIAGNOSIS — R10.2 PELVIC PAIN: ICD-10-CM

## 2022-03-28 DIAGNOSIS — N32.81 OVERACTIVE BLADDER: Primary | ICD-10-CM

## 2022-03-28 PROCEDURE — 97110 THERAPEUTIC EXERCISES: CPT

## 2022-03-28 PROCEDURE — 97112 NEUROMUSCULAR REEDUCATION: CPT

## 2022-03-28 PROCEDURE — 97140 MANUAL THERAPY 1/> REGIONS: CPT

## 2022-03-28 NOTE — PROGRESS NOTES
Daily Note     Today's date: 3/28/2022  Patient name: Steven Christine  : 2000  MRN: [id number]  Referring provider: Benita Roth  Dx:   Encounter Diagnosis     ICD-10-CM    1  Overactive bladder  N32 81    2  Pelvic pain  R10 2                   Subjective: Pt reports that after she use the pelvic wand she noted some burning that last for a short time  She iced and reported feeling better  States that she has not used the pelvic wand since and has had no burning sensation  Objective: See treatment diary below      Assessment: Tolerated treatment well  Patient demonstrated fatigue post treatment, exhibited good technique with therapeutic exercises and would benefit from continued PT      Plan: Continue per plan of care  Manuals 01/03/21 01/25/22 2022 02/09/22 3/1/21 3/8/22 3/14/22 3/28/22      eval   PFM with verbal and written consent   PFM assessment verbal and written consent provided PFM perineal massage ~ 15 min  PFM perineal massage ~ 15 min  PFM perineal massage manually           Pelvic wand next session Pelvic floor wand instruction  Pelvic floor instruction  erthymea along inferior portion of the vulva                                Neuro Re-Ed              reviewed and educated on pelvic floor anatomy, pelvic floor function, pelvic floor rehab   Bridges x 20     Bridges w/ ball sq x 20   bridges w/ hip abduction x 20  X 20  X 20     Bridges w/ ball sq x 20   bridges w/ hip abduction x 20  PFM iso w/ multiple cues required  X 20     Bridges w/ ball sq x 20   bridges w/ hip abduction x 20 X 20     bridges w/ball sq x 20   bridges w/ hip abduction x 20         Clamshells x 20 B  X 20  X 20  X 20  X 20  X 20                                                                        Ther Ex  Rere stretch 10s x 5  10s x 5  10s x 5  10s x 5  10s x 5  10s x 5 10s x 5        piriformis stretch 10s x 5  10s x 5 10s x 5  10s x 5  10s x 5  10s x 5 10s x 5        LTR stretch 10s x 5  10s x 5 10s x 5 10s x 5  10s x 5  10s x 5 10s x 5       Diaphragmatic breathing 3 min educational cues  3 min  3 min  3 min  3 min  3 min  3 min          Child pose 10s x 10  10s x 10  10s x 10  10s x 10  10s x 10          Cat camel 10s x 10  10s x 10  10s x 10  10s x 10  10s x 10          bridges w/ TA marches x 20  X 20  X 20  X 20  X 20                                Ther Activity                                       Gait Training                                       Modalities

## 2022-03-31 NOTE — PROGRESS NOTES
Problem List Items Addressed This Visit        Cardiovascular and Mediastinum    Pulmonary embolus ([facility] [address] )       Hematopoietic and Hemostatic    Factor V Leiden mutation ([facility] [address] )       Genitourinary    VUR (vesicoureteric reflux)    Recurrent UTI    Gross hematuria - Primary    Relevant Orders    Case request operating room: CYSTOSCOPY, possible biopsy, possible retrograde pyelogram, and examination under anesthesia (Completed)       Other    S/p nephrectomy            Discussion:   Sky Moodyri and I had a productive consultation today  She is status post vesicoureteral reflux repair by way of a ureteral reimplantation  This was done many years ago by Dr Kiana Brice at the [facility] [place]  Recently she has been having some intermittent hematuria and some urinary tract infections  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  Her functional status is otherwise good  She does have a known history of pulmonary embolus with reported factor 5 Leiden  She is not taking blood thinners at this time  I have ordered heparin to be given prior to her cystoscopy with examination under anesthesia with possible retrograde pyelography of her solitary ureter and kidney and all indicated procedures  We did attempt a cystoscopy today to complete her hematuria workup but she was not able to tolerate this  Of note, it is difficult to locate her urethral meatus  She does also mention difficulty with self catheterization previously when she was on a regimen of CIC in the past   I suspect that this is due to her vaginal anatomy  Her Melvin stage is normal   All questions and concerns answered and addressed  She will return for cystoscopy with retrograde pyelography and all indicated procedures to include possible biopsy given our inability to perform a flexible cystoscopy in the office today      Long-term, should she continue with urinary tract infections consideration can be given to workup with a repeat voiding cystourethrogram to assess the need for potential revision surgery if necessary, we can also look at suppressive antibiotics if needed    Assessment and plan:     Please see problem oriented charting for the assessment plan of today's urological complaints  Anila Fernandez MD      Chief Complaint   As above      History of Present Illness     Eb Robert is a 23 y  o  woman with a history of a bilateral ureteral reimplantation for vesicoureteral reflux  She also had 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, subsequently undergoing a nephrectomy on this side for poorly functioning kidney  This work was done previously at the [facility]  She was last seen at that urology clinic around July of 2020      Currently her functional status is quite good  She states that she gets urinary tract infections every few months  She does occasionally see gross hematuria, has not seen this in some time      Cystoscopy performed today, see separate procedure note for details  The following portions of the patient's history were reviewed and updated as appropriate: allergies, current medications, past family history, past medical history, past social history, past surgical history and problem list        Detailed Urologic History     - please refer to HPI    Review of Systems     Review of Systems          Allergies     Allergies   Allergen Reactions    Amoxicillin Anaphylaxis    Cinnamon - Food Allergy Anaphylaxis       Physical Exam     Physical Exam        Vital Signs  There were no vitals filed for this visit        Current Medications       Current Outpatient Medications:     albuterol (PROVENTIL HFA,VENTOLIN HFA) 90 mcg/act inhaler, Inhale 2 puffs every 4 (four) hours as needed for wheezing or shortness of breath, Disp: 18 g, Rfl: 6    busPIRone (BUSPAR) 15 mg tablet, Take 1 tablet (15 mg total) by mouth 3 (three) times a day, Disp: 270 tablet, Rfl: 0    butalbital-acetaminophen-caffeine (FIORICET,ESGIC) -40 mg per tablet, Take 1 tablet now, and then repeat in 4 hrs if no relief   Don't take more then 3 in a week , Disp: 30 tablet, Rfl: 0    calcium-vitamin D (OSCAL) 250-125 MG-UNIT per tablet, Take 1 tablet by mouth 2 (two) times a day, Disp: , Rfl:     EPINEPHrine (EPIPEN) 0 3 mg/0 3 mL SOAJ, Inject 0 3 mL (0 3 mg total) into a muscle once for 1 dose, Disp: 0 6 mL, Rfl: 0    fluticasone-salmeterol (Advair Diskus) 250-50 mcg/dose inhaler, Inhale 1 puff 2 (two) times a day Rinse mouth after use , Disp: 60 blister, Rfl: 5    hydrOXYzine HCL (ATARAX) 50 mg tablet, Take 1 tablet (50 mg total) by mouth daily at bedtime, Disp: 90 tablet, Rfl: 2    hydrOXYzine pamoate (VISTARIL) 25 mg capsule, take 1 capsule by mouth three times a day AS NEEDED FOR ANXIETY, Disp: 30 capsule, Rfl: 6    levonorgestrel (KYLEENA) 19 5 MG intrauterine device, 1 each by Intrauterine route, Disp: , Rfl:     Lidocaine (HM Lidocaine Patch) 4 % PTCH, Apply 1 patch topically every 12 (twelve) hours as needed (muscle pain), Disp: 15 patch, Rfl: 2    melatonin 3 mg, Take 6 mg by mouth  (Patient not taking: Reported on 3/23/2022 ), Disp: , Rfl:     methylPREDNISolone 4 MG tablet therapy pack, Use as directed on package (Patient not taking: Reported on 3/23/2022 ), Disp: 21 each, Rfl: 0    multivitamin (THERAGRAN) TABS, Take 1 tablet by mouth daily, Disp: , Rfl:     ondansetron (Zofran ODT) 4 mg disintegrating tablet, Take 1 tablet (4 mg total) by mouth every 6 (six) hours as needed for nausea or vomiting, Disp: 20 tablet, Rfl: 3    pantoprazole (PROTONIX) 40 mg tablet, Take 1 tablet (40 mg total) by mouth daily, Disp: 90 tablet, Rfl: 3    Probiotic Product (PROBIOTIC-10 PO), Take by mouth, Disp: , Rfl:     sertraline (ZOLOFT) 50 mg tablet, take 3 tablets by mouth once daily at bedtime, Disp: 90 tablet, Rfl: 6    SUMAtriptan (IMITREX) 100 mg tablet, Take 1 tablet (100 mg total) by mouth once as needed for migraine for up to 15 doses May repeat one dose 6 hours later, but no more 3 days a week , Disp: 15 tablet, Rfl: 0    topiramate (TOPAMAX) 50 MG tablet, take 1 tablet by mouth at bedtime, Disp: 90 tablet, Rfl: 4      Active Problems     Patient Active Problem List   Diagnosis    Allergic cough    Factor [facility] Leiden ([facility] [address] [facility]    Factor V Leiden mutation ([facility] [address] [facility]    Mild intermittent asthma without complication    Mixed stress and urge urinary incontinence    VUR (vesicoureteric reflux)    Recurrent UTI    Overactive bladder    Kidney stone    Chronic tonsillitis    Chronic tonsillar hypertrophy    Exercise-induced asthma    Pulmonary embolus (HCC)    S/p nephrectomy    Anxiety    Postprandial epigastric pain    Gastroesophageal reflux disease    Migraine with aura and without status migrainosus, not intractable    Shortness of breath    Mild persistent asthma without complication    Seasonal allergic rhinitis due to pollen         Past Medical History     Past Medical History:   Diagnosis Date    Anxiety and depression     Asthma     Clotting disorder ([facility] [address] )     Factor V Leiden ([facility] [address] [facility]     Pulmonary embolism ([facility] [address] )     Pulmonary embolism ([facility] [address] )     Thrombophlebitis          Surgical History     Past Surgical History:   Procedure Laterality Date    BLADDER SURGERY      NEPHRECTOMY Left 05/21/2020    Done laparoscopically at [place] [id number]           Family History     Family History   Problem Relation Age of Onset    Hypertension Mother     Hyperlipidemia Mother     Diabetes Mother     Factor V Leiden deficiency Mother     Ovarian cancer Maternal Grandmother     Arthritis Family     Asthma Family     Cancer Family     Cervical cancer Family     Heart failure Family     Hyperlipidemia Family     Hypertension Family     Diabetes Other     Heart disease Father          Social History     Social History     Social History     Tobacco Use   Smoking Status Never Smoker   Smokeless Tobacco Never Used         Pertinent Lab Values     Lab Results   Component Value Date    CREATININE 0 86 01/15/2022                 Pertinent Imaging      Imaging reviewed, normal solitary right kidney without hydronephrosis, nephrolithiasis, or masses

## 2022-03-31 NOTE — PATIENT INSTRUCTIONS
Hydronephrosis   WHAT YOU NEED TO KNOW:   What is hydronephrosis? Hydronephrosis is swelling in one or both kidneys caused by urine buildup  Urine normally flows from the kidneys to the bladder through tubes called ureters  A blockage in the ureters can prevent urine from flowing properly  Urine flow may also be prevented or slowed if your kidneys do not work correctly  Urine flows back into your urinary tract  Pressure builds up in the kidney and causes swelling  What increases my risk for hydronephrosis? · Nerve damage or narrowed blood vessels    · Kidney stones, blood clots, or tumors that cause a blockage    · Urinary tract infections    · Body changes during pregnancy    · Enlarged prostate    What are the signs and symptoms of hydronephrosis? You may have no signs or symptoms, or you may have any of the following:  · Frequent urinary tract infections    · Mild or severe lower back pain that may spread to the groin    · Urinating little or not at all, even with an urge to urinate    · Dribbling urine, or loss of urine control    · Blood or pus in your urine    · Nausea, vomiting, fever, or chills    · Abdominal fullness or swelling    · Weight gain that you cannot explain    How is hydronephrosis diagnosed? Your healthcare provider will examine you and ask you about your signs and symptoms  He may also feel your abdomen or pelvis for any pain or swelling  You may also need any of the following:  · Blood tests  show if your kidneys are working properly or have a blockage  · Kidney function tests  show how well your kidneys are working  · X-rays  may be taken of your kidneys, bladder, and ureters  You may need to have dye injected into your kidneys before the x-rays to help healthcare providers find the blockage  Tell the healthcare provider if you have ever had an allergic reaction to contrast dye  · Urine tests  show how much urine your body is removing   They may also show if you have infection, blood, or protein in your urine  This may mean your kidneys are not working as they should  · A CT scan  (CAT scan) uses an x-ray and a computer to take pictures of your kidneys, bladder, and ureters  The pictures may show a kidney stone or other obstruction  · An ultrasound  may be used to show your kidney or bladder size, and if either is swollen  Ultrasound can also be used to find kidney stones  You may need to have a CT and an ultrasound together to find a blockage  How is hydronephrosis treated? Treatment may help keep your kidneys healthy, and prevent infection  You may need the following:  · A renal diet  is a meal plan that includes foods that are low in sodium (salt), potassium, and protein  Your healthcare provider may also tell you to eat and drink more vegetables and juices  · Stone removal  may be used to remove the kidney stones that are slowing or blocking your urine flow  Your healthcare provider may use strong sound waves called shock wave therapy to break up large kidney stones  This will help make them small enough for you to pass them when you urinate  · Catheter or stent placement  may be needed to help increase your urine flow  You may need a catheter (flexible tube) placed directly into your bladder to drain urine  Your healthcare provider may place a hard plastic tube called a stent inside your urinary tract to help urine pass from your kidney to your bladder  · Surgery  may be needed to remove part or all of your kidney if it is not working properly  Your prostate may need to be removed if it is so large that it is blocking urine flow  What are the risks of hydronephrosis? Swelling in one or both kidneys from too much urine buildup may lead to long-term kidney damage  Partial blockages may cause loss of urine control  Severe hydronephrosis may cause a blood infection called sepsis  Sepsis is toxin (poison) buildup in your blood   It happens when your kidneys cannot flush toxins out of your body  It could also paralyze your intestines  Your kidneys could fail without treatment  These conditions may be life-threatening  When should I contact my healthcare provider? · Your abdomen feels full  · You have a change in how much or how often you urinate  · You urinate more times at night and in larger amounts than during the day  · You have mild lower back pain or pain on one side when you urinate  When should I seek immediate care? · You have severe, stabbing back pain  · You have blood in your urine  · You cannot urinate, or you urinate very little  CARE AGREEMENT:   You have the right to help plan your care  Learn about your health condition and how it may be treated  Discuss treatment options with your healthcare providers to decide what care you want to receive  You always have the right to refuse treatment  The above information is an  only  It is not intended as medical advice for individual conditions or treatments  Talk to your doctor, nurse or pharmacist before following any medical regimen to see if it is safe and effective for you  © Copyright [employer] 2022 Information is for End User's use only and may not be sold, redistributed or otherwise used for commercial purposes   All illustrations and images included in CareNotes® are the copyrighted property of [employer]  or [address] [place] [address] [employer]

## 2022-04-01 ENCOUNTER — PROCEDURE VISIT (OUTPATIENT)
Dept: UROLOGY | Facility: CLINIC | Age: 22
End: 2022-04-01
Payer: COMMERCIAL

## 2022-04-01 VITALS
DIASTOLIC BLOOD PRESSURE: 86 MMHG | HEIGHT: 65 IN | RESPIRATION RATE: 18 BRPM | HEART RATE: 75 BPM | OXYGEN SATURATION: 98 % | SYSTOLIC BLOOD PRESSURE: 126 MMHG | BODY MASS INDEX: 39.11 KG/M2

## 2022-04-01 DIAGNOSIS — Z90.5 S/P NEPHRECTOMY: ICD-10-CM

## 2022-04-01 DIAGNOSIS — D68.51 FACTOR V LEIDEN MUTATION (HCC): ICD-10-CM

## 2022-04-01 DIAGNOSIS — N39.0 RECURRENT UTI: ICD-10-CM

## 2022-04-01 DIAGNOSIS — N13.70 VUR (VESICOURETERIC REFLUX): ICD-10-CM

## 2022-04-01 DIAGNOSIS — R31.0 GROSS HEMATURIA: Primary | ICD-10-CM

## 2022-04-01 DIAGNOSIS — I26.99 PULMONARY EMBOLISM, UNSPECIFIED CHRONICITY, UNSPECIFIED PULMONARY EMBOLISM TYPE, UNSPECIFIED WHETHER ACUTE COR PULMONALE PRESENT (HCC): ICD-10-CM

## 2022-04-01 PROCEDURE — 99214 OFFICE O/P EST MOD 30 MIN: CPT | Performed by: UROLOGY

## 2022-04-01 PROCEDURE — 88112 CYTOPATH CELL ENHANCE TECH: CPT | Performed by: PATHOLOGY

## 2022-04-01 PROCEDURE — 1036F TOBACCO NON-USER: CPT | Performed by: UROLOGY

## 2022-04-01 PROCEDURE — 52000 CYSTOURETHROSCOPY: CPT | Performed by: UROLOGY

## 2022-04-01 RX ORDER — CIPROFLOXACIN 2 MG/ML
400 INJECTION, SOLUTION INTRAVENOUS ONCE
Status: CANCELLED | OUTPATIENT
Start: 2022-04-01 | End: 2022-04-01

## 2022-04-01 RX ORDER — HEPARIN SODIUM 5000 [USP'U]/ML
5000 INJECTION, SOLUTION INTRAVENOUS; SUBCUTANEOUS ONCE
Status: CANCELLED | OUTPATIENT
Start: 2022-04-01 | End: 2022-04-01

## 2022-04-01 RX ORDER — GABAPENTIN 100 MG/1
300 CAPSULE ORAL ONCE
Status: CANCELLED | OUTPATIENT
Start: 2022-04-01 | End: 2022-04-01

## 2022-04-01 RX ORDER — ACETAMINOPHEN 325 MG/1
975 TABLET ORAL ONCE
Status: CANCELLED | OUTPATIENT
Start: 2022-04-01 | End: 2022-04-01

## 2022-04-01 NOTE — LETTER
April 1, 2022     Kristian Murguia MD  [address] [facility]    Patient: Fatuma Michel   YOB: 2000   Date of Visit: 4/1/2022       Dear Dr Ron Dash: Thank you for referring Elisa Rodriguez to me for evaluation  Below are my notes for this consultation  If you have questions, please do not hesitate to call me  I look forward to following your patient along with you  Sincerely,        Thu Yeh MD        CC: MD Thu Key MD  4/1/2022  2:51 PM  Sign when Signing Visit  Office Cystoscopy Procedure Note    Indication:    Hematuria    Informed consent   The risks, benefits, complications, treatment options, and expected outcomes were discussed with the patient  The patient concurred with the proposed plan and provided informed consent  Anesthesia  Lidocaine jelly 2%    Antibiotic prophylaxis   None    Procedure  In the presence of a female nurse, the patient was placed in the supine frog-legged position, was prepped and draped in the usual manner using sterile technique, and 2% lidocaine jelly instilled into the urethra  There are no vaginal lesions, the urethral meatus is difficult to visualize  A 17 F flexible cystoscope was then attempted to be inserted into the urethra  This provoked pain for the patient  The scope is seen to deflect into the vagina number of times  After 3 gentle attempts at placement of the cystoscope the office procedure was terminated given patient discomfort with the plan for a trip to the operating room for examination under anesthesia and cystoscopy with possible biopsy and retrograde pyelography    Findings:  Unable to place cystoscope due to patient anatomy    Specimens: None                 Complications:    None; patient tolerated the procedure well           Disposition: To home   Condition: Stable    Plan: Proceed to the operating room for cystoscopy and EUA and all indicated procedures  Cystoscopy     Date/Time 4/1/2022 2:51 PM     Performed by  Mohsen Cloud MD     Authorized by Mohsen Cloud MD      Universal Protocol:  Consent: Verbal consent obtained  Written consent obtained  Risks and benefits: risks, benefits and alternatives were discussed  Consent given by: patient  Patient understanding: patient states understanding of the procedure being performed  Patient consent: the patient's understanding of the procedure matches consent given  Procedure consent: procedure consent matches procedure scheduled  Relevant documents: relevant documents present and verified  Test results: test results available and properly labeled  Site marked: the operative site was not marked  Radiology Images displayed and confirmed  If images not available, report reviewed: imaging studies available  Required items: required blood products, implants, devices, and special equipment available  Patient identity confirmed: verbally with patient and provided demographic data        Procedure Details:  Procedure type: cystoscopy    Patient tolerance: Patient tolerated the procedure well with no immediate complications    Additional Procedure Details: Office Cystoscopy Procedure Note    Indication:    Hematuria    Informed consent   The risks, benefits, complications, treatment options, and expected outcomes were discussed with the patient  The patient concurred with the proposed plan and provided informed consent  Anesthesia  Lidocaine jelly 2%    Antibiotic prophylaxis   None    Procedure  In the presence of a female nurse, the patient was placed in the supine frog-legged position, was prepped and draped in the usual manner using sterile technique, and 2% lidocaine jelly instilled into the urethra  There are no vaginal lesions, the urethral meatus is difficult to visualize  A 17 F flexible cystoscope was then attempted to be inserted into the urethra  This provoked pain for the patient    The scope is seen to deflect into the vagina number of times  After 3 gentle attempts at placement of the cystoscope the office procedure was terminated given patient discomfort with the plan for a trip to the operating room for examination under anesthesia and cystoscopy with possible biopsy and retrograde pyelography    Findings:  Unable to place cystoscope due to patient anatomy    Specimens: None                 Complications:    None; patient tolerated the procedure well           Disposition: To home   Condition: Stable    Plan: Proceed to the operating room for cystoscopy and EUA and all indicated procedures  Yessica Lao MD  4/1/2022  2:49 PM  Sign when Signing Visit       Problem List Items Addressed This Visit        Cardiovascular and Mediastinum    Pulmonary embolus ([facility] [address] )       Hematopoietic and Hemostatic    Factor V Leiden mutation ([facility] [address] )       Genitourinary    VUR (vesicoureteric reflux)    Recurrent UTI    Gross hematuria - Primary    Relevant Orders    Case request operating room: CYSTOSCOPY, possible biopsy, possible retrograde pyelogram, and examination under anesthesia (Completed)       Other    S/p nephrectomy            Discussion:   Rock aLndry and I had a productive consultation today  She is status post vesicoureteral reflux repair by way of a ureteral reimplantation  This was done many years ago by Dr Kenneth Valenzuela at the [facility]  Recently she has been having some intermittent hematuria and some urinary tract infections  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  Her functional status is otherwise good  She does have a known history of pulmonary embolus with reported factor 5 Leiden  She is not taking blood thinners at this time  I have ordered heparin to be given prior to her cystoscopy with examination under anesthesia with possible retrograde pyelography of her solitary ureter and kidney and all indicated procedures    We did attempt a cystoscopy today to complete her hematuria workup but she was not able to tolerate this  Of note, it is difficult to locate her urethral meatus  She does also mention difficulty with self catheterization previously when she was on a regimen of CIC in the past   I suspect that this is due to her vaginal anatomy  Her Melvin stage is normal   All questions and concerns answered and addressed  She will return for cystoscopy with retrograde pyelography and all indicated procedures to include possible biopsy given our inability to perform a flexible cystoscopy in the office today  Long-term, should she continue with urinary tract infections consideration can be given to workup with a repeat voiding cystourethrogram to assess the need for potential revision surgery if necessary, we can also look at suppressive antibiotics if needed    Assessment and plan:     Please see problem oriented charting for the assessment plan of today's urological complaints  Morgan Joe MD      Chief Complaint   As above      History of Present Illness     Sheree Forde is a 23 y  o  woman with a history of a bilateral ureteral reimplantation for vesicoureteral reflux  She also had 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, subsequently undergoing a nephrectomy on this side for poorly functioning kidney  This work was done previously at the [facility]  She was last seen at that urology clinic around July of 2020      Currently her functional status is quite good  She states that she gets urinary tract infections every few months  She does occasionally see gross hematuria, has not seen this in some time      Cystoscopy performed today, see separate procedure note for details      The following portions of the patient's history were reviewed and updated as appropriate: allergies, current medications, past family history, past medical history, past social history, past surgical history and problem list        Detailed Urologic History     - please refer to HPI    Review of Systems     Review of Systems          Allergies     Allergies   Allergen Reactions    Amoxicillin Anaphylaxis    Cinnamon - Food Allergy Anaphylaxis       Physical Exam     Physical Exam        Vital Signs  There were no vitals filed for this visit  Current Medications       Current Outpatient Medications:     albuterol (PROVENTIL HFA,VENTOLIN HFA) 90 mcg/act inhaler, Inhale 2 puffs every 4 (four) hours as needed for wheezing or shortness of breath, Disp: 18 g, Rfl: 6    busPIRone (BUSPAR) 15 mg tablet, Take 1 tablet (15 mg total) by mouth 3 (three) times a day, Disp: 270 tablet, Rfl: 0    butalbital-acetaminophen-caffeine (FIORICET,ESGIC) -40 mg per tablet, Take 1 tablet now, and then repeat in 4 hrs if no relief   Don't take more then 3 in a week , Disp: 30 tablet, Rfl: 0    calcium-vitamin D (OSCAL) 250-125 MG-UNIT per tablet, Take 1 tablet by mouth 2 (two) times a day, Disp: , Rfl:     EPINEPHrine (EPIPEN) 0 3 mg/0 3 mL SOAJ, Inject 0 3 mL (0 3 mg total) into a muscle once for 1 dose, Disp: 0 6 mL, Rfl: 0    fluticasone-salmeterol (Advair Diskus) 250-50 mcg/dose inhaler, Inhale 1 puff 2 (two) times a day Rinse mouth after use , Disp: 60 blister, Rfl: 5    hydrOXYzine HCL (ATARAX) 50 mg tablet, Take 1 tablet (50 mg total) by mouth daily at bedtime, Disp: 90 tablet, Rfl: 2    hydrOXYzine pamoate (VISTARIL) 25 mg capsule, take 1 capsule by mouth three times a day AS NEEDED FOR ANXIETY, Disp: 30 capsule, Rfl: 6    levonorgestrel (KYLEENA) 19 5 MG intrauterine device, 1 each by Intrauterine route, Disp: , Rfl:     Lidocaine (HM Lidocaine Patch) 4 % PTCH, Apply 1 patch topically every 12 (twelve) hours as needed (muscle pain), Disp: 15 patch, Rfl: 2    melatonin 3 mg, Take 6 mg by mouth  (Patient not taking: Reported on 3/23/2022 ), Disp: , Rfl:     methylPREDNISolone 4 MG tablet therapy pack, Use as directed on package (Patient not taking: Reported on 3/23/2022 ), Disp: 21 each, Rfl: 0    multivitamin (THERAGRAN) TABS, Take 1 tablet by mouth daily, Disp: , Rfl:     ondansetron (Zofran ODT) 4 mg disintegrating tablet, Take 1 tablet (4 mg total) by mouth every 6 (six) hours as needed for nausea or vomiting, Disp: 20 tablet, Rfl: 3    pantoprazole (PROTONIX) 40 mg tablet, Take 1 tablet (40 mg total) by mouth daily, Disp: 90 tablet, Rfl: 3    Probiotic Product (PROBIOTIC-10 PO), Take by mouth, Disp: , Rfl:     sertraline (ZOLOFT) 50 mg tablet, take 3 tablets by mouth once daily at bedtime, Disp: 90 tablet, Rfl: 6    SUMAtriptan (IMITREX) 100 mg tablet, Take 1 tablet (100 mg total) by mouth once as needed for migraine for up to 15 doses May repeat one dose 6 hours later, but no more 3 days a week , Disp: 15 tablet, Rfl: 0    topiramate (TOPAMAX) 50 MG tablet, take 1 tablet by mouth at bedtime, Disp: 90 tablet, Rfl: 4      Active Problems     Patient Active Problem List   Diagnosis    Allergic cough    [facility] Leiden ([facility] [address] [facility]    [facility]    Mild intermittent asthma without complication    Mixed stress and urge urinary incontinence    VUR (vesicoureteric reflux)    Recurrent UTI    Overactive bladder    Kidney stone    Chronic tonsillitis    Chronic tonsillar hypertrophy    Exercise-induced asthma    Pulmonary embolus (HCC)    S/p nephrectomy    Anxiety    Postprandial epigastric pain    Gastroesophageal reflux disease    Migraine with aura and without status migrainosus, not intractable    Shortness of breath    Mild persistent asthma without complication    Seasonal allergic rhinitis due to pollen         Past Medical History     Past Medical History:   Diagnosis Date    Anxiety and depression     Asthma     Clotting disorder ([facility] [address] [facility]     Factor V Leiden ([facility] [address] [facility]     Pulmonary embolism (HCC)     Pulmonary embolism (HCC)     Thrombophlebitis          Surgical History     Past Surgical History:   Procedure Laterality Date    BLADDER SURGERY      NEPHRECTOMY Left 05/21/2020    [facility]osc[facility] at [id number]           Family History     Family History   Problem Relation Age of Onset    Hypertension Mother     Hyperlipidemia Mother     Diabetes Mother     Factor V Leiden deficiency Mother     Ovarian cancer Maternal Grandmother     Arthritis Family     Asthma Family     Cancer Family     Cervical cancer Family     Heart failure Family     Hyperlipidemia Family     Hypertension Family     Diabetes Other     Heart disease Father          Social History     Social History     Social History     Tobacco Use   Smoking Status Never Smoker   Smokeless Tobacco Never Used         Pertinent Lab Values     Lab Results   Component Value Date    CREATININE 0 86 01/15/2022                 Pertinent Imaging      Imaging reviewed, normal solitary right kidney without hydronephrosis, nephrolithiasis, or masses

## 2022-04-01 NOTE — PROGRESS NOTES
Office Cystoscopy Procedure Note    Indication:    Hematuria    Informed consent   The risks, benefits, complications, treatment options, and expected outcomes were discussed with the patient  The patient concurred with the proposed plan and provided informed consent  Anesthesia  Lidocaine jelly 2%    Antibiotic prophylaxis   None    Procedure  In the presence of a female nurse, the patient was placed in the supine frog-legged position, was prepped and draped in the usual manner using sterile technique, and 2% lidocaine jelly instilled into the urethra  There are no vaginal lesions, the urethral meatus is difficult to visualize  A 17 F flexible cystoscope was then attempted to be inserted into the urethra  This provoked pain for the patient  The scope is seen to deflect into the vagina number of times  After 3 gentle attempts at placement of the cystoscope the office procedure was terminated given patient discomfort with the plan for a trip to the operating room for examination under anesthesia and cystoscopy with possible biopsy and retrograde pyelography    Findings:  Unable to place cystoscope due to patient anatomy    Specimens: None                 Complications:    None; patient tolerated the procedure well           Disposition: To home   Condition: Stable    Plan: Proceed to the operating room for cystoscopy and EUA and all indicated procedures  Cystoscopy     Date/Time 4/1/2022 2:51 PM     Performed by  Jose Miguel Trinh MD     Authorized by Jose Miguel Trinh MD      Universal Protocol:  Consent: Verbal consent obtained  Written consent obtained    Risks and benefits: risks, benefits and alternatives were discussed  Consent given by: patient  Patient understanding: patient states understanding of the procedure being performed  Patient consent: the patient's understanding of the procedure matches consent given  Procedure consent: procedure consent matches procedure scheduled  Relevant documents: relevant documents present and verified  Test results: test results available and properly labeled  Site marked: the operative site was not marked  Radiology Images displayed and confirmed  If images not available, report reviewed: imaging studies available  Required items: required blood products, implants, devices, and special equipment available  Patient identity confirmed: verbally with patient and provided demographic data        Procedure Details:  Procedure type: cystoscopy    Patient tolerance: Patient tolerated the procedure well with no immediate complications    Additional Procedure Details: Office Cystoscopy Procedure Note    Indication:    Hematuria    Informed consent   The risks, benefits, complications, treatment options, and expected outcomes were discussed with the patient  The patient concurred with the proposed plan and provided informed consent  Anesthesia  Lidocaine jelly 2%    Antibiotic prophylaxis   None    Procedure  In the presence of a female nurse, the patient was placed in the supine frog-legged position, was prepped and draped in the usual manner using sterile technique, and 2% lidocaine jelly instilled into the urethra  There are no vaginal lesions, the urethral meatus is difficult to visualize  A 17 F flexible cystoscope was then attempted to be inserted into the urethra  This provoked pain for the patient  The scope is seen to deflect into the vagina number of times    After 3 gentle attempts at placement of the cystoscope the office procedure was terminated given patient discomfort with the plan for a trip to the operating room for examination under anesthesia and cystoscopy with possible biopsy and retrograde pyelography    Findings:  Unable to place cystoscope due to patient anatomy    Specimens: None                 Complications:    None; patient tolerated the procedure well           Disposition: To home   Condition: Stable    Plan: Proceed to the operating room for cystoscopy and EUA and all indicated procedures

## 2022-04-05 ENCOUNTER — TELEPHONE (OUTPATIENT)
Dept: UROLOGY | Facility: MEDICAL CENTER | Age: 22
End: 2022-04-05

## 2022-04-05 NOTE — TELEPHONE ENCOUNTER
I left a voice mail message for the patient to call back to schedule a Cysto, Poss BBX, Poss RTG ordered by Dr Melissa Reina

## 2022-04-06 NOTE — TELEPHONE ENCOUNTER
I spoke to the patient and scheduled her Cysto, BBX, RTG  for 5/20/2022 at [facility]  with Dr Lucila Jeffery     -instructions given verbally and mailed  -CBC, CMP,Urine C&S 3 weeks prior  -patient will avoid any potentially blood thinning medications 7 days prior  -[employer]  - on auth tracker 4/6/2022

## 2022-04-06 NOTE — TELEPHONE ENCOUNTER
Clinical, patient will need a follow up visit with Dr Sapphire Brown for pathology review at the [facility]  None available, please contact patient to arrange

## 2022-04-08 ENCOUNTER — HOSPITAL ENCOUNTER (EMERGENCY)
Facility: HOSPITAL | Age: 22
Discharge: HOME | End: 2022-04-08
Attending: EMERGENCY MEDICINE
Payer: COMMERCIAL

## 2022-04-08 VITALS
RESPIRATION RATE: 22 BRPM | BODY MASS INDEX: 39.31 KG/M2 | SYSTOLIC BLOOD PRESSURE: 160 MMHG | OXYGEN SATURATION: 99 % | DIASTOLIC BLOOD PRESSURE: 88 MMHG | WEIGHT: 236.2 LBS | TEMPERATURE: 97.6 F | HEART RATE: 88 BPM

## 2022-04-08 DIAGNOSIS — T78.40XA ALLERGIC REACTION, INITIAL ENCOUNTER: Primary | ICD-10-CM

## 2022-04-08 PROCEDURE — 96375 TX/PRO/DX INJ NEW DRUG ADDON: CPT

## 2022-04-08 PROCEDURE — 99283 EMERGENCY DEPT VISIT LOW MDM: CPT

## 2022-04-08 PROCEDURE — 96374 THER/PROPH/DIAG INJ IV PUSH: CPT

## 2022-04-08 PROCEDURE — 99284 EMERGENCY DEPT VISIT MOD MDM: CPT | Performed by: EMERGENCY MEDICINE

## 2022-04-08 PROCEDURE — 96361 HYDRATE IV INFUSION ADD-ON: CPT

## 2022-04-08 RX ORDER — METHYLPREDNISOLONE SODIUM SUCCINATE 125 MG/2ML
125 INJECTION, POWDER, LYOPHILIZED, FOR SOLUTION INTRAMUSCULAR; INTRAVENOUS ONCE
Status: COMPLETED | OUTPATIENT
Start: 2022-04-08 | End: 2022-04-08

## 2022-04-08 RX ORDER — FAMOTIDINE 20 MG/1
20 TABLET, FILM COATED ORAL 2 TIMES DAILY
Qty: 14 TABLET | Refills: 0 | Status: SHIPPED | OUTPATIENT
Start: 2022-04-08 | End: 2022-04-15 | Stop reason: SDUPTHER

## 2022-04-08 RX ORDER — PREDNISONE 20 MG/1
60 TABLET ORAL DAILY
Qty: 15 TABLET | Refills: 0 | Status: SHIPPED | OUTPATIENT
Start: 2022-04-08 | End: 2022-04-13

## 2022-04-08 RX ORDER — DIPHENHYDRAMINE HYDROCHLORIDE 50 MG/ML
50 INJECTION INTRAMUSCULAR; INTRAVENOUS ONCE
Status: COMPLETED | OUTPATIENT
Start: 2022-04-08 | End: 2022-04-08

## 2022-04-08 RX ORDER — DIPHENHYDRAMINE HCL 25 MG
25 TABLET ORAL EVERY 6 HOURS
Qty: 20 TABLET | Refills: 0 | Status: SHIPPED | OUTPATIENT
Start: 2022-04-08 | End: 2022-05-17

## 2022-04-08 RX ADMIN — METHYLPREDNISOLONE SODIUM SUCCINATE 125 MG: 125 INJECTION, POWDER, FOR SOLUTION INTRAMUSCULAR; INTRAVENOUS at 14:12

## 2022-04-08 RX ADMIN — DIPHENHYDRAMINE HYDROCHLORIDE 50 MG: 50 INJECTION, SOLUTION INTRAMUSCULAR; INTRAVENOUS at 14:08

## 2022-04-08 RX ADMIN — FAMOTIDINE 20 MG: 10 INJECTION, SOLUTION INTRAVENOUS at 14:13

## 2022-04-08 RX ADMIN — SODIUM CHLORIDE 1000 ML: 0.9 INJECTION, SOLUTION INTRAVENOUS at 14:05

## 2022-04-08 NOTE — ED NOTES
Dr Niko Jones at bedside  Noted bilateral red rashes on the upper arms and neck       Suraj Murcia RN  04/08/22 [id number]

## 2022-04-08 NOTE — ED PROVIDER NOTES
History  Chief Complaint   Patient presents with    Allergic Reaction     Patient arrives AO x 4 with c/o allergic reaction  Patient states allergy to cinnamon and breathed some in and broke out in hives and has itchy throat  Patient took 50mg Benadryl around 15m      26-year-old female presents with an allergic reaction with rash all over her arms legs and abdomen and chest   Says that her throat got a little tight she took some Benadryl and then now her throat is itching her tongue is itching but no breathing difficulty no wheezing diarrhea or any other symptoms  She is allergic to cinnamon and she inhaled a whiff of cinnamon at work  History provided by:  Patient   used: No        Prior to Admission Medications   Prescriptions Last Dose Informant Patient Reported? Taking? EPINEPHrine (EPIPEN) 0 3 mg/0 3 mL SOAJ  Self No No   Sig: Inject 0 3 mL (0 3 mg total) into a muscle once for 1 dose   Patient not taking: Reported on 4/1/2022    Lidocaine (HM Lidocaine Patch) 4 % PTCH  Self No No   Sig: Apply 1 patch topically every 12 (twelve) hours as needed (muscle pain)   Probiotic Product (PROBIOTIC-10 PO)  Self Yes No   Sig: Take by mouth   SUMAtriptan (IMITREX) 100 mg tablet  Self No No   Sig: Take 1 tablet (100 mg total) by mouth once as needed for migraine for up to 15 doses May repeat one dose 6 hours later, but no more 3 days a week  albuterol (PROVENTIL HFA,VENTOLIN HFA) 90 mcg/act inhaler  Self No No   Sig: Inhale 2 puffs every 4 (four) hours as needed for wheezing or shortness of breath   busPIRone (BUSPAR) 15 mg tablet  Self No No   Sig: Take 1 tablet (15 mg total) by mouth 3 (three) times a day   butalbital-acetaminophen-caffeine (FIORICET,ESGIC) -40 mg per tablet  Self No No   Sig: Take 1 tablet now, and then repeat in 4 hrs if no relief  Don't take more then 3 in a week     calcium-vitamin D (OSCAL) 250-125 MG-UNIT per tablet  Self Yes No   Sig: Take 1 tablet by mouth 2 (two) times a day   fluticasone-salmeterol (Advair Diskus) 250-50 mcg/dose inhaler  Self No No   Sig: Inhale 1 puff 2 (two) times a day Rinse mouth after use    hydrOXYzine HCL (ATARAX) 50 mg tablet  Self No No   Sig: Take 1 tablet (50 mg total) by mouth daily at bedtime   hydrOXYzine pamoate (VISTARIL) 25 mg capsule  Self No No   Sig: take 1 capsule by mouth three times a day AS NEEDED FOR ANXIETY   levonorgestrel (KYLEENA) 19 5 MG intrauterine device  Self Yes No   Si each by Intrauterine route   melatonin 3 mg  Self Yes No   Sig: Take 6 mg by mouth     methylPREDNISolone 4 MG tablet therapy pack  Self No No   Sig: Use as directed on package   multivitamin (THERAGRAN) TABS  Self Yes No   Sig: Take 1 tablet by mouth daily   ondansetron (Zofran ODT) 4 mg disintegrating tablet  Self No No   Sig: Take 1 tablet (4 mg total) by mouth every 6 (six) hours as needed for nausea or vomiting   pantoprazole (PROTONIX) 40 mg tablet  Self No No   Sig: Take 1 tablet (40 mg total) by mouth daily   sertraline (ZOLOFT) 50 mg tablet  Self No No   Sig: take 3 tablets by mouth once daily at bedtime   topiramate (TOPAMAX) 50 MG tablet  Self No No   Sig: take 1 tablet by mouth at bedtime      Facility-Administered Medications: None       Past Medical History:   Diagnosis Date    Anxiety and depression     Asthma     Clotting disorder (HCC)     Factor V Leiden ([address] )     Pulmonary embolism (HCC)     Thrombophlebitis        Past Surgical History:   Procedure Laterality Date    BLADDER SURGERY      NEPHRECTOMY Left 2020    Done laparoscopically at [place] [id number]         Family History   Problem Relation Age of Onset    Hypertension Mother     Hyperlipidemia Mother     Diabetes Mother     Factor V Leiden deficiency Mother     Ovarian cancer Maternal Grandmother     Arthritis Family     Asthma Family     Cancer Family     Cervical cancer Family     Heart failure Family     Hyperlipidemia Family     Hypertension Family     Diabetes Other     Heart disease Father      I have reviewed and agree with the history as documented  E-Cigarette/Vaping    E-Cigarette Use Never User      E-Cigarette/Vaping Substances     Social History     Tobacco Use    Smoking status: Never Smoker    Smokeless tobacco: Never Used   Vaping Use    Vaping Use: Never used   Substance Use Topics    Alcohol use: Yes     Comment: social    Drug use: Never       Review of Systems   Constitutional: Negative  HENT: Negative  Eyes: Negative  Respiratory: Negative  Cardiovascular: Negative  Gastrointestinal: Negative  Endocrine: Negative  Genitourinary: Negative  Musculoskeletal: Negative  Skin: Positive for rash  Allergic/Immunologic: Negative  Neurological: Negative  Hematological: Negative  Psychiatric/Behavioral: Negative  All other systems reviewed and are negative  Physical Exam  Physical Exam  Constitutional:       Appearance: Normal appearance  HENT:      Head: Normocephalic and atraumatic  Nose: Nose normal       Mouth/Throat:      Mouth: Mucous membranes are moist    Eyes:      Extraocular Movements: Extraocular movements intact  Pupils: Pupils are equal, round, and reactive to light  Cardiovascular:      Rate and Rhythm: Normal rate and regular rhythm  Pulmonary:      Effort: Pulmonary effort is normal       Breath sounds: Normal breath sounds  Abdominal:      General: Abdomen is flat  Bowel sounds are normal       Palpations: Abdomen is soft  Musculoskeletal:         General: Normal range of motion  Cervical back: Normal range of motion and neck supple  Skin:     General: Skin is warm  Capillary Refill: Capillary refill takes less than 2 seconds  Comments: Hives and rash consistent with allergic reaction noted upper extremities, lower extremities,chest abdomen   Neurological:      General: No focal deficit present        Mental Status: She is alert and oriented to person, place, and time  Mental status is at baseline  Psychiatric:         Mood and Affect: Mood normal          Thought Content: Thought content normal          Vital Signs  ED Triage Vitals [04/08/22 1353]   Temperature Pulse Respirations Blood Pressure SpO2   97 6 °F (36 4 °C) 88 22 160/88 99 %      Temp Source Heart Rate Source Patient Position - Orthostatic VS BP Location FiO2 (%)   Tympanic Monitor Sitting Right arm --      Pain Score       No Pain           Vitals:    04/08/22 1353   BP: 160/88   Pulse: 88   Patient Position - Orthostatic VS: Sitting         Visual Acuity      ED Medications  Medications   sodium chloride 0 9 % bolus 1,000 mL (0 mL Intravenous Stopped 4/8/22 1510)   diphenhydrAMINE (BENADRYL) injection 50 mg (50 mg Intravenous Given 4/8/22 1408)   famotidine (PEPCID) injection 20 mg (20 mg Intravenous Given 4/8/22 1413)   methylPREDNISolone sodium succinate (Solu-MEDROL) injection 125 mg (125 mg Intravenous Given 4/8/22 1412)       Diagnostic Studies  Results Reviewed     None                 No orders to display              Procedures  Procedures         ED Course                                             MDM  Number of Diagnoses or Management Options  Patient Progress  Patient progress: stable      Disposition  Final diagnoses: Allergic reaction, initial encounter     Time reflects when diagnosis was documented in both MDM as applicable and the Disposition within this note     Time User Action Codes Description Comment    4/8/2022  3:45 PM Amparo Sauer Add [T78 [id number]] Allergic reaction, initial encounter       ED Disposition     ED Disposition Condition Date/Time Comment    Discharge Stable Fri Apr 8, 2022  3:45 PM Valdez Bey discharge to home/self care              Follow-up Information     Follow up With Specialties Details Why Contact Info Additional Information    Micheal Goltz, MD Family Medicine Schedule an appointment as soon as possible for a visit   [address] [facility] [address]  [address] Emergency Department Emergency Medicine  If symptoms worsen [address] [phone] [id number] [place] [address] Emergency Department, [place], [place], [address]          Patient's Medications   Discharge Prescriptions    DIPHENHYDRAMINE (BENADRYL) 25 MG TABLET    Take 1 tablet (25 mg total) by mouth every 6 (six) hours       Start Date: 4/8/2022  End Date: --       Order Dose: 25 mg       Quantity: 20 tablet    Refills: 0    FAMOTIDINE (PEPCID) 20 MG TABLET    Take 1 tablet (20 mg total) by mouth 2 (two) times a day for 7 days       Start Date: 4/8/2022  End Date: 4/15/2022       Order Dose: 20 mg       Quantity: 14 tablet    Refills: 0    PREDNISONE 20 MG TABLET    Take 3 tablets (60 mg total) by mouth daily for 5 days       Start Date: 4/8/2022  End Date: 4/13/2022       Order Dose: 60 mg       Quantity: 15 tablet    Refills: 0       No discharge procedures on file      PDMP Review     None          ED Provider  Electronically Signed by           Loc Nascimento DO  04/08/22 154

## 2022-04-08 NOTE — ED NOTES
Patient states she feels better after receiving the meds, however now c/o of pain in the IV site where fluids is running as bolus  Will let the doctor know if we can stop the fluids and let her drink po instead       Charlene Anderson RN  04/08/22 1230

## 2022-04-08 NOTE — ED NOTES
As per Dr Madhav Villanueva to stop fluids and have IV access out  Patient can have some water to drink and to stay for another 30 minutes for observation  Plans made known to patient       Geoffrey Tam RN  04/08/22 9723

## 2022-04-15 ENCOUNTER — OFFICE VISIT (OUTPATIENT)
Dept: FAMILY MEDICINE CLINIC | Facility: CLINIC | Age: 22
End: 2022-04-15
Payer: COMMERCIAL

## 2022-04-15 VITALS
BODY MASS INDEX: 38.79 KG/M2 | DIASTOLIC BLOOD PRESSURE: 80 MMHG | OXYGEN SATURATION: 97 % | WEIGHT: 232.8 LBS | HEIGHT: 65 IN | SYSTOLIC BLOOD PRESSURE: 108 MMHG | HEART RATE: 88 BPM | RESPIRATION RATE: 16 BRPM | TEMPERATURE: 98 F

## 2022-04-15 DIAGNOSIS — T78.40XD ALLERGIC REACTION, SUBSEQUENT ENCOUNTER: Primary | ICD-10-CM

## 2022-04-15 DIAGNOSIS — Z87.892 HISTORY OF ANAPHYLAXIS: ICD-10-CM

## 2022-04-15 PROBLEM — N26.1 ATROPHIC KIDNEY: Status: ACTIVE | Noted: 2019-07-01

## 2022-04-15 PROBLEM — Q63.8 DUPLEX KIDNEY: Status: ACTIVE | Noted: 2020-05-19

## 2022-04-15 PROCEDURE — 3008F BODY MASS INDEX DOCD: CPT | Performed by: UROLOGY

## 2022-04-15 PROCEDURE — 99213 OFFICE O/P EST LOW 20 MIN: CPT | Performed by: FAMILY MEDICINE

## 2022-04-15 RX ORDER — PREDNISONE 20 MG/1
TABLET ORAL
COMMUNITY
Start: 2022-04-14 | End: 2022-05-17

## 2022-04-15 RX ORDER — FAMOTIDINE 20 MG/1
20 TABLET, FILM COATED ORAL 2 TIMES DAILY
Qty: 60 TABLET | Refills: 0 | Status: SHIPPED | OUTPATIENT
Start: 2022-04-15 | End: 2022-05-17

## 2022-05-06 ENCOUNTER — ANESTHESIA EVENT (OUTPATIENT)
Dept: PERIOP | Facility: AMBULARY SURGERY CENTER | Age: 22
End: 2022-05-06
Payer: COMMERCIAL

## 2022-05-12 PROBLEM — T78.40XA ALLERGIC REACTION: Status: ACTIVE | Noted: 2022-05-12

## 2022-05-12 PROBLEM — Z87.892 HISTORY OF ANAPHYLAXIS: Status: ACTIVE | Noted: 2022-05-12

## 2022-05-12 NOTE — PROGRESS NOTES
Assessment/Plan:    1  Allergic reaction, subsequent encounter  Assessment & Plan:  Complete course of steroid   Carry anti-histamine for cont and  prn use  Follow-up post Allergist eval    Orders:  -     Ambulatory Referral to Allergy; Future  -     famotidine (PEPCID) 20 mg tablet; Take 1 tablet (20 mg total) by mouth 2 (two) times a day for 7 days    2  History of anaphylaxis  -     Ambulatory Referral to Allergy; Future        Subjective:      Patient ID: Ebony Weinstein is a 23 y o  female  Chief Complaint   Patient presents with    Follow-up     allergic reaction to cinnamin [facility]        HPI  Follow-up on allergic rxn to cinnammon  eval at [facility] 4/8 as well as [facility]  4/14--records reviewed  Given epi-pen from school nurse prior to transport at [facility] 4/14  Doing sig better-VS good  Denies cp/wheeze/dysphagia or rash at this time  req ref to allergist for further testing/txoptions    The following portions of the patient's history were reviewed and updated as appropriate: allergies, current medications, past family history, past medical history, past social history, past surgical history and problem list     Review of Systems   Constitutional: Negative for fever  HENT: Negative for facial swelling and trouble swallowing  Respiratory: Negative for shortness of breath and wheezing  Cardiovascular: Negative for chest pain and palpitations  Gastrointestinal: Negative for vomiting  Musculoskeletal: Negative  Skin: Negative for rash  Neurological: Negative for syncope           Current Outpatient Medications   Medication Sig Dispense Refill    albuterol (PROVENTIL HFA,VENTOLIN HFA) 90 mcg/act inhaler Inhale 2 puffs every 4 (four) hours as needed for wheezing or shortness of breath 18 g 6    busPIRone (BUSPAR) 15 mg tablet Take 1 tablet (15 mg total) by mouth 3 (three) times a day 270 tablet 0    butalbital-acetaminophen-caffeine (FIORICET,ESGIC) -40 mg per tablet Take 1 tablet now, and then repeat in 4 hrs if no relief  Don't take more then 3 in a week  30 tablet 0    calcium-vitamin D (OSCAL) 250-125 MG-UNIT per tablet Take 1 tablet by mouth 2 (two) times a day      diphenhydrAMINE (BENADRYL) 25 mg tablet Take 1 tablet (25 mg total) by mouth every 6 (six) hours 20 tablet 0    famotidine (PEPCID) 20 mg tablet Take 1 tablet (20 mg total) by mouth 2 (two) times a day for 7 days 60 tablet 0    fluticasone-salmeterol (Advair Diskus) 250-50 mcg/dose inhaler Inhale 1 puff 2 (two) times a day Rinse mouth after use  60 blister 5    hydrOXYzine HCL (ATARAX) 50 mg tablet Take 1 tablet (50 mg total) by mouth daily at bedtime 90 tablet 2    hydrOXYzine pamoate (VISTARIL) 25 mg capsule take 1 capsule by mouth three times a day AS NEEDED FOR ANXIETY 30 capsule 6    levonorgestrel (KYLEENA) 19 5 MG intrauterine device 1 each by Intrauterine route      Lidocaine (HM Lidocaine Patch) 4 % PTCH Apply 1 patch topically every 12 (twelve) hours as needed (muscle pain) 15 patch 2    melatonin 3 mg Take 6 mg by mouth        methylPREDNISolone 4 MG tablet therapy pack Use as directed on package 21 each 0    multivitamin (THERAGRAN) TABS Take 1 tablet by mouth daily      ondansetron (Zofran ODT) 4 mg disintegrating tablet Take 1 tablet (4 mg total) by mouth every 6 (six) hours as needed for nausea or vomiting 20 tablet 3    pantoprazole (PROTONIX) 40 mg tablet Take 1 tablet (40 mg total) by mouth daily 90 tablet 3    Probiotic Product (PROBIOTIC-10 PO) Take by mouth      sertraline (ZOLOFT) 50 mg tablet take 3 tablets by mouth once daily at bedtime 90 tablet 6    SUMAtriptan (IMITREX) 100 mg tablet Take 1 tablet (100 mg total) by mouth once as needed for migraine for up to 15 doses May repeat one dose 6 hours later, but no more 3 days a week   15 tablet 0    topiramate (TOPAMAX) 50 MG tablet take 1 tablet by mouth at bedtime 90 tablet 4    EPINEPHrine (EPIPEN) 0 3 mg/0 3 mL SOAJ Inject 0 3 mL (0 3 mg total) into a muscle once for 1 dose (Patient not taking: Reported on 4/1/2022 ) 0 6 mL 0    predniSONE 20 mg tablet take 3 tablets by mouth once daily for 4 days then 2 tablets once     (REFER TO PRESCRIPTION NOTES)  No current facility-administered medications for this visit  Objective:    /80 (BP Location: Left arm, Patient Position: Sitting, Cuff Size: Large)   Pulse 88   Temp 98 °F (36 7 °C)   Resp 16   Ht 5' 5" (1 651 m)   Wt 106 kg (232 lb 12 8 oz)   SpO2 97%   BMI 38 74 kg/m²        Physical Exam  Vitals and nursing note reviewed  Constitutional:       General: She is not in acute distress  Eyes:      Conjunctiva/sclera: Conjunctivae normal    Cardiovascular:      Rate and Rhythm: Normal rate and regular rhythm  Pulmonary:      Effort: Pulmonary effort is normal  No respiratory distress  Breath sounds: Normal breath sounds  Abdominal:      General: Bowel sounds are normal       Palpations: Abdomen is soft  Tenderness: There is no abdominal tenderness  Musculoskeletal:      Cervical back: Neck supple  Skin:     General: Skin is warm and dry  Findings: No rash  Neurological:      General: No focal deficit present  Mental Status: She is alert and oriented to person, place, and time  Cranial Nerves: No cranial nerve deficit     Psychiatric:         Mood and Affect: Mood normal                 Viet Callahan MD

## 2022-05-12 NOTE — ASSESSMENT & PLAN NOTE
Complete course of steroid   Carry anti-histamine for cont and  prn use  Follow-up post Allergist eval

## 2022-05-16 ENCOUNTER — APPOINTMENT (OUTPATIENT)
Dept: LAB | Facility: CLINIC | Age: 22
End: 2022-05-16
Payer: COMMERCIAL

## 2022-05-16 DIAGNOSIS — R06.02 SHORTNESS OF BREATH: ICD-10-CM

## 2022-05-16 DIAGNOSIS — R07.9 CHEST PAIN, UNSPECIFIED TYPE: ICD-10-CM

## 2022-05-16 DIAGNOSIS — J45.30 MILD PERSISTENT ASTHMA WITHOUT COMPLICATION: ICD-10-CM

## 2022-05-16 DIAGNOSIS — R31.0 GROSS HEMATURIA: ICD-10-CM

## 2022-05-16 DIAGNOSIS — J30.1 SEASONAL ALLERGIC RHINITIS DUE TO POLLEN: ICD-10-CM

## 2022-05-16 LAB
ALBUMIN SERPL BCP-MCNC: 3.4 G/DL (ref 3.5–5)
ALP SERPL-CCNC: 79 U/L (ref 46–116)
ALT SERPL W P-5'-P-CCNC: 28 U/L (ref 12–78)
ANION GAP SERPL CALCULATED.3IONS-SCNC: 8 MMOL/L (ref 4–13)
AST SERPL W P-5'-P-CCNC: 15 U/L (ref 5–45)
BASOPHILS # BLD AUTO: 0.03 THOUSANDS/ΜL (ref 0–0.1)
BASOPHILS NFR BLD AUTO: 0 % (ref 0–1)
BILIRUB SERPL-MCNC: 0.36 MG/DL (ref 0.2–1)
BUN SERPL-MCNC: 10 MG/DL (ref 5–25)
CALCIUM ALBUM COR SERPL-MCNC: 9.3 MG/DL (ref 8.3–10.1)
CALCIUM SERPL-MCNC: 8.8 MG/DL (ref 8.3–10.1)
CHLORIDE SERPL-SCNC: 110 MMOL/L (ref 100–108)
CO2 SERPL-SCNC: 20 MMOL/L (ref 21–32)
CREAT SERPL-MCNC: 1 MG/DL (ref 0.6–1.3)
D DIMER PPP FEU-MCNC: <0.27 UG/ML FEU
EOSINOPHIL # BLD AUTO: 0.16 THOUSAND/ΜL (ref 0–0.61)
EOSINOPHIL NFR BLD AUTO: 2 % (ref 0–6)
ERYTHROCYTE [DISTWIDTH] IN BLOOD BY AUTOMATED COUNT: 12.6 % (ref 11.6–15.1)
GFR SERPL CREATININE-BSD FRML MDRD: 79 ML/MIN/1.73SQ M
GLUCOSE P FAST SERPL-MCNC: 122 MG/DL (ref 65–99)
HCT VFR BLD AUTO: 41.8 % (ref 34.8–46.1)
HGB BLD-MCNC: 14.1 G/DL (ref 11.5–15.4)
IMM GRANULOCYTES # BLD AUTO: 0.02 THOUSAND/UL (ref 0–0.2)
IMM GRANULOCYTES NFR BLD AUTO: 0 % (ref 0–2)
LYMPHOCYTES # BLD AUTO: 2.24 THOUSANDS/ΜL (ref 0.6–4.47)
LYMPHOCYTES NFR BLD AUTO: 33 % (ref 14–44)
MCH RBC QN AUTO: 29.6 PG (ref 26.8–34.3)
MCHC RBC AUTO-ENTMCNC: 33.7 G/DL (ref 31.4–37.4)
MCV RBC AUTO: 88 FL (ref 82–98)
MONOCYTES # BLD AUTO: 0.39 THOUSAND/ΜL (ref 0.17–1.22)
MONOCYTES NFR BLD AUTO: 6 % (ref 4–12)
NEUTROPHILS # BLD AUTO: 3.87 THOUSANDS/ΜL (ref 1.85–7.62)
NEUTS SEG NFR BLD AUTO: 59 % (ref 43–75)
NRBC BLD AUTO-RTO: 0 /100 WBCS
PLATELET # BLD AUTO: 215 THOUSANDS/UL (ref 149–390)
PMV BLD AUTO: 10.5 FL (ref 8.9–12.7)
POTASSIUM SERPL-SCNC: 3.8 MMOL/L (ref 3.5–5.3)
PROT SERPL-MCNC: 6.7 G/DL (ref 6.4–8.2)
RBC # BLD AUTO: 4.76 MILLION/UL (ref 3.81–5.12)
SODIUM SERPL-SCNC: 138 MMOL/L (ref 136–145)
WBC # BLD AUTO: 6.71 THOUSAND/UL (ref 4.31–10.16)

## 2022-05-16 PROCEDURE — 85379 FIBRIN DEGRADATION QUANT: CPT

## 2022-05-16 PROCEDURE — 82785 ASSAY OF IGE: CPT

## 2022-05-16 PROCEDURE — 85025 COMPLETE CBC W/AUTO DIFF WBC: CPT

## 2022-05-16 PROCEDURE — 36415 COLL VENOUS BLD VENIPUNCTURE: CPT

## 2022-05-16 PROCEDURE — 87086 URINE CULTURE/COLONY COUNT: CPT

## 2022-05-16 PROCEDURE — 80053 COMPREHEN METABOLIC PANEL: CPT

## 2022-05-16 PROCEDURE — 86003 ALLG SPEC IGE CRUDE XTRC EA: CPT

## 2022-05-17 LAB

## 2022-05-17 NOTE — PRE-PROCEDURE INSTRUCTIONS
Pre-Surgery Instructions:   Medication Instructions    albuterol (PROVENTIL HFA,VENTOLIN HFA) 90 mcg/act inhaler Instructed patient to continue taking as prescribed up to and including DOS    busPIRone (BUSPAR) 15 mg tablet Instructed patient to continue taking as prescribed up to and including DOS with sips of water   butalbital-acetaminophen-caffeine (FIORICET,ESGIC) -40 mg per tablet Instructed patient to continue taking as prescribed up to and including DOS with sips of water   fluticasone-salmeterol (Advair Diskus) 250-50 mcg/dose inhaler Instructed patient to continue taking as prescribed up to and including DOS     hydrOXYzine HCL (ATARAX) 50 mg tablet Instructed patient to continue taking as prescribed     multivitamin (THERAGRAN) TABS Stop taking 7 days prior to surgery   ondansetron (Zofran ODT) 4 mg disintegrating tablet Instructed patient to continue taking as prescribed     pantoprazole (PROTONIX) 40 mg tablet Instructed patient to continue taking as prescribed up to and including DOS with sips of water   Probiotic Product (PROBIOTIC-10 PO) Stop taking 7 days prior to surgery   sertraline (ZOLOFT) 50 mg tablet Instructed patient to continue taking as prescribed     SUMAtriptan (IMITREX) 100 mg tablet Uses PRN- OK to take day of surgery    topiramate (TOPAMAX) 50 MG tablet Uses PRN- OK to take day of surgery         Med list reviewed as above  Also instructed pt not to start any new vitamins/supplements preoperatively and to avoid NSAID's  7 days prior to surgery  Tylenol is acceptable if needed  Pt will shower with regular soap the evening before and the am of sx  Reviewed [facility]'s current covid visitor policy and pt understands that it may change at any time  All information within "My Surgical Experience" pamphlet reviewed and patient verbalizes understanding and compliance  All questions answered

## 2022-05-18 LAB — BACTERIA UR CULT: NORMAL

## 2022-05-19 PROBLEM — N35.919 URETHRAL MEATAL STENOSIS: Status: ACTIVE | Noted: 2022-05-19

## 2022-05-19 PROCEDURE — NC001 PR NO CHARGE: Performed by: UROLOGY

## 2022-05-19 NOTE — H&P
H&P Exam - Urology   Adelaide Smith 23 y o  female MRN: [id number]  Unit/Bed#:  Encounter: [id number]    Assessment/Plan     Assessment:  70-year-old woman with his solitary kidney and recurrent UTI, seen to have pedal stenosis in the office, here today for dilation of meatal stenosis, cystoscopy, examination under anesthesia, and all indicated procedures to include possible retrograde pyelography  Ready for surgery today  For previously signed informed consent is a multiple use consent and still valid for today, requires no marking      Plan:  Proceed to procedure as above  History of Present Illness   HPI:  Adelaide Smith is a 23 y o  female who presents with meatal stenosis, solitary kidney, recurrent UTI  Unable to perform office cystoscopy  Here today for dilation of meatal stenosis as well as cystoscopy  New complaints include none    The following portions of the patient's history were reviewed and updated as appropriate: allergies, current medications, past family history, past medical history, past social history, past surgical history and problem list     Review of Systems   Constitutional: Negative  HENT: Negative  Eyes: Negative  Respiratory: Negative  Cardiovascular: Negative  Gastrointestinal: Negative  Endocrine: Negative  Genitourinary: Negative  Musculoskeletal: Negative  Skin: Negative  Allergic/Immunologic: Negative  Neurological: Negative  Hematological: Negative  Psychiatric/Behavioral: Negative          Historical Information   Past Medical History:   Diagnosis Date    Anxiety and depression     Asthma     [facility]     [facility]     Pulmonary embolism (HCC)     Thrombophlebitis      Past Surgical History:   Procedure Laterality Date    BLADDER SURGERY      NEPHRECTOMY Left 05/21/2020    Done laparoscopically at [id number] [facility] EXTRACTION       Social History   Social History Substance and Sexual Activity   Alcohol Use Yes    Comment: social, 1 x per month     Social History     Substance and Sexual Activity   Drug Use Never     Social History     Tobacco Use   Smoking Status Never Smoker   Smokeless Tobacco Never Used     E-Cigarette/Vaping    E-Cigarette Use Never User      E-Cigarette/Vaping Substances    Nicotine No     THC No     CBD No      Family History:   Family History   Problem Relation Age of Onset    Hypertension Mother     Hyperlipidemia Mother     Diabetes Mother     Factor V Leiden deficiency Mother     Ovarian cancer Maternal Grandmother     Arthritis Family     Asthma Family     Cancer Family     Cervical cancer Family     Heart failure Family     Hyperlipidemia Family     Hypertension Family     Diabetes Other     Heart disease Father        Meds/Allergies   all medications and allergies reviewed  Allergies   Allergen Reactions    Amoxicillin Anaphylaxis    Cinnamon - Food Allergy Anaphylaxis       Objective   Vitals: Height 5' 5" (1 651 m), weight 105 kg (232 lb), not currently breastfeeding  No intake/output data recorded  Invasive Devices  Report    Peripheral Intravenous Line  Duration           Peripheral IV 04/08/22 Left Antecubital 40 days                Physical Exam  Vitals reviewed  Constitutional:       General: She is not in acute distress  Appearance: Normal appearance  She is obese  She is not ill-appearing, toxic-appearing or diaphoretic  HENT:      Head: Normocephalic and atraumatic  Eyes:      General: No scleral icterus  Right eye: No discharge  Left eye: No discharge  Cardiovascular:      Pulses: Normal pulses  Pulmonary:      Effort: Pulmonary effort is normal    Abdominal:      General: There is no distension  Palpations: There is no mass  Genitourinary:     Comments: Deferred for the operating room  Musculoskeletal:         General: No swelling  Skin:     General: Skin is warm  Neurological:      General: No focal deficit present  Mental Status: She is alert and oriented to person, place, and time  Cranial Nerves: No cranial nerve deficit  Psychiatric:         Mood and Affect: Mood normal          Behavior: Behavior normal          Thought Content: Thought content normal          Judgment: Judgment normal          Lab Results: I have personally reviewed pertinent reports  Imaging: I have personally reviewed pertinent reports  EKG, Pathology, and Other Studies: I have personally reviewed pertinent reports  VTE Prophylaxis: Sequential compression device Lazarus Lapidus)     Code Status: No Order  Advance Directive and Living Will:      Power of :    POLST:      Counseling / Coordination of Care  Total floor / unit time spent today 15 minutes  Greater than 50% of total time was spent with the patient and / or family counseling and / or coordination of care  A description of the counseling / coordination of care:  Review of today's case

## 2022-05-20 ENCOUNTER — ANESTHESIA (OUTPATIENT)
Dept: PERIOP | Facility: AMBULARY SURGERY CENTER | Age: 22
End: 2022-05-20
Payer: COMMERCIAL

## 2022-05-20 ENCOUNTER — APPOINTMENT (OUTPATIENT)
Dept: RADIOLOGY | Facility: AMBULARY SURGERY CENTER | Age: 22
End: 2022-05-20
Payer: COMMERCIAL

## 2022-05-20 ENCOUNTER — HOSPITAL ENCOUNTER (OUTPATIENT)
Facility: AMBULARY SURGERY CENTER | Age: 22
Setting detail: OUTPATIENT SURGERY
Discharge: HOME | End: 2022-05-20
Attending: UROLOGY | Admitting: UROLOGY
Payer: COMMERCIAL

## 2022-05-20 ENCOUNTER — TELEPHONE (OUTPATIENT)
Dept: UROLOGY | Facility: CLINIC | Age: 22
End: 2022-05-20

## 2022-05-20 VITALS
DIASTOLIC BLOOD PRESSURE: 70 MMHG | TEMPERATURE: 97.9 F | SYSTOLIC BLOOD PRESSURE: 115 MMHG | RESPIRATION RATE: 18 BRPM | WEIGHT: 232 LBS | HEIGHT: 65 IN | OXYGEN SATURATION: 96 % | HEART RATE: 77 BPM | BODY MASS INDEX: 38.65 KG/M2

## 2022-05-20 DIAGNOSIS — N39.0 RECURRENT UTI: Primary | ICD-10-CM

## 2022-05-20 DIAGNOSIS — N35.82 OTHER STRICTURE OF URETHRA IN FEMALE: Primary | ICD-10-CM

## 2022-05-20 LAB
EXT PREGNANCY TEST URINE: NEGATIVE
EXT. CONTROL: NORMAL

## 2022-05-20 PROCEDURE — 81025 URINE PREGNANCY TEST: CPT | Performed by: UROLOGY

## 2022-05-20 PROCEDURE — 74420 UROGRAPHY RTRGR +-KUB: CPT

## 2022-05-20 PROCEDURE — C1758 CATHETER, URETERAL: HCPCS | Performed by: UROLOGY

## 2022-05-20 PROCEDURE — C1769 GUIDE WIRE: HCPCS | Performed by: UROLOGY

## 2022-05-20 PROCEDURE — 52281 CYSTOSCOPY AND TREATMENT: CPT | Performed by: UROLOGY

## 2022-05-20 RX ORDER — ONDANSETRON 2 MG/ML
4 INJECTION INTRAMUSCULAR; INTRAVENOUS EVERY 6 HOURS PRN
Status: DISCONTINUED | OUTPATIENT
Start: 2022-05-20 | End: 2022-05-20 | Stop reason: HOSPADM

## 2022-05-20 RX ORDER — PROPOFOL 10 MG/ML
INJECTION, EMULSION INTRAVENOUS AS NEEDED
Status: DISCONTINUED | OUTPATIENT
Start: 2022-05-20 | End: 2022-05-20

## 2022-05-20 RX ORDER — HYDROMORPHONE HCL/PF 1 MG/ML
0.5 SYRINGE (ML) INJECTION EVERY 2 HOUR PRN
Status: DISCONTINUED | OUTPATIENT
Start: 2022-05-20 | End: 2022-05-20 | Stop reason: HOSPADM

## 2022-05-20 RX ORDER — SODIUM CHLORIDE, SODIUM LACTATE, POTASSIUM CHLORIDE, CALCIUM CHLORIDE 600; 310; 30; 20 MG/100ML; MG/100ML; MG/100ML; MG/100ML
INJECTION, SOLUTION INTRAVENOUS CONTINUOUS PRN
Status: DISCONTINUED | OUTPATIENT
Start: 2022-05-20 | End: 2022-05-20

## 2022-05-20 RX ORDER — GABAPENTIN 300 MG/1
300 CAPSULE ORAL ONCE
Status: COMPLETED | OUTPATIENT
Start: 2022-05-20 | End: 2022-05-20

## 2022-05-20 RX ORDER — HEPARIN SODIUM 5000 [USP'U]/ML
5000 INJECTION, SOLUTION INTRAVENOUS; SUBCUTANEOUS EVERY 8 HOURS SCHEDULED
Status: DISCONTINUED | OUTPATIENT
Start: 2022-05-20 | End: 2022-05-20 | Stop reason: HOSPADM

## 2022-05-20 RX ORDER — HYDROMORPHONE HCL/PF 1 MG/ML
0.4 SYRINGE (ML) INJECTION
Status: DISCONTINUED | OUTPATIENT
Start: 2022-05-20 | End: 2022-05-20 | Stop reason: HOSPADM

## 2022-05-20 RX ORDER — ONDANSETRON 2 MG/ML
INJECTION INTRAMUSCULAR; INTRAVENOUS AS NEEDED
Status: DISCONTINUED | OUTPATIENT
Start: 2022-05-20 | End: 2022-05-20

## 2022-05-20 RX ORDER — OXYCODONE HYDROCHLORIDE 5 MG/1
10 TABLET ORAL EVERY 4 HOURS PRN
Status: DISCONTINUED | OUTPATIENT
Start: 2022-05-20 | End: 2022-05-20 | Stop reason: HOSPADM

## 2022-05-20 RX ORDER — KETOROLAC TROMETHAMINE 10 MG/1
10 TABLET, FILM COATED ORAL EVERY 6 HOURS PRN
Qty: 12 TABLET | Refills: 0 | Status: SHIPPED | OUTPATIENT
Start: 2022-05-20 | End: 2022-05-23

## 2022-05-20 RX ORDER — SODIUM CHLORIDE, SODIUM LACTATE, POTASSIUM CHLORIDE, CALCIUM CHLORIDE 600; 310; 30; 20 MG/100ML; MG/100ML; MG/100ML; MG/100ML
50 INJECTION, SOLUTION INTRAVENOUS CONTINUOUS
Status: DISCONTINUED | OUTPATIENT
Start: 2022-05-20 | End: 2022-05-20 | Stop reason: HOSPADM

## 2022-05-20 RX ORDER — ACETAMINOPHEN 325 MG/1
975 TABLET ORAL ONCE
Status: COMPLETED | OUTPATIENT
Start: 2022-05-20 | End: 2022-05-20

## 2022-05-20 RX ORDER — MIDAZOLAM HYDROCHLORIDE 2 MG/2ML
INJECTION, SOLUTION INTRAMUSCULAR; INTRAVENOUS AS NEEDED
Status: DISCONTINUED | OUTPATIENT
Start: 2022-05-20 | End: 2022-05-20

## 2022-05-20 RX ORDER — DEXAMETHASONE SODIUM PHOSPHATE 10 MG/ML
INJECTION, SOLUTION INTRAMUSCULAR; INTRAVENOUS AS NEEDED
Status: DISCONTINUED | OUTPATIENT
Start: 2022-05-20 | End: 2022-05-20

## 2022-05-20 RX ORDER — ACETAMINOPHEN 325 MG/1
650 TABLET ORAL EVERY 6 HOURS SCHEDULED
Status: DISCONTINUED | OUTPATIENT
Start: 2022-05-20 | End: 2022-05-20 | Stop reason: HOSPADM

## 2022-05-20 RX ORDER — ONDANSETRON 2 MG/ML
4 INJECTION INTRAMUSCULAR; INTRAVENOUS ONCE AS NEEDED
Status: DISCONTINUED | OUTPATIENT
Start: 2022-05-20 | End: 2022-05-20 | Stop reason: HOSPADM

## 2022-05-20 RX ORDER — PHENAZOPYRIDINE HYDROCHLORIDE 100 MG/1
200 TABLET, FILM COATED ORAL
Status: DISCONTINUED | OUTPATIENT
Start: 2022-05-20 | End: 2022-05-20 | Stop reason: HOSPADM

## 2022-05-20 RX ORDER — MAGNESIUM HYDROXIDE/ALUMINUM HYDROXICE/SIMETHICONE 120; 1200; 1200 MG/30ML; MG/30ML; MG/30ML
30 SUSPENSION ORAL EVERY 6 HOURS PRN
Status: DISCONTINUED | OUTPATIENT
Start: 2022-05-20 | End: 2022-05-20 | Stop reason: HOSPADM

## 2022-05-20 RX ORDER — KETOROLAC TROMETHAMINE 30 MG/ML
15 INJECTION, SOLUTION INTRAMUSCULAR; INTRAVENOUS EVERY 6 HOURS SCHEDULED
Status: DISCONTINUED | OUTPATIENT
Start: 2022-05-20 | End: 2022-05-20 | Stop reason: HOSPADM

## 2022-05-20 RX ORDER — DIPHENHYDRAMINE HCL 25 MG
12.5 TABLET ORAL EVERY 6 HOURS PRN
Status: CANCELLED | OUTPATIENT
Start: 2022-05-20

## 2022-05-20 RX ORDER — LIDOCAINE HYDROCHLORIDE 10 MG/ML
INJECTION, SOLUTION EPIDURAL; INFILTRATION; INTRACAUDAL; PERINEURAL AS NEEDED
Status: DISCONTINUED | OUTPATIENT
Start: 2022-05-20 | End: 2022-05-20

## 2022-05-20 RX ORDER — FENTANYL CITRATE 50 UG/ML
INJECTION, SOLUTION INTRAMUSCULAR; INTRAVENOUS AS NEEDED
Status: DISCONTINUED | OUTPATIENT
Start: 2022-05-20 | End: 2022-05-20

## 2022-05-20 RX ORDER — FENTANYL CITRATE/PF 50 MCG/ML
25 SYRINGE (ML) INJECTION
Status: DISCONTINUED | OUTPATIENT
Start: 2022-05-20 | End: 2022-05-20 | Stop reason: HOSPADM

## 2022-05-20 RX ORDER — HEPARIN SODIUM 5000 [USP'U]/ML
5000 INJECTION, SOLUTION INTRAVENOUS; SUBCUTANEOUS ONCE
Status: COMPLETED | OUTPATIENT
Start: 2022-05-20 | End: 2022-05-20

## 2022-05-20 RX ORDER — MAGNESIUM HYDROXIDE 1200 MG/15ML
LIQUID ORAL AS NEEDED
Status: DISCONTINUED | OUTPATIENT
Start: 2022-05-20 | End: 2022-05-20 | Stop reason: HOSPADM

## 2022-05-20 RX ORDER — ONDANSETRON 4 MG/1
4 TABLET, ORALLY DISINTEGRATING ORAL EVERY 6 HOURS PRN
Qty: 20 TABLET | Refills: 0 | Status: SHIPPED | OUTPATIENT
Start: 2022-05-20

## 2022-05-20 RX ORDER — OXYBUTYNIN CHLORIDE 5 MG/1
5 TABLET ORAL 2 TIMES DAILY
Status: DISCONTINUED | OUTPATIENT
Start: 2022-05-20 | End: 2022-05-20 | Stop reason: HOSPADM

## 2022-05-20 RX ORDER — SULFAMETHOXAZOLE AND TRIMETHOPRIM 800; 160 MG/1; MG/1
1 TABLET ORAL EVERY 12 HOURS SCHEDULED
Qty: 10 TABLET | Refills: 0 | Status: SHIPPED | OUTPATIENT
Start: 2022-05-20 | End: 2022-05-25

## 2022-05-20 RX ORDER — CIPROFLOXACIN 2 MG/ML
400 INJECTION, SOLUTION INTRAVENOUS ONCE
Status: COMPLETED | OUTPATIENT
Start: 2022-05-20 | End: 2022-05-20

## 2022-05-20 RX ORDER — OXYCODONE HYDROCHLORIDE 5 MG/1
5 TABLET ORAL EVERY 4 HOURS PRN
Status: DISCONTINUED | OUTPATIENT
Start: 2022-05-20 | End: 2022-05-20 | Stop reason: HOSPADM

## 2022-05-20 RX ADMIN — SODIUM CHLORIDE, SODIUM LACTATE, POTASSIUM CHLORIDE, AND CALCIUM CHLORIDE: .6; .31; .03; .02 INJECTION, SOLUTION INTRAVENOUS at 12:01

## 2022-05-20 RX ADMIN — LIDOCAINE HYDROCHLORIDE 50 MG: 10 INJECTION, SOLUTION EPIDURAL; INFILTRATION; INTRACAUDAL at 12:03

## 2022-05-20 RX ADMIN — PROPOFOL 200 MG: 10 INJECTION, EMULSION INTRAVENOUS at 12:03

## 2022-05-20 RX ADMIN — ONDANSETRON 4 MG: 2 INJECTION INTRAMUSCULAR; INTRAVENOUS at 12:12

## 2022-05-20 RX ADMIN — FENTANYL CITRATE 25 MCG: 50 INJECTION INTRAMUSCULAR; INTRAVENOUS at 12:57

## 2022-05-20 RX ADMIN — KETOROLAC TROMETHAMINE 15 MG: 30 INJECTION, SOLUTION INTRAMUSCULAR at 14:33

## 2022-05-20 RX ADMIN — CIPROFLOXACIN: 2 INJECTION INTRAVENOUS at 12:01

## 2022-05-20 RX ADMIN — FENTANYL CITRATE 25 MCG: 50 INJECTION, SOLUTION INTRAMUSCULAR; INTRAVENOUS at 12:03

## 2022-05-20 RX ADMIN — PROPOFOL 75 MG: 10 INJECTION, EMULSION INTRAVENOUS at 12:04

## 2022-05-20 RX ADMIN — OXYBUTYNIN CHLORIDE 5 MG: 5 TABLET ORAL at 14:32

## 2022-05-20 RX ADMIN — DEXAMETHASONE SODIUM PHOSPHATE 10 MG: 10 INJECTION, SOLUTION INTRAMUSCULAR; INTRAVENOUS at 12:10

## 2022-05-20 RX ADMIN — GABAPENTIN 300 MG: 300 CAPSULE ORAL at 11:12

## 2022-05-20 RX ADMIN — FENTANYL CITRATE 25 MCG: 50 INJECTION INTRAMUSCULAR; INTRAVENOUS at 12:53

## 2022-05-20 RX ADMIN — FENTANYL CITRATE 25 MCG: 50 INJECTION INTRAMUSCULAR; INTRAVENOUS at 12:49

## 2022-05-20 RX ADMIN — ACETAMINOPHEN 975 MG: 325 TABLET, FILM COATED ORAL at 11:12

## 2022-05-20 RX ADMIN — HEPARIN SODIUM 5000 UNITS: 5000 INJECTION INTRAVENOUS; SUBCUTANEOUS at 11:12

## 2022-05-20 RX ADMIN — FENTANYL CITRATE 25 MCG: 50 INJECTION, SOLUTION INTRAMUSCULAR; INTRAVENOUS at 12:10

## 2022-05-20 RX ADMIN — FENTANYL CITRATE 25 MCG: 50 INJECTION, SOLUTION INTRAMUSCULAR; INTRAVENOUS at 12:19

## 2022-05-20 RX ADMIN — PHENAZOPYRIDINE 200 MG: 100 TABLET ORAL at 14:32

## 2022-05-20 RX ADMIN — MIDAZOLAM HYDROCHLORIDE 2 MG: 1 INJECTION, SOLUTION INTRAMUSCULAR; INTRAVENOUS at 12:01

## 2022-05-20 NOTE — ANESTHESIA PREPROCEDURE EVALUATION
Procedure:  CYSTOSCOPY, possible biopsy, possible retrograde pyelogram, and examination under anesthesia (N/A Bladder)    Relevant Problems   CARDIO   (+) Migraine with aura and without status migrainosus, not intractable   (+) Pulmonary embolus (HCC)      GI/HEPATIC   (+) Gastroesophageal reflux disease      /RENAL   (+) Atrophic kidney   (+) Duplex kidney      NEURO/PSYCH   (+) Anxiety   (+) History of anaphylaxis   (+) Migraine with aura and without status migrainosus, not intractable      PULMONARY   (+) Exercise-induced asthma   (+) Mild intermittent asthma without complication   (+) Mild persistent asthma without complication   (+) Shortness of breath      Genitourinary   (+) Urethral meatal stenosis   (+) VUR (vesicoureteric reflux)      Hematopoietic and Hemostatic   (+) [facility]      Other   (+) Chronic tonsillar hypertrophy   (+) Chronic tonsillitis   (+) S/p nephrectomy        Physical Exam    Airway    Mallampati score: III  TM Distance: >3 FB  Neck ROM: full     Dental       Cardiovascular      Pulmonary      Other Findings        Anesthesia Plan  ASA Score- 3     Anesthesia Type- general with ASA Monitors  Additional Monitors:   Airway Plan: LMA  Plan Factors-Exercise tolerance (METS): >4 METS  Chart reviewed  Existing labs reviewed  Patient summary reviewed  Induction- intravenous  Postoperative Plan- Plan for postoperative opioid use  Planned trial extubation    Informed Consent- Anesthetic plan and risks discussed with patient  I personally reviewed this patient with the CRNA  Discussed and agreed on the Anesthesia Plan with the MARISSA Kate

## 2022-05-20 NOTE — ANESTHESIA POSTPROCEDURE EVALUATION
Post-Op Assessment Note    CV Status:  Stable  Pain Score: 0    Pain management: adequate     Mental Status:  Awake   Hydration Status:  Euvolemic   PONV Controlled:  Controlled   Airway Patency:  Patent      Post Op Vitals Reviewed: Yes      Staff: CRNA         No complications documented      /57 (05/20/22 1240)    Temp 98 4 °F (36 9 °C) (05/20/22 1240)    Pulse 81 (05/20/22 1245)   Resp 16 (05/20/22 1245)    SpO2 98 % (05/20/22 1245)

## 2022-05-20 NOTE — OP NOTE
OPERATIVE REPORT  PATIENT NAME: Amanda Leblanc    :  2000  MRN: [id number]  Pt Location: [facility] OR [facility] 04    SURGERY DATE: 2022    Surgeon(s) and Role:     * Livan Carl MD - Primary    Preop Diagnosis:  Gross hematuria [R31 0]    Post-Op Diagnosis Codes:     * Gross hematuria [R31 0]    Procedure(s) (LRB):  CYSTOSCOPY, retrograde pyelogram, and examination under anesthesia, dilation urethral stenosis (N/A)    Specimen(s):  * No specimens in log *    Estimated Blood Loss:   Minimal    Drains:  * No LDAs found *    Anesthesia Type:   General    Operative Indications:  Gross hematuria [R31 0]      Operative Findings:  Meatal stenosis, dilated to [address] [place], the bladder is free of lesions, evidence of previous ureteral reimplantation is present, I am not able to see the right ureteral orifice, a duplicated left ureteral orifice is noted  The bladder was drained, she does not require Corrigan catheter  She can see us in 6 months for her history of recurrent UTIs    Complications:   None    Procedure and Technique:  The patient was seen in the office after having some gross hematuria, efforts at flexible cystoscopy failed due to stenosis at the meatus  She presented to the operating room today for dilation of meatal stenosis and cystoscopic workup with all indicated procedures  She was placed under anesthesia, antibiosis was given as per the guidelines, sequential compression devices were placed  He was prepared and draped in usual fashion after which a time-out confirmed the proper patient position and procedure  We began by performing an examination under anesthesia which shows a normal Melvin stage and normal external genitalia without pelvic organ prolapse  Meatal stenosis is noted  This was sequentially dilated with female sounds to 27 [address] [place]  A rigid scope was then used which shows normal bladder mucosa, evidence of a duplicated left ureteral orifice status post reimplantation  Retrograde pyelography in the ureteral orifices on the left hand side shows no passage proximally  The right ureteral orifice cannot be noted despite my efforts to identified  This is a negative workup for malignancy  The bladder was then drained  She does not require a Corrigan catheter  I was present for the entire procedure and A qualified resident physician was not available    Patient Disposition:  PACU      Plan:  She will see us back in 6 months for her history of recurrent UTI        SIGNATURE: Morgan Joe MD  DATE: May 20, 2022  TIME: 12:27 PM

## 2022-05-20 NOTE — INTERVAL H&P NOTE
H&P reviewed  After examining the patient I find no changes in the patients condition since the H&P had been written      Vitals:    05/20/22 1102   BP: 134/77   Pulse: 88   Resp: 18   Temp: 97 7 °F (36 5 °C)   SpO2: 99%

## 2022-05-20 NOTE — TELEPHONE ENCOUNTER
Patient still currently admitted  Appt on 6/3/22 cancelled  Please confirm  Please also schedule 6 month f/u with AP

## 2022-05-20 NOTE — TELEPHONE ENCOUNTER
Status post examination tried anesthesia (negative), and dilation of meatal stenosis, does not have a Corrigan catheter  She can see a advanced practitioner in 6 months for her history of recurrent UTI    If doing well at that time she can see us yearly

## 2022-05-20 NOTE — DISCHARGE INSTRUCTIONS
Hafsa Agarwal,    Today you had cystoscopy with dilation of meatal stenosis  We were able to get a good look at your bladder which is negative for any lesions  I was not able to shoot a retrograde on the right side, your left retrograde shows evidence of your previous surgeries  You will have some urgency and frequency after this surgery today, please use Tylenol and ibuprofen for any discomfort that you may have  You can see us back in 6 months for your history of recurrent UTI  If you have questions or concerns as you recover, please let us know    Thank you for allowing us to take care of you today,  Dr Tomas Chavez

## 2022-05-23 ENCOUNTER — OFFICE VISIT (OUTPATIENT)
Dept: PULMONOLOGY | Facility: MEDICAL CENTER | Age: 22
End: 2022-05-23
Payer: COMMERCIAL

## 2022-05-23 VITALS
TEMPERATURE: 98.7 F | SYSTOLIC BLOOD PRESSURE: 110 MMHG | OXYGEN SATURATION: 97 % | HEIGHT: 65 IN | BODY MASS INDEX: 39.82 KG/M2 | DIASTOLIC BLOOD PRESSURE: 82 MMHG | HEART RATE: 85 BPM | WEIGHT: 239 LBS | RESPIRATION RATE: 12 BRPM

## 2022-05-23 DIAGNOSIS — G47.10 HYPERSOMNIA: ICD-10-CM

## 2022-05-23 DIAGNOSIS — D68.51 FACTOR V LEIDEN MUTATION (HCC): ICD-10-CM

## 2022-05-23 DIAGNOSIS — J45.30 MILD PERSISTENT ASTHMA WITHOUT COMPLICATION: Primary | ICD-10-CM

## 2022-05-23 PROCEDURE — 1036F TOBACCO NON-USER: CPT | Performed by: INTERNAL MEDICINE

## 2022-05-23 PROCEDURE — 99214 OFFICE O/P EST MOD 30 MIN: CPT | Performed by: INTERNAL MEDICINE

## 2022-05-23 PROCEDURE — 3008F BODY MASS INDEX DOCD: CPT | Performed by: INTERNAL MEDICINE

## 2022-05-23 NOTE — PROGRESS NOTES
Assessment/Plan        Problem List Items Addressed This Visit        Respiratory    Mild persistent asthma without complication - Primary     She is doing better now on Advair 250 mcg 1 puff b i d   Continues  She periodically does use her Ventolin inhaler  Lung sounds were clear today  Hematopoietic and Hemostatic    Factor V Leiden mutation [facility])     She does have history of her like this factor 5 mutation  She was checked for several years ago  She did have DVT and pulmonary emboli after she had left nephrectomy done May 21, 2020 at [facility] for atrophic left kidney  She was on Xarelto for 3 months and was discontinued  Has not had any recurrence since it was discontinued              Other    Hypersomnia     Misty has daytime fatigue  She does snore  She is overweight and Mallampati score is 2  Possibility has JULIAN  Her father has JULIAN and she is familiar with it  I did order home sleep study  She can contact our office for results after it is completed  I did discussed diagnosis and treatment of JULIAN with her  I did recommend weight loss           Relevant Orders    Home Study            Cc:  Does have some shortness of breath with exertion      HPI     She has history mild intermittent asthma  Last visit I did give her prescription for Advair 250 mcg dose use 1 puff twice a day  Does have history of factor 5 mutation heterozygous  She did have DVT and PE after she had left kidney removed back on May 21st 2020 at [facility] for atrophic left kidney  She was on anticoagulation with Xarelto for about 3 months and this was discontinued  No recurrence since then  She was seen on 04/14/2022 at [address] [place] [address] for was felt to be possible allergic reaction to cinnamon  She had inhaled cinnamon and started to have some itchiness and hives  She was treated with Benadryl and prednisone    She was also given prescription for EpiPen to keep on hand at home     Blood work done May 16th was reviewed and showed hemoglobin of 14 1, hematocrit 41 8  She did [place] allergy panel done May 16th of this year and total IgE level was normal at 30 4 and she tested negative to all substances on panel  Her serum creatinine was normal at 1 0  She did have CBC done January 17th of this year so white count to be 7 61 hemoglobin 14 2 with platelet count of 919  No evidence of any eosinophilia  Spirometry done March of this year showed normal lung volumes    On 05/20/2022 she did undergo cystoscopy with retrograde pyelogram and dilatation of urethral stenosis  This was done by urologist Dr Salinas Render    Chest x-ray done in January of this year was normal     She does have snoring  She does have fatigue during the day  She does work as [employer]   She does take Benadryl periodically when she smells  Cinnamon in her environement as it causes her allergic reaction  Past Medical History:   Diagnosis Date    Anxiety and depression     Asthma     [facility] ([facility] )     Factor V Leiden ([facility] )     Pulmonary embolism (HCC)     Thrombophlebitis        Past Surgical History:   Procedure Laterality Date    BLADDER SURGERY      NEPHRECTOMY Left 05/21/2020    Done laparoscopically at [facility] EXTRACTION           Current Outpatient Medications:     albuterol (PROVENTIL HFA,VENTOLIN HFA) 90 mcg/act inhaler, Inhale 2 puffs every 4 (four) hours as needed for wheezing or shortness of breath, Disp: 18 g, Rfl: 6    busPIRone (BUSPAR) 15 mg tablet, Take 1 tablet (15 mg total) by mouth 3 (three) times a day (Patient taking differently: Take 15 mg by mouth as needed in the morning and 15 mg as needed at noon and 15 mg as needed in the evening ), Disp: 270 tablet, Rfl: 0    butalbital-acetaminophen-caffeine (FIORICET,ESGIC) -40 mg per tablet, Take 1 tablet now, and then repeat in 4 hrs if no relief   Don't take more then 3 in a week , Disp: 30 tablet, Rfl: 0    EPINEPHrine (EPIPEN) 0 3 mg/0 3 mL SOAJ, Inject 0 3 mL (0 3 mg total) into a muscle once for 1 dose, Disp: 0 6 mL, Rfl: 0    fluticasone-salmeterol (Advair Diskus) 250-50 mcg/dose inhaler, Inhale 1 puff 2 (two) times a day Rinse mouth after use , Disp: 60 blister, Rfl: 5    hydrOXYzine HCL (ATARAX) 50 mg tablet, Take 1 tablet (50 mg total) by mouth daily at bedtime, Disp: 90 tablet, Rfl: 2    ketorolac (TORADOL) 10 mg tablet, Take 1 tablet (10 mg total) by mouth every 6 (six) hours as needed for moderate pain for up to 3 days, Disp: 12 tablet, Rfl: 0    levonorgestrel (KYLEENA) 19 5 MG intrauterine device, 1 each by Intrauterine route, Disp: , Rfl:     multivitamin (THERAGRAN) TABS, Take 1 tablet by mouth daily, Disp: , Rfl:     ondansetron (Zofran ODT) 4 mg disintegrating tablet, Take 1 tablet (4 mg total) by mouth every 6 (six) hours as needed for nausea or vomiting, Disp: 20 tablet, Rfl: 3    pantoprazole (PROTONIX) 40 mg tablet, Take 1 tablet (40 mg total) by mouth daily, Disp: 90 tablet, Rfl: 3    Probiotic Product (PROBIOTIC-10 PO), Take by mouth, Disp: , Rfl:     sertraline (ZOLOFT) 50 mg tablet, take 3 tablets by mouth once daily at bedtime, Disp: 90 tablet, Rfl: 6    sulfamethoxazole-trimethoprim (BACTRIM DS) 800-160 mg per tablet, Take 1 tablet by mouth every 12 (twelve) hours for 5 days Please start this medication the day prior to your scheduled ureteral stent removal, Disp: 10 tablet, Rfl: 0    SUMAtriptan (IMITREX) 100 mg tablet, Take 1 tablet (100 mg total) by mouth once as needed for migraine for up to 15 doses May repeat one dose 6 hours later, but no more 3 days a week , Disp: 15 tablet, Rfl: 0    topiramate (TOPAMAX) 50 MG tablet, take 1 tablet by mouth at bedtime, Disp: 90 tablet, Rfl: 4    ondansetron (Zofran ODT) 4 mg disintegrating tablet, Take 1 tablet (4 mg total) by mouth every 6 (six) hours as needed for nausea or vomiting (Patient not taking: No sig reported), Disp: 20 tablet, Rfl: 0    Allergies   Allergen Reactions    Amoxicillin Anaphylaxis    Cinnamon - Food Allergy Anaphylaxis       Social History     Tobacco Use    Smoking status: Never Smoker    Smokeless tobacco: Never Used   Substance Use Topics    Alcohol use: Yes     Comment: social, 1 x per month         Family History   Problem Relation Age of Onset    Hypertension Mother     Hyperlipidemia Mother     Diabetes Mother     Factor V Leiden deficiency Mother     Ovarian cancer Maternal Grandmother     Arthritis Family     Asthma Family     Cancer Family     Cervical cancer Family     Heart failure Family     Hyperlipidemia Family     Hypertension Family     Diabetes Other     Heart disease Father        Review of Systems   Constitutional: Positive for fatigue  Negative for chills, fever and unexpected weight change  HENT: Negative for congestion, rhinorrhea and sore throat  Eyes: Negative for discharge and redness  Respiratory:        Some shortness of breath with activity   Cardiovascular: Negative for chest pain, palpitations and leg swelling  Gastrointestinal: Negative for abdominal distention, abdominal pain and nausea  Endocrine: Negative for polydipsia and polyphagia  Genitourinary: Negative for dysuria  Musculoskeletal: Negative for joint swelling and myalgias  Skin: Negative for rash  Neurological: Negative for light-headedness  Height 5 ft 5 in tall, weight 239 lb, BMI 39 77    Vitals:    05/23/22 0831   BP: 110/82   Pulse: 85   Resp: 12   Temp: 98 7 °F (37 1 °C)   SpO2: 97%           Physical Exam  Vitals reviewed  Constitutional:       General: She is not in acute distress  Appearance: Normal appearance  She is well-developed  She is obese  HENT:      Head: Normocephalic  Nose: Nose normal       Mouth/Throat:      Mouth: Mucous membranes are moist       Pharynx: Oropharynx is clear  No oropharyngeal exudate  Comments: Mallampati score is 3  Eyes:      Conjunctiva/sclera: Conjunctivae normal       Pupils: Pupils are equal, round, and reactive to light  Cardiovascular:      Rate and Rhythm: Normal rate and regular rhythm  Heart sounds: Normal heart sounds  Pulmonary:      Effort: Pulmonary effort is normal       Comments: Lung sounds are clear  No wheezes, crackles, or rhonchi  Abdominal:      General: There is no distension  Palpations: Abdomen is soft  Tenderness: There is no abdominal tenderness  Musculoskeletal:      Cervical back: Neck supple  Comments: No edema, cyanosis, or clubbing   Lymphadenopathy:      Cervical: No cervical adenopathy  Skin:     General: Skin is warm and dry  Neurological:      Mental Status: She is alert and oriented to person, place, and time  Psychiatric:         Mood and Affect: Mood normal          Behavior: Behavior normal          Thought Content:  Thought content normal

## 2022-05-23 NOTE — PATIENT INSTRUCTIONS
Continue Advair 250 mcg 1 puff twice a day    Call our back telephone number if you have any problems  [phone]    I ordered a home sleep study    Can contact us after home sleep study is done    Try to keep 1 finger free of any nailpolish

## 2022-05-23 NOTE — ASSESSMENT & PLAN NOTE
She is doing better now on Advair 250 mcg 1 puff b i d   Continues  She periodically does use her Ventolin inhaler  Lung sounds were clear today

## 2022-05-23 NOTE — ASSESSMENT & PLAN NOTE
Vipin Denis has daytime fatigue  She does snore  She is overweight and Mallampati score is 2  Possibility has JULIAN  Her father has JULIAN and she is familiar with it  I did order home sleep study  She can contact our office for results after it is completed  I did discussed diagnosis and treatment of JULIAN with her    I did recommend weight loss

## 2022-05-23 NOTE — ASSESSMENT & PLAN NOTE
She does have history of her like this factor 5 mutation  She was checked for several years ago  She did have DVT and pulmonary emboli after she had left nephrectomy done May 21, 2020 at [facility] for atrophic left kidney  She was on Xarelto for 3 months and was discontinued    Has not had any recurrence since it was discontinued

## 2022-05-23 NOTE — TELEPHONE ENCOUNTER
PT APPT SCHEDULED FOR 6 MONTH F/U  SHE HAD SOME CONCERNS WITH URGENCY AND PRESSURE SINCE SHE LEFT THE HOSPITAL  PLEASE ADVISE

## 2022-05-24 NOTE — TELEPHONE ENCOUNTER
Patient is currently on bactrim since 5/20 and expected to complete medication tomorrow 5/25  She is still having symptoms of a UTI (urgency and pressure) and they havent resolved despite abx  Advised her that due to medication she cant get urine testing  Plan to complete abx and then 24 hours later get repeat testing  Orders placed and patient in agreement      Will postpone til Friday

## 2022-06-27 ENCOUNTER — CLINICAL SUPPORT (OUTPATIENT)
Dept: FAMILY MEDICINE CLINIC | Facility: CLINIC | Age: 22
End: 2022-06-27
Payer: COMMERCIAL

## 2022-06-27 DIAGNOSIS — G43.109 MIGRAINE WITH AURA AND WITHOUT STATUS MIGRAINOSUS, NOT INTRACTABLE: ICD-10-CM

## 2022-06-27 DIAGNOSIS — Z11.1 ENCOUNTER FOR PPD TEST: Primary | ICD-10-CM

## 2022-06-27 PROCEDURE — 86580 TB INTRADERMAL TEST: CPT

## 2022-06-27 RX ORDER — SUMATRIPTAN 100 MG/1
TABLET, FILM COATED ORAL
Qty: 15 TABLET | Refills: 0 | Status: SHIPPED | OUTPATIENT
Start: 2022-06-27

## 2022-06-29 ENCOUNTER — CLINICAL SUPPORT (OUTPATIENT)
Dept: FAMILY MEDICINE CLINIC | Facility: CLINIC | Age: 22
End: 2022-06-29

## 2022-06-29 VITALS — TEMPERATURE: 98 F

## 2022-06-29 DIAGNOSIS — Z23 NEED FOR TUBERCULOSIS VACCINATION: Primary | ICD-10-CM

## 2022-06-29 PROCEDURE — RECHECK

## 2022-08-16 ENCOUNTER — TELEPHONE (OUTPATIENT)
Dept: FAMILY MEDICINE CLINIC | Facility: CLINIC | Age: 22
End: 2022-08-16

## 2022-08-16 NOTE — TELEPHONE ENCOUNTER
Dr Elissa Salazar    Patient needs letter for new employer stating that she is allergic to cinnamon  Please fax letter to Nurse [address], [address] [phone]

## 2022-09-01 DIAGNOSIS — F41.8 DEPRESSION WITH ANXIETY: ICD-10-CM

## 2022-09-10 DIAGNOSIS — F41.8 DEPRESSION WITH ANXIETY: ICD-10-CM

## 2022-09-10 DIAGNOSIS — G43.109 MIGRAINE WITH AURA AND WITHOUT STATUS MIGRAINOSUS, NOT INTRACTABLE: ICD-10-CM

## 2022-09-10 DIAGNOSIS — K21.9 GASTROESOPHAGEAL REFLUX DISEASE, UNSPECIFIED WHETHER ESOPHAGITIS PRESENT: ICD-10-CM

## 2022-09-11 RX ORDER — HYDROXYZINE 50 MG/1
50 TABLET, FILM COATED ORAL
Qty: 90 TABLET | Refills: 2 | Status: SHIPPED | OUTPATIENT
Start: 2022-09-11

## 2022-09-11 RX ORDER — SUMATRIPTAN 100 MG/1
TABLET, FILM COATED ORAL
Qty: 15 TABLET | Refills: 0 | Status: SHIPPED | OUTPATIENT
Start: 2022-09-11

## 2022-09-11 RX ORDER — PANTOPRAZOLE SODIUM 40 MG/1
40 TABLET, DELAYED RELEASE ORAL DAILY
Qty: 90 TABLET | Refills: 3 | Status: SHIPPED | OUTPATIENT
Start: 2022-09-11

## 2022-09-11 RX ORDER — TOPIRAMATE 50 MG/1
50 TABLET, FILM COATED ORAL
Qty: 90 TABLET | Refills: 4 | Status: SHIPPED | OUTPATIENT
Start: 2022-09-11

## 2022-09-12 DIAGNOSIS — F41.8 DEPRESSION WITH ANXIETY: ICD-10-CM

## 2022-11-07 DIAGNOSIS — G43.109 MIGRAINE WITH AURA AND WITHOUT STATUS MIGRAINOSUS, NOT INTRACTABLE: ICD-10-CM

## 2022-11-07 RX ORDER — SUMATRIPTAN 100 MG/1
TABLET, FILM COATED ORAL
Qty: 15 TABLET | Refills: 0 | Status: SHIPPED | OUTPATIENT
Start: 2022-11-07

## 2022-11-11 DIAGNOSIS — F41.8 DEPRESSION WITH ANXIETY: ICD-10-CM

## 2022-11-11 RX ORDER — HYDROXYZINE 50 MG/1
50 TABLET, FILM COATED ORAL
Qty: 90 TABLET | Refills: 2 | Status: SHIPPED | OUTPATIENT
Start: 2022-11-11

## 2022-11-16 ENCOUNTER — TELEMEDICINE (OUTPATIENT)
Dept: FAMILY MEDICINE CLINIC | Facility: CLINIC | Age: 22
End: 2022-11-16

## 2022-11-16 DIAGNOSIS — T78.40XS ALLERGIC REACTION, SEQUELA: Primary | ICD-10-CM

## 2022-11-16 RX ORDER — EPINEPHRINE 0.3 MG/.3ML
0.3 INJECTION SUBCUTANEOUS ONCE
Qty: 0.6 ML | Refills: 5 | Status: SHIPPED | OUTPATIENT
Start: 2022-11-16 | End: 2022-11-16

## 2022-11-16 RX ORDER — METHYLPREDNISOLONE 4 MG/1
TABLET ORAL
Qty: 21 EACH | Refills: 1 | Status: SHIPPED | OUTPATIENT
Start: 2022-11-16

## 2022-11-16 NOTE — PROGRESS NOTES
Virtual Regular Visit    Verification of patient location:    Patient is located in the following state in which I hold an active license [place]      Assessment/Plan:    Problem List Items Addressed This Visit        Other    Allergic reaction - Primary    Relevant Medications    EPINEPHrine (EPIPEN) 0 3 mg/0 3 mL SOAJ    methylPREDNISolone 4 MG tablet therapy pack     Medrol pack started  Epi pen  Establishing with Allergist         Reason for visit is   Chief Complaint   Patient presents with   • Virtual Regular Visit        Encounter provider Marybeth Gar MD    Provider located at [address] [address]      Recent Visits  No visits were found meeting these conditions  Showing recent visits within past 7 days and meeting all other requirements  Today's Visits  Date Type Provider Dept   11/16/22 MD Len Elder   Showing today's visits and meeting all other requirements  Future Appointments  No visits were found meeting these conditions  Showing future appointments within next 150 days and meeting all other requirements       The patient was identified by name and date of birth  Bladimir Driscoll was informed that this is a telemedicine visit and that the visit is being conducted through the [address] [place] [address] Now platform  She agrees to proceed     My office door was closed  No one else was in the room  She acknowledged consent and understanding of privacy and security of the video platform  The patient has agreed to participate and understands they can discontinue the visit at any time  Patient is aware this is a billable service  Subjective  Bladimir Driscoll is a 23 y o  female presents via video   Was in [place], inhaled Cinnamon, Allergic reaction  History of allergy  States that they didn't even run labs or EKG  1 bag  Of fluid  Kept 1 hour  Still have the rash on her face       HPI     Past Medical History:   Diagnosis Date   • Anxiety and depression    • Asthma    • Cl[facility] disorder [facility] )    • Factor V Leiden ([facility] )    • [facility]    • Thrombophlebitis        Past Surgical History:   Procedure Laterality Date   • BLADDER SURGERY     • FL RETROGRADE PYELOGRAM  5/20/2022   • NEPHRECTOMY Left 05/21/2020    Done laparoscopically at [facility]   • AL CYSTOURETHROSCOPY N/A 5/20/2022    Procedure: CYSTOSCOPY, retrograde pyelogram, and examination under anesthesia, dilation urethral stenosis; Surgeon: Ashley Peterson MD;  Location: [facility] MAIN OR;  Service: Urology   • URETER REVISION     • WISDOM TOOTH EXTRACTION         Current Outpatient Medications   Medication Sig Dispense Refill   • EPINEPHrine (EPIPEN) 0 3 mg/0 3 mL SOAJ Inject 0 3 mL (0 3 mg total) into a muscle once for 1 dose 0 6 mL 5   • methylPREDNISolone 4 MG tablet therapy pack Use as directed on package 21 each 1   • albuterol (PROVENTIL HFA,VENTOLIN HFA) 90 mcg/act inhaler Inhale 2 puffs every 4 (four) hours as needed for wheezing or shortness of breath 18 g 6   • busPIRone (BUSPAR) 15 mg tablet Take 1 tablet (15 mg total) by mouth 3 (three) times a day (Patient taking differently: Take 15 mg by mouth as needed in the morning and 15 mg as needed at noon and 15 mg as needed in the evening ) 270 tablet 0   • butalbital-acetaminophen-caffeine (FIORICET,ESGIC) -40 mg per tablet Take 1 tablet now, and then repeat in 4 hrs if no relief  Don't take more then 3 in a week  30 tablet 0   • fluticasone-salmeterol (Advair Diskus) 250-50 mcg/dose inhaler Inhale 1 puff 2 (two) times a day Rinse mouth after use   60 blister 5   • hydrOXYzine HCL (ATARAX) 50 mg tablet Take 1 tablet (50 mg total) by mouth daily at bedtime 90 tablet 2   • ketorolac (TORADOL) 10 mg tablet Take 1 tablet (10 mg total) by mouth every 6 (six) hours as needed for moderate pain for up to 3 days 12 tablet 0   • levonorgestrel (KYLEENA) 19 5 MG intrauterine device 1 each by Intrauterine route     • multivitamin (THERAGRAN) TABS Take 1 tablet by mouth daily     • ondansetron (Zofran ODT) 4 mg disintegrating tablet Take 1 tablet (4 mg total) by mouth every 6 (six) hours as needed for nausea or vomiting 20 tablet 3   • ondansetron (Zofran ODT) 4 mg disintegrating tablet Take 1 tablet (4 mg total) by mouth every 6 (six) hours as needed for nausea or vomiting (Patient not taking: No sig reported) 20 tablet 0   • pantoprazole (PROTONIX) 40 mg tablet Take 1 tablet (40 mg total) by mouth daily 90 tablet 3   • Probiotic Product (PROBIOTIC-10 PO) Take by mouth     • sertraline (ZOLOFT) 50 mg tablet TAKE 3 TABLETS BY MOUTH EVERY DAY AT BEDTIME 270 tablet 3   • SUMAtriptan (IMITREX) 100 mg tablet TAKE 1 TABLET BY MOUTH ONCE AS NEEDED FOR MIGRAINE REPEAT 1 DOSE 6 HOURS LATER NO MORE THAN 3 DAYS A WEEK AS DIRECTED 15 tablet 0   • topiramate (TOPAMAX) 50 MG tablet Take 1 tablet (50 mg total) by mouth daily at bedtime 90 tablet 4     No current facility-administered medications for this visit  Allergies   Allergen Reactions   • Amoxicillin Anaphylaxis   • Cinnamon - Food Allergy Anaphylaxis       Review of Systems   Skin: Positive for rash  Video Exam    There were no vitals filed for this visit  Physical Exam  Constitutional:       Appearance: Normal appearance  Pulmonary:      Effort: Pulmonary effort is normal    Musculoskeletal:         General: Normal range of motion  Neurological:      General: No focal deficit present  Mental Status: She is alert and oriented to person, place, and time  Psychiatric:         Mood and Affect: Mood normal          Behavior: Behavior normal          Thought Content:  Thought content normal          Judgment: Judgment normal           I spent 15 minutes directly with the patient during this visit

## 2022-12-19 ENCOUNTER — TELEMEDICINE (OUTPATIENT)
Dept: FAMILY MEDICINE CLINIC | Facility: CLINIC | Age: 22
End: 2022-12-19

## 2022-12-19 DIAGNOSIS — J45.21 MILD INTERMITTENT ASTHMA WITH ACUTE EXACERBATION: Primary | ICD-10-CM

## 2022-12-19 RX ORDER — AZITHROMYCIN 250 MG/1
TABLET, FILM COATED ORAL
Qty: 6 TABLET | Refills: 0 | Status: SHIPPED | OUTPATIENT
Start: 2022-12-19 | End: 2022-12-23

## 2022-12-19 RX ORDER — METHYLPREDNISOLONE 4 MG/1
TABLET ORAL
Qty: 21 EACH | Refills: 0 | Status: SHIPPED | OUTPATIENT
Start: 2022-12-19

## 2022-12-19 NOTE — LETTER
December 19, 2022     Patient: Trent Spence  YOB: 2000  Date of Visit: 12/19/2022      To Whom it May Concern:    Mary Alice Barreto is under my professional care  Andrez Logan was seen in my office on 12/19/2022  Andrez Logan may return to work on 12/20/22  If you have any questions or concerns, please don't hesitate to call           Sincerely,          Mireille Sommer MD        CC: No Recipients

## 2022-12-19 NOTE — PROGRESS NOTES
Virtual Regular Visit    Verification of patient location:    Patient is located in the following state in which I hold an active license [place]      Assessment/Plan:    Problem List Items Addressed This Visit        Respiratory    Mild intermittent asthma without complication - Primary    Relevant Medications    methylPREDNISolone 4 MG tablet therapy pack    azithromycin (ZITHROMAX) 250 mg tablet     If there is no improvement recommend the patient come into the office for evaluation       Reason for visit is   Chief Complaint   Patient presents with   • Virtual Regular Visit        Encounter provider Sanjuana Stark MD    Provider located at [address] [place] [id number]      Recent Visits  No visits were found meeting these conditions  Showing recent visits within past 7 days and meeting all other requirements  Today's Visits  Date Type Provider Dept   12/19/22 Telemedicine Sanjuana Stark  [facility] today's visits and meeting all other requirements  Future Appointments  No visits were found meeting these conditions  Showing future appointments within next 150 days and meeting all other requirements       The patient was identified by name and date of birth  Margaret Rios was informed that this is a telemedicine visit and that the visit is being conducted through the [facility] platform  She agrees to proceed     My office door was closed  No one else was in the room  She acknowledged consent and understanding of privacy and security of the video platform  The patient has agreed to participate and understands they can discontinue the visit at any time  Patient is aware this is a billable service  Subjective  Margaret Rios is a 23 y o  female presents via video  Fever, congestion,sore throat  Ears hurts, and lungs are showing chestness  Denies any fevers         HPI     Past Medical History:   Diagnosis Date   • Anxiety and depression    • Asthma    • Clotting disorder ([facility] )    • Factor V Leiden ([facility] )    • Pulmonary embolism ([facility] )    • Thrombophlebitis        Past Surgical History:   Procedure Laterality Date   • BLADDER SURGERY     • [place] RETROGRADE PYELOGRAM  5/20/2022   • NEPHRECTOMY Left 05/21/2020    Done laparoscopically at [facility]   • [place] CYSTOURETHROSCOPY N/A 5/20/2022    Procedure: CYSTOSCOPY, retrograde pyelogram, and examination under anesthesia, dilation urethral stenosis; Surgeon: Dania Alba MD;  Location: [facility]IN OR;  Service: Urology   • URETER REVISION     • WISDOM TOOTH EXTRACTION         Current Outpatient Medications   Medication Sig Dispense Refill   • azithromycin (ZITHROMAX) 250 mg tablet Take 2 tablets today then 1 tablet daily x 4 days 6 tablet 0   • methylPREDNISolone 4 MG tablet therapy pack Use as directed on package 21 each 0   • albuterol (PROVENTIL HFA,VENTOLIN HFA) 90 mcg/act inhaler Inhale 2 puffs every 4 (four) hours as needed for wheezing or shortness of breath 18 g 6   • busPIRone (BUSPAR) 15 mg tablet Take 1 tablet (15 mg total) by mouth 3 (three) times a day (Patient taking differently: Take 15 mg by mouth as needed in the morning and 15 mg as needed at noon and 15 mg as needed in the evening ) 270 tablet 0   • butalbital-acetaminophen-caffeine (FIORICET,ESGIC) -40 mg per tablet Take 1 tablet now, and then repeat in 4 hrs if no relief  Don't take more then 3 in a week  30 tablet 0   • EPINEPHrine (EPIPEN) 0 3 mg/0 3 mL SOAJ Inject 0 3 mL (0 3 mg total) into a muscle once for 1 dose 0 6 mL 5   • fluticasone-salmeterol (Advair Diskus) 250-50 mcg/dose inhaler Inhale 1 puff 2 (two) times a day Rinse mouth after use   60 blister 5   • hydrOXYzine HCL (ATARAX) 50 mg tablet Take 1 tablet (50 mg total) by mouth daily at bedtime 90 tablet 2   • ketorolac (TORADOL) 10 mg tablet Take 1 tablet (10 mg total) by mouth every 6 (six) hours as needed for moderate pain for up to 3 days 12 tablet 0   • levonorgestrel (KYLEENA) 19 5 MG intrauterine device 1 each by Intrauterine route     • methylPREDNISolone 4 MG tablet therapy pack Use as directed on package 21 each 1   • multivitamin (THERAGRAN) TABS Take 1 tablet by mouth daily     • ondansetron (Zofran ODT) 4 mg disintegrating tablet Take 1 tablet (4 mg total) by mouth every 6 (six) hours as needed for nausea or vomiting 20 tablet 3   • ondansetron (Zofran ODT) 4 mg disintegrating tablet Take 1 tablet (4 mg total) by mouth every 6 (six) hours as needed for nausea or vomiting (Patient not taking: No sig reported) 20 tablet 0   • pantoprazole (PROTONIX) 40 mg tablet Take 1 tablet (40 mg total) by mouth daily 90 tablet 3   • Probiotic Product (PROBIOTIC-10 PO) Take by mouth     • sertraline (ZOLOFT) 50 mg tablet TAKE 3 TABLETS BY MOUTH EVERY DAY AT BEDTIME 270 tablet 3   • SUMAtriptan (IMITREX) 100 mg tablet TAKE 1 TABLET BY MOUTH ONCE AS NEEDED FOR MIGRAINE REPEAT 1 DOSE 6 HOURS LATER NO MORE THAN 3 DAYS A WEEK AS DIRECTED 15 tablet 0   • topiramate (TOPAMAX) 50 MG tablet Take 1 tablet (50 mg total) by mouth daily at bedtime 90 tablet 4     No current facility-administered medications for this visit  Allergies   Allergen Reactions   • Amoxicillin Anaphylaxis   • Cinnamon - Food Allergy Anaphylaxis       Review of Systems   Constitutional: Positive for fever  Negative for activity change, appetite change, chills and fatigue  HENT: Positive for congestion and ear pain  Respiratory: Positive for cough and chest tightness  Negative for shortness of breath  Cardiovascular: Negative for chest pain and leg swelling  Gastrointestinal: Negative for abdominal distention, abdominal pain, constipation, diarrhea, nausea and vomiting  All other systems reviewed and are negative  Video Exam    There were no vitals filed for this visit  Physical Exam  Constitutional:       Appearance: Normal appearance  HENT:      Nose: Congestion present  Pulmonary:      Effort: Pulmonary effort is normal    Musculoskeletal:         General: Normal range of motion  Neurological:      General: No focal deficit present  Mental Status: She is alert and oriented to person, place, and time  Psychiatric:         Mood and Affect: Mood normal          Behavior: Behavior normal          Thought Content:  Thought content normal          Judgment: Judgment normal           I spent 15 minutes directly with the patient during this visit

## 2023-01-01 NOTE — TELEPHONE ENCOUNTER
----- Message from Lv Muhammad MD sent at 6/9/2021  7:34 AM EDT -----   Left a voice message and sent patient MyChart message  Would like to be sure that the patient knows that her DVT Doppler came back within normal limits   Monitors symptoms if they worsen recommend coming into the office for exam
Pt aware of normal DVT if s/s worse come to office for eval
2023 10:45

## 2023-01-20 ENCOUNTER — TELEPHONE (OUTPATIENT)
Dept: FAMILY MEDICINE CLINIC | Facility: CLINIC | Age: 23
End: 2023-01-20

## 2023-01-20 NOTE — TELEPHONE ENCOUNTER
Patient called and wanted to schedule a virtual call because she has been seeing blood in stool for the past month  Spoke with Dr Jesse Wright who recommended she go to ER for evaluation but she was hesitant to go there  Mentioned [facility] as alternative and she agreed to leave work to be seen there instead  Will call tomorrow to schedule follow up with Dr Ann Goldstein next week

## 2023-01-23 ENCOUNTER — TELEMEDICINE (OUTPATIENT)
Dept: FAMILY MEDICINE CLINIC | Facility: CLINIC | Age: 23
End: 2023-01-23

## 2023-01-23 DIAGNOSIS — K21.9 GASTROESOPHAGEAL REFLUX DISEASE, UNSPECIFIED WHETHER ESOPHAGITIS PRESENT: ICD-10-CM

## 2023-01-23 DIAGNOSIS — K92.1 BLOODY STOOL: Primary | ICD-10-CM

## 2023-01-23 RX ORDER — SUCRALFATE 1 G/1
1 TABLET ORAL 4 TIMES DAILY
Qty: 28 TABLET | Refills: 0 | Status: SHIPPED | OUTPATIENT
Start: 2023-01-23 | End: 2023-02-07 | Stop reason: SDUPTHER

## 2023-01-23 RX ORDER — OMEPRAZOLE 40 MG/1
40 CAPSULE, DELAYED RELEASE ORAL
Qty: 30 CAPSULE | Refills: 5 | Status: SHIPPED | OUTPATIENT
Start: 2023-01-23

## 2023-01-23 NOTE — PROGRESS NOTES
Virtual Regular Visit    Verification of patient location:    Patient is located in the following state in which I hold an active license [place]      Assessment/Plan:    Problem List Items Addressed This Visit        Digestive    Gastroesophageal reflux disease    Relevant Medications    omeprazole (PriLOSEC) 40 MG capsule    sucralfate (CARAFATE) 1 g tablet    Other Relevant Orders    Ambulatory Referral to Gastroenterology   Other Visit Diagnoses     Bloody stool    -  Primary    Relevant Orders    Ambulatory Referral to Gastroenterology      Bloody stool (+) since october  Concerning hemorrhoid vs UC vs chrons  Hx of Ulcer off medications-> started on medications to help with symptoms  Recommend seeing GI ASAP  Reason for visit is   Chief Complaint   Patient presents with   • Virtual Regular Visit        Encounter provider Nichol Glynn MD    Provider located at [address] [place] [id number]      Recent Visits  Date Type Provider Dept   01/20/23 Telephone Nichol Glynn MD [facility] recent visits within past 7 days and meeting all other requirements  Today's Visits  Date Type Provider Dept   01/23/23 Telemedicine Nichol Glynn MD [address] [place] [address] today's visits and meeting all other requirements  Future Appointments  No visits were found meeting these conditions  Showing future appointments within next 150 days and meeting all other requirements       The patient was identified by name and date of birth  Galina Villarreal was informed that this is a telemedicine visit and that the visit is being conducted through the [address] Now platform  She agrees to proceed     My office door was closed  No one else was in the room  She acknowledged consent and understanding of privacy and security of the video platform   The patient has agreed to participate and understands they can discontinue the visit at any time     Patient is aware this is a billable service  Subjective  Clevester Smoker is a 20 y o  female Presents via video  Patient with stabbing pains well eating  States that she was unable to refill her Protonix given it was very expensive  Since October has had bloody stool states that it varies in quantity  Has not had any weight loss  Patient has not been seen by specialist   Looking for guidance  HPI     Past Medical History:   Diagnosis Date   • Anxiety and depression    • Asthma    • Clotting disorder ([facility] )    • Factor V Leiden ([facility] )    • Pulmonary embolism ([facility] )    • Thrombophlebitis        Past Surgical History:   Procedure Laterality Date   • BLADDER SURGERY     • FL RETROGRADE PYELOGRAM  5/20/2022   • NEPHRECTOMY Left 05/21/2020    Done laparoscopically at [facility]   • [place] CYSTOURETHROSCOPY N/A 5/20/2022    Procedure: CYSTOSCOPY, retrograde pyelogram, and examination under anesthesia, dilation urethral stenosis; Surgeon: Bev Oliveira MD;  Location: [facility] MAIN OR;  Service: Urology   • URETER REVISION     • WISDOM TOOTH EXTRACTION         Current Outpatient Medications   Medication Sig Dispense Refill   • omeprazole (PriLOSEC) 40 MG capsule Take 1 capsule (40 mg total) by mouth daily before breakfast 30 capsule 5   • sucralfate (CARAFATE) 1 g tablet Take 1 tablet (1 g total) by mouth 4 (four) times a day for 7 days 28 tablet 0   • albuterol (PROVENTIL HFA,VENTOLIN HFA) 90 mcg/act inhaler Inhale 2 puffs every 4 (four) hours as needed for wheezing or shortness of breath 18 g 6   • busPIRone (BUSPAR) 15 mg tablet Take 1 tablet (15 mg total) by mouth 3 (three) times a day (Patient taking differently: Take 15 mg by mouth as needed in the morning and 15 mg as needed at noon and 15 mg as needed in the evening ) 270 tablet 0   • butalbital-acetaminophen-caffeine (FIORICET,ESGIC) -40 mg per tablet Take 1 tablet now, and then repeat in 4 hrs if no relief  Don't take more then 3 in a week   30 tablet 0   • EPINEPHrine (EPIPEN) 0 3 mg/0 3 mL SOAJ Inject 0 3 mL (0 3 mg total) into a muscle once for 1 dose 0 6 mL 5   • fluticasone-salmeterol (Advair Diskus) 250-50 mcg/dose inhaler Inhale 1 puff 2 (two) times a day Rinse mouth after use  60 blister 5   • hydrOXYzine HCL (ATARAX) 50 mg tablet Take 1 tablet (50 mg total) by mouth daily at bedtime 90 tablet 2   • ketorolac (TORADOL) 10 mg tablet Take 1 tablet (10 mg total) by mouth every 6 (six) hours as needed for moderate pain for up to 3 days 12 tablet 0   • levonorgestrel (KYLEENA) 19 5 MG intrauterine device 1 each by Intrauterine route     • methylPREDNISolone 4 MG tablet therapy pack Use as directed on package 21 each 1   • methylPREDNISolone 4 MG tablet therapy pack Use as directed on package 21 each 0   • multivitamin (THERAGRAN) TABS Take 1 tablet by mouth daily     • ondansetron (Zofran ODT) 4 mg disintegrating tablet Take 1 tablet (4 mg total) by mouth every 6 (six) hours as needed for nausea or vomiting 20 tablet 3   • ondansetron (Zofran ODT) 4 mg disintegrating tablet Take 1 tablet (4 mg total) by mouth every 6 (six) hours as needed for nausea or vomiting (Patient not taking: No sig reported) 20 tablet 0   • Probiotic Product (PROBIOTIC-10 PO) Take by mouth     • sertraline (ZOLOFT) 50 mg tablet TAKE 3 TABLETS BY MOUTH EVERY DAY AT BEDTIME 270 tablet 3   • SUMAtriptan (IMITREX) 100 mg tablet TAKE 1 TABLET BY MOUTH ONCE AS NEEDED FOR MIGRAINE REPEAT 1 DOSE 6 HOURS LATER NO MORE THAN 3 DAYS A WEEK AS DIRECTED 15 tablet 0   • topiramate (TOPAMAX) 50 MG tablet Take 1 tablet (50 mg total) by mouth daily at bedtime 90 tablet 4     No current facility-administered medications for this visit  Allergies   Allergen Reactions   • Amoxicillin Anaphylaxis   • Cinnamon - Food Allergy Anaphylaxis       Review of Systems   Gastrointestinal: Positive for abdominal pain and blood in stool  Video Exam    There were no vitals filed for this visit      Physical Exam  Constitutional:       Appearance: Normal appearance  Pulmonary:      Effort: Pulmonary effort is normal    Musculoskeletal:         General: Normal range of motion  Neurological:      General: No focal deficit present  Mental Status: She is alert and oriented to person, place, and time  Psychiatric:         Mood and Affect: Mood normal          Behavior: Behavior normal          Thought Content:  Thought content normal          Judgment: Judgment normal           I spent 15 minutes directly with the patient during this visit

## 2023-01-23 NOTE — TELEPHONE ENCOUNTER
I called pt and she said that she is feeling the same  She wants to see you virtually if possible she was unable to go to [facility]

## 2023-02-07 ENCOUNTER — OFFICE VISIT (OUTPATIENT)
Dept: GASTROENTEROLOGY | Facility: CLINIC | Age: 23
End: 2023-02-07

## 2023-02-07 VITALS
WEIGHT: 252.8 LBS | DIASTOLIC BLOOD PRESSURE: 106 MMHG | HEART RATE: 81 BPM | SYSTOLIC BLOOD PRESSURE: 130 MMHG | BODY MASS INDEX: 42.07 KG/M2

## 2023-02-07 DIAGNOSIS — R10.13 EPIGASTRIC PAIN: ICD-10-CM

## 2023-02-07 DIAGNOSIS — K92.1 BLOODY STOOL: ICD-10-CM

## 2023-02-07 DIAGNOSIS — R19.5 LOOSE STOOLS: Primary | ICD-10-CM

## 2023-02-07 DIAGNOSIS — K21.9 GASTROESOPHAGEAL REFLUX DISEASE, UNSPECIFIED WHETHER ESOPHAGITIS PRESENT: ICD-10-CM

## 2023-02-07 RX ORDER — PANTOPRAZOLE SODIUM 40 MG/1
40 TABLET, DELAYED RELEASE ORAL DAILY
Qty: 30 TABLET | Refills: 6 | Status: SHIPPED | OUTPATIENT
Start: 2023-02-07

## 2023-02-07 RX ORDER — SUCRALFATE 1 G/1
1 TABLET ORAL 4 TIMES DAILY
Qty: 120 TABLET | Refills: 0 | Status: SHIPPED | OUTPATIENT
Start: 2023-02-07 | End: 2023-03-09

## 2023-02-07 NOTE — PROGRESS NOTES
[facility]  Trent Spence 20 y o  female MRN: [id number]            Assessment & Plan:  Pleasant 18-year-old female,  in [place], with several months of epigastric pain, loose stools with blood mixed in her stools  1   Epigastric pain: Suspect recurrent peptic ulcer disease  -Recommended pantoprazole 40 mg once daily, likely the omeprazole is not covering her symptoms  -Given peptic ulcer disease in the past we will proceed with an upper endoscopy  -Refilled Carafate  -We will check LFTs given epigastric pain  -Further recommendations pending endoscopic evaluation and laboratory studies  2   Change in bowel movement of loose stools: Differential clues IBS with hemorrhoids versus more significant pathology such as underlying inflammatory bowel disease and malignancies  -We will check a CBC  -We will check TSH  -Recommend proceed with colonoscopy to exclude luminal pathology  -Discussed with her risks of procedures including bleeding, surgery, perforation      Andrez Logan was seen today for gerd, abdominal pain and rectal bleeding  Diagnoses and all orders for this visit:    Loose stools  -     C-reactive protein; Future  -     TSH, 3rd generation with Free T4 reflex; Future  -     Colonoscopy; Future    Epigastric pain  -     pantoprazole (PROTONIX) 40 mg tablet; Take 1 tablet (40 mg total) by mouth daily  -     EGD; Future    Gastroesophageal reflux disease, unspecified whether esophagitis present  -     Ambulatory Referral to Gastroenterology  -     pantoprazole (PROTONIX) 40 mg tablet; Take 1 tablet (40 mg total) by mouth daily  -     sucralfate (CARAFATE) 1 g tablet; Take 1 tablet (1 g total) by mouth 4 (four) times a day  -     EGD; Future    Bloody stool  -     Ambulatory Referral to Gastroenterology  -     Basic metabolic panel; Future  -     CBC and differential; Future  -     Hepatic function panel; Future  -     C-reactive protein;  Future  - Colonoscopy; Future    Other orders  -     Diet NPO; Sips with meds; Standing  -     Void on call to OR; Standing            _____________________________________________________________        CC: Epigastric pain, rectal bleeding    HPI:  Radha Louis is a 20 y  o female who is here for epigastric pain and rectal bleeding  This is a pleasant 17-year-old female, history of migraines, history of epigastric pain in the past, upper endoscopy with demonstrated peptic ulcer disease, likely in setting of NSAIDs  Biopsies were negative for H  pylori  Patient has been on PPI therapy  She reports that over the past 6 months has had persistent epigastric pain usually worse after eating additionally over the same period of time she has noted change in bowel movements  She had increased stool frequency with unformed stools and has frequent blood mixed in her stools, having up to 3 bowel movements per day  She is post pain abdominal pain  She has some associated nausea  Denies any vomiting  She has been on PPI therapy, unfortunately her insurance did not cover, currently taking over-the-counter omeprazole  Still having epigastric discomfort  She was given Carafate when she went to her PCP  Currently she works as a  in [employer] and computers back-and-forth for doctors visits  Her weight has been stable  Denies any NSAID use  Has had some increase in stressors, no significant dietary changes  Has not been able to identify any foods that exacerbate her symptoms  ROS:  The remainder of the ROS was negative except for the pertinent positives mentioned in HPI        Allergies: Amoxicillin and Cinnamon - food allergy    Medications:   Current Outpatient Medications:   •  albuterol (PROVENTIL HFA,VENTOLIN HFA) 90 mcg/act inhaler, Inhale 2 puffs every 4 (four) hours as needed for wheezing or shortness of breath, Disp: 18 g, Rfl: 6  •  butalbital-acetaminophen-caffeine (FIORICET,ESGIC) -40 mg per tablet, Take 1 tablet now, and then repeat in 4 hrs if no relief   Don't take more then 3 in a week , Disp: 30 tablet, Rfl: 0  •  fluticasone-salmeterol (Advair Diskus) 250-50 mcg/dose inhaler, Inhale 1 puff 2 (two) times a day Rinse mouth after use , Disp: 60 blister, Rfl: 5  •  hydrOXYzine HCL (ATARAX) 50 mg tablet, Take 1 tablet (50 mg total) by mouth daily at bedtime, Disp: 90 tablet, Rfl: 2  •  levonorgestrel (KYLEENA) 19 5 MG intrauterine device, 1 each by Intrauterine route, Disp: , Rfl:   •  methylPREDNISolone 4 MG tablet therapy pack, Use as directed on package, Disp: 21 each, Rfl: 1  •  methylPREDNISolone 4 MG tablet therapy pack, Use as directed on package, Disp: 21 each, Rfl: 0  •  multivitamin (THERAGRAN) TABS, Take 1 tablet by mouth daily, Disp: , Rfl:   •  omeprazole (PriLOSEC) 40 MG capsule, Take 1 capsule (40 mg total) by mouth daily before breakfast, Disp: 30 capsule, Rfl: 5  •  ondansetron (Zofran ODT) 4 mg disintegrating tablet, Take 1 tablet (4 mg total) by mouth every 6 (six) hours as needed for nausea or vomiting, Disp: 20 tablet, Rfl: 3  •  ondansetron (Zofran ODT) 4 mg disintegrating tablet, Take 1 tablet (4 mg total) by mouth every 6 (six) hours as needed for nausea or vomiting, Disp: 20 tablet, Rfl: 0  •  pantoprazole (PROTONIX) 40 mg tablet, Take 1 tablet (40 mg total) by mouth daily, Disp: 30 tablet, Rfl: 6  •  Probiotic Product (PROBIOTIC-10 PO), Take by mouth, Disp: , Rfl:   •  sertraline (ZOLOFT) 50 mg tablet, TAKE 3 TABLETS BY MOUTH EVERY DAY AT BEDTIME, Disp: 270 tablet, Rfl: 3  •  sucralfate (CARAFATE) 1 g tablet, Take 1 tablet (1 g total) by mouth 4 (four) times a day, Disp: 120 tablet, Rfl: 0  •  SUMAtriptan (IMITREX) 100 mg tablet, TAKE 1 TABLET BY MOUTH ONCE AS NEEDED FOR MIGRAINE REPEAT 1 DOSE 6 HOURS LATER NO MORE THAN 3 DAYS A WEEK AS DIRECTED, Disp: 15 tablet, Rfl: 0  •  topiramate (TOPAMAX) 50 MG tablet, Take 1 tablet (50 mg total) by mouth daily at bedtime, Disp: 90 tablet, Rfl: 4  •  busPIRone (BUSPAR) 15 mg tablet, Take 1 tablet (15 mg total) by mouth 3 (three) times a day (Patient taking differently: Take 15 mg by mouth as needed in the morning and 15 mg as needed at noon and 15 mg as needed in the evening ), Disp: 270 tablet, Rfl: 0  •  EPINEPHrine (EPIPEN) 0 3 mg/0 3 mL SOAJ, Inject 0 3 mL (0 3 mg total) into a muscle once for 1 dose, Disp: 0 6 mL, Rfl: 5  •  ketorolac (TORADOL) 10 mg tablet, Take 1 tablet (10 mg total) by mouth every 6 (six) hours as needed for moderate pain for up to 3 days, Disp: 12 tablet, Rfl: 0    Past Medical History:   Diagnosis Date   • Anxiety and depression    • Asthma    • Cl[facility]ting disorder ([facility] )    • Factor V Leiden ([facility] )    • Pulmonary embolism ([facility])    • Thrombophlebitis        Past Surgical History:   Procedure Laterality Date   • BLADDER SURGERY     • FL RETROGRADE PYELOGRAM  5/20/2022   • NEPHRECTOMY Left 05/21/2020    Done laparoscopically at [facility]   • [place] CYSTOURETHROSCOPY N/A 5/20/2022    Procedure: CYSTOSCOPY, retrograde pyelogram, and examination under anesthesia, dilation urethral stenosis; Surgeon: Shaheen Monsivais MD;  [facility] MAIN OR;  Service: Urology   • URETER REVISION     • WISDOM TOOTH EXTRACTION         Family History   Problem Relation Age of Onset   • Hypertension Mother    • Hyperlipidemia Mother    • Diabetes Mother    • Factor V Leiden deficiency Mother    • Ovarian cancer Maternal Grandmother    • Arthritis Family    • Asthma Family    • Cancer Family    • Cervical cancer Family    • Heart failure Family    • Hyperlipidemia Family    • Hypertension Family    • Diabetes Other    • Heart disease Father         reports that she has never smoked  She has never used smokeless tobacco  She reports current alcohol use  She reports that she does not use drugs        Physical Exam:    BP (!) 130/106 (BP Location: Left arm, Patient Position: Sitting, Cuff Size: Standard)   Pulse 81   Wt 115 kg (252 lb 12 8 oz)   BMI 42 07 kg/m²     Gen: wn/wd, NAD, obese  HEENT: anicteric, MMM, no cervical LAD  CVS: RRR, no m/r/g  CHEST: CTA b/l  ABD: +BS, soft, epigastric tenderness, no rebound or guarding, no hepatosplenomegaly  EXT: no c/c/e  NEURO: aaox3  SKIN: NO rashes

## 2023-02-07 NOTE — LETTER
February 7, 2023     [address], [address]    Patient: Clevester Smoker   YOB: 2000   Date of Visit: 2/7/2023       Dear Dr Manulea Quiroga: Thank you for referring Hina Gandara to me for evaluation  Below are my notes for this consultation  If you have questions, please do not hesitate to call me  I look forward to following your patient along with you  Sincerely,        Florencia Leon MD        CC: No Recipients  Florencia Leon MD  2/7/2023 11:13 AM  Sign when Signing Visit  [address] [facility]  Clevester Smoker 20 y o  female MRN: [id number]            Assessment & Plan:  Pleasant 68-year-old female,  in [place], with several months of epigastric pain, loose stools with blood mixed in her stools  1   Epigastric pain: Suspect recurrent peptic ulcer disease  -Recommended pantoprazole 40 mg once daily, likely the omeprazole is not covering her symptoms  -Given peptic ulcer disease in the past we will proceed with an upper endoscopy  -Refilled Carafate  -We will check LFTs given epigastric pain  -Further recommendations pending endoscopic evaluation and laboratory studies  2   Change in bowel movement of loose stools: Differential clues IBS with hemorrhoids versus more significant pathology such as underlying inflammatory bowel disease and malignancies  -We will check a CBC  -We will check TSH  -Recommend proceed with colonoscopy to exclude luminal pathology  -Discussed with her risks of procedures including bleeding, surgery, perforation      Kenna Urbano was seen today for gerd, abdominal pain and rectal bleeding  Diagnoses and all orders for this visit:    Loose stools  -     C-reactive protein; Future  -     TSH, 3rd generation with Free T4 reflex; Future  -     Colonoscopy; Future    Epigastric pain  -     pantoprazole (PROTONIX) 40 mg tablet;  Take 1 tablet (40 mg total) by mouth daily  -     EGD; Future    Gastroesophageal reflux disease, unspecified whether esophagitis present  -     Ambulatory Referral to Gastroenterology  -     pantoprazole (PROTONIX) 40 mg tablet; Take 1 tablet (40 mg total) by mouth daily  -     sucralfate (CARAFATE) 1 g tablet; Take 1 tablet (1 g total) by mouth 4 (four) times a day  -     EGD; Future    Bloody stool  -     Ambulatory Referral to Gastroenterology  -     Basic metabolic panel; Future  -     CBC and differential; Future  -     Hepatic function panel; Future  -     C-reactive protein; Future  -     Colonoscopy; Future    Other orders  -     Diet NPO; Sips with meds; Standing  -     Void on call to OR; Standing            _____________________________________________________________        CC: Epigastric pain, rectal bleeding    HPI:  Vonda Santos is a 20 y  o female who is here for epigastric pain and rectal bleeding  This is a pleasant 18-year-old female, history of migraines, history of epigastric pain in the past, upper endoscopy with demonstrated peptic ulcer disease, likely in setting of NSAIDs  Biopsies were negative for H  pylori  Patient has been on PPI therapy  She reports that over the past 6 months has had persistent epigastric pain usually worse after eating additionally over the same period of time she has noted change in bowel movements  She had increased stool frequency with unformed stools and has frequent blood mixed in her stools, having up to 3 bowel movements per day  She is post pain abdominal pain  She has some associated nausea  Denies any vomiting  She has been on PPI therapy, unfortunately her insurance did not cover, currently taking over-the-counter omeprazole  Still having epigastric discomfort  She was given Carafate when she went to her PCP  Currently she works as a  in [place] and computers back-and-forth for doctors visits  Her weight has been stable  Denies any NSAID use      Has had some increase in stressors, no significant dietary changes  Has not been able to identify any foods that exacerbate her symptoms  ROS:  The remainder of the ROS was negative except for the pertinent positives mentioned in HPI  Allergies: Amoxicillin and Cinnamon - food allergy    Medications:   Current Outpatient Medications:   •  albuterol (PROVENTIL HFA,VENTOLIN HFA) 90 mcg/act inhaler, Inhale 2 puffs every 4 (four) hours as needed for wheezing or shortness of breath, Disp: 18 g, Rfl: 6  •  butalbital-acetaminophen-caffeine (FIORICET,ESGIC) -40 mg per tablet, Take 1 tablet now, and then repeat in 4 hrs if no relief   Don't take more then 3 in a week , Disp: 30 tablet, Rfl: 0  •  fluticasone-salmeterol (Advair Diskus) 250-50 mcg/dose inhaler, Inhale 1 puff 2 (two) times a day Rinse mouth after use , Disp: 60 blister, Rfl: 5  •  hydrOXYzine HCL (ATARAX) 50 mg tablet, Take 1 tablet (50 mg total) by mouth daily at bedtime, Disp: 90 tablet, Rfl: 2  •  levonorgestrel (KYLEENA) 19 5 MG intrauterine device, 1 each by Intrauterine route, Disp: , Rfl:   •  methylPREDNISolone 4 MG tablet therapy pack, Use as directed on package, Disp: 21 each, Rfl: 1  •  methylPREDNISolone 4 MG tablet therapy pack, Use as directed on package, Disp: 21 each, Rfl: 0  •  multivitamin (THERAGRAN) TABS, Take 1 tablet by mouth daily, Disp: , Rfl:   •  omeprazole (PriLOSEC) 40 MG capsule, Take 1 capsule (40 mg total) by mouth daily before breakfast, Disp: 30 capsule, Rfl: 5  •  ondansetron (Zofran ODT) 4 mg disintegrating tablet, Take 1 tablet (4 mg total) by mouth every 6 (six) hours as needed for nausea or vomiting, Disp: 20 tablet, Rfl: 3  •  ondansetron (Zofran ODT) 4 mg disintegrating tablet, Take 1 tablet (4 mg total) by mouth every 6 (six) hours as needed for nausea or vomiting, Disp: 20 tablet, Rfl: 0  •  pantoprazole (PROTONIX) 40 mg tablet, Take 1 tablet (40 mg total) by mouth daily, Disp: 30 tablet, Rfl: 6  •  Probiotic Product (PROBIOTIC-10 PO), Take by mouth, Disp: , Rfl:   •  sertraline (ZOLOFT) 50 mg tablet, TAKE 3 TABLETS BY MOUTH EVERY DAY AT BEDTIME, Disp: 270 tablet, Rfl: 3  •  sucralfate (CARAFATE) 1 g tablet, Take 1 tablet (1 g total) by mouth 4 (four) times a day, Disp: 120 tablet, Rfl: 0  •  SUMAtriptan (IMITREX) 100 mg tablet, TAKE 1 TABLET BY MOUTH ONCE AS NEEDED FOR MIGRAINE REPEAT 1 DOSE 6 HOURS LATER NO MORE THAN 3 DAYS A WEEK AS DIRECTED, Disp: 15 tablet, Rfl: 0  •  topiramate (TOPAMAX) 50 MG tablet, Take 1 tablet (50 mg total) by mouth daily at bedtime, Disp: 90 tablet, Rfl: 4  •  busPIRone (BUSPAR) 15 mg tablet, Take 1 tablet (15 mg total) by mouth 3 (three) times a day (Patient taking differently: Take 15 mg by mouth as needed in the morning and 15 mg as needed at noon and 15 mg as needed in the evening ), Disp: 270 tablet, Rfl: 0  •  EPINEPHrine (EPIPEN) 0 3 mg/0 3 mL SOAJ, Inject 0 3 mL (0 3 mg total) into a muscle once for 1 dose, Disp: 0 6 mL, Rfl: 5  •  ketorolac (TORADOL) 10 mg tablet, Take 1 tablet (10 mg total) by mouth every 6 (six) hours as needed for moderate pain for up to 3 days, Disp: 12 tablet, Rfl: 0    Past Medical History:   Diagnosis Date   • Anxiety and depression    • Asthma    • Clotting disorder (HCC)    • Factor V Leiden ([facility] [address] )    • Pulmonary embolism (HCC)    • Thrombophlebitis        Past Surgical History:   Procedure Laterality Date   • BLADDER SURGERY     • FL RETROGRADE PYELOGRAM  5/20/2022   • NEPHRECTOMY Left 05/21/2020    Done laparoscopically at [facility]   • [place] CYSTOURETHROSCOPY N/A 5/20/2022    Procedure: CYSTOSCOPY, retrograde pyelogram, and examination under anesthesia, dilation urethral stenosis;   Surgeon: Pasha Childs MD;  Location: [facility] MA[facility] OR;  Service: Urology   • URETER REVISION     • WISDOM TOOTH EXTRACTION         Family History   Problem Relation Age of Onset   • Hypertension Mother    • Hyperlipidemia Mother    • Diabetes Mother    • Factor V Leiden deficiency Mother    • Ovarian cancer Maternal Grandmother    • Arthritis Family    • Asthma Family    • Cancer Family    • Cervical cancer Family    • Heart failure Family    • Hyperlipidemia Family    • Hypertension Family    • Diabetes Other    • Heart disease Father         reports that she has never smoked  She has never used smokeless tobacco  She reports current alcohol use  She reports that she does not use drugs        Physical Exam:    BP (!) 130/106 (BP Location: Left arm, Patient Position: Sitting, Cuff Size: Standard)   Pulse 81   Wt 115 kg (252 lb 12 8 oz)   BMI 42 07 kg/m²     Gen: wn/wd, NAD, obese  HEENT: anicteric, MMM, no cervical LAD  CVS: RRR, no m/r/g  CHEST: CTA b/l  ABD: +BS, soft, epigastric tenderness, no rebound or guarding, no hepatosplenomegaly  EXT: no c/c/e  NEURO: aaox3  SKIN: NO rashes

## 2023-02-08 LAB
ALBUMIN SERPL-MCNC: 4.4 G/DL (ref 3.9–5)
ALP SERPL-CCNC: 77 IU/L (ref 44–121)
ALT SERPL-CCNC: 17 IU/L (ref 0–32)
AST SERPL-CCNC: 14 IU/L (ref 0–40)
BASOPHILS # BLD AUTO: 0 X10E3/UL (ref 0–0.2)
BASOPHILS NFR BLD AUTO: 1 %
BILIRUB DIRECT SERPL-MCNC: <0.1 MG/DL (ref 0–0.4)
BILIRUB SERPL-MCNC: 0.2 MG/DL (ref 0–1.2)
BUN SERPL-MCNC: 11 MG/DL (ref 6–20)
BUN/CREAT SERPL: 12 (ref 9–23)
CALCIUM SERPL-MCNC: 9.5 MG/DL (ref 8.7–10.2)
CHLORIDE SERPL-SCNC: 108 MMOL/L (ref 96–106)
CO2 SERPL-SCNC: 18 MMOL/L (ref 20–29)
CREAT SERPL-MCNC: 0.95 MG/DL (ref 0.57–1)
CRP SERPL-MCNC: 5 MG/L (ref 0–10)
EGFR: 85 ML/MIN/1.73
EOSINOPHIL # BLD AUTO: 0.1 X10E3/UL (ref 0–0.4)
EOSINOPHIL NFR BLD AUTO: 2 %
ERYTHROCYTE [DISTWIDTH] IN BLOOD BY AUTOMATED COUNT: 12.8 % (ref 11.7–15.4)
GLUCOSE SERPL-MCNC: 89 MG/DL (ref 70–99)
HCT VFR BLD AUTO: 44.7 % (ref 34–46.6)
HGB BLD-MCNC: 14.9 G/DL (ref 11.1–15.9)
IMM GRANULOCYTES # BLD: 0 X10E3/UL (ref 0–0.1)
IMM GRANULOCYTES NFR BLD: 0 %
LYMPHOCYTES # BLD AUTO: 1.8 X10E3/UL (ref 0.7–3.1)
LYMPHOCYTES NFR BLD AUTO: 28 %
MCH RBC QN AUTO: 29.7 PG (ref 26.6–33)
MCHC RBC AUTO-ENTMCNC: 33.3 G/DL (ref 31.5–35.7)
MCV RBC AUTO: 89 FL (ref 79–97)
MONOCYTES # BLD AUTO: 0.4 X10E3/UL (ref 0.1–0.9)
MONOCYTES NFR BLD AUTO: 6 %
NEUTROPHILS # BLD AUTO: 4.2 X10E3/UL (ref 1.4–7)
NEUTROPHILS NFR BLD AUTO: 63 %
PLATELET # BLD AUTO: 235 X10E3/UL (ref 150–450)
POTASSIUM SERPL-SCNC: 4.6 MMOL/L (ref 3.5–5.2)
PROT SERPL-MCNC: 6.8 G/DL (ref 6–8.5)
RBC # BLD AUTO: 5.02 X10E6/UL (ref 3.77–5.28)
SODIUM SERPL-SCNC: 142 MMOL/L (ref 134–144)
TSH SERPL DL<=0.005 MIU/L-ACNC: 1.77 UIU/ML (ref 0.45–4.5)
WBC # BLD AUTO: 6.6 X10E3/UL (ref 3.4–10.8)

## 2023-02-09 ENCOUNTER — NURSE TRIAGE (OUTPATIENT)
Dept: OTHER | Facility: OTHER | Age: 23
End: 2023-02-09

## 2023-02-09 NOTE — TELEPHONE ENCOUNTER
Regarding: diarrhea and vomiting  ----- Message from Guilford Skiff sent at 2/9/2023  4:07 PM EST -----  "I started last night with diarrhea and vomiting "

## 2023-02-09 NOTE — TELEPHONE ENCOUNTER
Reason for Disposition  • Constant abdominal pain lasting > 2 hours    Answer Assessment - Initial Assessment Questions  1  VOMITING SEVERITY: "How many times have you vomited in the past 24 hours?"      - MILD:  1 - 2 times/day     - MODERATE: 3 - 5 times/day, decreased oral intake without significant weight loss or symptoms of dehydration     - SEVERE: 6 or more times/day, vomits everything or nearly everything, with significant weight loss, symptoms of dehydration       8-9 times in 24 hours     2  ONSET: "When did the vomiting begin?"       Last night    3  FLUIDS: "What fluids or food have you vomited up today?" "Have you been able to keep any fluids down?"     Yes, water     4  ABDOMINAL PAIN: "Are your having any abdominal pain?" If yes : "How bad is it and what does it feel like?" (e g , crampy, dull, intermittent, constant)      Stabbing abdominal pain 8/10 that is constant    5  DIARRHEA: "Is there any diarrhea?" If Yes, ask: "How many times today?"       Yes, straight water about every 20 minutes     6  CONTACTS: "Is there anyone else in the family with the same symptoms?"       Denies    7  CAUSE: "What do you think is causing your vomiting?"      Unknown     8  HYDRATION STATUS: "Any signs of dehydration?" (e g , dry mouth [not only dry lips], too weak to stand) "When did you last urinate?"      Urinated last night    9  OTHER SYMPTOMS: "Do you have any other symptoms?" (e g , fever, headache, vertigo, vomiting blood or coffee grounds, recent head injury)     Blood in stool     10   PREGNANCY: "Is there any chance you are pregnant?" "When was your last menstrual period?"        denies    Protocols used: VOMITING-ADULT-OH

## 2023-03-01 RX ORDER — SODIUM CHLORIDE, SODIUM LACTATE, POTASSIUM CHLORIDE, CALCIUM CHLORIDE 600; 310; 30; 20 MG/100ML; MG/100ML; MG/100ML; MG/100ML
125 INJECTION, SOLUTION INTRAVENOUS CONTINUOUS
Status: CANCELLED | OUTPATIENT
Start: 2023-03-01

## 2023-03-02 ENCOUNTER — ANESTHESIA EVENT (OUTPATIENT)
Dept: GASTROENTEROLOGY | Facility: AMBULARY SURGERY CENTER | Age: 23
End: 2023-03-02

## 2023-03-02 ENCOUNTER — ANESTHESIA (OUTPATIENT)
Dept: GASTROENTEROLOGY | Facility: AMBULARY SURGERY CENTER | Age: 23
End: 2023-03-02

## 2023-03-02 ENCOUNTER — HOSPITAL ENCOUNTER (OUTPATIENT)
Dept: GASTROENTEROLOGY | Facility: AMBULARY SURGERY CENTER | Age: 23
Setting detail: OUTPATIENT SURGERY
End: 2023-03-02
Attending: INTERNAL MEDICINE

## 2023-03-02 VITALS
HEART RATE: 64 BPM | DIASTOLIC BLOOD PRESSURE: 75 MMHG | SYSTOLIC BLOOD PRESSURE: 119 MMHG | OXYGEN SATURATION: 100 % | RESPIRATION RATE: 18 BRPM

## 2023-03-02 DIAGNOSIS — K21.9 GASTROESOPHAGEAL REFLUX DISEASE, UNSPECIFIED WHETHER ESOPHAGITIS PRESENT: ICD-10-CM

## 2023-03-02 DIAGNOSIS — K92.1 BLOODY STOOL: ICD-10-CM

## 2023-03-02 DIAGNOSIS — R10.13 EPIGASTRIC PAIN: ICD-10-CM

## 2023-03-02 DIAGNOSIS — R19.5 LOOSE STOOLS: ICD-10-CM

## 2023-03-02 LAB
EXT PREGNANCY TEST URINE: NEGATIVE
EXT. CONTROL: NORMAL

## 2023-03-02 RX ORDER — PROPOFOL 10 MG/ML
INJECTION, EMULSION INTRAVENOUS AS NEEDED
Status: DISCONTINUED | OUTPATIENT
Start: 2023-03-02 | End: 2023-03-02

## 2023-03-02 RX ORDER — SODIUM CHLORIDE, SODIUM LACTATE, POTASSIUM CHLORIDE, CALCIUM CHLORIDE 600; 310; 30; 20 MG/100ML; MG/100ML; MG/100ML; MG/100ML
125 INJECTION, SOLUTION INTRAVENOUS CONTINUOUS
Status: DISCONTINUED | OUTPATIENT
Start: 2023-03-02 | End: 2023-03-06 | Stop reason: HOSPADM

## 2023-03-02 RX ORDER — LIDOCAINE HYDROCHLORIDE 10 MG/ML
INJECTION, SOLUTION EPIDURAL; INFILTRATION; INTRACAUDAL; PERINEURAL AS NEEDED
Status: DISCONTINUED | OUTPATIENT
Start: 2023-03-02 | End: 2023-03-02

## 2023-03-02 RX ORDER — PROPOFOL 10 MG/ML
INJECTION, EMULSION INTRAVENOUS CONTINUOUS PRN
Status: DISCONTINUED | OUTPATIENT
Start: 2023-03-02 | End: 2023-03-02

## 2023-03-02 RX ORDER — ONDANSETRON 2 MG/ML
4 INJECTION INTRAMUSCULAR; INTRAVENOUS ONCE AS NEEDED
Status: CANCELLED | OUTPATIENT
Start: 2023-03-02

## 2023-03-02 RX ADMIN — PROPOFOL 100 MG: 10 INJECTION, EMULSION INTRAVENOUS at 14:40

## 2023-03-02 RX ADMIN — PROPOFOL 100 MG: 10 INJECTION, EMULSION INTRAVENOUS at 14:42

## 2023-03-02 RX ADMIN — PROPOFOL 200 MG: 10 INJECTION, EMULSION INTRAVENOUS at 14:37

## 2023-03-02 RX ADMIN — LIDOCAINE HYDROCHLORIDE 5 ML: 10 INJECTION, SOLUTION EPIDURAL; INFILTRATION; INTRACAUDAL; PERINEURAL at 14:37

## 2023-03-02 RX ADMIN — SODIUM CHLORIDE, SODIUM LACTATE, POTASSIUM CHLORIDE, AND CALCIUM CHLORIDE: .6; .31; .03; .02 INJECTION, SOLUTION INTRAVENOUS at 14:25

## 2023-03-02 RX ADMIN — PROPOFOL 100 MG: 10 INJECTION, EMULSION INTRAVENOUS at 14:44

## 2023-03-02 RX ADMIN — PROPOFOL 140 MCG/KG/MIN: 10 INJECTION, EMULSION INTRAVENOUS at 14:44

## 2023-03-02 NOTE — ANESTHESIA PREPROCEDURE EVALUATION
Procedure:  COLONOSCOPY  EGD    Relevant Problems   ANESTHESIA (within normal limits)      CARDIO (within normal limits)   (+) Migraine with aura and without status migrainosus, not intractable      GI/HEPATIC   (+) Gastroesophageal reflux disease      /RENAL   (+) Atrophic kidney   (+) Duplex kidney      NEURO/PSYCH   (+) Anxiety   (+) History of anaphylaxis   (+) Migraine with aura and without status migrainosus, not intractable      PULMONARY   (+) Exercise-induced asthma   (+) Mild intermittent asthma without complication   (+) Mild persistent asthma without complication   (+) Shortness of breath      Other   (+) Chronic tonsillar hypertrophy   (+) Chronic tonsillitis        Physical Exam    Airway    Mallampati score: II  TM Distance: >3 FB  Neck ROM: full     Dental   No notable dental hx     Cardiovascular  Rhythm: regular, Rate: normal,     Pulmonary  Breath sounds clear to auscultation,     Other Findings        Anesthesia Plan  ASA Score- 3     Anesthesia Type- IV sedation with anesthesia with ASA Monitors  Additional Monitors:   Airway Plan:           Plan Factors-Exercise tolerance (METS): >4 METS  Chart reviewed  Existing labs reviewed  Patient summary reviewed  Patient is not a current smoker  Induction-     Postoperative Plan-     Informed Consent- Anesthetic plan and risks discussed with patient  I personally reviewed this patient with the CRNA  Discussed and agreed on the Anesthesia Plan with the CRNA  Sadaf Maya

## 2023-03-02 NOTE — ANESTHESIA POSTPROCEDURE EVALUATION
Post-Op Assessment Note    CV Status:  Stable       Mental Status:  Sleepy   Hydration Status:  Stable   PONV Controlled:  Controlled   Airway Patency:  Patent      Post Op Vitals Reviewed: Yes      Staff: CRNA         No notable events documented      BP   119/66   Temp      Pulse  85   Resp   20   SpO2   95

## 2023-03-02 NOTE — H&P
History and Physical -  [facility]  Radha Louis 20 y o  female MRN: [id number]    HPI: Radha Louis is a 20y o  year old female who presents with epigastric pain and change in bowel movements  Review of Systems    Historical Information   Past Medical History:   Diagnosis Date   • Anxiety and depression    • Asthma    • Clotting disorder ([facility] [address] )    • Factor V Leiden ([facility] [address] )    • Pulmonary embolism ([facility] )    • Thrombophlebitis      Past Surgical History:   Procedure Laterality Date   • BLADDER SURGERY     • FL RETROGRADE PYELOGRAM  05/20/2022   • NEPHRECTOMY Left 05/21/2020    Done laparoscopically at [facility]   • [place] CYSTOURETHROSCOPY N/A 05/20/2022    Procedure: CYSTOSCOPY, retrograde pyelogram, and examination under anesthesia, dilation urethral stenosis; Surgeon: Santana Bell MD;  Location: [facility] MAIN OR;  Service: Urology   • URETER REVISION     • WISDOM TOOTH EXTRACTION      woke up during procedure       Social History   Social History     Substance and Sexual Activity   Alcohol Use Yes    Comment: social, 1 x per month     Social History     Substance and Sexual Activity   Drug Use Never     Social History     Tobacco Use   Smoking Status Never   Smokeless Tobacco Never     Family History   Problem Relation Age of Onset   • Hypertension Mother    • Hyperlipidemia Mother    • Diabetes Mother    • Factor V Leiden deficiency Mother    • Ovarian cancer Maternal Grandmother    • Arthritis Family    • Asthma Family    • Cancer Family    • Cervical cancer Family    • Heart failure Family    • Hyperlipidemia Family    • Hypertension Family    • Diabetes Other    • Heart disease Father        Meds/Allergies     (Not in a hospital admission)      Allergies   Allergen Reactions   • Amoxicillin Anaphylaxis   • Cinnamon - Food Allergy Anaphylaxis       Objective     /68   Pulse 92   Resp 18   SpO2 98%       PHYSICAL EXAM    Gen: NAD  CV: RRR  CHEST: Clear  ABD: soft, NT/ND  EXT: no edema  Neuro: AAO      ASSESSMENT/PLAN:  This is a 20y o  year old female here for  epigastric pain and change in bowel movements         PLAN:   Procedure: egd/colonoscopy

## 2023-03-21 ENCOUNTER — TELEPHONE (OUTPATIENT)
Dept: FAMILY MEDICINE CLINIC | Facility: CLINIC | Age: 23
End: 2023-03-21

## 2023-03-21 NOTE — TELEPHONE ENCOUNTER
Patient needs new note for work stating that she is allergic to cinnamon  Please advise when done, so she can pull from my chart

## 2023-03-23 ENCOUNTER — TELEMEDICINE (OUTPATIENT)
Dept: FAMILY MEDICINE CLINIC | Facility: CLINIC | Age: 23
End: 2023-03-23

## 2023-03-23 ENCOUNTER — TELEPHONE (OUTPATIENT)
Dept: FAMILY MEDICINE CLINIC | Facility: CLINIC | Age: 23
End: 2023-03-23

## 2023-03-23 DIAGNOSIS — T78.40XS ALLERGIC REACTION, SEQUELA: Primary | ICD-10-CM

## 2023-03-23 NOTE — TELEPHONE ENCOUNTER
----- Message from Severo Naylor MD sent at 3/23/2023  9:19 AM EDT -----  Can we call their office to get records ASAP as her primary, given that patient had another reaction  [phone] Dr Reid Ribeiro

## 2023-03-23 NOTE — PROGRESS NOTES
Virtual Regular Visit    Verification of patient location:    Patient is located in the following state in which I hold an active license [place]      Assessment/Plan:    Problem List Items Addressed This Visit        Other    Allergic reaction - Primary     Sent a message to our front staff to help obtain emergency room records as well as the allergist records that she saw  Very concerned about the patient being around and being exposed 24/7 to cinnamon  Patient has had 6 episodes of an allergy  Some requiring EpiPen and leading to anaphylaxis  I will be filling out paperwork so that accommodations can be made at the school       Reason for visit is   Chief Complaint   Patient presents with   • Virtual Regular Visit        Encounter provider Jacklyn Krishnan MD    Provider located at [address] [place] [id number]      Recent Visits  Date Type Provider Dept   03/21/23 Telephone [facility] recent visits within past 7 days and meeting all other requirements  Today's Visits  Date Type Provider Dept   03/23/23 Telephone [address] [facility]   03/23/23 Telemedicine Jacklyn Krishnan  [facility] today's visits and meeting all other requirements  Future Appointments  No visits were found meeting these conditions  Showing future appointments within next 150 days and meeting all other requirements       The patient was identified by name and date of birth  Charly Colon was informed that this is a telemedicine visit and that the visit is being conducted through the [facility] Now platform  She agrees to proceed     My office door was closed  No one else was in the room  She acknowledged consent and understanding of privacy and security of the video platform  The patient has agreed to participate and understands they can discontinue the visit at any time  Patient is aware this is a billable service  Hussain Sandoval is a 20 y o  female presents via video  Unfortunately patient was just recently in the emergency room again  She states that she was exposed to cinnamon buns  She states that she did not touch them she was just near them and started with hives that started on her arms chest then became nauseous and then her throat started to swell  She received 1 dose of epi and was sent via ambulance to the emergency room  Patient already saw an allergist who stated that she did not have an allergy get does not understand why she is having these reactions  She has been getting allergy injections ever since  Patient states that the school is requiring an accommodation letter to be performed and she will be faxing it directly to her office today      HPI     Past Medical History:   Diagnosis Date   • Anxiety and depression    • Asthma    • Clotting disorder ([facility] [address] )    • Factor V Leiden ([facility] [address] )    • Pulmonary embolism ([facility] [address] )    • Thrombophlebitis        Past Surgical History:   Procedure Laterality Date   • BLADDER SURGERY     • FL RETROGRADE PYELOGRAM  05/20/2022   • NEPHRECTOMY Left 05/21/2020    Done laparoscopically at [facility]   • [place] CYSTOURETHROSCOPY N/A 05/20/2022    Procedure: CYSTOSCOPY, retrograde pyelogram, and examination under anesthesia, dilation urethral stenosis; Surgeon: Carloz Cooper MD;  Location: [facility] MAIN OR;  Service: Urology   • URETER REVISION     • WISDOM TOOTH EXTRACTION      woke up during procedure         Current Outpatient Medications   Medication Sig Dispense Refill   • albuterol (PROVENTIL HFA,VENTOLIN HFA) 90 mcg/act inhaler Inhale 2 puffs every 4 (four) hours as needed for wheezing or shortness of breath 18 g 6   • busPIRone (BUSPAR) 15 mg tablet Take 1 tablet (15 mg total) by mouth 3 (three) times a day (Patient taking differently: Take 15 mg by mouth 3 (three) times a day as needed) 270 tablet 0   • butalbital-acetaminophen-caffeine (FIORICET,ESGIC) -40 mg per tablet Take 1 tablet now, and then repeat in 4 hrs if no relief  Don't take more then 3 in a week  30 tablet 0   • EPINEPHrine (EPIPEN) 0 3 mg/0 3 mL SOAJ Inject 0 3 mL (0 3 mg total) into a muscle once for 1 dose 0 6 mL 5   • fluticasone-salmeterol (Advair Diskus) 250-50 mcg/dose inhaler Inhale 1 puff 2 (two) times a day Rinse mouth after use  60 blister 5   • hydrOXYzine HCL (ATARAX) 50 mg tablet Take 1 tablet (50 mg total) by mouth daily at bedtime 90 tablet 2   • levonorgestrel (KYLEENA) 19 5 MG intrauterine device 1 each by Intrauterine route     • multivitamin (THERAGRAN) TABS Take 1 tablet by mouth daily     • ondansetron (Zofran ODT) 4 mg disintegrating tablet Take 1 tablet (4 mg total) by mouth every 6 (six) hours as needed for nausea or vomiting 20 tablet 3   • pantoprazole (PROTONIX) 40 mg tablet Take 1 tablet (40 mg total) by mouth daily 30 tablet 6   • Probiotic Product (PROBIOTIC-10 PO) Take by mouth     • sertraline (ZOLOFT) 50 mg tablet TAKE 3 TABLETS BY MOUTH EVERY DAY AT BEDTIME (Patient taking differently: 50 mg TAKE 3 TABLETS BY MOUTH EVERY DAY AT BEDTIME) 270 tablet 3   • sucralfate (CARAFATE) 1 g tablet Take 1 tablet (1 g total) by mouth 4 (four) times a day 120 tablet 0   • SUMAtriptan (IMITREX) 100 mg tablet TAKE 1 TABLET BY MOUTH ONCE AS NEEDED FOR MIGRAINE REPEAT 1 DOSE 6 HOURS LATER NO MORE THAN 3 DAYS A WEEK AS DIRECTED 15 tablet 0   • topiramate (TOPAMAX) 50 MG tablet Take 1 tablet (50 mg total) by mouth daily at bedtime 90 tablet 4     No current facility-administered medications for this visit  Allergies   Allergen Reactions   • Amoxicillin Anaphylaxis   • Cinnamon - Food Allergy Anaphylaxis       Review of Systems   Constitutional: Negative for activity change, appetite change, chills, fatigue and fever  HENT: Negative for congestion  Respiratory: Negative for cough, chest tightness and shortness of breath  Cardiovascular: Negative for chest pain and leg swelling  Gastrointestinal: Negative for abdominal distention, abdominal pain, constipation, diarrhea, nausea and vomiting  All other systems reviewed and are negative  Video Exam    There were no vitals filed for this visit  Physical Exam  Constitutional:       Appearance: Normal appearance  Pulmonary:      Effort: Pulmonary effort is normal    Musculoskeletal:         General: Normal range of motion  Skin:     Findings: Rash present  Neurological:      General: No focal deficit present  Mental Status: She is alert and oriented to person, place, and time  Psychiatric:         Mood and Affect: Mood normal          Behavior: Behavior normal          Thought Content:  Thought content normal          Judgment: Judgment normal           I spent 15 minutes directly with the patient during this visit

## 2023-03-23 NOTE — TELEPHONE ENCOUNTER
----- Message from Geraldo Sanabria MD sent at 3/23/2023  9:20 AM EDT -----  [address] please get records from ED from patient who just had a visit for Allergy reaction

## 2023-03-23 NOTE — TELEPHONE ENCOUNTER
Called Dr Suad Flower office  Recording says to call [phone]  Called and spoke with Larry Govea who advised they will need signed release from patient before sending records  Called patient, she will follow up with allergist office to sign release so they can send us records

## 2023-03-23 NOTE — Clinical Note
Can we call their office to get records ASAP as her primary, given that patient had another reaction [phone] Dr Mynor Wagner

## 2023-03-24 ENCOUNTER — TELEPHONE (OUTPATIENT)
Dept: GASTROENTEROLOGY | Facility: CLINIC | Age: 23
End: 2023-03-24

## 2023-03-24 NOTE — TELEPHONE ENCOUNTER
[place] relaying normal results to patient's vm   Along with requesting a call back to schedule her follow up

## 2023-03-24 NOTE — TELEPHONE ENCOUNTER
----- Message -----   From: Rod Rodirgues MD   Sent: 3/9/2023   2:36 PM EST   To: Gastroenterology [facility] news, all of your biopsies are normal, there is no evidence of celiac sprue, there is no evidence of H  pylori   The biopsies throughout your colon are normal as well  Please take the fiber supplement as we discussed, if you continue to have diarrhea I would recommend a trial of cholestyramine  Please send us a message in AppShare with an update of your symptoms   If not better we will prescribe the cholestyramine  Please follow-up in the office in 2 to 3 months

## 2023-03-28 ENCOUNTER — TELEPHONE (OUTPATIENT)
Dept: FAMILY MEDICINE CLINIC | Facility: CLINIC | Age: 23
End: 2023-03-28

## 2023-03-28 NOTE — TELEPHONE ENCOUNTER
Pamela Pate   1/24/2017 10:15 AM   Office Visit    Provider:  Danielle Laboy MD   Department:  [facility]   Dept Phone:  [phone]                Your Full Care Plan              Today's Medication Changes          These changes are accurate as of: 1/24/17 10:36 AM.  If you have any questions, ask your nurse or doctor.               New Medication(s)Ordered:     albuterol 108 (90 BASE) MCG/ACT inhaler   Commonly known as:  PROVENTIL HFA;VENTOLIN HFA   Inhale 2 puffs Every 4 (Four) Hours As Needed for wheezing.   Started by:  Danielle Laboy MD       doxycycline 100 MG enteric coated tablet   Commonly known as:  DORYX   Take 1 tablet by mouth 2 (Two) Times a Day.   Started by:  Danielle Laboy MD            Where to Get Your Medications      These medications were sent to [place] [address] AT [facility] - [phone] [facility] [phone] [place] [address]     Phone:  [phone]     albuterol 108 (90 BASE) MCG/ACT inhaler    doxycycline 100 MG enteric coated tablet                  Your Updated Medication List          This list is accurate as of: 1/24/17 10:36 AM.  Always use your most recent med list.                albuterol 108 (90 BASE) MCG/ACT inhaler   Commonly known as:  PROVENTIL HFA;VENTOLIN HFA   Inhale 2 puffs Every 4 (Four) Hours As Needed for wheezing.       azithromycin 250 MG tablet   Commonly known as:  ZITHROMAX Z-FELIX   Take 2 tablets the first day, then 1 tablet daily for 4 days.       cyclobenzaprine 10 MG tablet   Commonly known as:  FLEXERIL   Take 1 tablet by mouth At Night As Needed for muscle spasms.       doxycycline 100 MG enteric coated tablet   Commonly known as:  DORYX   Take 1 tablet by mouth 2 (Two) Times a Day.       meloxicam 7.5 MG tablet   Commonly known as:  MOBIC   Take 1 tablet by mouth Daily.       pantoprazole 40 MG EC tablet    Patient advised   Commonly known as:  PROTONIX   Take 1 tablet by mouth Daily.               You Were Diagnosed With        Codes Comments    Bronchitis    -  Primary ICD-10-CM: J40  ICD-9-CM: 490     Current every day smoker     ICD-10-CM: F17.200  ICD-9-CM: 305.1       Instructions    Acute Bronchitis  Bronchitis is inflammation of the airways that extend from the windpipe into the lungs (bronchi). The inflammation often causes mucus to develop. This leads to a cough, which is the most common symptom of bronchitis.   In acute bronchitis, the condition usually develops suddenly and goes away over time, usually in a couple weeks. Smoking, allergies, and asthma can make bronchitis worse. Repeated episodes of bronchitis may cause further lung problems.   CAUSES  Acute bronchitis is most often caused by the same virus that causes a cold. The virus can spread from person to person (contagious) through coughing, sneezing, and touching contaminated objects.  SIGNS AND SYMPTOMS   · Cough.    · Fever.    · Coughing up mucus.    · Body aches.    · Chest congestion.    · Chills.    · Shortness of breath.    · Sore throat.    DIAGNOSIS   Acute bronchitis is usually diagnosed through a physical exam. Your health care provider will also ask you questions about your medical history. Tests, such as chest X-rays, are sometimes done to rule out other conditions.   TREATMENT   Acute bronchitis usually goes away in a couple weeks. Oftentimes, no medical treatment is necessary. Medicines are sometimes given for relief of fever or cough. Antibiotic medicines are usually not needed but may be prescribed in certain situations. In some cases, an inhaler may be recommended to help reduce shortness of breath and control the cough. A cool mist vaporizer may also be used to help thin bronchial secretions and make it easier to clear the chest.   HOME CARE INSTRUCTIONS  · Get plenty of rest.    · Drink enough fluids to keep your urine clear or pale yellow  (unless you have a medical condition that requires fluid restriction). Increasing fluids may help thin your respiratory secretions (sputum) and reduce chest congestion, and it will prevent dehydration.    · Take medicines only as directed by your health care provider.  · If you were prescribed an antibiotic medicine, finish it all even if you start to feel better.  · Avoid smoking and secondhand smoke. Exposure to cigarette smoke or irritating chemicals will make bronchitis worse. If you are a smoker, consider using nicotine gum or skin patches to help control withdrawal symptoms. Quitting smoking will help your lungs heal faster.    · Reduce the chances of another bout of acute bronchitis by washing your hands frequently, avoiding people with cold symptoms, and trying not to touch your hands to your mouth, nose, or eyes.    · Keep all follow-up visits as directed by your health care provider.    SEEK MEDICAL CARE IF:  Your symptoms do not improve after 1 week of treatment.   SEEK IMMEDIATE MEDICAL CARE IF:  · You develop an increased fever or chills.    · You have chest pain.    · You have severe shortness of breath.  · You have bloody sputum.    · You develop dehydration.  · You faint or repeatedly feel like you are going to pass out.  · You develop repeated vomiting.  · You develop a severe headache.  MAKE SURE YOU:   · Understand these instructions.  · Will watch your condition.  · Will get help right away if you are not doing well or get worse.     This information is not intended to replace advice given to you by your health care provider. Make sure you discuss any questions you have with your health care provider.     Document Released: 01/25/2006 Document Revised: 01/08/2016 Document Reviewed: 06/10/2014  Elsevier Interactive Patient Education ©2016 [employer]       Patient Instructions History      [employer] Signup     [facility] [email or web address] allows you to send messages to your doctor, view your test  "results, renew your prescriptions, schedule appointments, and more. To sign up, go to [email or web address] and click on the Sign Up Now link in the New User? box. Enter your [employer] Activation Code exactly as it appears below along with the last four digits of your Social Security Number and your Date of Birth () to complete the sign-up process. If you do not sign up before the expiration date, you must request a new code.    [employer] Activation Code: [email or web address]  Expires: 2017 10:35 AM    If you have questions, you can email [email or web address] or call [phone] to talk to our [employer] staff. Remember, [employer] is NOT to be used for urgent needs. For medical emergencies, dial 911.               Other Info from Your Visit           Allergies     No Known Allergies      Vital Signs     Blood Pressure Pulse Height Weight Last Menstrual Period Oxygen Saturation    102/70 78 62\" (157.5 cm) 126 lb 9.6 oz (57.4 kg) 2014 (Exact Date) 98%    Body Mass Index Smoking Status                23.16 kg/m2 Current Every Day Smoker          Problems and Diagnoses Noted     Bronchitis    Current every day smoker      "

## 2023-03-28 NOTE — TELEPHONE ENCOUNTER
Patient needs letter stating that she has to carry 2 epi pens on her at all times and what to do if she is exposed to cinnamon  Needs this for her employer

## 2023-04-07 DIAGNOSIS — J45.30 MILD PERSISTENT ASTHMA WITHOUT COMPLICATION: ICD-10-CM

## 2023-04-07 RX ORDER — FLUTICASONE PROPIONATE AND SALMETEROL 250; 50 UG/1; UG/1
1 POWDER RESPIRATORY (INHALATION) 2 TIMES DAILY
Qty: 60 BLISTER | Refills: 8 | Status: SHIPPED | OUTPATIENT
Start: 2023-04-07

## 2023-04-07 NOTE — TELEPHONE ENCOUNTER
Patient called requesting apt  and refill of purple inhaler  She will call back tomorrow morning to schedule late morning apt  unless you want to see her today

## 2023-04-08 NOTE — TELEPHONE ENCOUNTER
Patient advised  Says she took inhaler 2 times yesterday and once this morning  Still very congested and hurts to breathe  Patient requesting appointment on Monday  Please advise

## 2023-05-22 ENCOUNTER — TELEPHONE (OUTPATIENT)
Dept: FAMILY MEDICINE CLINIC | Facility: CLINIC | Age: 23
End: 2023-05-22

## 2023-05-22 NOTE — TELEPHONE ENCOUNTER
Dr Colleen Bender:    Please see voicemail below:    Good afternoon  I am calling from the [facility] in [place], [place]  I'm calling in regards to a patient that we were needing a medical form returned to us so that she can return back to work due to her allergy  Miss Michelle had spoken with someone by the name of when or excuse me, Claudia Graf last week and she wanted to follow up because we haven't received anything back yet  I think it was coming back via fax  I think I know the form  The letter we sent over with the forms of was a fax  It's not just give us a call back  So we kind of know where what the status is of that form  My office number is [phone]  My name is Piter Diamond  I work with Miss Boykin Favre  And again she said she spoke with someone by the name of Claudia Session last week  Thank you and have a great day

## 2023-05-24 ENCOUNTER — TELEPHONE (OUTPATIENT)
Dept: FAMILY MEDICINE CLINIC | Facility: CLINIC | Age: 23
End: 2023-05-24

## 2023-05-24 NOTE — TELEPHONE ENCOUNTER
LEFT MESSAGE TO RETURN CALL for Angelina Buckner RE: medications benadryl and Epi pen  Hello, this is Katelynn Carroll from the [place], [place]  I've spoken with Lora Mehta last week I was calling about Alee Borwn, date of birth, January 13th, 2000  We were trying to get an order for the Benadryl 50 an EPI pen and we're waiting to hear back from Doctor Daniel  And I just need to find out what she wants us to do  And if you could just give me a call, that would be great at [phone]  Thank you so much  Goodbye    You received a voice mail from [address] [place] [address] Drive

## 2023-05-25 ENCOUNTER — TELEPHONE (OUTPATIENT)
Dept: FAMILY MEDICINE CLINIC | Facility: CLINIC | Age: 23
End: 2023-05-25

## 2023-05-25 NOTE — TELEPHONE ENCOUNTER
Please call patient to see if she called school? Because they have called us again yesterday and the day before  Would like to know if she is planning on giving notice

## 2023-05-25 NOTE — TELEPHONE ENCOUNTER
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

## 2023-05-25 NOTE — TELEPHONE ENCOUNTER
I spoke with patient this morning and she said that she sent the letter  Hansel De Paz called asking for order for epi pen and benadryl  Please call joel back to discuss further

## 2023-05-25 NOTE — TELEPHONE ENCOUNTER
I spoke with pt she said that she called and has been trying to get in touch with people and will be sending in her notice today

## 2023-07-05 DIAGNOSIS — F41.8 DEPRESSION WITH ANXIETY: ICD-10-CM

## 2023-07-05 DIAGNOSIS — G43.109 MIGRAINE WITH AURA AND WITHOUT STATUS MIGRAINOSUS, NOT INTRACTABLE: ICD-10-CM

## 2023-07-05 RX ORDER — HYDROXYZINE 50 MG/1
50 TABLET, FILM COATED ORAL
Qty: 90 TABLET | Refills: 2 | Status: SHIPPED | OUTPATIENT
Start: 2023-07-05

## 2023-07-05 RX ORDER — TOPIRAMATE 50 MG/1
50 TABLET, FILM COATED ORAL
Qty: 90 TABLET | Refills: 4 | Status: SHIPPED | OUTPATIENT
Start: 2023-07-05

## 2023-07-17 ENCOUNTER — RA CDI HCC (OUTPATIENT)
Dept: OTHER | Facility: HOSPITAL | Age: 23
End: 2023-07-17

## 2023-07-17 PROBLEM — E66.01 MORBID OBESITY (HCC): Status: ACTIVE | Noted: 2023-07-17

## 2023-07-17 NOTE — PROGRESS NOTES
[address] [facility] coding opportunities          Chart Reviewed number of suggestions sent to Provider: 1     Patients Insurance        Commercial Insurance: [address]     E66.01  MD Isi Knight  I have reviewed the recommendation(s) and accept the change(s) suggested.

## 2023-07-24 ENCOUNTER — OFFICE VISIT (OUTPATIENT)
Dept: FAMILY MEDICINE CLINIC | Facility: CLINIC | Age: 23
End: 2023-07-24
Payer: COMMERCIAL

## 2023-07-24 VITALS
RESPIRATION RATE: 18 BRPM | BODY MASS INDEX: 42.17 KG/M2 | TEMPERATURE: 98.3 F | HEART RATE: 104 BPM | OXYGEN SATURATION: 97 % | SYSTOLIC BLOOD PRESSURE: 116 MMHG | HEIGHT: 64 IN | DIASTOLIC BLOOD PRESSURE: 80 MMHG | WEIGHT: 247 LBS

## 2023-07-24 DIAGNOSIS — F41.8 DEPRESSION WITH ANXIETY: ICD-10-CM

## 2023-07-24 DIAGNOSIS — Z00.00 PHYSICAL EXAM: Primary | ICD-10-CM

## 2023-07-24 DIAGNOSIS — Z11.1 ENCOUNTER FOR PPD TEST: ICD-10-CM

## 2023-07-24 DIAGNOSIS — D68.51 FACTOR V LEIDEN MUTATION (HCC): ICD-10-CM

## 2023-07-24 PROCEDURE — 86580 TB INTRADERMAL TEST: CPT | Performed by: FAMILY MEDICINE

## 2023-07-24 PROCEDURE — 99395 PREV VISIT EST AGE 18-39: CPT | Performed by: FAMILY MEDICINE

## 2023-07-24 NOTE — PROGRESS NOTES
[facility]    NAME: Tarsha Rivas  AGE: 20 y.o. SEX: female  : 2000     DATE: 2023     Assessment and Plan:     Problem List Items Addressed This Visit        Hematopoietic and Hemostatic    Factor V Leiden mutation ([address])   Other Visit Diagnoses     Physical exam    -  Primary    Encounter for PPD test        Relevant Orders    TB Skin Test (Completed)    BMI 40.0-44.9, adult [facility])        Relevant Orders    Ambulatory Referral to Weight Management    Depression with anxiety            (+) Wellbutrin consider in the future if patient continues to have worsening depression with anxiety. Would recommend decreasing Zoloft if we did  Factor V Leyden  PPD test demonstrated today  Paperwork filled out for physical and handed back to the patient. We will hand her the PPD paperwork until results come in  Immunizations and preventive care screenings were discussed with patient today. Appropriate education was printed on patient's after visit summary. No follow-ups on file. Chief Complaint:     Chief Complaint   Patient presents with   • Physical Exam     Forms for work       History of Present Illness:     Adult Annual Physical   Patient here for a comprehensive physical exam.  Does not feel like her anxiety is controlled. At times does feel like it is worsened. Patient would like to discuss about possible weight loss. Does not want to go to weight loss management at this time. Does have physical forms at this time needs a PPD. Diet and Physical Activity  Diet/Nutrition: well balanced diet. Exercise: no formal exercise.       Depression Screening  PHQ-2/9 Depression Screening    Little interest or pleasure in doing things: 0 - not at all  Feeling down, depressed, or hopeless: 0 - not at all  PHQ-2 Score: 0  PHQ-2 Interpretation: Negative depression screen       General Health  Sleep: gets 7-8 hours of sleep on average. Hearing: normal - bilateral.  Vision: no vision problems and wears glasses. Dental: regular dental visits. /GYN Health  Last menstrual period IUD     Review of Systems:     Review of Systems   Constitutional: Negative for activity change, appetite change, chills, fatigue and fever. HENT: Negative for congestion. Respiratory: Negative for cough, chest tightness and shortness of breath. Cardiovascular: Negative for chest pain and leg swelling. Gastrointestinal: Negative for abdominal distention, abdominal pain, constipation, diarrhea, nausea and vomiting. All other systems reviewed and are negative. Past Medical History:     Past Medical History:   Diagnosis Date   • Allergic    • Anxiety    • Anxiety and depression    • Asthma    • Clotting disorder ([address])    • Factor V Leiden ([address])    • Headache(784.0)    • Pulmonary embolism ([address])    • Thrombophlebitis       Past Surgical History:     Past Surgical History:   Procedure Laterality Date   • BLADDER SURGERY     • FL RETROGRADE PYELOGRAM  05/20/2022   • FL VCUG VOIDING URETHROCYSTOGRAM  8/7/2018   • NEPHRECTOMY Left 05/21/2020    Done laparoscopically at [facility]   • DE CYSTOURETHROSCOPY N/A 05/20/2022    Procedure: CYSTOSCOPY, retrograde pyelogram, and examination under anesthesia, dilation urethral stenosis; Surgeon: Kajal Feliz MD;  Location: [facility] MAIN OR;  Service: Urology   • URETER REVISION     • WISDOM TOOTH EXTRACTION      woke up during procedure.       Social History:     Social History     Socioeconomic History   • Marital status: Single     Spouse name: None   • Number of children: None   • Years of education: None   • Highest education level: None   Occupational History   • None   Tobacco Use   • Smoking status: Never   • Smokeless tobacco: Never   Vaping Use   • Vaping Use: Never used   Substance and Sexual Activity   • Alcohol use: Yes     Comment: social, 1 x per month   • Drug use: Never   • Sexual activity: Yes     Partners: Male     Birth control/protection: I.U.D. Other Topics Concern   • None   Social History Narrative   • None     Social Determinants of Health     Financial Resource Strain: Not on file   Food Insecurity: Not on file   Transportation Needs: Not on file   Physical Activity: Not on file   Stress: Not on file   Social Connections: Not on file   Intimate Partner Violence: Not on file   Housing Stability: Not on file      Family History:     Family History   Problem Relation Age of Onset   • Hypertension Mother    • Hyperlipidemia Mother    • Diabetes Mother    • Factor V Leiden deficiency Mother    • Ovarian cancer Maternal Grandmother    • Arthritis Family    • Asthma Family    • Cancer Family    • Cervical cancer Family    • Heart failure Family    • Hyperlipidemia Family    • Hypertension Family    • Diabetes Other    • Heart disease Father    • Depression Father    • ADD / ADHD Brother       Current Medications:     Current Outpatient Medications   Medication Sig Dispense Refill   • albuterol (PROVENTIL HFA,VENTOLIN HFA) 90 mcg/act inhaler Inhale 2 puffs every 4 (four) hours as needed for wheezing or shortness of breath 18 g 6   • busPIRone (BUSPAR) 15 mg tablet Take 1 tablet (15 mg total) by mouth 3 (three) times a day (Patient taking differently: Take 15 mg by mouth 3 (three) times a day as needed) 270 tablet 0   • butalbital-acetaminophen-caffeine (FIORICET,ESGIC) -40 mg per tablet Take 1 tablet now, and then repeat in 4 hrs if no relief. Don't take more then 3 in a week. 30 tablet 0   • cetirizine (ZyrTEC) 10 mg tablet Take 10 mg by mouth daily     • EPINEPHrine (EPIPEN) 0.3 mg/0.3 mL SOAJ Inject 0.3 mL (0.3 mg total) into a muscle once for 1 dose 0.6 mL 5   • Fluticasone-Salmeterol (Advair Diskus) 250-50 mcg/dose inhaler Inhale 1 puff 2 (two) times a day Rinse mouth after use.  60 blister 8   • hydrOXYzine HCL (ATARAX) 50 mg tablet Take 1 tablet (50 mg total) by mouth daily at bedtime 90 tablet 2   • levonorgestrel (KYLEENA) 19.5 MG intrauterine device 1 each by Intrauterine route     • multivitamin (THERAGRAN) TABS Take 1 tablet by mouth daily     • ondansetron (Zofran ODT) 4 mg disintegrating tablet Take 1 tablet (4 mg total) by mouth every 6 (six) hours as needed for nausea or vomiting 20 tablet 3   • pantoprazole (PROTONIX) 40 mg tablet Take 1 tablet (40 mg total) by mouth daily 30 tablet 6   • sertraline (ZOLOFT) 50 mg tablet TAKE 3 TABLETS BY MOUTH EVERY DAY AT BEDTIME 270 tablet 3   • SUMAtriptan (IMITREX) 100 mg tablet TAKE 1 TABLET BY MOUTH ONCE AS NEEDED FOR MIGRAINE REPEAT 1 DOSE 6 HOURS LATER NO MORE THAN 3 DAYS A WEEK AS DIRECTED 15 tablet 0   • topiramate (TOPAMAX) 50 MG tablet Take 1 tablet (50 mg total) by mouth daily at bedtime 90 tablet 4   • famotidine (PEPCID) 20 mg tablet Take 20 mg by mouth daily (Patient not taking: Reported on 4/10/2023)       No current facility-administered medications for this visit. Allergies: Allergies   Allergen Reactions   • Amoxicillin Anaphylaxis   • Cinnamon - Food Allergy Anaphylaxis      Physical Exam:     /80 (BP Location: Left arm, Patient Position: Sitting, Cuff Size: Large)   Pulse 104   Temp 98.3 °F (36.8 °C)   Resp 18   Ht 5' 4" (1.626 m)   Wt 112 kg (247 lb)   SpO2 97%   BMI 42.40 kg/m²     Physical Exam  Vitals reviewed. Constitutional:       Appearance: Normal appearance. She is well-developed. HENT:      Head: Normocephalic and atraumatic. Right Ear: Tympanic membrane, ear canal and external ear normal. There is no impacted cerumen. Left Ear: Tympanic membrane, ear canal and external ear normal. There is no impacted cerumen. Nose: Nose normal.      Mouth/Throat:      Mouth: Mucous membranes are moist.      Pharynx: Oropharynx is clear. Eyes:      Conjunctiva/sclera: Conjunctivae normal.      Pupils: Pupils are equal, round, and reactive to light.    Cardiovascular:      Rate and Rhythm: Normal rate and regular rhythm. Heart sounds: Normal heart sounds. Pulmonary:      Effort: Pulmonary effort is normal.      Breath sounds: Normal breath sounds. Abdominal:      General: Abdomen is flat. Bowel sounds are normal.      Palpations: Abdomen is soft. Musculoskeletal:         General: Normal range of motion. Cervical back: Normal range of motion and neck supple. Skin:     General: Skin is warm. Capillary Refill: Capillary refill takes less than 2 seconds. Neurological:      General: No focal deficit present. Mental Status: She is alert and oriented to person, place, and time. Mental status is at baseline. Psychiatric:         Mood and Affect: Mood normal.         Behavior: Behavior normal.         Thought Content:  Thought content normal.         Judgment: Judgment normal.          Rony Freed MD   [address] [place] [address]

## 2023-07-26 ENCOUNTER — CLINICAL SUPPORT (OUTPATIENT)
Dept: FAMILY MEDICINE CLINIC | Facility: CLINIC | Age: 23
End: 2023-07-26

## 2023-07-26 DIAGNOSIS — Z11.1 ENCOUNTER FOR PPD SKIN TEST READING: Primary | ICD-10-CM

## 2023-07-26 PROCEDURE — RECHECK

## 2023-07-29 NOTE — TELEPHONE ENCOUNTER
CALLED PT AND SHE DID GET THE PATCHES
CALLED PT LEFT MSG   ADVISING TO CALL BACK I WANT TO MAKE SURE SHE GOT SCRIPT
Dr Jose L Harrison    Patient says Nakia Hawkins did not receive lidocaine patch that was sent on 7/29  I advised that Dr Jose L Harrison is not in the office today 
I sent it over again just now to be sure it got to her  Unsure why she is havign problems with the lidocaine patches 
LMOM advising medication was sent 
,

## 2023-07-30 ENCOUNTER — HOSPITAL ENCOUNTER (EMERGENCY)
Facility: HOSPITAL | Age: 23
Discharge: HOME | End: 2023-07-30
Attending: EMERGENCY MEDICINE
Payer: COMMERCIAL

## 2023-07-30 VITALS
SYSTOLIC BLOOD PRESSURE: 134 MMHG | DIASTOLIC BLOOD PRESSURE: 70 MMHG | TEMPERATURE: 99.2 F | WEIGHT: 255.95 LBS | RESPIRATION RATE: 22 BRPM | HEART RATE: 72 BPM | OXYGEN SATURATION: 98 % | BODY MASS INDEX: 43.93 KG/M2

## 2023-07-30 DIAGNOSIS — T78.40XA ALLERGIC REACTION, INITIAL ENCOUNTER: Primary | ICD-10-CM

## 2023-07-30 DIAGNOSIS — T78.40XS ALLERGIC REACTION, SEQUELA: ICD-10-CM

## 2023-07-30 PROCEDURE — 99284 EMERGENCY DEPT VISIT MOD MDM: CPT | Performed by: EMERGENCY MEDICINE

## 2023-07-30 PROCEDURE — 99283 EMERGENCY DEPT VISIT LOW MDM: CPT

## 2023-07-30 RX ORDER — ONDANSETRON 4 MG/1
4 TABLET, ORALLY DISINTEGRATING ORAL ONCE
Status: COMPLETED | OUTPATIENT
Start: 2023-07-30 | End: 2023-07-30

## 2023-07-30 RX ORDER — EPINEPHRINE 0.3 MG/.3ML
0.3 INJECTION SUBCUTANEOUS ONCE
Qty: 0.6 ML | Refills: 0 | Status: SHIPPED | OUTPATIENT
Start: 2023-07-30 | End: 2023-07-30

## 2023-07-30 RX ORDER — FAMOTIDINE 20 MG/1
20 TABLET, FILM COATED ORAL ONCE
Status: COMPLETED | OUTPATIENT
Start: 2023-07-30 | End: 2023-07-30

## 2023-07-30 RX ORDER — DIPHENHYDRAMINE HCL 25 MG
25 TABLET ORAL ONCE
Status: COMPLETED | OUTPATIENT
Start: 2023-07-30 | End: 2023-07-30

## 2023-07-30 RX ORDER — PREDNISONE 20 MG/1
20 TABLET ORAL DAILY
Qty: 5 TABLET | Refills: 0 | Status: SHIPPED | OUTPATIENT
Start: 2023-07-30 | End: 2023-08-04

## 2023-07-30 RX ADMIN — DEXAMETHASONE SODIUM PHOSPHATE 10 MG: 10 INJECTION, SOLUTION INTRAMUSCULAR; INTRAVENOUS at 18:12

## 2023-07-30 RX ADMIN — DIPHENHYDRAMINE HYDROCHLORIDE 25 MG: 25 TABLET ORAL at 18:12

## 2023-07-30 RX ADMIN — ONDANSETRON 4 MG: 4 TABLET, ORALLY DISINTEGRATING ORAL at 18:13

## 2023-07-30 RX ADMIN — FAMOTIDINE 20 MG: 20 TABLET, FILM COATED ORAL at 18:11

## 2023-07-30 NOTE — DISCHARGE INSTRUCTIONS
We have provided information regarding anaphylaxis. If you have any severe worsening of symptoms, particularly lip or tongue swelling, throat swelling sensation, shortness of breath, administer newly prescribed EpiPen and return to the emergency department. Take the prescribed prednisone for the next 5 days. You can additionally continue Benadryl at home as well.

## 2023-07-30 NOTE — ED PROVIDER NOTES
History  Chief Complaint   Patient presents with   • Allergic Reaction     States she had a " exposure " yesterday to cinnamon that was in apple pie. Was taking Benadryl. Took Benadryl and Allegra this morning. States PTA her allergic reaction was affecting her throat and her sat was dropping so she gave self a epi pen. No stridor, no swelling, no hives , lungs clear. Sat is 98 %. HPI  Patient is a 64-year-old female presenting for evaluation of possible anaphylactic reaction. Patient states that she was exposed to cinnamon yesterday. Patient states that she has had pruritus since that time for the past day and a half. Patient states that she took Benadryl and Allegra this morning however remains symptomatic and over the course of the day states that she started to feel a scratchy throat as well as a throat swelling sensation and tingling in her lips. Patient administered previously prescribed EpiPen approximately 1 hour prior to come to the emergency department. Patient states that she feels resolution of lip and throat symptoms, feels improved shortness of breath however continues to feel pruritus as well as some associated nausea. Patient states that symptoms all feel similar to prior allergic reactions. Patient denies preceding illness. Prior to Admission Medications   Prescriptions Last Dose Informant Patient Reported? Taking? EPINEPHrine (EPIPEN) 0.3 mg/0.3 mL SOAJ   No No   Sig: Inject 0.3 mL (0.3 mg total) into a muscle once for 1 dose   EPINEPHrine (EPIPEN) 0.3 mg/0.3 mL SOAJ   No Yes   Sig: Inject 0.3 mL (0.3 mg total) into a muscle once for 1 dose   Fluticasone-Salmeterol (Advair Diskus) 250-50 mcg/dose inhaler   No No   Sig: Inhale 1 puff 2 (two) times a day Rinse mouth after use.    SUMAtriptan (IMITREX) 100 mg tablet  Self No No   Sig: TAKE 1 TABLET BY MOUTH ONCE AS NEEDED FOR MIGRAINE REPEAT 1 DOSE 6 HOURS LATER NO MORE THAN 3 DAYS A WEEK AS DIRECTED   albuterol (PROVENTIL HFA,VENTOLIN HFA) 90 mcg/act inhaler  Self No No   Sig: Inhale 2 puffs every 4 (four) hours as needed for wheezing or shortness of breath   busPIRone (BUSPAR) 15 mg tablet  Self No No   Sig: Take 1 tablet (15 mg total) by mouth 3 (three) times a day   Patient taking differently: Take 15 mg by mouth 3 (three) times a day as needed   butalbital-acetaminophen-caffeine (FIORICET,ESGIC) -40 mg per tablet  Self No No   Sig: Take 1 tablet now, and then repeat in 4 hrs if no relief. Don't take more then 3 in a week.    cetirizine (ZyrTEC) 10 mg tablet   Yes No   Sig: Take 10 mg by mouth daily   famotidine (PEPCID) 20 mg tablet   Yes No   Sig: Take 20 mg by mouth daily   Patient not taking: Reported on 4/10/2023   hydrOXYzine HCL (ATARAX) 50 mg tablet   No No   Sig: Take 1 tablet (50 mg total) by mouth daily at bedtime   levonorgestrel (KYLEENA) 19.5 MG intrauterine device  Self Yes No   Si each by Intrauterine route   multivitamin (THERAGRAN) TABS  Self Yes No   Sig: Take 1 tablet by mouth daily   ondansetron (Zofran ODT) 4 mg disintegrating tablet  Self No No   Sig: Take 1 tablet (4 mg total) by mouth every 6 (six) hours as needed for nausea or vomiting   pantoprazole (PROTONIX) 40 mg tablet   No No   Sig: Take 1 tablet (40 mg total) by mouth daily   sertraline (ZOLOFT) 50 mg tablet   No No   Sig: TAKE 3 TABLETS BY MOUTH EVERY DAY AT BEDTIME   topiramate (TOPAMAX) 50 MG tablet   No No   Sig: Take 1 tablet (50 mg total) by mouth daily at bedtime      Facility-Administered Medications: None       Past Medical History:   Diagnosis Date   • Allergic    • Anxiety    • Anxiety and depression    • Asthma    • Clotting disorder (HCC)    • Factor V Leiden ([address])    • Headache(784.0)    • Pulmonary embolism (HCC)    • Thrombophlebitis        Past Surgical History:   Procedure Laterality Date   • BLADDER SURGERY     • FL RETROGRADE PYELOGRAM  2022   • FL VCUG VOIDING URETHROCYSTOGRAM  2018   • NEPHRECTOMY Left 2020    Done laparoscopically at [facility]   • DE CYSTOURETHROSCOPY N/A 05/20/2022    Procedure: CYSTOSCOPY, retrograde pyelogram, and examination under anesthesia, dilation urethral stenosis; Surgeon: Mana Mathis MD;  Location: [facility] MA[facility] OR;  Service: Urology   • URETER REVISION     • WISDOM TOOTH EXTRACTION      woke up during procedure. Family History   Problem Relation Age of Onset   • Hypertension Mother    • Hyperlipidemia Mother    • Diabetes Mother    • Factor V Leiden deficiency Mother    • Ovarian cancer Maternal Grandmother    • Arthritis Family    • Asthma Family    • Cancer Family    • Cervical cancer Family    • Heart failure Family    • Hyperlipidemia Family    • Hypertension Family    • Diabetes Other    • Heart disease Father    • Depression Father    • ADD / ADHD Brother      I have reviewed and agree with the history as documented. E-Cigarette/Vaping   • E-Cigarette Use Never User      E-Cigarette/Vaping Substances   • Nicotine No    • THC No    • CBD No      Social History     Tobacco Use   • Smoking status: Never   • Smokeless tobacco: Never   Vaping Use   • Vaping Use: Never used   Substance Use Topics   • Alcohol use: Yes     Comment: social, 1 x per month   • Drug use: Never       Review of Systems   Constitutional: Negative for chills, fatigue and fever. Respiratory: Positive for shortness of breath. Negative for cough. Cardiovascular: Negative for chest pain. Gastrointestinal: Positive for nausea. Negative for diarrhea and vomiting. Genitourinary: Negative for flank pain. Musculoskeletal: Negative for arthralgias and myalgias. Skin: Positive for rash. Neurological: Negative for headaches. Psychiatric/Behavioral: Negative for confusion. All other systems reviewed and are negative. Physical Exam  Physical Exam  Vitals and nursing note reviewed. Constitutional:       General: She is not in acute distress. Appearance: Normal appearance.  She is not ill-appearing, toxic-appearing or diaphoretic. Comments: Anxious appearing but nontoxic nondistressed   HENT:      Head: Normocephalic and atraumatic. Comments: Moist mucous membranes. No lip or tongue swelling. No stridor. Right Ear: External ear normal.      Left Ear: External ear normal.   Eyes:      General:         Right eye: No discharge. Left eye: No discharge. Cardiovascular:      Comments: Regular rate and rhythm, no murmurs rubs or gallops. Extremities warm and well-perfused without mottling. Pulmonary:      Effort: No respiratory distress. Comments: No increased work of breathing. Speaking in complete sentences. Lungs clear to auscultation bilaterally without wheezes, rales, rhonchi. Satting 100% on room air indicating adequate oxygenation. Abdominal:      General: There is no distension. Musculoskeletal:         General: No deformity. Cervical back: Normal range of motion. Skin:     Findings: No lesion or rash. Comments: Patchy areas of erythema without true urticaria. Neurological:      Mental Status: She is alert and oriented to person, place, and time. Mental status is at baseline.    Psychiatric:         Mood and Affect: Mood and affect normal.         Vital Signs  ED Triage Vitals [07/30/23 1714]   Temperature Pulse Respirations Blood Pressure SpO2   99.2 °F (37.3 °C) 93 (!) 24 133/68 100 %      Temp Source Heart Rate Source Patient Position - Orthostatic VS BP Location FiO2 (%)   Tympanic Monitor Sitting Left arm --      Pain Score       --           Vitals:    07/30/23 1714 07/30/23 1854   BP: 133/68 134/70   Pulse: 93 72   Patient Position - Orthostatic VS: Sitting Sitting         Visual Acuity      ED Medications  Medications   famotidine (PEPCID) tablet 20 mg (20 mg Oral Given 7/30/23 1811)   ondansetron (ZOFRAN-ODT) dispersible tablet 4 mg (4 mg Oral Given 7/30/23 1813)   dexamethasone oral liquid 10 mg 1 mL (10 mg Oral Given 7/30/23 1812)   diphenhydrAMINE (BENADRYL) tablet 25 mg (25 mg Oral Given 7/30/23 1812)       Diagnostic Studies  Results Reviewed     None                 No orders to display              Procedures  Procedures         ED Course                               SBIRT 22yo+    Flowsheet Row Most Recent Value   Initial Alcohol Screen: US AUDIT-C     1. How often do you have a drink containing alcohol? 2 Filed at: 07/30/2023 1718   2. How many drinks containing alcohol do you have on a typical day you are drinking? 1 Filed at: 07/30/2023 1718   3b. FEMALE Any Age, or MALE 65+: How often do you have 4 or more drinks on one occassion? 0 Filed at: 07/30/2023 1718   Audit-C Score 3 Filed at: 07/30/2023 1718   OCTAVIA: How many times in the past year have you. .. Used an illegal drug or used a prescription medication for non-medical reasons? Never Filed at: 07/30/2023 1718                    Medical Decision Making  Patient well-appearing with some generalized erythema but no additional external signs to suggest ongoing allergic reaction. Patient had administered epinephrine prior to coming to the emergency department. Patient additionally treated with Benadryl, Pepcid, Decadron for possible anaphylaxis. Treated with Zofran for nausea. Plan to observe in the emergency department. Patient with no additional symptoms. Provided with prescription for an additional EpiPen, prednisone, instructions to continue Benadryl as needed, discharged with return precautions. Risk  OTC drugs. Prescription drug management. Disposition  Final diagnoses:    Allergic reaction, initial encounter     Time reflects when diagnosis was documented in both MDM as applicable and the Disposition within this note     Time User Action Codes Description Comment    7/30/2023  7:11 PM Myra Zafarl Add [T78.40XA] Allergic reaction, initial encounter     7/30/2023  7:12 PM Myra Shawl Add [T78.40XS] Allergic reaction, sequela       ED Disposition     ED Disposition   Discharge    Condition   Stable    Date/Time   Sun Jul 30, 2023  7:11 PM    Comment   Iliana Hendrickson discharge to home/self care. Follow-up Information     Follow up With Specialties Details Why Contact Info Additional Information    [address] Emergency Department Emergency Medicine  If symptoms worsen [address] [address]  [address] [facility] Emergency Department, [address] [facility] , [facility], [address]          Patient's Medications   Discharge Prescriptions    PREDNISONE 20 MG TABLET    Take 1 tablet (20 mg total) by mouth daily for 5 days       Start Date: 7/30/2023 End Date: 8/4/2023       Order Dose: 20 mg       Quantity: 5 tablet    Refills: 0       No discharge procedures on file.     PDMP Review     None          ED Provider  Electronically Signed by           Duard Spurling, MD  07/30/23 5961

## 2023-09-06 ENCOUNTER — TELEPHONE (OUTPATIENT)
Dept: FAMILY MEDICINE CLINIC | Facility: CLINIC | Age: 23
End: 2023-09-06

## 2023-09-06 NOTE — TELEPHONE ENCOUNTER
Patient is having cough, itchy eyes, swollen glands for past week, requesting appointment after 3:30 today or tomorrow- due to work schedule. She does not have covid test kit.

## 2023-09-07 ENCOUNTER — OFFICE VISIT (OUTPATIENT)
Dept: FAMILY MEDICINE CLINIC | Facility: CLINIC | Age: 23
End: 2023-09-07
Payer: COMMERCIAL

## 2023-09-07 VITALS
HEART RATE: 78 BPM | DIASTOLIC BLOOD PRESSURE: 60 MMHG | HEIGHT: 64 IN | TEMPERATURE: 98.7 F | BODY MASS INDEX: 42.34 KG/M2 | SYSTOLIC BLOOD PRESSURE: 92 MMHG | OXYGEN SATURATION: 98 % | RESPIRATION RATE: 14 BRPM | WEIGHT: 248 LBS

## 2023-09-07 DIAGNOSIS — T78.40XS ALLERGIC REACTION, SEQUELA: Primary | ICD-10-CM

## 2023-09-07 DIAGNOSIS — F41.8 DEPRESSION WITH ANXIETY: ICD-10-CM

## 2023-09-07 PROCEDURE — 99213 OFFICE O/P EST LOW 20 MIN: CPT | Performed by: FAMILY MEDICINE

## 2023-09-07 RX ORDER — BUSPIRONE HYDROCHLORIDE 15 MG/1
15 TABLET ORAL 3 TIMES DAILY
Qty: 270 TABLET | Refills: 3 | Status: SHIPPED | OUTPATIENT
Start: 2023-09-07 | End: 2023-12-06

## 2023-09-07 NOTE — PROGRESS NOTES
Jamal Nicole 2000 female MRN: [id number]    [facility] PRACTICE OFFICE VISIT  [facility] - [address] [place] [address]      ASSESSMENT/PLAN  Jamal Nicole is a 20 y.o. female presents to the office for    Diagnoses and all orders for this visit:    Allergic reaction, sequela  -     Ambulatory Referral to Allergy; Future    Depression with anxiety  -     busPIRone (BUSPAR) 15 mg tablet; Take 1 tablet (15 mg total) by mouth 3 (three) times a day       Highly recommend establishing with an allergist.  I do not like how the patient is having to take Benadryl every 4 hours on a daily basis. As well as EpiPen's with multiple ER visits. Patient with a history of high allergy to cinnamon injections for allergies did not help in the past.  Depression with anxiety would like a refill on her medication for BuSpar. Patient is currently living back in our area         Future Appointments   Date Time Provider [address]  [address]   11/2/2023  9:15 AM OLEG Kinney [facility]          SUBJECTIVE  CC: Cold Like Symptoms (Pt c/o itchy watery eyes and sneezing OTC benadryl helping)      HPI:  Jamal Nicole is a 20 y.o. female who presents for an acute appointment. Patient states that originally she made this appointment thinking she was sick but realized that she might just be having allergies. Patient states that her mom has been bringing home pumpkin spice and is concerned that was causing her to have anaphylaxis. Required EpiPen and needed an emergency room visit. Currently patient is asymptomatic she does need a refill of all her depression and anxiety medication states that she has been doing very well on it. Review of Systems   Constitutional: Negative for activity change, appetite change, chills, fatigue and fever. HENT: Positive for sneezing. Negative for congestion. Respiratory: Negative for cough, chest tightness and shortness of breath.     Cardiovascular: Negative for chest pain and leg swelling. Gastrointestinal: Negative for abdominal distention, abdominal pain, constipation, diarrhea, nausea and vomiting. All other systems reviewed and are negative. Historical Information   The patient history was reviewed as follows:  Past Medical History:   Diagnosis Date   • Allergic    • Anxiety    • Anxiety and depression    • Asthma    • Clotting disorder ([address])    • Factor V Leiden ([address])    • Headache(784.0)    • Pulmonary embolism (HCC)    • Thrombophlebitis          Medications:     Current Outpatient Medications:   •  albuterol (PROVENTIL HFA,VENTOLIN HFA) 90 mcg/act inhaler, Inhale 2 puffs every 4 (four) hours as needed for wheezing or shortness of breath, Disp: 18 g, Rfl: 6  •  busPIRone (BUSPAR) 15 mg tablet, Take 1 tablet (15 mg total) by mouth 3 (three) times a day, Disp: 270 tablet, Rfl: 3  •  butalbital-acetaminophen-caffeine (FIORICET,ESGIC) -40 mg per tablet, Take 1 tablet now, and then repeat in 4 hrs if no relief.  Don't take more then 3 in a week., Disp: 30 tablet, Rfl: 0  •  cetirizine (ZyrTEC) 10 mg tablet, Take 10 mg by mouth daily, Disp: , Rfl:   •  EPINEPHrine (EPIPEN) 0.3 mg/0.3 mL SOAJ, Inject 0.3 mL (0.3 mg total) into a muscle once for 1 dose, Disp: 0.6 mL, Rfl: 0  •  Fluticasone-Salmeterol (Advair Diskus) 250-50 mcg/dose inhaler, Inhale 1 puff 2 (two) times a day Rinse mouth after use., Disp: 60 blister, Rfl: 8  •  hydrOXYzine HCL (ATARAX) 50 mg tablet, Take 1 tablet (50 mg total) by mouth daily at bedtime, Disp: 90 tablet, Rfl: 2  •  levonorgestrel (KYLEENA) 19.5 MG intrauterine device, 1 each by Intrauterine route, Disp: , Rfl:   •  multivitamin (THERAGRAN) TABS, Take 1 tablet by mouth daily, Disp: , Rfl:   •  ondansetron (Zofran ODT) 4 mg disintegrating tablet, Take 1 tablet (4 mg total) by mouth every 6 (six) hours as needed for nausea or vomiting, Disp: 20 tablet, Rfl: 3  •  pantoprazole (PROTONIX) 40 mg tablet, Take 1 tablet (40 mg total) by mouth daily, Disp: 30 tablet, Rfl: 6  •  sertraline (ZOLOFT) 50 mg tablet, TAKE 3 TABLETS BY MOUTH EVERY DAY AT BEDTIME, Disp: 270 tablet, Rfl: 3  •  SUMAtriptan (IMITREX) 100 mg tablet, TAKE 1 TABLET BY MOUTH ONCE AS NEEDED FOR MIGRAINE REPEAT 1 DOSE 6 HOURS LATER NO MORE THAN 3 DAYS A WEEK AS DIRECTED, Disp: 15 tablet, Rfl: 0  •  topiramate (TOPAMAX) 50 MG tablet, Take 1 tablet (50 mg total) by mouth daily at bedtime, Disp: 90 tablet, Rfl: 4    Allergies   Allergen Reactions   • Amoxicillin Anaphylaxis   • Cinnamon - Food Allergy Anaphylaxis       OBJECTIVE  Vitals:   Vitals:    09/07/23 1606   BP: 92/60   BP Location: Left arm   Patient Position: Sitting   Cuff Size: Adult   Pulse: 78   Resp: 14   Temp: 98.7 °F (37.1 °C)   TempSrc: Temporal   SpO2: 98%   Weight: 112 kg (248 lb)   Height: 5' 4" (1.626 m)         Physical Exam  Vitals reviewed. Constitutional:       Appearance: She is well-developed. HENT:      Head: Normocephalic and atraumatic. Eyes:      Conjunctiva/sclera: Conjunctivae normal.      Pupils: Pupils are equal, round, and reactive to light. Cardiovascular:      Rate and Rhythm: Normal rate and regular rhythm. Heart sounds: Normal heart sounds. Pulmonary:      Effort: Pulmonary effort is normal. No respiratory distress. Breath sounds: Normal breath sounds. Musculoskeletal:         General: Normal range of motion. Cervical back: Normal range of motion and neck supple. Skin:     General: Skin is warm. Capillary Refill: Capillary refill takes less than 2 seconds. Neurological:      Mental Status: She is alert and oriented to person, place, and time.                     Jackelyn Morton MD,   [facility]  9/7/2023

## 2023-10-04 ENCOUNTER — OFFICE VISIT (OUTPATIENT)
Dept: FAMILY MEDICINE CLINIC | Facility: CLINIC | Age: 23
End: 2023-10-04
Payer: COMMERCIAL

## 2023-10-04 VITALS
HEART RATE: 81 BPM | RESPIRATION RATE: 18 BRPM | BODY MASS INDEX: 42.34 KG/M2 | OXYGEN SATURATION: 95 % | DIASTOLIC BLOOD PRESSURE: 80 MMHG | SYSTOLIC BLOOD PRESSURE: 124 MMHG | WEIGHT: 248 LBS | HEIGHT: 64 IN | TEMPERATURE: 97.4 F

## 2023-10-04 DIAGNOSIS — G43.109 MIGRAINE WITH AURA AND WITHOUT STATUS MIGRAINOSUS, NOT INTRACTABLE: ICD-10-CM

## 2023-10-04 DIAGNOSIS — B37.9 YEAST INFECTION: Primary | ICD-10-CM

## 2023-10-04 DIAGNOSIS — R30.0 DYSURIA: ICD-10-CM

## 2023-10-04 LAB
SL AMB  POCT GLUCOSE, UA: ABNORMAL
SL AMB LEUKOCYTE ESTERASE,UA: 75
SL AMB POCT BILIRUBIN,UA: ABNORMAL
SL AMB POCT BLOOD,UA: ABNORMAL
SL AMB POCT CLARITY,UA: ABNORMAL
SL AMB POCT COLOR,UA: YELLOW
SL AMB POCT KETONES,UA: ABNORMAL
SL AMB POCT NITRITE,UA: ABNORMAL
SL AMB POCT PH,UA: 8
SL AMB POCT SPECIFIC GRAVITY,UA: 1.01
SL AMB POCT URINE PROTEIN: ABNORMAL
SL AMB POCT UROBILINOGEN: ABNORMAL

## 2023-10-04 PROCEDURE — 99213 OFFICE O/P EST LOW 20 MIN: CPT | Performed by: FAMILY MEDICINE

## 2023-10-04 PROCEDURE — 81003 URINALYSIS AUTO W/O SCOPE: CPT | Performed by: FAMILY MEDICINE

## 2023-10-04 RX ORDER — SUMATRIPTAN 100 MG/1
TABLET, FILM COATED ORAL
Qty: 15 TABLET | Refills: 0 | Status: SHIPPED | OUTPATIENT
Start: 2023-10-04

## 2023-10-04 RX ORDER — CIPROFLOXACIN 500 MG/1
500 TABLET, FILM COATED ORAL EVERY 12 HOURS SCHEDULED
Qty: 10 TABLET | Refills: 0 | Status: SHIPPED | OUTPATIENT
Start: 2023-10-04 | End: 2023-10-09

## 2023-10-04 RX ORDER — ONDANSETRON 4 MG/1
4 TABLET, ORALLY DISINTEGRATING ORAL EVERY 6 HOURS PRN
Qty: 20 TABLET | Refills: 3 | Status: SHIPPED | OUTPATIENT
Start: 2023-10-04

## 2023-10-04 RX ORDER — FLUCONAZOLE 150 MG/1
TABLET ORAL
Qty: 2 TABLET | Refills: 0 | Status: SHIPPED | OUTPATIENT
Start: 2023-10-04 | End: 2023-10-07

## 2023-10-04 NOTE — PROGRESS NOTES
Jeane Sanchez 2000 female MRN: [id number]    [facility] PRACTICE OFFICE VISIT  [address]      ASSESSMENT/PLAN  Jeane Sanchez is a 20 y.o. female presents to the office for    Diagnoses and all orders for this visit:    Yeast infection  -     fluconazole (DIFLUCAN) 150 mg tablet; Take 1 tablet now and take 1 tablet in 3 days  -     Urine culture    Dysuria  -     ciprofloxacin (CIPRO) 500 mg tablet; Take 1 tablet (500 mg total) by mouth every 12 (twelve) hours for 5 days  -     POCT urine dip auto non-scope  -     Cancel: Urine culture; Future  -     Cancel: Urine culture  -     Cancel: Urine culture; Future  -     Urine culture    Migraine with aura and without status migrainosus, not intractable  -     SUMAtriptan (IMITREX) 100 mg tablet; TAKE 1 TABLET BY MOUTH ONCE AS NEEDED FOR MIGRAINE REPEAT 1 DOSE 6 HOURS LATER NO MORE THAN 3 DAYS A WEEK AS DIRECTED  -     ondansetron (Zofran ODT) 4 mg disintegrating tablet; Take 1 tablet (4 mg total) by mouth every 6 (six) hours as needed for nausea or vomiting    Other orders  -     Result     . Treatment shown above. Paperwork filled out for patient today         Future Appointments   Date Time Provider [address] [place] [address]   11/2/2023  9:15 AM OLEG Forde [facility]          SUBJECTIVE  CC: Cystitis (Blood in urine )      HPI:  Jeane Sanchez is a 20 y.o. female who presents for an acute appointment. Patient states that she noticed blood in her urine and feels very itchy and irritated and feels that she might have a urinary tract infection. Patient also with a history of migraines and would like a refill on her medications if possible. Review of Systems   Constitutional: Negative for activity change, appetite change, chills, fatigue and fever. HENT: Negative for congestion. Respiratory: Negative for cough, chest tightness and shortness of breath.     Cardiovascular: Negative for chest pain and leg swelling. Gastrointestinal: Negative for abdominal distention, abdominal pain, constipation, diarrhea, nausea and vomiting. All other systems reviewed and are negative. Historical Information   The patient history was reviewed as follows:  Past Medical History:   Diagnosis Date   • Allergic    • Anxiety    • Anxiety and depression    • Asthma    • Clotting disorder ([address])    • Factor V Leiden ([address])    • Headache(784.0)    • Pulmonary embolism (HCC)    • Thrombophlebitis          Medications:     Current Outpatient Medications:   •  albuterol (PROVENTIL HFA,VENTOLIN HFA) 90 mcg/act inhaler, Inhale 2 puffs every 4 (four) hours as needed for wheezing or shortness of breath, Disp: 18 g, Rfl: 6  •  busPIRone (BUSPAR) 15 mg tablet, Take 1 tablet (15 mg total) by mouth 3 (three) times a day, Disp: 270 tablet, Rfl: 3  •  butalbital-acetaminophen-caffeine (FIORICET,ESGIC) -40 mg per tablet, Take 1 tablet now, and then repeat in 4 hrs if no relief.  Don't take more then 3 in a week., Disp: 30 tablet, Rfl: 0  •  cetirizine (ZyrTEC) 10 mg tablet, Take 10 mg by mouth daily, Disp: , Rfl:   •  ciprofloxacin (CIPRO) 500 mg tablet, Take 1 tablet (500 mg total) by mouth every 12 (twelve) hours for 5 days, Disp: 10 tablet, Rfl: 0  •  EPINEPHrine (EPIPEN) 0.3 mg/0.3 mL SOAJ, Inject 0.3 mL (0.3 mg total) into a muscle once for 1 dose, Disp: 0.6 mL, Rfl: 0  •  Fluticasone-Salmeterol (Advair Diskus) 250-50 mcg/dose inhaler, Inhale 1 puff 2 (two) times a day Rinse mouth after use., Disp: 60 blister, Rfl: 8  •  hydrOXYzine HCL (ATARAX) 50 mg tablet, Take 1 tablet (50 mg total) by mouth daily at bedtime, Disp: 90 tablet, Rfl: 2  •  levonorgestrel (KYLEENA) 19.5 MG intrauterine device, 1 each by Intrauterine route, Disp: , Rfl:   •  multivitamin (THERAGRAN) TABS, Take 1 tablet by mouth daily, Disp: , Rfl:   •  ondansetron (Zofran ODT) 4 mg disintegrating tablet, Take 1 tablet (4 mg total) by mouth every 6 (six) hours as needed for nausea or vomiting, Disp: 20 tablet, Rfl: 3  •  sertraline (ZOLOFT) 50 mg tablet, TAKE 3 TABLETS BY MOUTH EVERY DAY AT BEDTIME, Disp: 270 tablet, Rfl: 3  •  SUMAtriptan (IMITREX) 100 mg tablet, TAKE 1 TABLET BY MOUTH ONCE AS NEEDED FOR MIGRAINE REPEAT 1 DOSE 6 HOURS LATER NO MORE THAN 3 DAYS A WEEK AS DIRECTED, Disp: 15 tablet, Rfl: 0  •  topiramate (TOPAMAX) 50 MG tablet, Take 1 tablet (50 mg total) by mouth daily at bedtime, Disp: 90 tablet, Rfl: 4  •  pantoprazole (PROTONIX) 40 mg tablet, Take 1 tablet (40 mg total) by mouth daily (Patient not taking: Reported on 10/4/2023), Disp: 30 tablet, Rfl: 6    Allergies   Allergen Reactions   • Amoxicillin Anaphylaxis   • Cinnamon - Food Allergy Anaphylaxis       OBJECTIVE  Vitals:   Vitals:    10/04/23 0937   BP: 124/80   BP Location: Right arm   Patient Position: Sitting   Cuff Size: Large   Pulse: 81   Resp: 18   Temp: (!) 97.4 °F (36.3 °C)   SpO2: 95%   Weight: 112 kg (248 lb)   Height: 5' 4" (1.626 m)         Physical Exam  Vitals reviewed. Constitutional:       Appearance: She is well-developed. HENT:      Head: Normocephalic and atraumatic. Eyes:      Conjunctiva/sclera: Conjunctivae normal.      Pupils: Pupils are equal, round, and reactive to light. Cardiovascular:      Rate and Rhythm: Normal rate and regular rhythm. Heart sounds: Normal heart sounds. Pulmonary:      Effort: Pulmonary effort is normal. No respiratory distress. Breath sounds: Normal breath sounds. Abdominal:      General: There is no distension. Tenderness: There is no abdominal tenderness. Musculoskeletal:         General: Normal range of motion. Cervical back: Normal range of motion and neck supple. Skin:     General: Skin is warm. Capillary Refill: Capillary refill takes less than 2 seconds. Neurological:      Mental Status: She is alert and oriented to person, place, and time.                     Leonel Mullins MD,   [facility]  10/8/2023

## 2023-10-06 LAB
BACTERIA UR CULT: NORMAL
Lab: NORMAL

## 2023-11-03 ENCOUNTER — CONSULT (OUTPATIENT)
Dept: BARIATRICS | Facility: CLINIC | Age: 23
End: 2023-11-03
Payer: COMMERCIAL

## 2023-11-03 VITALS
HEIGHT: 65 IN | BODY MASS INDEX: 41.19 KG/M2 | WEIGHT: 247.2 LBS | DIASTOLIC BLOOD PRESSURE: 70 MMHG | SYSTOLIC BLOOD PRESSURE: 110 MMHG | HEART RATE: 78 BPM

## 2023-11-03 DIAGNOSIS — Z91.89 AT RISK FOR SLEEP APNEA: ICD-10-CM

## 2023-11-03 DIAGNOSIS — E66.01 CLASS 3 SEVERE OBESITY DUE TO EXCESS CALORIES WITH SERIOUS COMORBIDITY AND BODY MASS INDEX (BMI) OF 40.0 TO 44.9 IN ADULT (HCC): Primary | ICD-10-CM

## 2023-11-03 DIAGNOSIS — Z83.3 FAMILY HISTORY OF DIABETES MELLITUS: ICD-10-CM

## 2023-11-03 DIAGNOSIS — G43.909 MIGRAINES: ICD-10-CM

## 2023-11-03 DIAGNOSIS — R53.83 FATIGUE: ICD-10-CM

## 2023-11-03 PROBLEM — E66.813 CLASS 3 SEVERE OBESITY DUE TO EXCESS CALORIES WITH SERIOUS COMORBIDITY AND BODY MASS INDEX (BMI) OF 40.0 TO 44.9 IN ADULT (HCC): Status: ACTIVE | Noted: 2023-07-17

## 2023-11-03 PROCEDURE — 99204 OFFICE O/P NEW MOD 45 MIN: CPT | Performed by: NURSE PRACTITIONER

## 2023-11-03 NOTE — ASSESSMENT & PLAN NOTE
- Discussed options of HealthyCORE-Intensive Lifestyle Intervention Program, Very Low Calorie Diet-VLCD, Conservative Program, Librado-En-Y Gastric Bypass, and Vertical Sleeve Gastrectomy and the role of weight loss medications. - Explained the importance of making lifestyle changes first before starting anti-obesity medications. - Patient should demonstrate lifestyle changes first before anti-obesity medication initiated. - Patient is interested in pursuing Conservative Program including 60-minute visit with dietitian and 60-minute visit with  and follow up visits with medical weight management provider. - Initial weight loss goal of 5-10% weight loss for improved health  - Weight loss can improve patient's co-morbid conditions and/or prevent weight-related complications. - Labs reviewed from 2/2023 -patient will obtain updated CBC, CMP, thyroid, lipids, fasting insulin, A1c, vitamin D  - patient lifestyle habits were reviewed and barriers to weight loss were discussed. Patient will be meeting with dietitian to better balance her nutrition and focus on more balanced calories throughout the day. She was encouraged to start to choose more protein rich snacks to help with hunger and support her metabolism more evenly. Patient was encouraged to continue with her activity level and hydrating well with at least 64 ounces of water daily. Medications were discussed:  Phentermine to be avoided as patient has uncontrolled anxiety  GLP-1 medications were discussed and patient was interested in starting [place]. Patient understands the supply concerns with this medication and will be willing to try to find it at a local pharmacy. Pen was demonstrated in the office today and questions were answered. We will evaluate blood work prior to prescribing. [facility] Instructions:    - Begin Wegovy 0.25 mg subcutaneously once a week. Dose changes may occur after 4 doses if medication is tolerated.  You will be assessed prior to each dose change to make sure you are tolerating the medication well. - Please message me when you have 2 pens left from the prescription so there are no lapses in treatment. - Visit [facility] for further information/injection instructions.   -Please eat small frequent meals to help reduce nausea. Lemon water and saltine crackers may help with this. - Side effects of Wegovy discussed: nausea, vomiting, diarrhea, and constipation. If you experience fever, nausea/vomiting, and pain radiating to your back this may be a sign of pancreatitis. Please go to the emergency room if this occurs. - Patient understands the side effects of the medication and proper administration. Patient agrees with the treatment plan and all questions were answered. Currently patient does not use CPAP  -Sleep referral ordered placed today and patient instructed to schedule appointment  -Discussed risks of untreated sleep apnea such as sudden cardiac death by arrhythmia, uncontrolled hypertension, difficulty with weight loss, decreased quality sleep, increased insulin resistance, and stroke.  -Sleep apnea often improves with dietary and lifestyle changes  -20-30% weight loss will also help with symptoms and complications of sleep apnea      Goals:  Calorie Goal: 0324-6458 calories per day  Do not skip meals. Food log via saida - options include  www. [employer].com, sparkpeople. com, loseit.com, calorieking. com, baritastic  No sugary beverages. At least 64oz of water daily. Increase physical activity by 10 minutes daily.  Gradually increase physical activity to a goal of 5 days per week for 30 minutes of MODERATE intensity PLUS 2 days per week of FULL BODY resistance training

## 2023-11-03 NOTE — PROGRESS NOTES
Assessment/Plan:    Class 3 severe obesity due to excess calories with serious comorbidity and body mass index (BMI) of 40.0 to 44.9 in adult [facility])  - Discussed options of HealthyCORE-Intensive Lifestyle Intervention Program, Very Low Calorie Diet-VLCD, Conservative Program, Librado-En-Y Gastric Bypass, and Vertical Sleeve Gastrectomy and the role of weight loss medications. - Explained the importance of making lifestyle changes first before starting anti-obesity medications. - Patient should demonstrate lifestyle changes first before anti-obesity medication initiated. - Patient is interested in pursuing Conservative Program including 60-minute visit with dietitian and 60-minute visit with  and follow up visits with medical weight management provider. - Initial weight loss goal of 5-10% weight loss for improved health  - Weight loss can improve patient's co-morbid conditions and/or prevent weight-related complications. - Labs reviewed from 2/2023 -patient will obtain updated CBC, CMP, thyroid, lipids, fasting insulin, A1c, vitamin D  - patient lifestyle habits were reviewed and barriers to weight loss were discussed. Patient will be meeting with dietitian to better balance her nutrition and focus on more balanced calories throughout the day. She was encouraged to start to choose more protein rich snacks to help with hunger and support her metabolism more evenly. Patient was encouraged to continue with her activity level and hydrating well with at least 64 ounces of water daily. Medications were discussed:  Phentermine to be avoided as patient has uncontrolled anxiety  GLP-1 medications were discussed and patient was interested in starting [facility][place]. Patient understands the supply concerns with this medication and will be willing to try to find it at a local pharmacy. Pen was demonstrated in the office today and questions were answered. We will evaluate blood work prior to prescribing.     [facility] Instructions:    - Begin Wegovy 0.25 mg subcutaneously once a week. Dose changes may occur after 4 doses if medication is tolerated. You will be assessed prior to each dose change to make sure you are tolerating the medication well. - Please message me when you have 2 pens left from the prescription so there are no lapses in treatment. - Visit [facility] for further information/injection instructions.   -Please eat small frequent meals to help reduce nausea. Lemon water and saltine crackers may help with this. - Side effects of Wegovy discussed: nausea, vomiting, diarrhea, and constipation. If you experience fever, nausea/vomiting, and pain radiating to your back this may be a sign of pancreatitis. Please go to the emergency room if this occurs. - Patient understands the side effects of the medication and proper administration. Patient agrees with the treatment plan and all questions were answered. Currently patient does not use CPAP  -Sleep referral ordered placed today and patient instructed to schedule appointment  -Discussed risks of untreated sleep apnea such as sudden cardiac death by arrhythmia, uncontrolled hypertension, difficulty with weight loss, decreased quality sleep, increased insulin resistance, and stroke.  -Sleep apnea often improves with dietary and lifestyle changes  -20-30% weight loss will also help with symptoms and complications of sleep apnea      Goals:  Calorie Goal: 2138-3478 calories per day  Do not skip meals. Food log via saida - options include  www. [employer].com, sparkpeople. com, loseit.com, calorieking. com, baritastic  No sugary beverages. At least 64oz of water daily. Increase physical activity by 10 minutes daily.  Gradually increase physical activity to a goal of 5 days per week for 30 minutes of MODERATE intensity PLUS 2 days per week of FULL BODY resistance training     Migraine with aura and without status migrainosus, not intractable  Treatment includes topirabrianne         Osmar Rios was seen today for consult. Diagnoses and all orders for this visit:    Class 3 severe obesity due to excess calories with serious comorbidity and body mass index (BMI) of 40.0 to 44.9 in adult ([facility])  -     CBC and differential; Future  -     Comprehensive metabolic panel; Future  -     HEMOGLOBIN A1C W/ EAG ESTIMATION; Future  -     Insulin, fasting; Future  -     Vitamin D 25 hydroxy; Future  -     TSH, 3rd generation with Free T4 reflex; Future  -     Lipid panel; Future  -     CBC and differential  -     Comprehensive metabolic panel  -     HEMOGLOBIN A1C W/ EAG ESTIMATION  -     Vitamin D 25 hydroxy  -     TSH, 3rd generation with Free T4 reflex  -     Lipid panel    BMI 40.0-44.9, adult ([address])  -     Ambulatory Referral to Weight Management    At risk for sleep apnea  -     Ambulatory referral to Sleep Medicine; Future    Family history of diabetes mellitus  -     HEMOGLOBIN A1C W/ EAG ESTIMATION; Future  -     Insulin, fasting; Future  -     HEMOGLOBIN A1C W/ EAG ESTIMATION    Migraines  -     CBC and differential; Future  -     Comprehensive metabolic panel; Future  -     TSH, 3rd generation with Free T4 reflex; Future  -     CBC and differential  -     Comprehensive metabolic panel  -     TSH, 3rd generation with Free T4 reflex    Fatigue  -     Ambulatory referral to Sleep Medicine; Future  -     CBC and differential; Future  -     Comprehensive metabolic panel; Future  -     HEMOGLOBIN A1C W/ EAG ESTIMATION; Future  -     Insulin, fasting; Future  -     Vitamin D 25 hydroxy; Future  -     TSH, 3rd generation with Free T4 reflex; Future  -     Lipid panel; Future  -     CBC and differential  -     Comprehensive metabolic panel  -     HEMOGLOBIN A1C W/ EAG ESTIMATION  -     Vitamin D 25 hydroxy  -     TSH, 3rd generation with Free T4 reflex  -     Lipid panel       Patient denies personal history of pancreatitis.  Patient also denies personal and family history of medullary thyroid cancer and multiple endocrine neoplasia type 2 (MEN 2 tumor). Patient denies any history of kidney stones, seizures, or glaucoma. Total time spent reviewing chart, interviewing patient, examining patient, discussing plan, answering all questions, and documentin min, with >50% face-to-face time spent counseling patient on nonsurgical interventions for the treatment of excess weight. Discussed in detail nonsurgical options including intensive lifestyle intervention program, very low-calorie diet program and conservative program.  Discussed the role of weight loss medications. Counseled patient on diet behavior and exercise modification for weight loss. Follow up in approximately 2 months with Non-Surgical Physician/Advanced Practitioner. Subjective:   Chief Complaint   Patient presents with    Consult     Pt is here for MWM consult       Patient ID: Seun Neal  is a 20 y.o. female with excess weight/obesity here to pursue weight management. Previous notes and records have been reviewed. Past Medical History:   Diagnosis Date    Allergic     Anxiety     Anxiety and depression     Asthma     Clotting disorder ([address])     Factor V Leiden ([address])     Headache(784.0)     Pulmonary embolism ([address])     Thrombophlebitis      Past Surgical History:   Procedure Laterality Date    BLADDER SURGERY      FL RETROGRADE PYELOGRAM  2022    FL VCUG VOIDING URETHROCYSTOGRAM  2018    NEPHRECTOMY Left 2020    Done laparoscopically at [address] 2022    Procedure: CYSTOSCOPY, retrograde pyelogram, and examination under anesthesia, dilation urethral stenosis; Surgeon: Santy Fofana MD;  Location: [facility] OR;  Service: Urology    URETER REVISION      WISDOM TOOTH EXTRACTION      woke up during procedure.        HPI:  Wt Readings from Last 20 Encounters:   23 112 kg (247 lb 3.2 oz)   10/04/23 112 kg (248 lb)   23 112 kg (248 lb)   23 116 kg (255 lb 15.3 oz)   07/24/23 112 kg (247 lb)   04/10/23 112 kg (248 lb)   04/03/23 112 kg (247 lb)   02/07/23 115 kg (252 lb 12.8 oz)   05/23/22 108 kg (239 lb)   05/17/22 105 kg (232 lb)   04/15/22 106 kg (232 lb 12.8 oz)   04/08/22 107 kg (236 lb 3.2 oz)   03/23/22 107 kg (235 lb)   02/09/22 108 kg (238 lb)   01/15/22 109 kg (240 lb)   12/09/21 112 kg (246 lb)   11/19/21 111 kg (244 lb)   10/19/21 109 kg (241 lb 3.2 oz)   09/25/21 110 kg (242 lb 3.2 oz)   09/09/21 104 kg (230 lb)       Patient presents today to medical weight management office for consult. Patient has been struggling with her weight for quite a few years. She has been trying to be more conscious about her nutrition, joined the gym and goes 3-4 times a week, and drinks water throughout the day. She is here today to help get guidance regarding her nutrition and to discuss other options for her weight loss. 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 She does find it difficult to incorporate snacks throughout the day as she is teaching all day. Patient currently has an IUD in place Guido Lombardo and does not get a period. Prior to getting the IUD her periods were frequent and heavy. She is currently treated with topiramate for her migraines and states this helps her appetite at times. Patient takes BuSpar, Zoloft, and hydroxyzine for her anxiety.   Patient does not want to discuss bariatric surgery at this time    Obesity/Excess Weight:  Severity: Severe  Onset:  many years    Modifiers: Diet and Exercise  Contributing factors: Poor Food Choices, Binge Eating, and Lack of knowledge of appropriate lifestyle changes  Associated symptoms: comorbid conditions, fatigue, increased joint pain, decreased exercise capacity, body image issues, decreased self esteem, decreased mobility, inability to do certain activities, and clothes do not fit    Diet recall:  B: protein shake with coffee  L: yogurt (oikos zero) with fruit and grapes  S: chocolate   D: pasta with chicken and vegetable (mom cooks)    Hydration: water - 40+ oz, coffee - cream and sugar, coke 2 times a week  Alcohol: socially  Smoking: no  Exercise: 3-4 times a week at the gym (cardio and strength)  Occupation: teach  Sleep: varies  STOP ban/8    Current weight: 247.2 lbs  Current BMI: 41.26  Current Waist Measurement: 49.5 in  Goal weight: 150-180 lbs        The following portions of the patient's history were reviewed and updated as appropriate: allergies, current medications, past family history, past medical history, past social history, past surgical history, and problem list.    Family History   Problem Relation Age of Onset    Hypertension Mother     Hyperlipidemia Mother     Diabetes Mother     Factor V Leiden deficiency Mother     Heart disease Father     Depression Father     ADD / ADHD Brother     Ovarian cancer Maternal Grandmother     Diabetes Other     Arthritis Family     Asthma Family     Cancer Family     Cervical cancer Family     Heart failure Family     Hyperlipidemia Family     Hypertension Family         Review of Systems   Constitutional:  Positive for fatigue. HENT:  Negative for sore throat. Respiratory:  Negative for cough and shortness of breath. Cardiovascular:  Negative for chest pain, palpitations and leg swelling. Gastrointestinal:  Negative for abdominal pain, constipation, diarrhea and nausea.        + occasional heartburn   Genitourinary:  Negative for dysuria. Musculoskeletal:  Negative for arthralgias and back pain. Skin:  Negative for rash. Neurological:  Positive for headaches (migraines). Psychiatric/Behavioral:  Negative for dysphoric mood. The patient is not nervous/anxious. Objective:  /70   Pulse 78   Ht 5' 4.9" (1.648 m)   Wt 112 kg (247 lb 3.2 oz)   BMI 41.26 kg/m²     Physical Exam  Vitals and nursing note reviewed. Constitutional:       Appearance: Normal appearance. She is obese.    HENT:      Head: Normocephalic. Pulmonary:      Effort: Pulmonary effort is normal.   Neurological:      General: No focal deficit present. Mental Status: She is alert and oriented to person, place, and time. Psychiatric:         Mood and Affect: Mood normal.         Behavior: Behavior normal.         Thought Content: Thought content normal.         Judgment: Judgment normal.                Labs and Imaging  Recent labs and imaging have been personally reviewed.   Lab Results   Component Value Date    WBC 6.6 02/07/2023    HGB 14.9 02/07/2023    HCT 44.7 02/07/2023    MCV 89 02/07/2023     02/07/2023     Lab Results   Component Value Date     11/24/2017    SODIUM 142 02/07/2023    K 4.6 02/07/2023     (H) 02/07/2023    CO2 18 (L) 02/07/2023    AGAP 8 05/16/2022    BUN 11 02/07/2023    CREATININE 0.95 02/07/2023    GLUC 89 02/07/2023    GLUF 122 (H) 05/16/2022    CALCIUM 8.8 05/16/2022    AST 14 02/07/2023    ALT 17 02/07/2023    ALKPHOS 79 05/16/2022    PROT 6.9 11/24/2017    TP 6.8 02/07/2023    BILITOT 0.3 11/24/2017    TBILI 0.2 02/07/2023    EGFR 85 02/07/2023     No results found for: "HGBA1C"  Lab Results   Component Value Date    GMC9LJVUSBYK 0.750 07/08/2020    TSH 1.770 02/07/2023     No results found for: "CHOLESTEROL"  No results found for: "HDL"  No results found for: "TRIG"  No results found for: "LDLCALC"

## 2023-11-11 LAB
25(OH)D3+25(OH)D2 SERPL-MCNC: 29.4 NG/ML (ref 30–100)
ALBUMIN SERPL-MCNC: 4.1 G/DL (ref 4–5)
ALBUMIN/GLOB SERPL: 1.7 {RATIO} (ref 1.2–2.2)
ALP SERPL-CCNC: 79 IU/L (ref 44–121)
ALT SERPL-CCNC: 18 IU/L (ref 0–32)
AST SERPL-CCNC: 16 IU/L (ref 0–40)
BASOPHILS # BLD AUTO: 0 X10E3/UL (ref 0–0.2)
BASOPHILS NFR BLD AUTO: 0 %
BILIRUB SERPL-MCNC: <0.2 MG/DL (ref 0–1.2)
BUN SERPL-MCNC: 10 MG/DL (ref 6–20)
BUN/CREAT SERPL: 9 (ref 9–23)
CALCIUM SERPL-MCNC: 9.3 MG/DL (ref 8.7–10.2)
CHLORIDE SERPL-SCNC: 109 MMOL/L (ref 96–106)
CHOLEST SERPL-MCNC: 169 MG/DL (ref 100–199)
CHOLEST/HDLC SERPL: 4.1 RATIO (ref 0–4.4)
CO2 SERPL-SCNC: 19 MMOL/L (ref 20–29)
CREAT SERPL-MCNC: 1.06 MG/DL (ref 0.57–1)
EGFR: 75 ML/MIN/1.73
EOSINOPHIL # BLD AUTO: 0.1 X10E3/UL (ref 0–0.4)
EOSINOPHIL NFR BLD AUTO: 2 %
ERYTHROCYTE [DISTWIDTH] IN BLOOD BY AUTOMATED COUNT: 12.6 % (ref 11.7–15.4)
EST. AVERAGE GLUCOSE BLD GHB EST-MCNC: 114 MG/DL
GLOBULIN SER-MCNC: 2.4 G/DL (ref 1.5–4.5)
GLUCOSE SERPL-MCNC: 101 MG/DL (ref 70–99)
HBA1C MFR BLD: 5.6 % (ref 4.8–5.6)
HCT VFR BLD AUTO: 43.5 % (ref 34–46.6)
HDLC SERPL-MCNC: 41 MG/DL
HGB BLD-MCNC: 14.5 G/DL (ref 11.1–15.9)
IMM GRANULOCYTES # BLD: 0 X10E3/UL (ref 0–0.1)
IMM GRANULOCYTES NFR BLD: 0 %
INSULIN SERPL-ACNC: 37.8 UIU/ML (ref 2.6–24.9)
LDLC SERPL CALC-MCNC: 107 MG/DL (ref 0–99)
LYMPHOCYTES # BLD AUTO: 2.1 X10E3/UL (ref 0.7–3.1)
LYMPHOCYTES NFR BLD AUTO: 30 %
MCH RBC QN AUTO: 29.5 PG (ref 26.6–33)
MCHC RBC AUTO-ENTMCNC: 33.3 G/DL (ref 31.5–35.7)
MCV RBC AUTO: 88 FL (ref 79–97)
MONOCYTES # BLD AUTO: 0.5 X10E3/UL (ref 0.1–0.9)
MONOCYTES NFR BLD AUTO: 7 %
NEUTROPHILS # BLD AUTO: 4.3 X10E3/UL (ref 1.4–7)
NEUTROPHILS NFR BLD AUTO: 61 %
PLATELET # BLD AUTO: 241 X10E3/UL (ref 150–450)
POTASSIUM SERPL-SCNC: 4.3 MMOL/L (ref 3.5–5.2)
PROT SERPL-MCNC: 6.5 G/DL (ref 6–8.5)
RBC # BLD AUTO: 4.92 X10E6/UL (ref 3.77–5.28)
SL AMB VLDL CHOLESTEROL CALC: 21 MG/DL (ref 5–40)
SODIUM SERPL-SCNC: 141 MMOL/L (ref 134–144)
TRIGL SERPL-MCNC: 116 MG/DL (ref 0–149)
TSH SERPL DL<=0.005 MIU/L-ACNC: 2 UIU/ML (ref 0.45–4.5)
WBC # BLD AUTO: 7 X10E3/UL (ref 3.4–10.8)

## 2023-12-06 ENCOUNTER — RA CDI HCC (OUTPATIENT)
Dept: OTHER | Facility: HOSPITAL | Age: 23
End: 2023-12-06

## 2023-12-06 NOTE — PROGRESS NOTES
[facility] [address] [facility] coding opportunities       Chart reviewed, no opportunity found: CHART REVIEWED, NO OPPORTUNITY FOUND        Patients Insurance        Commercial Insurance: [address] Retinoid Dermatitis Aggressive Treatment: I recommended more frequent application of Cetaphil or CeraVe to the areas of dermatitis. I also prescribed a topical steroid for twice daily use until the dermatitis resolves. What Is The Patient's Gender: Female Cheilitis Aggressive Treatment: I recommended application of Vaseline or Aquaphor numerous times a day (as often as every hour) and before going to bed. I also prescribed a topical steroid for twice daily use. Female Pregnancy Counseling Text: Female patients should also be on two forms of birth control while taking this medication and for one month after their last dose. Myalgia Treatment: I explained this is common when taking isotretinoin. If this worsens they will contact us. They may try OTC ibuprofen. Pounds Preamble Statement (Weight Entered In Details Tab): Reported Weight in pounds: Use Enhanced Counseling Feature (Automatic): No Headache Monitoring: I recommended monitoring the headaches for now. There is no evidence of increased intracranial pressure. They were instructed to call if the headaches are worsening. Hypercholesterolemia Monitoring: I explained this is common when taking isotretinoin. We will monitor closely. Retinoid Dermatitis Normal Treatment: I recommended more frequent application of Cetaphil or CeraVe to the areas of dermatitis. Cheilitis Normal Treatment: I recommended application of Vaseline or Aquaphor numerous times a day (as often as every hour) and before going to bed. Target Cumulative Dosage (In Mg/Kg): 135 Use Therapeutic Ranged Or Therapeutic Target: please select Range or Target Months Of Therapy Completed: 2 Add Associated Diagnosis When Managing Medication Side Effects: Yes Xerosis Aggressive Treatment: I recommended application of Cetaphil or CeraVe numerous times a day going to bed to all dry areas. I also prescribed a topical steroid for twice daily use. Weight Units: pounds Kilograms Preamble Statement (Weight Entered In Details Tab): Reported Weight in kilograms: Nosebleeds Normal Treatment: I explained this is common when taking isotretinoin. I recommended saline mist in each nostril multiple times a day. If this worsens they will contact us. Next Month's Dosage: Continue Current Dosage Female Completion Statement: After discussing her treatment course we decided to discontinue isotretinoin therapy at this time. I explained that she would need to continue her birth control methods for at least one month after the last dosage. She should also get a pregnancy test one month after the last dose. She shouldn't donate blood for one month after the last dose. She should call with any new symptoms of depression. Dosing Month 2 (Required For Cumulative Dosing): 60mg Daily Counseling Text: I reviewed the side effect in detail. Patient should get monthly blood tests, not donate blood, not drive at night if vision affected, and not share medication. Depression Monitoring: Orlando Rivera MD will check in with patient Upper Range (In Mg/Kg): 150 Patient Weight (Optional But Required For Cumulative Dose-Numbers And Decimals Only): 118 Hypertriglyceridemia Monitoring: I explained this is common when taking isotretinoin. If this worsens they will contact us. Xerosis Normal Treatment: I recommended application of Cetaphil or CeraVe numerous times a day and before going to bed to all dry areas. Xerosis Aggressive Treatment: I recommended application of Cetaphil or CeraVe numerous times a day and before going to bed to all dry areas. I also prescribed a topical steroid for twice daily use. Male Completion Statement: After discussing his treatment course we decided to discontinue isotretinoin therapy at this time. He shouldn't donate blood for one month after the last dose. He should call with any new symptoms of depression. Lower Range (In Mg/Kg): 120 Xerosis Normal Treatment: I recommended application of Cetaphil or CeraVe numerous times a day going to bed to all dry areas. Detail Level: Detailed

## 2023-12-07 ENCOUNTER — TELEPHONE (OUTPATIENT)
Dept: BARIATRICS | Facility: CLINIC | Age: 23
End: 2023-12-07

## 2023-12-07 NOTE — TELEPHONE ENCOUNTER
Pt would like to r/s previous cancelled appt. Left vm to call office.  Verify with pt which appt shed like r/s

## 2023-12-13 ENCOUNTER — APPOINTMENT (OUTPATIENT)
Dept: RADIOLOGY | Facility: CLINIC | Age: 23
End: 2023-12-13
Payer: COMMERCIAL

## 2023-12-13 ENCOUNTER — OFFICE VISIT (OUTPATIENT)
Dept: FAMILY MEDICINE CLINIC | Facility: CLINIC | Age: 23
End: 2023-12-13
Payer: COMMERCIAL

## 2023-12-13 VITALS
WEIGHT: 247 LBS | HEART RATE: 96 BPM | SYSTOLIC BLOOD PRESSURE: 130 MMHG | DIASTOLIC BLOOD PRESSURE: 90 MMHG | TEMPERATURE: 97.1 F | OXYGEN SATURATION: 97 % | HEIGHT: 64 IN | RESPIRATION RATE: 16 BRPM | BODY MASS INDEX: 42.17 KG/M2

## 2023-12-13 DIAGNOSIS — F41.8 DEPRESSION WITH ANXIETY: Primary | ICD-10-CM

## 2023-12-13 DIAGNOSIS — M25.472 LEFT ANKLE SWELLING: ICD-10-CM

## 2023-12-13 DIAGNOSIS — J45.30 MILD PERSISTENT ASTHMA WITHOUT COMPLICATION: ICD-10-CM

## 2023-12-13 DIAGNOSIS — T78.40XS ALLERGIC REACTION, SEQUELA: ICD-10-CM

## 2023-12-13 PROCEDURE — 99214 OFFICE O/P EST MOD 30 MIN: CPT | Performed by: FAMILY MEDICINE

## 2023-12-13 PROCEDURE — 73610 X-RAY EXAM OF ANKLE: CPT

## 2023-12-13 RX ORDER — EPINEPHRINE 0.3 MG/.3ML
0.3 INJECTION SUBCUTANEOUS ONCE
Qty: 0.6 ML | Refills: 0 | Status: SHIPPED | OUTPATIENT
Start: 2023-12-13 | End: 2023-12-13

## 2023-12-13 RX ORDER — FLUTICASONE PROPIONATE AND SALMETEROL 250; 50 UG/1; UG/1
1 POWDER RESPIRATORY (INHALATION) 2 TIMES DAILY
Qty: 60 BLISTER | Refills: 8 | Status: SHIPPED | OUTPATIENT
Start: 2023-12-13

## 2023-12-13 RX ORDER — ALBUTEROL SULFATE 90 UG/1
2 AEROSOL, METERED RESPIRATORY (INHALATION) EVERY 4 HOURS PRN
Qty: 18 G | Refills: 6 | Status: SHIPPED | OUTPATIENT
Start: 2023-12-13

## 2023-12-13 NOTE — PROGRESS NOTES
Nicki Rolon 2000 female MRN: [id number]    [facility] PRACTICE OFFICE VISIT  [address]      ASSESSMENT/PLAN  Nicki Rloon is a 20 y.o. female presents to the office for    Diagnoses and all orders for this visit:    Depression with anxiety    Left ankle swelling  -     XR ankle 3+ vw left; Future  -     Ambulatory Referral to Podiatry; Future    Mild persistent asthma without complication  -     Fluticasone-Salmeterol (Advair Diskus) 250-50 mcg/dose inhaler; Inhale 1 puff 2 (two) times a day Rinse mouth after use. -     albuterol (PROVENTIL HFA,VENTOLIN HFA) 90 mcg/act inhaler; Inhale 2 puffs every 4 (four) hours as needed for wheezing or shortness of breath    Wheezing    Allergic reaction, sequela  -     EPINEPHrine (EPIPEN) 0.3 mg/0.3 mL SOAJ; Inject 0.3 mL (0.3 mg total) into a muscle once for 1 dose    Strain versus acute fracture will send for imaging. Given that it has been 1 month. Refer to podiatry if the symptoms worsen. Recommend Ace bandage to be applied  Asthmatic refilled all medications today. History of high Synnamon allergy epinephrine pen refilled today  Depression with anxiety refills all sent patient doing very well on the medication           Future Appointments   Date Time Provider [facility] [address]   1/24/2024  2:00 PM OLEG Moreno [facility]          SUBJECTIVE  CC: Foot Injury (Left heel and ankle pain )      HPI:  Nicki Rolon is a 20 y.o. female who presents for an acute appointment. 1 month ago the patient states she was chasing around a little kid at her class and felt like her ankle sprains. For 1 month she has not been able to bear weight but was unable to come in for an appointment. Patient states that she has left heel pain and left ankle pain with range of motion. Has not had an allergy reaction to cinnamon recently but does need a refill of her EpiPen.   Depression with anxiety taking her meds as prescribed without any difficulties. Will need a refill on all her asthmatic meds currently asymptomatic  Review of Systems   Constitutional:  Negative for activity change, appetite change, chills, fatigue and fever. HENT:  Negative for congestion. Respiratory:  Negative for cough, chest tightness and shortness of breath. Cardiovascular:  Negative for chest pain and leg swelling. Gastrointestinal:  Negative for abdominal distention, abdominal pain, constipation, diarrhea, nausea and vomiting. Musculoskeletal:  Positive for arthralgias. All other systems reviewed and are negative. Historical Information   The patient history was reviewed as follows:  Past Medical History:   Diagnosis Date    Allergic     Anxiety     Anxiety and depression     Asthma     Clotting disorder ([address])     Factor V Leiden ([address])     Headache(784.0)     Pulmonary embolism (HCC)     Thrombophlebitis          Medications:     Current Outpatient Medications:     albuterol (PROVENTIL HFA,VENTOLIN HFA) 90 mcg/act inhaler, Inhale 2 puffs every 4 (four) hours as needed for wheezing or shortness of breath, Disp: 18 g, Rfl: 6    butalbital-acetaminophen-caffeine (FIORICET,ESGIC) -40 mg per tablet, Take 1 tablet now, and then repeat in 4 hrs if no relief.  Don't take more then 3 in a week., Disp: 30 tablet, Rfl: 0    Fluticasone-Salmeterol (Advair Diskus) 250-50 mcg/dose inhaler, Inhale 1 puff 2 (two) times a day Rinse mouth after use., Disp: 60 blister, Rfl: 8    hydrOXYzine HCL (ATARAX) 50 mg tablet, Take 1 tablet (50 mg total) by mouth daily at bedtime, Disp: 90 tablet, Rfl: 2    levonorgestrel (KYLEENA) 19.5 MG intrauterine device, 1 each by Intrauterine route, Disp: , Rfl:     ondansetron (Zofran ODT) 4 mg disintegrating tablet, Take 1 tablet (4 mg total) by mouth every 6 (six) hours as needed for nausea or vomiting, Disp: 20 tablet, Rfl: 3    sertraline (ZOLOFT) 50 mg tablet, TAKE 3 TABLETS BY MOUTH EVERY DAY AT BEDTIME, Disp: 270 tablet, Rfl: 3    topiramate (TOPAMAX) 50 MG tablet, Take 1 tablet (50 mg total) by mouth daily at bedtime, Disp: 90 tablet, Rfl: 4    busPIRone (BUSPAR) 15 mg tablet, Take 1 tablet (15 mg total) by mouth 3 (three) times a day, Disp: 270 tablet, Rfl: 3    cetirizine (ZyrTEC) 10 mg tablet, Take 10 mg by mouth daily (Patient not taking: Reported on 11/3/2023), Disp: , Rfl:     EPINEPHrine (EPIPEN) 0.3 mg/0.3 mL SOAJ, Inject 0.3 mL (0.3 mg total) into a muscle once for 1 dose (Patient not taking: Reported on 11/3/2023), Disp: 0.6 mL, Rfl: 0    multivitamin (THERAGRAN) TABS, Take 1 tablet by mouth daily (Patient not taking: Reported on 11/3/2023), Disp: , Rfl:     SUMAtriptan (IMITREX) 100 mg tablet, TAKE 1 TABLET BY MOUTH ONCE AS NEEDED FOR MIGRAINE REPEAT 1 DOSE 6 HOURS LATER NO MORE THAN 3 DAYS A WEEK AS DIRECTED (Patient not taking: Reported on 11/3/2023), Disp: 15 tablet, Rfl: 0    Allergies   Allergen Reactions    Amoxicillin Anaphylaxis    Cinnamon - Food Allergy Anaphylaxis       OBJECTIVE  Vitals:   Vitals:    12/13/23 0729   BP: 130/90   BP Location: Right arm   Patient Position: Sitting   Cuff Size: Large   Pulse: 96   Resp: 16   Temp: (!) 97.1 °F (36.2 °C)   SpO2: 97%   Weight: 112 kg (247 lb)   Height: 5' 4" (1.626 m)         Physical Exam  Vitals reviewed. Constitutional:       Appearance: She is well-developed. HENT:      Head: Normocephalic and atraumatic. Eyes:      Conjunctiva/sclera: Conjunctivae normal.      Pupils: Pupils are equal, round, and reactive to light. Cardiovascular:      Rate and Rhythm: Normal rate and regular rhythm. Heart sounds: Normal heart sounds. Pulmonary:      Effort: Pulmonary effort is normal. No respiratory distress. Breath sounds: Normal breath sounds. Musculoskeletal:         General: Normal range of motion. Cervical back: Normal range of motion and neck supple.       Comments: Tenderness over the later aspect; with mild swelling. Decrease range of motion     Skin:     General: Skin is warm. Capillary Refill: Capillary refill takes less than 2 seconds. Neurological:      Mental Status: She is alert and oriented to person, place, and time.                   Danny Lr MD,   [facility]  12/13/2023

## 2023-12-19 ENCOUNTER — OFFICE VISIT (OUTPATIENT)
Age: 23
End: 2023-12-19
Payer: COMMERCIAL

## 2023-12-19 ENCOUNTER — OFFICE VISIT (OUTPATIENT)
Dept: FAMILY MEDICINE CLINIC | Facility: CLINIC | Age: 23
End: 2023-12-19
Payer: COMMERCIAL

## 2023-12-19 VITALS
WEIGHT: 244 LBS | HEART RATE: 91 BPM | OXYGEN SATURATION: 97 % | HEIGHT: 64 IN | BODY MASS INDEX: 41.66 KG/M2 | DIASTOLIC BLOOD PRESSURE: 80 MMHG | TEMPERATURE: 98.5 F | SYSTOLIC BLOOD PRESSURE: 124 MMHG | RESPIRATION RATE: 16 BRPM

## 2023-12-19 VITALS
BODY MASS INDEX: 41.66 KG/M2 | DIASTOLIC BLOOD PRESSURE: 91 MMHG | HEART RATE: 79 BPM | SYSTOLIC BLOOD PRESSURE: 149 MMHG | HEIGHT: 64 IN | WEIGHT: 244 LBS

## 2023-12-19 DIAGNOSIS — R05.1 ACUTE COUGH: ICD-10-CM

## 2023-12-19 DIAGNOSIS — J45.30 MILD PERSISTENT ASTHMA WITHOUT COMPLICATION: ICD-10-CM

## 2023-12-19 DIAGNOSIS — M25.472 LEFT ANKLE SWELLING: ICD-10-CM

## 2023-12-19 DIAGNOSIS — J20.9 ACUTE BRONCHITIS, UNSPECIFIED ORGANISM: Primary | ICD-10-CM

## 2023-12-19 DIAGNOSIS — M76.72 PERONEAL TENDONITIS OF LEFT LOWER LEG: Primary | ICD-10-CM

## 2023-12-19 DIAGNOSIS — M72.2 PLANTAR FASCIITIS OF LEFT FOOT: ICD-10-CM

## 2023-12-19 LAB
SARS-COV-2 AG UPPER RESP QL IA: NEGATIVE
VALID CONTROL: NORMAL

## 2023-12-19 PROCEDURE — 99204 OFFICE O/P NEW MOD 45 MIN: CPT | Performed by: STUDENT IN AN ORGANIZED HEALTH CARE EDUCATION/TRAINING PROGRAM

## 2023-12-19 PROCEDURE — 87811 SARS-COV-2 COVID19 W/OPTIC: CPT | Performed by: NURSE PRACTITIONER

## 2023-12-19 PROCEDURE — 99214 OFFICE O/P EST MOD 30 MIN: CPT | Performed by: NURSE PRACTITIONER

## 2023-12-19 RX ORDER — AZITHROMYCIN 250 MG/1
TABLET, FILM COATED ORAL DAILY
Qty: 6 TABLET | Refills: 0 | Status: SHIPPED | OUTPATIENT
Start: 2023-12-19 | End: 2023-12-24

## 2023-12-19 RX ORDER — BENZONATATE 200 MG/1
200 CAPSULE ORAL 3 TIMES DAILY PRN
Qty: 20 CAPSULE | Refills: 0 | OUTPATIENT
Start: 2023-12-19 | End: 2023-12-28

## 2023-12-19 RX ORDER — PREDNISONE 20 MG/1
20 TABLET ORAL DAILY
Qty: 5 TABLET | Refills: 0 | Status: SHIPPED | OUTPATIENT
Start: 2023-12-19 | End: 2023-12-24

## 2023-12-19 RX ORDER — MELOXICAM 15 MG/1
15 TABLET ORAL DAILY
Qty: 30 TABLET | Refills: 0 | Status: SHIPPED | OUTPATIENT
Start: 2023-12-19

## 2023-12-19 NOTE — PROGRESS NOTES
Name: Misty Blackmon      : 2000      MRN: [id number]  Encounter Provider: OLEG Arana  Encounter Date: 2023   Encounter department: [facility]    Assessment & Plan     1. Acute cough  - POCT Rapid Covid Ag- neg  - predniSONE 20 mg tablet; Take 1 tablet (20 mg total) by mouth daily for 5 days  Dispense: 5 tablet; Refill: 0  - benzonatate (TESSALON) 200 MG capsule; Take 1 capsule (200 mg total) by mouth 3 (three) times a day as needed for cough  Dispense: 20 capsule; Refill: 0    2. Acute bronchitis, unspecified organism  Given underlying asthma, will treat with abx and oral steroids.   Continue Advair.   Rescue inhaler 2 puffs as needed for shortness breath, chest tightness, bronchospasm, coughing fits.   Zithromax 250mg - take 2 tablets today and then 1 tablet daily for 4 days.   Prednisone 20mg daily with food for 5 days to decrease inflammation in airways  Tessalon perles as needed for cough  Call or return to office if symptoms worsen or if new symptoms develop.   - predniSONE 20 mg tablet; Take 1 tablet (20 mg total) by mouth daily for 5 days  Dispense: 5 tablet; Refill: 0  - azithromycin (Zithromax) 250 mg tablet; Take 2 tablets (500 mg total) by mouth daily for 1 day, THEN 1 tablet (250 mg total) daily for 4 days.  Dispense: 6 tablet; Refill: 0  - benzonatate (TESSALON) 200 MG capsule; Take 1 capsule (200 mg total) by mouth 3 (three) times a day as needed for cough  Dispense: 20 capsule; Refill: 0    3. Mild persistent asthma without complication  Rescue inhaler 2 puffs as needed for shortness breath, chest tightness, bronchospasm, coughing fits.   - predniSONE 20 mg tablet; Take 1 tablet (20 mg total) by mouth daily for 5 days  Dispense: 5 tablet; Refill: 0         Subjective      Here for cough and congestion  Symptoms started 6 days ago. Started as cold but now feels like it is in her chest.   Home covid test negative on Saturday (3 days ago)  No fever.    History of asthma. On advair and has rescue inhaler. Has been using rescue inhaler about every 4 hours  Cough is keeping her awake  Chest feels tight and trouble taking deep breathing. (+) wheeze.       Cough  Associated symptoms include wheezing. Pertinent negatives include no fever.     Review of Systems   Constitutional:  Negative for fever.   HENT:  Positive for congestion.    Respiratory:  Positive for cough, chest tightness and wheezing.    Gastrointestinal:  Negative for nausea.   Musculoskeletal:  Negative for back pain.   Allergic/Immunologic: Negative for immunocompromised state.   Hematological:  Negative for adenopathy.       Current Outpatient Medications on File Prior to Visit   Medication Sig    albuterol (PROVENTIL HFA,VENTOLIN HFA) 90 mcg/act inhaler Inhale 2 puffs every 4 (four) hours as needed for wheezing or shortness of breath    butalbital-acetaminophen-caffeine (FIORICET,ESGIC) -40 mg per tablet Take 1 tablet now, and then repeat in 4 hrs if no relief. Don't take more then 3 in a week.    Fluticasone-Salmeterol (Advair Diskus) 250-50 mcg/dose inhaler Inhale 1 puff 2 (two) times a day Rinse mouth after use.    hydrOXYzine HCL (ATARAX) 50 mg tablet Take 1 tablet (50 mg total) by mouth daily at bedtime    levonorgestrel (KYLEENA) 19.5 MG intrauterine device 1 each by Intrauterine route    ondansetron (Zofran ODT) 4 mg disintegrating tablet Take 1 tablet (4 mg total) by mouth every 6 (six) hours as needed for nausea or vomiting    sertraline (ZOLOFT) 50 mg tablet TAKE 3 TABLETS BY MOUTH EVERY DAY AT BEDTIME    topiramate (TOPAMAX) 50 MG tablet Take 1 tablet (50 mg total) by mouth daily at bedtime    busPIRone (BUSPAR) 15 mg tablet Take 1 tablet (15 mg total) by mouth 3 (three) times a day    EPINEPHrine (EPIPEN) 0.3 mg/0.3 mL SOAJ Inject 0.3 mL (0.3 mg total) into a muscle once for 1 dose    SUMAtriptan (IMITREX) 100 mg tablet TAKE 1 TABLET BY MOUTH ONCE AS NEEDED FOR MIGRAINE REPEAT 1  "DOSE 6 HOURS LATER NO MORE THAN 3 DAYS A WEEK AS DIRECTED (Patient not taking: Reported on 11/3/2023)       Objective   Poct covid negative    /80 (BP Location: Right arm, Patient Position: Sitting, Cuff Size: Large)   Pulse 91   Temp 98.5 °F (36.9 °C)   Resp 16   Ht 5' 4\" (1.626 m)   Wt 111 kg (244 lb)   SpO2 97%   BMI 41.88 kg/m²     Physical Exam  Vitals reviewed.   Constitutional:       Appearance: She is ill-appearing.   HENT:      Nose: Congestion (mild) present.   Cardiovascular:      Rate and Rhythm: Normal rate and regular rhythm.   Pulmonary:      Effort: Pulmonary effort is normal. No respiratory distress.      Breath sounds: Normal breath sounds. No wheezing or rhonchi.      Comments: Cough triggered by deep breath during exam  Lymphadenopathy:      Cervical: No cervical adenopathy.   Skin:     General: Skin is warm and dry.      Coloration: Skin is not jaundiced or pale.   Neurological:      General: No focal deficit present.      Mental Status: She is alert and oriented to person, place, and time.   Psychiatric:         Mood and Affect: Mood normal.         Behavior: Behavior normal.         Thought Content: Thought content normal.         Judgment: Judgment normal.       OLEG Arana    "

## 2023-12-19 NOTE — PATIENT INSTRUCTIONS
Purchase a supportive over-the-counter pair of inserts such as Superfeet, powersteps, tread labs  Purchase a supportive pair of sneakers such as Hernández, Hovale, Samoiraony, New Balance  Purchase and use Voltaren gel to left ankle

## 2023-12-19 NOTE — PATIENT INSTRUCTIONS
Rapid covid test done in the office today was negative  Continue Advair.   Rescue inhaler 2 puffs as needed for shortness breath, chest tightness, bronchospasm, coughing fits.   Zithromax 250mg - take 2 tablets today and then 1 tablet daily for 4 days.   Prednisone 20mg daily with food for 5 days to decrease inflammation in airways  Tessalon perles as needed for cough  Call or return to office if symptoms worsen or if new symptoms develop.

## 2023-12-19 NOTE — PROGRESS NOTES
This patient was seen on 12/19/2023.    My role is Foot , Ankle, and Wound Specialist    ASSESSMENT     Diagnoses and all orders for this visit:    Peroneal tendonitis of left lower leg  -     Brace  -     meloxicam (Mobic) 15 mg tablet; Take 1 tablet (15 mg total) by mouth daily    Left ankle swelling  -     Ambulatory Referral to Podiatry    Plantar fasciitis of left foot  -     P.F. Night Splint  -     meloxicam (Mobic) 15 mg tablet; Take 1 tablet (15 mg total) by mouth daily         Problem List Items Addressed This Visit          Musculoskeletal and Integument    Plantar fasciitis of left foot    Relevant Medications    meloxicam (Mobic) 15 mg tablet    Other Relevant Orders    P.F. Night Splint    Peroneal tendonitis of left lower leg - Primary    Relevant Medications    meloxicam (Mobic) 15 mg tablet    Other Relevant Orders    Brace     Other Visit Diagnoses       Left ankle swelling              PLAN  -Patient was educated regarding their condition.  -Rx night splints  -Educated patient on plantar fasciitis stretching program to be performed daily  -Recommend purchase of supportive shoes with wide toe box. Recommend purchase of OTC orthosis (superfeet, powersteps, or tread labs).  -Rx meloxicam  -Corticosteroid injection discussed today, we will consider this at her next visit should the pain to her left plantar foot not be improved.  -Rx ASO brace to be worn on the left foot  -Recommend Voltaren gel to left lateral ankle  -Patient will RTC in 4 weeks, we will consider physical therapy and corticosteroid injection at this time should no improvement be made.    -X-ray from 12/19/2023 of the left ankle was interpreted by myself: No acute osseous abnormality noted.  No abnormalities noted within the soft tissues.      SUBJECTIVE    Chief Complaint:  Left foot pain     Patient ID: Misty Blackmon     12/19/2023: Misty is a pleasant 23-year-old female who presents today with left foot pain.  She states that  "this started approximately 1 month ago.  She did not injure it at the time, however she does state that she rolled her left ankle quite frequently.  She denies any laxity to other joints in her body.  She states that she has been wrapping it which has helped slightly, she also states that she has attempted taking Tylenol and Motrin which have not helped.  She states that her foot hurts most when she is up and walking on after a long day, better at rest.        The following portions of the patient's history were reviewed and updated as appropriate: allergies, current medications, past family history, past medical history, past social history, past surgical history and problem list.    Review of Systems   Constitutional: Negative.    HENT: Negative.     Respiratory: Negative.     Cardiovascular: Negative.    Gastrointestinal: Negative.    Musculoskeletal:  Positive for myalgias.   Skin: Negative.    Neurological: Negative.          OBJECTIVE      /91   Pulse 79   Ht 5' 4\" (1.626 m) Comment: verbal  Wt 111 kg (244 lb) Comment: verbal  BMI 41.88 kg/m²        Physical Exam  Constitutional:       Appearance: Normal appearance.   HENT:      Head: Normocephalic and atraumatic.   Eyes:      General:         Right eye: No discharge.         Left eye: No discharge.   Cardiovascular:      Rate and Rhythm: Normal rate and regular rhythm.      Pulses:           Dorsalis pedis pulses are 2+ on the right side and 2+ on the left side.        Posterior tibial pulses are 2+ on the right side and 2+ on the left side.   Pulmonary:      Effort: Pulmonary effort is normal.      Breath sounds: Normal breath sounds.   Skin:     General: Skin is warm.      Capillary Refill: Capillary refill takes less than 2 seconds.   Neurological:      Sensory: Sensation is intact. No sensory deficit.           MSK:  -Pain on palpation to the left ankle just inferior to the lateral malleolus continuing distally towards the fifth metatarsal base " at the insertion site of the peroneus brevis  -Pain with plantarflexion and eversion against resistance  -Pain on palpation of medial calcaneal tubercle at the insertion site of the plantar fascia  -no pain with compression of both sides of heel  -No gross deformities noted  -Active range of motion lesser digits intact  -MMT 5/5 to all muscle compartments of the lower extremity  -Ankle dorsiflexion is less than 10 degrees with knee extended, and knee flexed    Neuro:  -Light sensation intact bilaterally  -Protective sensation intact bilaterally  -Tinel sign negative

## 2023-12-27 ENCOUNTER — OFFICE VISIT (OUTPATIENT)
Dept: URGENT CARE | Facility: CLINIC | Age: 23
End: 2023-12-27
Payer: COMMERCIAL

## 2023-12-27 ENCOUNTER — TELEPHONE (OUTPATIENT)
Age: 23
End: 2023-12-27

## 2023-12-27 VITALS
HEIGHT: 65 IN | WEIGHT: 248 LBS | HEART RATE: 128 BPM | OXYGEN SATURATION: 96 % | BODY MASS INDEX: 41.32 KG/M2 | TEMPERATURE: 98.4 F | RESPIRATION RATE: 16 BRPM | SYSTOLIC BLOOD PRESSURE: 145 MMHG | DIASTOLIC BLOOD PRESSURE: 76 MMHG

## 2023-12-27 DIAGNOSIS — R09.82 ALLERGIC RHINITIS WITH POSTNASAL DRIP: Primary | ICD-10-CM

## 2023-12-27 DIAGNOSIS — J30.9 ALLERGIC RHINITIS WITH POSTNASAL DRIP: Primary | ICD-10-CM

## 2023-12-27 PROCEDURE — 99213 OFFICE O/P EST LOW 20 MIN: CPT | Performed by: FAMILY MEDICINE

## 2023-12-27 NOTE — PROGRESS NOTES
[facility]        NAME: Misty Blackmon is a 23 y.o. female  : 2000    MRN: [id number]  DATE: 2023  TIME: 1:39 PM    Assessment and Plan   Allergic rhinitis with postnasal drip [J30.9, R09.82]  1. Allergic rhinitis with postnasal drip          Perceived worsening of bronchitis is likely a transition from bronchitis to postnasal drip secondary to allergic triggers.  Patient instructed on starting Claritin and using Flonase twice daily for the next few days.  Advised to hydrate with plenty of fluids to counteract dehydration/tachycardia.    Patient Instructions     Follow up with PCP in 3-5 days.  Proceed to  ER if symptoms worsen.    Chief Complaint     Chief Complaint   Patient presents with    Bronchitis     Patient diagnosed with bronchitis last week and is getting worse. She has been using z-pack and steroids.          History of Present Illness       23-year-old female with 3 weeks of respiratory symptoms.  Was diagnosed with acute bronchitis over a week ago and prescribed azithromycin, steroid and Tessalon Perles while using her albuterol inhaler.  Today she reports that the entire treatment regimen was ineffective and her symptoms are worsening.  Describes chest pain with coughing triggered by talking and laughing. URI symptoms.      Review of Systems   Review of Systems   Constitutional:  Negative for chills and fever.   HENT:  Positive for congestion, ear pain, rhinorrhea and sinus pressure. Negative for sore throat.    Respiratory:  Positive for cough and chest tightness. Negative for shortness of breath.    Cardiovascular:  Positive for chest pain.   Gastrointestinal:  Negative for abdominal pain.   Neurological:  Negative for dizziness and headaches.     Current Medications       Current Outpatient Medications:     albuterol (PROVENTIL HFA,VENTOLIN HFA) 90 mcg/act inhaler, Inhale 2 puffs every 4 (four) hours as needed for wheezing or shortness of breath, Disp: 18 g, Rfl:  6    benzonatate (TESSALON) 200 MG capsule, Take 1 capsule (200 mg total) by mouth 3 (three) times a day as needed for cough, Disp: 20 capsule, Rfl: 0    butalbital-acetaminophen-caffeine (FIORICET,ESGIC) -40 mg per tablet, Take 1 tablet now, and then repeat in 4 hrs if no relief. Don't take more then 3 in a week., Disp: 30 tablet, Rfl: 0    Fluticasone-Salmeterol (Advair Diskus) 250-50 mcg/dose inhaler, Inhale 1 puff 2 (two) times a day Rinse mouth after use., Disp: 60 blister, Rfl: 8    hydrOXYzine HCL (ATARAX) 50 mg tablet, Take 1 tablet (50 mg total) by mouth daily at bedtime, Disp: 90 tablet, Rfl: 2    levonorgestrel (KYLEENA) 19.5 MG intrauterine device, 1 each by Intrauterine route, Disp: , Rfl:     meloxicam (Mobic) 15 mg tablet, Take 1 tablet (15 mg total) by mouth daily, Disp: 30 tablet, Rfl: 0    ondansetron (Zofran ODT) 4 mg disintegrating tablet, Take 1 tablet (4 mg total) by mouth every 6 (six) hours as needed for nausea or vomiting, Disp: 20 tablet, Rfl: 3    sertraline (ZOLOFT) 50 mg tablet, TAKE 3 TABLETS BY MOUTH EVERY DAY AT BEDTIME, Disp: 270 tablet, Rfl: 3    SUMAtriptan (IMITREX) 100 mg tablet, TAKE 1 TABLET BY MOUTH ONCE AS NEEDED FOR MIGRAINE REPEAT 1 DOSE 6 HOURS LATER NO MORE THAN 3 DAYS A WEEK AS DIRECTED, Disp: 15 tablet, Rfl: 0    topiramate (TOPAMAX) 50 MG tablet, Take 1 tablet (50 mg total) by mouth daily at bedtime, Disp: 90 tablet, Rfl: 4    busPIRone (BUSPAR) 15 mg tablet, Take 1 tablet (15 mg total) by mouth 3 (three) times a day, Disp: 270 tablet, Rfl: 3    EPINEPHrine (EPIPEN) 0.3 mg/0.3 mL SOAJ, Inject 0.3 mL (0.3 mg total) into a muscle once for 1 dose, Disp: 0.6 mL, Rfl: 0    Current Allergies     Allergies as of 12/27/2023 - Reviewed 12/27/2023   Allergen Reaction Noted    Amoxicillin Anaphylaxis 06/21/2017    Cinnamon - food allergy Anaphylaxis 07/01/2020            The following portions of the patient's history were reviewed and updated as appropriate: allergies,  "current medications, past family history, past medical history, past social history, past surgical history and problem list.     Past Medical History:   Diagnosis Date    Allergic     Anxiety     Anxiety and depression     Asthma     Clotting disorder (HCC)     Factor V Leiden (HCC)     Headache(784.0)     Pulmonary embolism (HCC)     Thrombophlebitis        Past Surgical History:   Procedure Laterality Date    BLADDER SURGERY      FL RETROGRADE PYELOGRAM  05/20/2022    FL VCUG VOIDING URETHROCYSTOGRAM  8/7/2018    NEPHRECTOMY Left 05/21/2020    Done laparoscopically at [facility]    OR CYSTOURETHROSCOPY N/A 05/20/2022    Procedure: CYSTOSCOPY, retrograde pyelogram, and examination under anesthesia, dilation urethral stenosis;  Surgeon: Kamran Lott MD;  Location: [facility];  Service: Urology    URETER REVISION      WISDOM TOOTH EXTRACTION      woke up during procedure.       Family History   Problem Relation Age of Onset    Hypertension Mother     Hyperlipidemia Mother     Diabetes Mother     Factor V Leiden deficiency Mother     Heart disease Father     Depression Father     ADD / ADHD Brother     Ovarian cancer Maternal Grandmother     Diabetes Other     Arthritis Family     Asthma Family     Cancer Family     Cervical cancer Family     Heart failure Family     Hyperlipidemia Family     Hypertension Family          Medications have been verified.        Objective   /76   Pulse (!) 128   Temp 98.4 °F (36.9 °C)   Resp 16   Ht 5' 5\" (1.651 m)   Wt 112 kg (248 lb)   SpO2 96%   BMI 41.27 kg/m²   No LMP recorded. Patient has had an implant.       Physical Exam     Physical Exam  Vitals and nursing note reviewed.   Constitutional:       General: She is in acute distress.      Appearance: Normal appearance. She is obese. She is not ill-appearing, toxic-appearing or diaphoretic.   HENT:      Head: Normocephalic and atraumatic.      Nose: Rhinorrhea present.      Mouth/Throat:      Mouth: Mucous membranes " are moist.   Eyes:      General:         Right eye: No discharge.         Left eye: No discharge.      Conjunctiva/sclera: Conjunctivae normal.   Cardiovascular:      Rate and Rhythm: Regular rhythm. Tachycardia present.   Pulmonary:      Effort: Pulmonary effort is normal.      Breath sounds: Normal breath sounds.   Skin:     General: Skin is warm.      Findings: No erythema.   Neurological:      General: No focal deficit present.      Mental Status: She is alert and oriented to person, place, and time.   Psychiatric:         Mood and Affect: Mood normal.         Behavior: Behavior normal.         Thought Content: Thought content normal.         Judgment: Judgment normal.

## 2023-12-27 NOTE — TELEPHONE ENCOUNTER
Mother called stating patient was seen a few weeks ago for a cough/trouble breathing and would like patient to be re-evaluated. Mother understood Dr Sanchez is not in the office. Access center reached out to office clerical to see if any overbookings were available. Clerical staff stated all providers were completely booked and patient was advised to either schedule a virtual visit with a [facility] provider via [facility], or urgent care. Mother disagreed with suggestion and is insisting an appointment today in the office regardless and would like to speak with . Please call mother at .

## 2023-12-27 NOTE — TELEPHONE ENCOUNTER
Pt does not have communication consent on file to speak with mom.  She also has appt scheduled at care now for today at 12pm

## 2023-12-27 NOTE — TELEPHONE ENCOUNTER
PT was seen on 12/19 by Cora in [place], was given tessaloon pearls for cough, she still has a lot of ear pain, coughing to the point that she can't sleep, congested and still wheezing. She tested covid negative again on 12/24 and no fever. Is there something else we can give her or bring her in to be seen?

## 2023-12-28 ENCOUNTER — APPOINTMENT (EMERGENCY)
Dept: RADIOLOGY | Facility: HOSPITAL | Age: 23
End: 2023-12-28
Payer: COMMERCIAL

## 2023-12-28 ENCOUNTER — HOSPITAL ENCOUNTER (EMERGENCY)
Facility: HOSPITAL | Age: 23
Discharge: HOME | End: 2023-12-28
Attending: EMERGENCY MEDICINE
Payer: COMMERCIAL

## 2023-12-28 ENCOUNTER — NURSE TRIAGE (OUTPATIENT)
Age: 23
End: 2023-12-28

## 2023-12-28 ENCOUNTER — APPOINTMENT (EMERGENCY)
Dept: CT IMAGING | Facility: HOSPITAL | Age: 23
End: 2023-12-28
Payer: COMMERCIAL

## 2023-12-28 VITALS
RESPIRATION RATE: 20 BRPM | TEMPERATURE: 98.3 F | OXYGEN SATURATION: 94 % | DIASTOLIC BLOOD PRESSURE: 96 MMHG | HEART RATE: 125 BPM | SYSTOLIC BLOOD PRESSURE: 139 MMHG

## 2023-12-28 DIAGNOSIS — J10.1 INFLUENZA A: Primary | ICD-10-CM

## 2023-12-28 LAB
ALBUMIN SERPL BCP-MCNC: 3.8 G/DL (ref 3.5–5)
ALP SERPL-CCNC: 70 U/L (ref 34–104)
ALT SERPL W P-5'-P-CCNC: 18 U/L (ref 7–52)
ANION GAP SERPL CALCULATED.3IONS-SCNC: 9 MMOL/L
APTT PPP: 27 SECONDS (ref 23–37)
AST SERPL W P-5'-P-CCNC: 11 U/L (ref 13–39)
ATRIAL RATE: 136 BPM
B-HCG SERPL-ACNC: <1 MIU/ML (ref 0–5)
BASE EXCESS BLDA CALC-SCNC: -4 MMOL/L (ref -2–3)
BASOPHILS # BLD AUTO: 0.02 THOUSANDS/ÂΜL (ref 0–0.1)
BASOPHILS NFR BLD AUTO: 0 % (ref 0–1)
BILIRUB SERPL-MCNC: 0.41 MG/DL (ref 0.2–1)
BUN SERPL-MCNC: 8 MG/DL (ref 5–25)
CA-I BLD-SCNC: 1.21 MMOL/L (ref 1.12–1.32)
CALCIUM SERPL-MCNC: 8.5 MG/DL (ref 8.4–10.2)
CARDIAC TROPONIN I PNL SERPL HS: <2 NG/L
CHLORIDE SERPL-SCNC: 106 MMOL/L (ref 96–108)
CO2 SERPL-SCNC: 21 MMOL/L (ref 21–32)
CREAT SERPL-MCNC: 1.06 MG/DL (ref 0.6–1.3)
EOSINOPHIL # BLD AUTO: 0.04 THOUSAND/ÂΜL (ref 0–0.61)
EOSINOPHIL NFR BLD AUTO: 1 % (ref 0–6)
ERYTHROCYTE [DISTWIDTH] IN BLOOD BY AUTOMATED COUNT: 13 % (ref 11.6–15.1)
FLUAV RNA RESP QL NAA+PROBE: POSITIVE
FLUBV RNA RESP QL NAA+PROBE: NEGATIVE
GFR SERPL CREATININE-BSD FRML MDRD: 73 ML/MIN/1.73SQ M
GLUCOSE SERPL-MCNC: 118 MG/DL (ref 65–140)
GLUCOSE SERPL-MCNC: 168 MG/DL (ref 65–140)
HCO3 BLDA-SCNC: 20.4 MMOL/L (ref 24–30)
HCT VFR BLD AUTO: 44 % (ref 34.8–46.1)
HCT VFR BLD CALC: 42 % (ref 34.8–46.1)
HGB BLD-MCNC: 14.4 G/DL (ref 11.5–15.4)
HGB BLDA-MCNC: 14.3 G/DL (ref 11.5–15.4)
IMM GRANULOCYTES # BLD AUTO: 0.04 THOUSAND/UL (ref 0–0.2)
IMM GRANULOCYTES NFR BLD AUTO: 1 % (ref 0–2)
INR PPP: 0.99 (ref 0.84–1.19)
LYMPHOCYTES # BLD AUTO: 1.34 THOUSANDS/ÂΜL (ref 0.6–4.47)
LYMPHOCYTES NFR BLD AUTO: 19 % (ref 14–44)
MCH RBC QN AUTO: 29.5 PG (ref 26.8–34.3)
MCHC RBC AUTO-ENTMCNC: 32.7 G/DL (ref 31.4–37.4)
MCV RBC AUTO: 90 FL (ref 82–98)
MONOCYTES # BLD AUTO: 0.68 THOUSAND/ÂΜL (ref 0.17–1.22)
MONOCYTES NFR BLD AUTO: 9 % (ref 4–12)
NEUTROPHILS # BLD AUTO: 5.1 THOUSANDS/ÂΜL (ref 1.85–7.62)
NEUTS SEG NFR BLD AUTO: 70 % (ref 43–75)
NRBC BLD AUTO-RTO: 0 /100 WBCS
P AXIS: 47 DEGREES
PCO2 BLD: 21 MMOL/L (ref 21–32)
PCO2 BLD: 33.9 MM HG (ref 42–50)
PH BLD: 7.39 [PH] (ref 7.3–7.4)
PLATELET # BLD AUTO: 160 THOUSANDS/UL (ref 149–390)
PMV BLD AUTO: 9.8 FL (ref 8.9–12.7)
PO2 BLD: 40 MM HG (ref 35–45)
POTASSIUM BLD-SCNC: 3.8 MMOL/L (ref 3.5–5.3)
POTASSIUM SERPL-SCNC: 3.7 MMOL/L (ref 3.5–5.3)
PR INTERVAL: 128 MS
PROT SERPL-MCNC: 6.5 G/DL (ref 6.4–8.4)
PROTHROMBIN TIME: 13.7 SECONDS (ref 11.6–14.5)
QRS AXIS: 107 DEGREES
QRSD INTERVAL: 62 MS
QT INTERVAL: 282 MS
QTC INTERVAL: 424 MS
RBC # BLD AUTO: 4.88 MILLION/UL (ref 3.81–5.12)
RSV RNA RESP QL NAA+PROBE: NEGATIVE
SAO2 % BLD FROM PO2: 75 % (ref 60–85)
SARS-COV-2 RNA RESP QL NAA+PROBE: NEGATIVE
SODIUM BLD-SCNC: 138 MMOL/L (ref 136–145)
SODIUM SERPL-SCNC: 136 MMOL/L (ref 135–147)
SPECIMEN SOURCE: ABNORMAL
T WAVE AXIS: 15 DEGREES
VENTRICULAR RATE: 136 BPM
WBC # BLD AUTO: 7.22 THOUSAND/UL (ref 4.31–10.16)

## 2023-12-28 PROCEDURE — 84484 ASSAY OF TROPONIN QUANT: CPT | Performed by: EMERGENCY MEDICINE

## 2023-12-28 PROCEDURE — 36415 COLL VENOUS BLD VENIPUNCTURE: CPT | Performed by: EMERGENCY MEDICINE

## 2023-12-28 PROCEDURE — 0241U HB NFCT DS VIR RESP RNA 4 TRGT: CPT | Performed by: EMERGENCY MEDICINE

## 2023-12-28 PROCEDURE — 99285 EMERGENCY DEPT VISIT HI MDM: CPT

## 2023-12-28 PROCEDURE — 85014 HEMATOCRIT: CPT

## 2023-12-28 PROCEDURE — 82803 BLOOD GASES ANY COMBINATION: CPT

## 2023-12-28 PROCEDURE — 84702 CHORIONIC GONADOTROPIN TEST: CPT | Performed by: EMERGENCY MEDICINE

## 2023-12-28 PROCEDURE — 85730 THROMBOPLASTIN TIME PARTIAL: CPT | Performed by: EMERGENCY MEDICINE

## 2023-12-28 PROCEDURE — 80053 COMPREHEN METABOLIC PANEL: CPT | Performed by: EMERGENCY MEDICINE

## 2023-12-28 PROCEDURE — 85610 PROTHROMBIN TIME: CPT | Performed by: EMERGENCY MEDICINE

## 2023-12-28 PROCEDURE — 99285 EMERGENCY DEPT VISIT HI MDM: CPT | Performed by: EMERGENCY MEDICINE

## 2023-12-28 PROCEDURE — 71275 CT ANGIOGRAPHY CHEST: CPT

## 2023-12-28 PROCEDURE — G1004 CDSM NDSC: HCPCS

## 2023-12-28 PROCEDURE — 96361 HYDRATE IV INFUSION ADD-ON: CPT

## 2023-12-28 PROCEDURE — 84295 ASSAY OF SERUM SODIUM: CPT

## 2023-12-28 PROCEDURE — 85025 COMPLETE CBC W/AUTO DIFF WBC: CPT | Performed by: EMERGENCY MEDICINE

## 2023-12-28 PROCEDURE — 96374 THER/PROPH/DIAG INJ IV PUSH: CPT

## 2023-12-28 PROCEDURE — 71045 X-RAY EXAM CHEST 1 VIEW: CPT

## 2023-12-28 PROCEDURE — 94640 AIRWAY INHALATION TREATMENT: CPT

## 2023-12-28 PROCEDURE — 93005 ELECTROCARDIOGRAM TRACING: CPT

## 2023-12-28 PROCEDURE — 82947 ASSAY GLUCOSE BLOOD QUANT: CPT

## 2023-12-28 PROCEDURE — 84132 ASSAY OF SERUM POTASSIUM: CPT

## 2023-12-28 PROCEDURE — 82330 ASSAY OF CALCIUM: CPT

## 2023-12-28 RX ORDER — IPRATROPIUM BROMIDE AND ALBUTEROL SULFATE 2.5; .5 MG/3ML; MG/3ML
3 SOLUTION RESPIRATORY (INHALATION)
Status: DISCONTINUED | OUTPATIENT
Start: 2023-12-28 | End: 2023-12-28

## 2023-12-28 RX ORDER — IPRATROPIUM BROMIDE AND ALBUTEROL SULFATE 2.5; .5 MG/3ML; MG/3ML
3 SOLUTION RESPIRATORY (INHALATION) ONCE
Status: COMPLETED | OUTPATIENT
Start: 2023-12-28 | End: 2023-12-28

## 2023-12-28 RX ORDER — IBUPROFEN 600 MG/1
600 TABLET ORAL EVERY 6 HOURS PRN
Qty: 30 TABLET | Refills: 0 | Status: SHIPPED | OUTPATIENT
Start: 2023-12-28

## 2023-12-28 RX ORDER — KETOROLAC TROMETHAMINE 30 MG/ML
15 INJECTION, SOLUTION INTRAMUSCULAR; INTRAVENOUS ONCE
Status: COMPLETED | OUTPATIENT
Start: 2023-12-28 | End: 2023-12-28

## 2023-12-28 RX ORDER — LIDOCAINE 50 MG/G
1 PATCH TOPICAL ONCE
Status: DISCONTINUED | OUTPATIENT
Start: 2023-12-28 | End: 2023-12-28 | Stop reason: HOSPADM

## 2023-12-28 RX ORDER — ACETAMINOPHEN 325 MG/1
650 TABLET ORAL EVERY 6 HOURS PRN
Qty: 30 TABLET | Refills: 0 | Status: SHIPPED | OUTPATIENT
Start: 2023-12-28 | End: 2024-01-05

## 2023-12-28 RX ORDER — GUAIFENESIN/DEXTROMETHORPHAN 100-10MG/5
10 SYRUP ORAL ONCE
Status: COMPLETED | OUTPATIENT
Start: 2023-12-28 | End: 2023-12-28

## 2023-12-28 RX ORDER — OSELTAMIVIR PHOSPHATE 75 MG/1
75 CAPSULE ORAL EVERY 12 HOURS
Qty: 10 CAPSULE | Refills: 0 | Status: SHIPPED | OUTPATIENT
Start: 2023-12-28 | End: 2024-01-02

## 2023-12-28 RX ORDER — DEXTROMETHORPHAN HYDROBROMIDE AND PROMETHAZINE HYDROCHLORIDE 15; 6.25 MG/5ML; MG/5ML
5 SYRUP ORAL 4 TIMES DAILY PRN
Qty: 118 ML | Refills: 0 | Status: SHIPPED | OUTPATIENT
Start: 2023-12-28

## 2023-12-28 RX ORDER — OSELTAMIVIR PHOSPHATE 75 MG/1
75 CAPSULE ORAL ONCE
Status: COMPLETED | OUTPATIENT
Start: 2023-12-28 | End: 2023-12-28

## 2023-12-28 RX ADMIN — IOHEXOL 85 ML: 350 INJECTION, SOLUTION INTRAVENOUS at 11:08

## 2023-12-28 RX ADMIN — KETOROLAC TROMETHAMINE 15 MG: 30 INJECTION, SOLUTION INTRAMUSCULAR; INTRAVENOUS at 11:25

## 2023-12-28 RX ADMIN — SODIUM CHLORIDE 1000 ML: 0.9 INJECTION, SOLUTION INTRAVENOUS at 11:23

## 2023-12-28 RX ADMIN — LIDOCAINE 1 PATCH: 50 PATCH TOPICAL at 11:24

## 2023-12-28 RX ADMIN — GUAIFENESIN AND DEXTROMETHORPHAN 10 ML: 100; 10 SYRUP ORAL at 11:24

## 2023-12-28 RX ADMIN — OSELTAMIVIR PHOSPHATE 75 MG: 75 CAPSULE ORAL at 11:24

## 2023-12-28 RX ADMIN — IPRATROPIUM BROMIDE AND ALBUTEROL SULFATE 3 ML: .5; 3 SOLUTION RESPIRATORY (INHALATION) at 11:26

## 2023-12-28 NOTE — TELEPHONE ENCOUNTER
Patient called in stating she has severe left sided chest pain that radiates into her back, SOB, nausea, cough, fever of 102.5.  Patient has a history of 5 blood clots in her lungs. Last one was 3 years ago. Patient states the pain is stabbing pain when she breathes.      I recommended ER evaluation now. Patient agreed and will head to [facility].

## 2023-12-28 NOTE — TELEPHONE ENCOUNTER
"Reason for Disposition  • SEVERE chest pain    Answer Assessment - Initial Assessment Questions  1. LOCATION: \"Where does it hurt?\"        Left side chest pain feels left lower chest stabbing pain      History of blood clots in her lung 5 x      2. RADIATION: \"Does the pain go anywhere else?\" (e.g., into neck, jaw, arms, back)        Goes into her back yes    3. ONSET: \"When did the chest pain begin?\" (Minutes, hours or days)         Yesterday but has gotten worse    4. PATTERN \"Does the pain come and go, or has it been constant since it started?\"  \"Does it get worse with exertion?\"         Constant     5. DURATION: \"How long does it last\" (e.g., seconds, minutes, hours)        Constant     6. SEVERITY: \"How bad is the pain?\"  (e.g., Scale 1-10; mild, moderate, or severe)     - MILD (1-3): doesn't interfere with normal activities      - MODERATE (4-7): interferes with normal activities or awakens from sleep     - SEVERE (8-10): excruciating pain, unable to do any normal activities           Severe     7. CARDIAC RISK FACTORS: \"Do you have any history of heart problems or risk factors for heart disease?\" (e.g., angina, prior heart attack; diabetes, high blood pressure, high cholesterol, smoker, or strong family history of heart disease)       unsure    8. PULMONARY RISK FACTORS: \"Do you have any history of lung disease?\"  (e.g., blood clots in lung, asthma, emphysema, birth control pills)        History of 5 clots     9. CAUSE: \"What do you think is causing the chest pain?\"        Unsure     10. OTHER SYMPTOMS: \"Do you have any other symptoms?\" (e.g., dizziness, nausea, vomiting, sweating, fever, difficulty breathing, cough)          Nauseated    Protocols used: Chest Pain-ADULT-[place]    "

## 2023-12-28 NOTE — Clinical Note
Misty Blackmon was seen and treated in our emergency department on 12/28/2023.                Diagnosis:     Misty  may return to work on return date.    She may return on this date: 01/02/2024         If you have any questions or concerns, please don't hesitate to call.      Jessica Jackson, DO    ______________________________           _______________          _______________  Hospital Representative                              Date                                Time

## 2023-12-28 NOTE — TELEPHONE ENCOUNTER
Regarding: fever and chest pain  ----- Message from Gayle Bond sent at 12/28/2023  8:04 AM EST -----  Pt called in, went to urgent care yesterday, they said it was an allergy flare, pt doesn't think it is. She states she developed a fever over night and now has sharp, stabbing - like chest pain on her left side. She says it does hurt to breathe, pt seems very upset. Advised to go to he ER if the pain is that bad, or gets even worse. Tried transferring to cts but was unable to get someone. Please advise

## 2023-12-28 NOTE — ED PROVIDER NOTES
"History  Chief Complaint   Patient presents with    Rib Pain     Pt reports pain in left ribs and chest worse when inhaling or coughing, SOB, starting last night. Hx PE.      22 yo F hx factor V leiden hx PE no anitcoagulation comes today for left lateral cp sob.  Patient states she has had a cough and left-sided rib pain for approximately 3 weeks she has been seen outpatient several times and been treated for acute bronchitis/\"allergies \".  Patient has been given albuterol inhaler, Tessalon, steroids, as well as OTC medications over the past few weeks without relief of her symptoms.  Patient now states symptoms got much worse last night she now has chest pain and severe shortness of breath especially when lying flat.      History provided by:  Medical records and patient   used: No    Chest Pain  Pain location:  L lateral chest  Pain quality: stabbing    Pain radiates to:  Does not radiate  Pain radiates to the back: no    Pain severity:  Severe  Onset quality:  Gradual  Duration:  3 days  Date/time of last known well:  12/7/2023 10:31 AM  Timing:  Constant  Progression:  Worsening  Chronicity:  New  Context: breathing    Ineffective treatments: otc meds steroids.  Associated symptoms: cough, fatigue, fever and shortness of breath    Risk factors: obesity        Prior to Admission Medications   Prescriptions Last Dose Informant Patient Reported? Taking?   EPINEPHrine (EPIPEN) 0.3 mg/0.3 mL SOAJ   No No   Sig: Inject 0.3 mL (0.3 mg total) into a muscle once for 1 dose   Fluticasone-Salmeterol (Advair Diskus) 250-50 mcg/dose inhaler   No No   Sig: Inhale 1 puff 2 (two) times a day Rinse mouth after use.   SUMAtriptan (IMITREX) 100 mg tablet   No No   Sig: TAKE 1 TABLET BY MOUTH ONCE AS NEEDED FOR MIGRAINE REPEAT 1 DOSE 6 HOURS LATER NO MORE THAN 3 DAYS A WEEK AS DIRECTED   albuterol (PROVENTIL HFA,VENTOLIN HFA) 90 mcg/act inhaler   No No   Sig: Inhale 2 puffs every 4 (four) hours as needed for " wheezing or shortness of breath   benzonatate (TESSALON) 200 MG capsule   No No   Sig: Take 1 capsule (200 mg total) by mouth 3 (three) times a day as needed for cough   busPIRone (BUSPAR) 15 mg tablet   No No   Sig: Take 1 tablet (15 mg total) by mouth 3 (three) times a day   butalbital-acetaminophen-caffeine (FIORICET,ESGIC) -40 mg per tablet  Self No No   Sig: Take 1 tablet now, and then repeat in 4 hrs if no relief. Don't take more then 3 in a week.   hydrOXYzine HCL (ATARAX) 50 mg tablet   No No   Sig: Take 1 tablet (50 mg total) by mouth daily at bedtime   levonorgestrel (KYLEENA) 19.5 MG intrauterine device  Self Yes No   Si each by Intrauterine route   meloxicam (Mobic) 15 mg tablet   No No   Sig: Take 1 tablet (15 mg total) by mouth daily   ondansetron (Zofran ODT) 4 mg disintegrating tablet   No No   Sig: Take 1 tablet (4 mg total) by mouth every 6 (six) hours as needed for nausea or vomiting   sertraline (ZOLOFT) 50 mg tablet   No No   Sig: TAKE 3 TABLETS BY MOUTH EVERY DAY AT BEDTIME   topiramate (TOPAMAX) 50 MG tablet   No No   Sig: Take 1 tablet (50 mg total) by mouth daily at bedtime      Facility-Administered Medications: None       Past Medical History:   Diagnosis Date    Allergic     Anxiety     Anxiety and depression     Asthma     Clotting disorder ([facility])     Factor V Leiden ([facility])     Headache(784.0)     Pulmonary embolism ([facility])     Thrombophlebitis        Past Surgical History:   Procedure Laterality Date    BLADDER SURGERY      FL RETROGRADE PYELOGRAM  2022    FL VCUG VOIDING URETHROCYSTOGRAM  2018    NEPHRECTOMY Left 2020    Done laparoscopically at [facility]    WA CYSTOURETHROSCOPY N/A 2022    Procedure: CYSTOSCOPY, retrograde pyelogram, and examination under anesthesia, dilation urethral stenosis;  Surgeon: Kamran Lott MD;  Location: [facility] MAIN OR;  Service: Urology    URETER REVISION      WISDOM TOOTH EXTRACTION      woke up during procedure.       Family  History   Problem Relation Age of Onset    Hypertension Mother     Hyperlipidemia Mother     Diabetes Mother     Factor V Leiden deficiency Mother     Heart disease Father     Depression Father     ADD / ADHD Brother     Ovarian cancer Maternal Grandmother     Diabetes Other     Arthritis Family     Asthma Family     Cancer Family     Cervical cancer Family     Heart failure Family     Hyperlipidemia Family     Hypertension Family      I have reviewed and agree with the history as documented.    E-Cigarette/Vaping    E-Cigarette Use Never User      E-Cigarette/Vaping Substances    Nicotine No     THC No     CBD No      Social History     Tobacco Use    Smoking status: Never    Smokeless tobacco: Never   Vaping Use    Vaping status: Never Used   Substance Use Topics    Alcohol use: Yes     Comment: social, 1 x per month    Drug use: Never       Review of Systems   Constitutional:  Positive for fatigue and fever.   Respiratory:  Positive for cough and shortness of breath.    Cardiovascular:  Positive for chest pain.       Physical Exam  Physical Exam    Vital Signs  ED Triage Vitals [12/28/23 0932]   Temperature Pulse Respirations Blood Pressure SpO2   98.3 °F (36.8 °C) (!) 137 18 (!) 174/107 96 %      Temp Source Heart Rate Source Patient Position - Orthostatic VS BP Location FiO2 (%)   Oral Monitor Sitting Right arm --      Pain Score       --           Vitals:    12/28/23 0932 12/28/23 1007   BP: (!) 174/107 139/96   Pulse: (!) 137 (!) 125   Patient Position - Orthostatic VS: Sitting Lying         Visual Acuity      ED Medications  Medications   sodium chloride 0.9 % bolus 1,000 mL (1,000 mL Intravenous New Bag 12/28/23 1123)   lidocaine (LIDODERM) 5 % patch 1 patch (1 patch Topical Medication Applied 12/28/23 1124)   iohexol (OMNIPAQUE) 350 MG/ML injection (MULTI-DOSE) 85 mL (85 mL Intravenous Given 12/28/23 1108)   dextromethorphan-guaiFENesin (ROBITUSSIN DM) oral syrup 10 mL (10 mL Oral Given 12/28/23 1124)    ketorolac (TORADOL) injection 15 mg (15 mg Intravenous Given 12/28/23 1125)   oseltamivir (TAMIFLU) capsule 75 mg (75 mg Oral Given 12/28/23 1124)   ipratropium-albuterol (DUO-NEB) 0.5-2.5 mg/3 mL inhalation solution 3 mL (3 mL Nebulization Given 12/28/23 1126)       Diagnostic Studies  Results Reviewed       Procedure Component Value Units Date/Time    FLU/RSV/COVID - if FLU/RSV clinically relevant [[id number]]  (Abnormal) Collected: 12/28/23 1024    Lab Status: Final result Specimen: Nares from Nose Updated: 12/28/23 1108     SARS-CoV-2 Negative     INFLUENZA A PCR Positive     INFLUENZA B PCR Negative     RSV PCR Negative    Narrative:      FOR PEDIATRIC PATIENTS - copy/paste COVID Guidelines URL to browser: [email or web address]    SARS-CoV-2 assay is a Nucleic Acid Amplification assay intended for the  qualitative detection of nucleic acid from SARS-CoV-2 in nasopharyngeal  swabs. Results are for the presumptive identification of SARS-CoV-2 RNA.    Positive results are indicative of infection with SARS-CoV-2, the virus  causing COVID-19, but do not rule out bacterial infection or co-infection  with other viruses. Laboratories within the [place] and its  territories are required to report all positive results to the appropriate  public health authorities. Negative results do not preclude SARS-CoV-2  infection and should not be used as the sole basis for treatment or other  patient management decisions. Negative results must be combined with  clinical observations, patient history, and epidemiological information.  This test has not been FDA cleared or approved.    This test has been authorized by FDA under an Emergency Use Authorization  (EUA). This test is only authorized for the duration of time the  declaration that circumstances exist justifying the authorization of the  emergency use of an in vitro diagnostic tests for detection of  SARS-CoV-2  virus and/or diagnosis of COVID-19 infection under section 564(b)(1) of  the Act, 21 U.S.C. 360bbb-3(b)(1), unless the authorization is terminated  or revoked sooner. The test has been validated but independent review by FDA  and CLIA is pending.    Test performed using [employer] GeneXpert: This RT-PCR assay targets N2,  a region unique to SARS-CoV-2. A conserved region in the E-gene was chosen  for pan-Sarbecovirus detection which includes SARS-CoV-2.    According to CMS-2020-01-R, this platform meets the definition of high-throughput technology.    POCT Blood Gas (CG8+) [[id number]]  (Abnormal) Collected: 12/28/23 1034    Lab Status: Final result Specimen: Venous Updated: 12/28/23 1036     ph, Uriel ISTAT 7.388     pCO2, Uriel i-STAT 33.9 mm HG      pO2, Uriel i-STAT 40.0 mm HG      BE, i-STAT -4 mmol/L      HCO3, Uriel i-STAT 20.4 mmol/L      CO2, i-STAT 21 mmol/L      O2 Sat, i-STAT 75 %      SODIUM, I-STAT 138 mmol/l      Potassium, i-STAT 3.8 mmol/L      Calcium, Ionized i-STAT 1.21 mmol/L      Hct, i-STAT 42 %      Hgb, i-STAT 14.3 g/dl      Glucose, i-STAT 118 mg/dl      Specimen Type VENOUS    Comprehensive metabolic panel [[id number]]  (Abnormal) Collected: 12/28/23 0940    Lab Status: Final result Specimen: Blood from Arm, Right Updated: 12/28/23 1028     Sodium 136 mmol/L      Potassium 3.7 mmol/L      Chloride 106 mmol/L      CO2 21 mmol/L      ANION GAP 9 mmol/L      BUN 8 mg/dL      Creatinine 1.06 mg/dL      Glucose 168 mg/dL      Calcium 8.5 mg/dL      AST 11 U/L      ALT 18 U/L      Alkaline Phosphatase 70 U/L      Total Protein 6.5 g/dL      Albumin 3.8 g/dL      Total Bilirubin 0.41 mg/dL      eGFR 73 ml/min/1.73sq m     Narrative:      National Kidney Disease Foundation guidelines for Chronic Kidney Disease (CKD):     Stage 1 with normal or high GFR (GFR > 90 mL/min/1.73 square meters)    Stage 2 Mild CKD (GFR = 60-89 mL/min/1.73 square meters)    Stage 3A Moderate CKD (GFR = 45-59 mL/min/1.73  square meters)    Stage 3B Moderate CKD (GFR = 30-44 mL/min/1.73 square meters)    Stage 4 Severe CKD (GFR = 15-29 mL/min/1.73 square meters)    Stage 5 End Stage CKD (GFR <15 mL/min/1.73 square meters)  Note: GFR calculation is accurate only with a steady state creatinine    hCG, quantitative [[id number]]  (Normal) Collected: 12/28/23 0940    Lab Status: Final result Specimen: Blood from Arm, Right Updated: 12/28/23 1016     HCG, Quant <1 mIU/mL     Narrative:       Expected Ranges:    HCG results between 5 and 25 mIU/mL may be indicative of early pregnancy but should be interpreted in light of the total clinical presentation.    HCG can rise to detectable levels in tricia and post menopausal women (0-11.6 mIU/mL).     Approximate               Approximate HCG  Gestation age          Concentration ( mIU/mL)  _____________          ______________________   Weeks                      HCG values  0.2-1                       5-50  1-2                           2-3                         100-5000  3-4                         500-23840  4-5                         1000-76015  5-6                         [id number]  6-8                         28187-178638  8-12                        60450-052997      HS Troponin 0hr (reflex protocol) [[id number]]  (Normal) Collected: 12/28/23 0940    Lab Status: Final result Specimen: Blood from Arm, Right Updated: 12/28/23 1016     hs TnI 0hr <2 ng/L     Protime-INR [[id number]]  (Normal) Collected: 12/28/23 0940    Lab Status: Final result Specimen: Blood from Arm, Right Updated: 12/28/23 1007     Protime 13.7 seconds      INR 0.99    APTT [[id number]]  (Normal) Collected: 12/28/23 0940    Lab Status: Final result Specimen: Blood from Arm, Right Updated: 12/28/23 1007     PTT 27 seconds     CBC and differential [[id number]] Collected: 12/28/23 0940    Lab Status: Final result Specimen: Blood from Arm, Right Updated: 12/28/23 0957     WBC 7.22 Thousand/uL      RBC 4.88 Million/uL       Hemoglobin 14.4 g/dL      Hematocrit 44.0 %      MCV 90 fL      MCH 29.5 pg      MCHC 32.7 g/dL      RDW 13.0 %      MPV 9.8 fL      Platelets 160 Thousands/uL      nRBC 0 /100 WBCs      Neutrophils Relative 70 %      Immat GRANS % 1 %      Lymphocytes Relative 19 %      Monocytes Relative 9 %      Eosinophils Relative 1 %      Basophils Relative 0 %      Neutrophils Absolute 5.10 Thousands/µL      Immature Grans Absolute 0.04 Thousand/uL      Lymphocytes Absolute 1.34 Thousands/µL      Monocytes Absolute 0.68 Thousand/µL      Eosinophils Absolute 0.04 Thousand/µL      Basophils Absolute 0.02 Thousands/µL                    CTA ED chest PE study   Final Result by Paty Richmond MD (12/28 1141)      No pulmonary embolism identified.   No acute pulmonary process.   Mild mediastinal and hilar lymphadenopathy, nonspecific, possibly reactive. Consider follow-up in 6 to 8 weeks.                  Workstation performed: [id number]         XR chest 1 view portable    (Results Pending)              Procedures  Procedures         ED Course                       PERC Rule for PE      Flowsheet Row Most Recent Value   PERC Rule for PE    Age >=50 0 Filed at: 12/28/2023 1014   HR >=100 1 Filed at: 12/28/2023 1014   O2 Sat on room air < 95% 0 Filed at: 12/28/2023 1014   History of PE or DVT 1 Filed at: 12/28/2023 1014   Recent trauma or surgery 0 Filed at: 12/28/2023 1014   Hemoptysis 0 Filed at: 12/28/2023 1014   Exogenous estrogen 0 Filed at: 12/28/2023 1014   Unilateral leg swelling 0 Filed at: 12/28/2023 1014   PERC Rule for PE Results 2 Filed at: 12/28/2023 1014                    Wells' Criteria for PE      Flowsheet Row Most Recent Value   Wells' Criteria for PE    Clinical signs and symptoms of DVT 0 Filed at: 12/28/2023 1014   PE is primary diagnosis or equally likely 3 Filed at: 12/28/2023 1014   HR >100 1.5 Filed at: 12/28/2023 1014   Immobilization at least 3 days or Surgery in the previous 4 weeks 0 Filed at:  12/28/2023 1014   Previous, objectively diagnosed PE or DVT 1.5 Filed at: 12/28/2023 1014   Hemoptysis 0 Filed at: 12/28/2023 1014   Malignancy with treatment within 6 months or palliative 0 Filed at: 12/28/2023 1014   Wells' Criteria Total 6 Filed at: 12/28/2023 1014                  Medical Decision Making  Differential diagnosis includes but is not limited to PE, pneumonia, viral illness such as COVID flu RSV,    Problems Addressed:  Influenza A: acute illness or injury    Amount and/or Complexity of Data Reviewed  Labs: ordered. Decision-making details documented in ED Course.  Radiology: ordered and independent interpretation performed. Decision-making details documented in ED Course.     Details: Chest x-ray within normal limits no pneumonia appreciated    Risk  OTC drugs.  Prescription drug management.  Risk Details: Discussed with patient that enlarged lymph nodes probably secondary to viral illness however she should follow-up with her PCP in 6 to 8 weeks.             Disposition  Final diagnoses:   Influenza A     Time reflects when diagnosis was documented in both MDM as applicable and the Disposition within this note       Time User Action Codes Description Comment    12/28/2023 11:44 AM Jessica Jackson Add [J10.1] Influenza A           ED Disposition       ED Disposition   Discharge    Condition   Stable    Date/Time   Thu Dec 28, 2023 1144    Comment   Misty Blackmon discharge to home/self care.                   Follow-up Information       Follow up With Specialties Details Why Contact Info    Sue Leyva MD Family Medicine Schedule an appointment as soon as possible for a visit   [address] [address]  [phone]              Patient's Medications   Discharge Prescriptions    ACETAMINOPHEN (TYLENOL) 325 MG TABLET    Take 2 tablets (650 mg total) by mouth every 6 (six) hours as needed for mild pain or fever       Start Date: 12/28/2023End Date: --       Order  Dose: 650 mg       Quantity: 30 tablet    Refills: 0    IBUPROFEN (MOTRIN) 600 MG TABLET    Take 1 tablet (600 mg total) by mouth every 6 (six) hours as needed for moderate pain or fever       Start Date: 12/28/2023End Date: --       Order Dose: 600 mg       Quantity: 30 tablet    Refills: 0    OSELTAMIVIR (TAMIFLU) 75 MG CAPSULE    Take 1 capsule (75 mg total) by mouth every 12 (twelve) hours for 5 days       Start Date: 12/28/2023End Date: 1/2/2024       Order Dose: 75 mg       Quantity: 10 capsule    Refills: 0    PROMETHAZINE-DEXTROMETHORPHAN (PHENERGAN-DM) 6.25-15 MG/5 ML ORAL SYRUP    Take 5 mL by mouth 4 (four) times a day as needed for cough       Start Date: 12/28/2023End Date: --       Order Dose: 5 mL       Quantity: 118 mL    Refills: 0       No discharge procedures on file.    PDMP Review       None            ED Provider  Electronically Signed by             Jessica Jackson DO  12/28/23 8171

## 2024-01-04 ENCOUNTER — TELEPHONE (OUTPATIENT)
Age: 24
End: 2024-01-04

## 2024-01-04 NOTE — TELEPHONE ENCOUNTER
Patient called in stating that since 12/19 she was seen in office, seen at Urgent Care and ER. Patient still has a cough that she feels is settling back into her chest. Patient does not want to go back to Urgent Care because they did not help her. Patient is requesting medication to be sent in for her. Please advise.

## 2024-01-05 ENCOUNTER — OFFICE VISIT (OUTPATIENT)
Dept: FAMILY MEDICINE CLINIC | Facility: CLINIC | Age: 24
End: 2024-01-05
Payer: COMMERCIAL

## 2024-01-05 VITALS
BODY MASS INDEX: 41.82 KG/M2 | OXYGEN SATURATION: 98 % | WEIGHT: 251 LBS | SYSTOLIC BLOOD PRESSURE: 100 MMHG | HEART RATE: 84 BPM | DIASTOLIC BLOOD PRESSURE: 60 MMHG | HEIGHT: 65 IN | RESPIRATION RATE: 14 BRPM | TEMPERATURE: 98.6 F

## 2024-01-05 DIAGNOSIS — J01.00 ACUTE NON-RECURRENT MAXILLARY SINUSITIS: ICD-10-CM

## 2024-01-05 DIAGNOSIS — J45.20 MILD INTERMITTENT ASTHMA WITHOUT COMPLICATION: Primary | ICD-10-CM

## 2024-01-05 DIAGNOSIS — D68.51 FACTOR V LEIDEN MUTATION (HCC): ICD-10-CM

## 2024-01-05 DIAGNOSIS — R09.81 SINUS CONGESTION: ICD-10-CM

## 2024-01-05 PROCEDURE — 99214 OFFICE O/P EST MOD 30 MIN: CPT | Performed by: STUDENT IN AN ORGANIZED HEALTH CARE EDUCATION/TRAINING PROGRAM

## 2024-01-05 RX ORDER — PREDNISONE 20 MG/1
20 TABLET ORAL DAILY
Qty: 5 TABLET | Refills: 0 | Status: SHIPPED | OUTPATIENT
Start: 2024-01-05

## 2024-01-05 RX ORDER — DOXYCYCLINE HYCLATE 100 MG/1
100 CAPSULE ORAL EVERY 12 HOURS SCHEDULED
Qty: 14 CAPSULE | Refills: 0 | Status: SHIPPED | OUTPATIENT
Start: 2024-01-05 | End: 2024-01-12

## 2024-01-05 NOTE — PROGRESS NOTES
Clinic Visit Note  Misty Blackmon 23 y.o. female   MRN: [id number]    Assessment and Plan      Diagnoses and all orders for this visit:    Mild intermittent asthma without complication  Albuterol inhaler as needed    Factor V Leiden mutation (HCC)  No respiratory distress, history of Pes    Acute non-recurrent maxillary sinusitis  Recommend antibiotic therapy given suspicion for superimposed bacterial infection, acute bacterial sinusitis.  -     doxycycline hyclate (VIBRAMYCIN) 100 mg capsule; Take 1 capsule (100 mg total) by mouth every 12 (twelve) hours for 7 days    Sinus congestion  -     predniSONE 20 mg tablet; Take 1 tablet (20 mg total) by mouth daily    My impressions and treatment recommendations were discussed in detail with the patient who verbalized understanding and had no further questions.  Discharge instructions were provided.    Subjective     Chief Complaint: Acute care visit    History of Present Illness:    Patient is a pleasant 23-year-old female coming in for follow-up after being diagnosed with influenza on 12/28/2023, having sinus congestion worsening.    The following portions of the patient's history were reviewed and updated as appropriate: allergies, current medications, past family history, past medical history, past social history, past surgical history and problem list.    REVIEW OF SYSTEMS:  A complete 12-point review of systems is negative other than that noted in the HPI.    Review of Systems   Constitutional:  Positive for fatigue. Negative for chills and fever.   HENT:  Positive for congestion and postnasal drip. Negative for sore throat.    Respiratory:  Positive for cough. Negative for shortness of breath and wheezing.    Cardiovascular:  Negative for chest pain, palpitations and leg swelling.   Neurological:  Negative for dizziness and headaches.         Current Outpatient Medications:   •  albuterol (PROVENTIL HFA,VENTOLIN HFA) 90 mcg/act inhaler, Inhale 2 puffs every 4  (four) hours as needed for wheezing or shortness of breath, Disp: 18 g, Rfl: 6  •  busPIRone (BUSPAR) 15 mg tablet, Take 1 tablet (15 mg total) by mouth 3 (three) times a day, Disp: 270 tablet, Rfl: 3  •  butalbital-acetaminophen-caffeine (FIORICET,ESGIC) -40 mg per tablet, Take 1 tablet now, and then repeat in 4 hrs if no relief. Don't take more then 3 in a week., Disp: 30 tablet, Rfl: 0  •  doxycycline hyclate (VIBRAMYCIN) 100 mg capsule, Take 1 capsule (100 mg total) by mouth every 12 (twelve) hours for 7 days, Disp: 14 capsule, Rfl: 0  •  EPINEPHrine (EPIPEN) 0.3 mg/0.3 mL SOAJ, Inject 0.3 mL (0.3 mg total) into a muscle once for 1 dose, Disp: 0.6 mL, Rfl: 0  •  Fluticasone-Salmeterol (Advair Diskus) 250-50 mcg/dose inhaler, Inhale 1 puff 2 (two) times a day Rinse mouth after use., Disp: 60 blister, Rfl: 8  •  hydrOXYzine HCL (ATARAX) 50 mg tablet, Take 1 tablet (50 mg total) by mouth daily at bedtime, Disp: 90 tablet, Rfl: 2  •  ibuprofen (MOTRIN) 600 mg tablet, Take 1 tablet (600 mg total) by mouth every 6 (six) hours as needed for moderate pain or fever, Disp: 30 tablet, Rfl: 0  •  levonorgestrel (KYLEENA) 19.5 MG intrauterine device, 1 each by Intrauterine route, Disp: , Rfl:   •  meloxicam (Mobic) 15 mg tablet, Take 1 tablet (15 mg total) by mouth daily, Disp: 30 tablet, Rfl: 0  •  ondansetron (Zofran ODT) 4 mg disintegrating tablet, Take 1 tablet (4 mg total) by mouth every 6 (six) hours as needed for nausea or vomiting, Disp: 20 tablet, Rfl: 3  •  predniSONE 20 mg tablet, Take 1 tablet (20 mg total) by mouth daily, Disp: 5 tablet, Rfl: 0  •  promethazine-dextromethorphan (PHENERGAN-DM) 6.25-15 mg/5 mL oral syrup, Take 5 mL by mouth 4 (four) times a day as needed for cough, Disp: 118 mL, Rfl: 0  •  sertraline (ZOLOFT) 50 mg tablet, TAKE 3 TABLETS BY MOUTH EVERY DAY AT BEDTIME, Disp: 270 tablet, Rfl: 3  •  SUMAtriptan (IMITREX) 100 mg tablet, TAKE 1 TABLET BY MOUTH ONCE AS NEEDED FOR MIGRAINE REPEAT 1  DOSE 6 HOURS LATER NO MORE THAN 3 DAYS A WEEK AS DIRECTED, Disp: 15 tablet, Rfl: 0  •  topiramate (TOPAMAX) 50 MG tablet, Take 1 tablet (50 mg total) by mouth daily at bedtime, Disp: 90 tablet, Rfl: 4  Past Medical History:   Diagnosis Date   • Allergic    • Anxiety    • Anxiety and depression    • Asthma    • Clotting disorder (HCC)    • Factor V Leiden (HCC)    • Headache(784.0)    • Pulmonary embolism (HCC)    • Thrombophlebitis      Past Surgical History:   Procedure Laterality Date   • BLADDER SURGERY     • FL RETROGRADE PYELOGRAM  05/20/2022   • FL VCUG VOIDING URETHROCYSTOGRAM  8/7/2018   • NEPHRECTOMY Left 05/21/2020    Done laparoscopically at [facility]   • [place] CYSTOURETHROSCOPY N/A 05/20/2022    Procedure: CYSTOSCOPY, retrograde pyelogram, and examination under anesthesia, dilation urethral stenosis;  Surgeon: Kamran Lott MD;  Location: [facility] MAIN OR;  Service: Urology   • URETER REVISION     • WISDOM TOOTH EXTRACTION      woke up during procedure.     Social History     Socioeconomic History   • Marital status: Single     Spouse name: Not on file   • Number of children: Not on file   • Years of education: Not on file   • Highest education level: Not on file   Occupational History   • Not on file   Tobacco Use   • Smoking status: Never   • Smokeless tobacco: Never   Vaping Use   • Vaping status: Never Used   Substance and Sexual Activity   • Alcohol use: Yes     Comment: social, 1 x per month   • Drug use: Never   • Sexual activity: Yes     Partners: Male     Birth control/protection: I.U.D.   Other Topics Concern   • Not on file   Social History Narrative   • Not on file     Social Determinants of Health     Financial Resource Strain: Not on file   Food Insecurity: Not on file   Transportation Needs: Not on file   Physical Activity: Not on file   Stress: Not on file   Social Connections: Not on file   Intimate Partner Violence: Not on file   Housing Stability: Not on file     Family History   Problem  "Relation Age of Onset   • Hypertension Mother    • Hyperlipidemia Mother    • Diabetes Mother    • Factor V Leiden deficiency Mother    • Heart disease Father    • Depression Father    • ADD / ADHD Brother    • Ovarian cancer Maternal Grandmother    • Diabetes Other    • Arthritis Family    • Asthma Family    • Cancer Family    • Cervical cancer Family    • Heart failure Family    • Hyperlipidemia Family    • Hypertension Family      Allergies   Allergen Reactions   • Amoxicillin Anaphylaxis   • Cinnamon - Food Allergy Anaphylaxis       Objective     Vitals:    01/05/24 0802   BP: 100/60   BP Location: Left arm   Patient Position: Sitting   Cuff Size: Adult   Pulse: 84   Resp: 14   Temp: 98.6 °F (37 °C)   TempSrc: Temporal   SpO2: 98%   Weight: 114 kg (251 lb)   Height: 5' 5\" (1.651 m)       Physical Exam:     GENERAL: NAD, pleasant   HEENT:  NC/AT, PERRL, EOMI, no scleral icterus  CARDIAC:  RRR, +S1/S2, no S3/S4 appreciated, no m/g/r  PULMONARY:  CTA B/L, no wheezing/rales/rhonci, non-labored breathing  ABDOMEN:  Soft, NT/ND, no rebound/guarding/rigidity  Extremities:. No edema, cyanosis, or clubbing  Musculoskeletal:  Full range of motion intact in all extremities   NEUROLOGIC: Grossly intact, no focal deficits  SKIN:  No rashes or erythema noted on exposed skin  Psych: Normal affect, mood stable    ==  PLEASE NOTE:  This encounter was completed utilizing the [employer] Direct Speech Voice Recognition Software. Grammatical errors, random word insertions, pronoun errors and incomplete sentences are occasional consequences of the system due to software limitations, ambient noise and hardware issues.These may be missed by proof reading prior to affixing electronic signature. Any questions or concerns about the content, text or information contained within the body of this dictation should be directly addressed to the physician for clarification. Please do not hesitate to call me directly if you have any any " questions or concerns.    Garrett Damon DO  [facility] Internal Medicine   [facility]

## 2024-01-22 ENCOUNTER — OFFICE VISIT (OUTPATIENT)
Age: 24
End: 2024-01-22
Payer: COMMERCIAL

## 2024-01-22 VITALS
WEIGHT: 251 LBS | HEIGHT: 65 IN | HEART RATE: 103 BPM | DIASTOLIC BLOOD PRESSURE: 84 MMHG | BODY MASS INDEX: 41.82 KG/M2 | SYSTOLIC BLOOD PRESSURE: 130 MMHG

## 2024-01-22 DIAGNOSIS — M72.2 PLANTAR FASCIITIS OF LEFT FOOT: Primary | ICD-10-CM

## 2024-01-22 DIAGNOSIS — M76.72 PERONEAL TENDONITIS OF LEFT LOWER LEG: ICD-10-CM

## 2024-01-22 PROCEDURE — 99213 OFFICE O/P EST LOW 20 MIN: CPT | Performed by: STUDENT IN AN ORGANIZED HEALTH CARE EDUCATION/TRAINING PROGRAM

## 2024-01-22 RX ORDER — MELOXICAM 15 MG/1
15 TABLET ORAL DAILY
Qty: 30 TABLET | Refills: 0 | Status: SHIPPED | OUTPATIENT
Start: 2024-01-22

## 2024-01-22 NOTE — PROGRESS NOTES
This patient was seen on 1/22/2024.    My role is Foot , Ankle, and Wound Specialist    ASSESSMENT     Diagnoses and all orders for this visit:    Plantar fasciitis of left foot  -     Ambulatory referral to Physical Therapy; Future  -     meloxicam (Mobic) 15 mg tablet; Take 1 tablet (15 mg total) by mouth daily    Peroneal tendonitis of left lower leg  -     meloxicam (Mobic) 15 mg tablet; Take 1 tablet (15 mg total) by mouth daily       Problem List Items Addressed This Visit          Musculoskeletal and Integument    Plantar fasciitis of left foot - Primary    Relevant Medications    meloxicam (Mobic) 15 mg tablet    Other Relevant Orders    Ambulatory referral to Physical Therapy    Peroneal tendonitis of left lower leg    Relevant Medications    meloxicam (Mobic) 15 mg tablet     PLAN  -Patient was educated regarding their condition.  -Continue night splints, daily stretching program, again reinforced the importance of proper footwear and orthotics  -Corticosteroid injection deferred today  -Renewed prescription for meloxicam  -Will attempt formal physical therapy  -Patient will RTC in 6-weeks    SUBJECTIVE    Chief Complaint:  Left heel pain     Patient ID: Misty Muñozdarshan     12/19/2023: Misty is a pleasant 23-year-old female who presents today with left foot pain.  She states that this started approximately 1 month ago.  She did not injure it at the time, however she does state that she rolled her left ankle quite frequently.  She denies any laxity to other joints in her body.  She states that she has been wrapping it which has helped slightly, she also states that she has attempted taking Tylenol and Motrin which have not helped.  She states that her foot hurts most when she is up and walking on after a long day, better at rest.    1/22/2024: Misty states that she is doing better in comparison to her last visit.  She states that her left lateral ankle pain is fully resolved.  She states that she wears  "her ASO brace anytime she is being active and that this has helped her.  She states that she has been taking her meloxicam as prescribed, she has purchased a pair of Hernández although she has not purchased a pair of inserts for these yet.  She presents today wearing crocs.  She states that the pain to the bottom of her left foot is still present and is nearly just as bad.      The following portions of the patient's history were reviewed and updated as appropriate: allergies, current medications, past family history, past medical history, past social history, past surgical history and problem list.    Review of Systems   Constitutional: Negative.    HENT: Negative.     Respiratory: Negative.     Cardiovascular: Negative.    Gastrointestinal: Negative.    Musculoskeletal:  Positive for myalgias.   Skin: Negative.    Neurological: Negative.          OBJECTIVE      /84   Pulse 103   Ht 5' 5\" (1.651 m)   Wt 114 kg (251 lb)   BMI 41.77 kg/m²        Physical Exam  Constitutional:       Appearance: Normal appearance.   HENT:      Head: Normocephalic and atraumatic.   Eyes:      General:         Right eye: No discharge.         Left eye: No discharge.   Cardiovascular:      Rate and Rhythm: Normal rate and regular rhythm.      Pulses:           Dorsalis pedis pulses are 2+ on the right side and 2+ on the left side.        Posterior tibial pulses are 2+ on the right side and 2+ on the left side.   Pulmonary:      Effort: Pulmonary effort is normal.      Breath sounds: Normal breath sounds.   Skin:     General: Skin is warm.      Capillary Refill: Capillary refill takes less than 2 seconds.   Neurological:      Sensory: Sensation is intact. No sensory deficit.       MSK:  -No pain along the course of the peroneal tendons of the left ankle at today's visit.  No pain with plantarflexion and eversion against resistance.  -Pain on palpation of medial calcaneal tubercle at the insertion site of the plantar fascia  -no pain " with compression of both sides of heel  -No gross deformities noted  -Active range of motion lesser digits intact  -MMT 5/5 to all muscle compartments of the lower extremity  -Ankle dorsiflexion is less than 10 degrees with knee extended, and knee flexed     Neuro:  -Light sensation intact bilaterally  -Protective sensation intact bilaterally  -Tinel sign negative

## 2024-01-30 ENCOUNTER — CLINICAL SUPPORT (OUTPATIENT)
Dept: BARIATRICS | Facility: CLINIC | Age: 24
End: 2024-01-30

## 2024-01-30 VITALS — WEIGHT: 249.8 LBS | BODY MASS INDEX: 41.57 KG/M2

## 2024-01-30 PROCEDURE — RECHECK

## 2024-01-30 NOTE — PROGRESS NOTES
Patient presents for 30 minute Behavioral Health Evaluation as a part of Medical Weight Management Program, current weight 249.8lbs.    Eating behaviors/food choices: Reports skipping breakfast some mornings, has a light lunch, denies excessive eating at dinner or in the evening. She reports not having hunger most of the time, if she snacks at night it might be on fruit. She does identify as a stress eater, goes for chocolate in her desk at work if cravings are triggered. Discussed importance of three meals a day to support metabolism, suggested prepping breakfast and lunch night before for easy access in the AM. Also suggested she work on coping skills to replace stress eating.    Activity/Exercise:  Patient was going to the gym before the holidays 3-5 times in a week but wasn't seeing much change in weight. She had a well rounded workout routine but eating habits may not have been supporting her weight management. Encouraged patient to work with RD on nutrition to support the level of exercise she wants to do. She had gotten out of the habit of exercise over the holidays due to being sick, plans to get back into it.    Sleep/Rest:  patient's sleep is often poor, she struggles to fall asleep due to racing thoughts. Discussed the importance of quality sleep and impact on weight and health. Reviewed having a sleep routine, consider adding things to relax senses such as aromatherapy and sound therapy, comfortably sheets and blankets. Patient enjoys her sleeping space she does use a fan for sound therapy. Discussed the benefits of deep breathing exercises to help relax mind, to avoid screens and snacking about an hour before bed.     Mental Health/Wellness:  Patient has a diagnosis of anxiety, she is on medication, feels her symptoms are well managed. She did go to therapy a few years ago but had to discontinue due to not having time. Suggested patient consider reconnecting if she finds her anxiety symptoms interfering  with her weight management and health goals. Suggested using deep breathing exercises as a means to manage racing thoughts and reduce anxiety, to find non-food coping skills and to call insurance provider for in-network provider resource list.    Next Appointment:  with NP on 2/12

## 2024-01-30 NOTE — PATIENT INSTRUCTIONS
- try to incorporate breakfast more frequently, consider preparing breakfast and lunch the night before for easy access in the morning  - work on deep breathing exercises before bed to support restful sleep  - use guided meditation videos or apps to help calm and manage anxiety  - consider reconnecting with therapist to manage anxiety symptoms if needed, contact insurance for in network providers

## 2024-02-12 ENCOUNTER — OFFICE VISIT (OUTPATIENT)
Dept: BARIATRICS | Facility: CLINIC | Age: 24
End: 2024-02-12
Payer: COMMERCIAL

## 2024-02-12 VITALS
BODY MASS INDEX: 41.12 KG/M2 | HEIGHT: 65 IN | HEART RATE: 85 BPM | SYSTOLIC BLOOD PRESSURE: 110 MMHG | WEIGHT: 246.8 LBS | DIASTOLIC BLOOD PRESSURE: 88 MMHG

## 2024-02-12 DIAGNOSIS — E66.01 CLASS 3 SEVERE OBESITY DUE TO EXCESS CALORIES WITH SERIOUS COMORBIDITY AND BODY MASS INDEX (BMI) OF 40.0 TO 44.9 IN ADULT (HCC): Primary | ICD-10-CM

## 2024-02-12 PROCEDURE — 99214 OFFICE O/P EST MOD 30 MIN: CPT | Performed by: NURSE PRACTITIONER

## 2024-02-12 RX ORDER — TIRZEPATIDE 2.5 MG/.5ML
2.5 INJECTION, SOLUTION SUBCUTANEOUS WEEKLY
Qty: 2 ML | Refills: 0 | Status: SHIPPED | OUTPATIENT
Start: 2024-02-12 | End: 2024-03-11

## 2024-02-12 NOTE — PROGRESS NOTES
`Assessment/Plan:     Class 3 severe obesity due to excess calories with serious comorbidity and body mass index (BMI) of 40.0 to 44.9 in adult (HCC)  - Patient is pursuing Conservative Program and follow up visits with medical weight management provider  - Initial weight loss goal of 5-10% weight loss for improved health  - Labs reviewed from 11/2023  - Weight is not at goal and patient tried for more than 6 months to lose weight using various programs and tools, they were unable to achieve a meaningful weight loss above 5%   -Patient lifestyle habits were reviewed and barriers to weight loss were addressed today.  Nutrition was discussed and I suspect that patient is not eating enough calories throughout the day.  Patient does report stress which often causes her to avoid eating.  Encourage patient to better balance her calories throughout the day and to even set alarms if she has gone more than 3 hours without eating.  Patient will utilize the meal plan provided at last visit and snack list provided as options.   Patient would still like to pursue GLP-1 medications for weight loss as Wegovy was unable to be found at the time. Zepbound was discussed and patient was agreeable to try this medication.  Pen was demonstrated in the office and all questions were answered.    Zepbound Instructions:    - Begin Zepbound 2.5 mg subcutaneously once a week. Dose changes may occur after 4 doses if medication is tolerated. You will be assessed prior to each dose change to make sure you are tolerating the medication well.  - Please message me when you have 2 pens left from the prescription so there are no lapses in treatment.  - Visit Zepbound.com for further information/injection instructions.   -Please eat small frequent meals to help reduce nausea. Lemon water and saltine crackers may help with this.   - Side effects of Zepbound discussed: nausea, vomiting, diarrhea, and constipation. If you experience fever, nausea/vomiting,  and pain radiating to your back this may be a sign of pancreatitis. Please go to the emergency room if this occurs.  - If on oral birth control a 2nd method of birth control is recommended during the 1st 8 weeks of therapy and for 4 weeks after any dosage change.   - Patient understands the side effects of the medication and proper administration. Patient agrees with the treatment plan and all questions were answered.      Goals:  Calorie Goal: 4528-7284 calories per day  Do not skip meals.  Food log via saida or provided paper log (saida options include www.myfitnesspal.com, sparkpeople.com, loseit.com, calorieking.com, T-System)  No sugary beverages.   At least 64oz of water daily.  Increase physical activity by 10 minutes daily. Gradually increase physical activity to a goal of 5 days per week for 30 minutes of MODERATE intensity PLUS 2 days per week of FULL BODY resistance training          Misty was seen today for follow-up.    Diagnoses and all orders for this visit:    Class 3 severe obesity due to excess calories with serious comorbidity and body mass index (BMI) of 40.0 to 44.9 in adult ([facility])  -     tirzepatide (Zepbound) 2.5 mg/0.5 mL auto-injector; Inject 0.5 mL (2.5 mg total) under the skin once a week for 28 days        Total time spent reviewing chart, interviewing patient, examining patient, discussing plan, answering all questions, and documentin minutes with >50% face-to-face time with the patient.    Follow up in approximately 2 months with Non-Surgical Physician/Advanced Practitioner.    Subjective:   Chief Complaint   Patient presents with    Follow-up     Pt is here for [facility] f/u       Patient ID: Misty Blackmon  is a 24 y.o. female with excess weight/obesity here to pursue weight management.  Patient is pursuing Conservative Program.   Most recent notes and records were reviewed.    HPI    Wt Readings from Last 20 Encounters:   24 112 kg (246 lb 12.8 oz)   24 113 kg (249 lb 12.8  oz)   24 114 kg (251 lb)   24 114 kg (251 lb)   23 112 kg (248 lb)   23 111 kg (244 lb)   23 111 kg (244 lb)   23 112 kg (247 lb)   23 112 kg (247 lb 3.2 oz)   10/04/23 112 kg (248 lb)   23 112 kg (248 lb)   23 116 kg (255 lb 15.3 oz)   23 112 kg (247 lb)   04/10/23 112 kg (248 lb)   23 112 kg (247 lb)   23 115 kg (252 lb 12.8 oz)   22 108 kg (239 lb)   22 105 kg (232 lb)   04/15/22 106 kg (232 lb 12.8 oz)   22 107 kg (236 lb 3.2 oz)       Patient presents today to medical weight management office for follow up.  Since last visit patient has maintained her weight.  Patient was unable to fill Wegovy due to supply concerns but is still interested in weight loss medications.  Patient has met with the  to help with her stress eating as she feels like this is still a large barrier in her weight loss journey.  Patient also reports that she often does not feel hungry and will sometimes go most of the day without eating.  Also states she has not been as active lately as she has an injury to her foot and is working with orthopedics and physical therapy to heal.    Weight loss medication and dose: none  Started weight and date: 247.2 lbs in 2023  Current weight: 246.8 lbs  Difference: -0.4 lbs  Goal weight: 150-180 lbs    Starting BMI: 41.26 in 2023  Current BMI: 41.07    Waist Measurements:  2023: 49.5 in    Diet recall:  B: protein shake with coffee  L: yogurt (oikos zero) with fruit and grapes  S: fruit or protein bar  D: pasta with chicken and vegetable (mom cooks)    Hydration: water - 40+ oz, coffee - cream and sugar, coke 2 times a week  Alcohol: socially  Smoking: no  Exercise: 3-4 times a week at the gym (cardio and strength)  Occupation: teach  Sleep: varies  STOP ban/8        The following portions of the patient's history were reviewed and updated as appropriate: allergies, current medications, past  "family history, past medical history, past social history, past surgical history, and problem list.    Family History   Problem Relation Age of Onset    Hypertension Mother     Hyperlipidemia Mother     Diabetes Mother     Factor V Leiden deficiency Mother     Heart disease Father     Depression Father     ADD / ADHD Brother     Ovarian cancer Maternal Grandmother     Diabetes Other     Arthritis Family     Asthma Family     Cancer Family     Cervical cancer Family     Heart failure Family     Hyperlipidemia Family     Hypertension Family         Review of Systems   Constitutional:  Negative for fatigue.   HENT:  Negative for sore throat.    Respiratory:  Negative for cough and shortness of breath.    Cardiovascular:  Negative for chest pain, palpitations and leg swelling.   Gastrointestinal:  Negative for abdominal pain, constipation, diarrhea and nausea.   Genitourinary:  Negative for dysuria.   Musculoskeletal:  Positive for arthralgias (foot pain). Negative for back pain.   Skin:  Negative for rash.   Neurological:  Negative for headaches.   Psychiatric/Behavioral:  Negative for dysphoric mood. The patient is nervous/anxious.        Objective:  /88 (BP Location: Right arm, Patient Position: Sitting, Cuff Size: Large)   Pulse 85   Ht 5' 5\" (1.651 m)   Wt 112 kg (246 lb 12.8 oz)   BMI 41.07 kg/m²     Physical Exam  Vitals and nursing note reviewed.   Constitutional:       Appearance: Normal appearance. She is obese.   HENT:      Head: Normocephalic.   Pulmonary:      Effort: Pulmonary effort is normal.   Neurological:      General: No focal deficit present.      Mental Status: She is alert and oriented to person, place, and time.   Psychiatric:         Mood and Affect: Mood normal.         Behavior: Behavior normal.         Thought Content: Thought content normal.         Judgment: Judgment normal.            Labs   Most recent labs reviewed   Lab Results   Component Value Date     11/24/2017    " SODIUM 136 12/28/2023    K 3.7 12/28/2023     12/28/2023    CO2 21 12/28/2023    AGAP 9 12/28/2023    BUN 8 12/28/2023    CREATININE 1.06 12/28/2023    GLUC 168 (H) 12/28/2023    GLUF 122 (H) 05/16/2022    CALCIUM 8.5 12/28/2023    AST 11 (L) 12/28/2023    ALT 18 12/28/2023    ALKPHOS 70 12/28/2023    PROT 6.9 11/24/2017    TP 6.5 12/28/2023    BILITOT 0.3 11/24/2017    TBILI 0.41 12/28/2023    EGFR 73 12/28/2023     Lab Results   Component Value Date    HGBA1C 5.6 11/10/2023     Lab Results   Component Value Date    PCF8RODNMBVS 0.750 07/08/2020    TSH 2.000 11/10/2023     Lab Results   Component Value Date    CHOLESTEROL 169 11/10/2023     Lab Results   Component Value Date    HDL 41 11/10/2023     Lab Results   Component Value Date    TRIG 116 11/10/2023     Lab Results   Component Value Date    LDLCALC 107 (H) 11/10/2023

## 2024-02-12 NOTE — ASSESSMENT & PLAN NOTE
- Patient is pursuing Conservative Program and follow up visits with medical weight management provider  - Initial weight loss goal of 5-10% weight loss for improved health  - Labs reviewed from 11/2023  - Weight is not at goal and patient tried for more than 6 months to lose weight using various programs and tools, they were unable to achieve a meaningful weight loss above 5%   -Patient lifestyle habits were reviewed and barriers to weight loss were addressed today.  Nutrition was discussed and I suspect that patient is not eating enough calories throughout the day.  Patient does report stress which often causes her to avoid eating.  Encourage patient to better balance her calories throughout the day and to even set alarms if she has gone more than 3 hours without eating.  Patient will utilize the meal plan provided at last visit and snack list provided as options.   Patient would still like to pursue GLP-1 medications for weight loss as Wegovy was unable to be found at the time. Zepbound was discussed and patient was agreeable to try this medication.  Pen was demonstrated in the office and all questions were answered.    Zepbound Instructions:    - Begin Zepbound 2.5 mg subcutaneously once a week. Dose changes may occur after 4 doses if medication is tolerated. You will be assessed prior to each dose change to make sure you are tolerating the medication well.  - Please message me when you have 2 pens left from the prescription so there are no lapses in treatment.  - Visit [email or web address].com for further information/injection instructions.   -Please eat small frequent meals to help reduce nausea. Lemon water and saltine crackers may help with this.   - Side effects of Zepbound discussed: nausea, vomiting, diarrhea, and constipation. If you experience fever, nausea/vomiting, and pain radiating to your back this may be a sign of pancreatitis. Please go to the emergency room if this occurs.  - If on oral birth control a 2nd  method of birth control is recommended during the 1st 8 weeks of therapy and for 4 weeks after any dosage change.   - Patient understands the side effects of the medication and proper administration. Patient agrees with the treatment plan and all questions were answered.      Goals:  Calorie Goal: 2118-7684 calories per day  Do not skip meals.  Food log via saida or provided paper log (saida options include www.Shut Down.com, sparkpeople.com, loseit.com, calorieking.com, U.Gene.us)  No sugary beverages.   At least 64oz of water daily.  Increase physical activity by 10 minutes daily. Gradually increase physical activity to a goal of 5 days per week for 30 minutes of MODERATE intensity PLUS 2 days per week of FULL BODY resistance training

## 2024-02-14 ENCOUNTER — EVALUATION (OUTPATIENT)
Dept: PHYSICAL THERAPY | Facility: CLINIC | Age: 24
End: 2024-02-14
Payer: COMMERCIAL

## 2024-02-14 ENCOUNTER — TELEPHONE (OUTPATIENT)
Dept: BARIATRICS | Facility: CLINIC | Age: 24
End: 2024-02-14

## 2024-02-14 DIAGNOSIS — M79.672 PAIN OF LEFT HEEL: ICD-10-CM

## 2024-02-14 DIAGNOSIS — M72.2 PLANTAR FASCIITIS OF LEFT FOOT: Primary | ICD-10-CM

## 2024-02-14 DIAGNOSIS — M79.671 PAIN OF RIGHT HEEL: ICD-10-CM

## 2024-02-14 PROCEDURE — 97110 THERAPEUTIC EXERCISES: CPT

## 2024-02-14 PROCEDURE — 97161 PT EVAL LOW COMPLEX 20 MIN: CPT

## 2024-02-14 NOTE — PROGRESS NOTES
PT Evaluation     Today's date: 2024  Patient name: Misty Blackmon  : 2000  MRN: [id number]  Referring provider: Shady Crow DPM  Dx:   Encounter Diagnosis     ICD-10-CM    1. Pain of right heel  M79.671       2. Plantar fasciitis of left foot  M72.2 Ambulatory referral to Physical Therapy      3. Pain of left heel  M79.672           Start Time: 1700  Stop Time: 1745  Total time in clinic (min): 45 minutes    Assessment  Assessment details: 2024 Misty Blackmon is a 24 y.o. female who presents with bilateral foot and ankle pain (L greater than R). Pt demonstrates impaired ankle AROM (L deficits greater than R), L ankle strength, gastroc flexibility bilaterally, standing posture, and gait. 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 Patient's clinical presentation is consistent with their referring diagnosis of Plantar fasciitis of left foot, as well as pain of left and right heel. Patient has been educated in pathology, review of impairements, prognosis, activity modification, POC, and HEP. Patient would benefit from skilled physical therapy services to address their aforementioned functional limitations and progress towards prior level of function and independence with home exercise program.     Plan: Ambulatory referral to Physical Therapy        Impairments: abnormal gait, abnormal or restricted ROM, activity intolerance, impaired balance, impaired physical strength, lacks appropriate home exercise program, pain with function, weight-bearing intolerance and poor body mechanics    Goals  Short Term Goals 4 WEEKS Target Date (3/13/2024)  1. Establish Johnson w/ progressing HEP for ROM/strength.  2. Improve AROM L ankle P/D to be within 5 degrees of L to prepare for reciprocal stairs.  3. Improve AROM L ankle eversion equal to R to prepare for  "negotiation of uneven terrain.  4. Improve L ankle strength to 4/5 or better w/out pain for improved tolerance to WB.     Long Term Goals 12 WEEKS: Target Date (5/8/2024)  1. Improve L ankle strength to 4+/5 or better to allow SLS x 20\" or more.   2. Improve tolerance to weight bearing to 4 hours or more for return to pain-free teaching.   3. Ankle AROM WFL B/L to allow recipcrocal stairs w/o handrail.   4. Resolve gait deviations for return to safe community ambulation.   5. Pt will return to gym for lower body strengthening with report of 0/10 to demo return to PLOF.       Plan  Plan details: HEP development and progression, monitor patients adherence to activity modification to ensure adequate healing time/ recovery. Implement stretching, A/AA/PROM, joint mobilizations, posture education, STM/MI as needed to reduce muscle tension, muscle reeducation. Progress strengthening; improve pt's tolerance to resisted WB activities. PLOC discussed and agreed upon with patient.    Patient would benefit from: PT eval and skilled physical therapy  Planned modality interventions: cryotherapy, thermotherapy: hydrocollator packs and unattended electrical stimulation  Planned therapy interventions: manual therapy, neuromuscular re-education, therapeutic activities, therapeutic exercise, home exercise program, patient education, functional ROM exercises, strengthening and stretching  Frequency: 2-3x/week.  Duration in weeks: 12  Plan of Care beginning date: 2/14/2024  Plan of Care expiration date: 5/8/2024  Treatment plan discussed with: patient        Subjective Evaluation    History of Present Illness  Mechanism of injury: 2/14/2024: Pt is a 24 y.o female who reports to PT with compliant of bilateral foot pain (L greater than R). Pain described as sharp. Pain started in L ankle/ heel began in October; R foot pain began about a week ago. Pain is present all day. Pain with WB; minimal pain at rest. Static standing, walking and " stairs are painful. Patient is a  and has to be on her feet all day. Pain is present with ADLs (cooking/ cleaning). Pt denies LLE pain other than current injury; occasional B/L knee pain at gym. Denies hx of back pain or injury. Foot goes numb with night splint. She wears it w/ watching TV at home before bed; she is unable to sleep with it. Sleep is unaffected by pain. Hx of pulmonary embolism; had 5 in R lung. Hx of Factor V Leiden. Has had L kidney removed. Last PE . No BP issues as per pt report; she is not on BP medication.   Patient Goals  Patient goals for therapy: decreased pain  Patient goal: Eliminate pain in feet  Pain  Current pain ratin (B/L)  At best pain ratin  Pain scale at highest: R 5/10, L 8-9/10.  Quality: sharp  Aggravating factors: standing, walking, stair climbing and lifting    Social Support  Steps to enter house: yes  10  Stairs in house: yes (1 flight for laundry)   Lives with: parents      Diagnostic Tests  X-ray: abnormal (: Small plantar calcaneal spur. Preserved tibiotalar joint. No fracture or dislocation. No soft tissue swelling or obvious joint effusion.)  Treatments  Current treatment: physical therapy        Objective  Lumbar AROM:    2024  Flexion  Nil  Extension Nil  Side glide R Min  Side glide L  Min    Palpation:  2024: Pt is TTP along plantar aspect of calcaneous (L> R)    AROM:    R  L      2024  Ankle DF calc/5thray  -2  0  Ankle PF calc/5thray  70  60  Ankle inversion  40  40  Ankle eversion  30  25*    STRENGTH:   R  L     2024   Ankle DF  5/5  4+/5  Ankle PF  5/5  4/5  Ankle inversion 5/5  3+/5*  Ankle eversion 5/5  4/5*    Knee ext  5/5  5/5  Knee flex  5/5  5/5  Hip abduction  4/5  4/5  Hip extension  5/5  4+/5    Gastroc Flexibility:  2024: R 15, L 10    Gait:   2024: Antalgic, lacks hip/ knee flexion t/o gait cycle.    Standing posture: Sway back, genu recurvatum/ valgum, and pes  "planus bilaterally.     Balance:  2/14/2024  Tandem R post w/ EO: unable d/t pain  Tandem L post w/ EO: unable d/t pain  R SLS w/ EO:   NT  L SLS w/ EO:   NT    Function:  2/14/2024  Squat: Wide base of support; form is good.   Step up/down 6\" R/L: 6/10 pain L heel  Stairs: step-to when painful, reciprocal at baseline       Precautions:   *Hx of recurring PE  Past Medical History:   Diagnosis Date    Allergic     Anxiety     Anxiety and depression     Asthma     Clotting disorder (HCC)     Factor V Leiden (HCC)     Headache(784.0)     Pulmonary embolism (HCC)     Thrombophlebitis      Past Surgical History:   Procedure Laterality Date    BLADDER SURGERY      FL RETROGRADE PYELOGRAM  05/20/2022    FL VCUG VOIDING URETHROCYSTOGRAM  8/7/2018    NEPHRECTOMY Left 05/21/2020    Done laparoscopically at [facility]    [place] CYSTOURETHROSCOPY N/A 05/20/2022    Procedure: CYSTOSCOPY, retrograde pyelogram, and examination under anesthesia, dilation urethral stenosis;  Surgeon: Kamran Lott MD;  Location: [facility] MAIN OR;  Service: Urology    URETER REVISION      WISDOM TOOTH EXTRACTION      woke up during procedure.       SOC: 2/14/2024  FOTO: 2/14/2024  POC Expiration: 5/8/2024   Daily Treatment Log  Date: Initial Evaluation  2/14/2024 Next session         Visit#/ auth: 1           Objective Measures                           Manuals              PF iso vs man                                                      Neuro Re-Ed             LAQ + DF        Seated wobble board                                                                                                Ther Ex  10'           Gastroc stretch w/ sos  30\" x 3 R/L           PF AROM  5\" x 10 pain-free range           Seated HR/ TR             Prone HS curl w/ DF             Prone glute kicks        SL hip abd                                        EDUCATION: Pathology, review of impairements, prognosis, activity modification, POC, and HEP  KE           Ther Activity          "                                Gait Training                                         Modalities                                         HEP:   Access Code: [email or web address]  URL: [email or web address]  Date: 02/14/2024  Prepared by: Erika Coreas    Exercises  - Seated Gastroc Stretch with Strap  - 3 x daily - 3 sets - 30 seconds hold  - Long Sitting Ankle Pumps  - 2 x daily - 1 sets - 10 reps - 5 seconds hold

## 2024-02-16 ENCOUNTER — TELEPHONE (OUTPATIENT)
Age: 24
End: 2024-02-16

## 2024-02-16 NOTE — TELEPHONE ENCOUNTER
Patient called to inform [facility]ova that her Zepbound was not approved.  Previously spoke about possible alternatives if medication was not approved.    Would like to have alternative ordered or guidance with next steps.    Please advise  CB#[phone]

## 2024-02-16 NOTE — TELEPHONE ENCOUNTER
PA for Zepbound    Submitted via    []CMM-KEY   []GailriNewsBasis-Case ID #   [x][facility] [phone]  []Other website   []Phone call Case ID #     Office notes sent, clinical questions answered. Awaiting determination    Turnaround time for your insurance to make a decision on your Prior Authorization can take 7-21 business days.

## 2024-02-19 NOTE — TELEPHONE ENCOUNTER
I called spoke with Barry it was not denied - they did not receive the prior auth form- refaxed it to [phone] to proceed with Authorization

## 2024-02-20 NOTE — TELEPHONE ENCOUNTER
PA for Zepbound Denied    Reason:(Screenshot if applicable)        Message sent to office clinical pool Yes    Denial letter scanned into Media Yes    Appeal started No

## 2024-02-21 ENCOUNTER — OFFICE VISIT (OUTPATIENT)
Dept: PHYSICAL THERAPY | Facility: CLINIC | Age: 24
End: 2024-02-21
Payer: COMMERCIAL

## 2024-02-21 DIAGNOSIS — M79.671 PAIN OF RIGHT HEEL: ICD-10-CM

## 2024-02-21 DIAGNOSIS — M79.672 PAIN OF LEFT HEEL: ICD-10-CM

## 2024-02-21 DIAGNOSIS — M72.2 PLANTAR FASCIITIS OF LEFT FOOT: Primary | ICD-10-CM

## 2024-02-21 PROBLEM — N39.0 RECURRENT UTI: Status: RESOLVED | Noted: 2019-07-01 | Resolved: 2024-02-21

## 2024-02-21 PROBLEM — J35.01 CHRONIC TONSILLITIS: Status: RESOLVED | Noted: 2019-07-02 | Resolved: 2024-02-21

## 2024-02-21 PROCEDURE — 97140 MANUAL THERAPY 1/> REGIONS: CPT

## 2024-02-21 PROCEDURE — 97112 NEUROMUSCULAR REEDUCATION: CPT

## 2024-02-21 PROCEDURE — 97110 THERAPEUTIC EXERCISES: CPT

## 2024-02-21 NOTE — PROGRESS NOTES
Daily Note     Today's date: 2024  Patient name: Misty Blackmon  : 2000  MRN: [id number]  Referring provider: Shady Crow DPM  Dx:   Encounter Diagnosis     ICD-10-CM    1. Plantar fasciitis of left foot  M72.2       2. Pain of right heel  M79.671       3. Pain of left heel  M79.672                      Subjective: after sitting for a while or getting up in the AM her pain at worse 7-8/10; since doing her HEP she has had more so pain on the lateral/bottom of her L foot and hasn't had any of the achilles pain this week. No pain in R foot this week. On arrival pain in L foot 6/10 with standing and 5/10 seated.       Objective: See treatment diary below      Assessment: Tolerated treatment well. Pt had dec in L foot pain 2/10 after IASTM to PF. Pt edu to cont with gastroc stretching and PF rolling at home w/ frozen water bottle/rolling pin to aid in PF massage.  Patient would benefit from continued PT      Plan: Continue per plan of care.      Precautions:   *Hx of recurring PE  Past Medical History:   Diagnosis Date    Allergic     Anxiety     Anxiety and depression     Asthma     Clotting disorder ([facility])     Factor V Leiden ([facility])     Headache(784.0)     Pulmonary embolism (HCC)     Thrombophlebitis      Past Surgical History:   Procedure Laterality Date    BLADDER SURGERY      FL RETROGRADE PYELOGRAM  2022    FL VCUG VOIDING URETHROCYSTOGRAM  2018    NEPHRECTOMY Left 2020    Done laparoscopically at [facility]    [place] CYSTOURETHROSCOPY N/A 2022    Procedure: CYSTOSCOPY, retrograde pyelogram, and examination under anesthesia, dilation urethral stenosis;  Surgeon: Kamran Lott MD;  [facility]: [facility] MAIN OR;  Service: Urology    URETER REVISION      WISDOM TOOTH EXTRACTION      woke up during procedure.       SOC: 2024  FOTO: 2024  POC Expiration: 2024   Daily Treatment Log  Date: Initial Evaluation  2024         Visit#/ auth: 1  2         Objective  "Measures                           Manuals    10'           PF iso vs man             IASTM B/L PF w/ DF stretch  prone    RB L                                      Neuro Re-Ed    10'          LAQ + DF  1x10 R/L       Seated wobble board    20x AP/ML                                                                                             Ther Ex  10'  20'          Gastroc stretch w/ sos  30\" x 3 R/L  30\"x3 R/L          PF AROM  5\" x 10 pain-free range  5\"x10 pain free range          Seated HR/ TR    1x10 ea         Prone HS curl w/ DF             Prone glute kicks        SL hip abd             PF roll w/ therabar    2' R/L                        EDUCATION: Pathology, review of impairements, prognosis, activity modification, POC, and HEP  KE  updated          Ther Activity                                         Gait Training                                         Modalities                                         HEP:   Access Code: [email or web address]  URL: [email or web address]  Date: 02/21/2024  Prepared by: Adelaide Tilley    Exercises  - Seated Gastroc Stretch with Strap  - 3 x daily - 3 sets - 30 seconds hold  - Long Sitting Ankle Pumps  - 2 x daily - 1 sets - 10 reps - 5 seconds hold  - Foot Roller Plantar Massage  - 1 x daily - 7 x weekly - 1 reps - 3 min  hold          "

## 2024-02-22 ENCOUNTER — OFFICE VISIT (OUTPATIENT)
Dept: PHYSICAL THERAPY | Facility: CLINIC | Age: 24
End: 2024-02-22
Payer: COMMERCIAL

## 2024-02-22 DIAGNOSIS — M79.672 PAIN OF LEFT HEEL: ICD-10-CM

## 2024-02-22 DIAGNOSIS — M72.2 PLANTAR FASCIITIS OF LEFT FOOT: Primary | ICD-10-CM

## 2024-02-22 DIAGNOSIS — M79.671 PAIN OF RIGHT HEEL: ICD-10-CM

## 2024-02-22 PROCEDURE — 97110 THERAPEUTIC EXERCISES: CPT

## 2024-02-22 PROCEDURE — 97112 NEUROMUSCULAR REEDUCATION: CPT

## 2024-02-22 NOTE — PROGRESS NOTES
Daily Note     Today's date: 2024  Patient name: Misty Blackmon  : 2000  MRN: [id number]  Referring provider: Shady Crow DPM  Dx:   Encounter Diagnosis     ICD-10-CM    1. Plantar fasciitis of left foot  M72.2       2. Pain of right heel  M79.671       3. Pain of left heel  M79.672                      Subjective: Pt states that her feet felt better yesterday. Today as the day progressed, her pain increased. She did not wear her sneakers at work today; she wore her Uggs which did have insoles in them, however she does not favor them. Pt states that her left heel pain is more severe than right today, however she has pain in both.       Objective: See treatment diary below      Assessment: Pt required VC t/o session to limit pain; instructed to perform exercises within ROM that is pain-free. 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 Pt instructed to refrain from standing w/ B/L knees hyperextended to prevent increase pressure through heels. Discussed footwear; pt aware of importance of supportive/comfortable shoe wear. Tolerated treatment well. Patient would benefit from continued PT.       Plan: Continue per plan of care.  Progress treatment as tolerated.       Precautions:   *Hx of recurring PE  Past Medical History:   Diagnosis Date    Allergic     Anxiety     Anxiety and depression     Asthma     Clotting disorder (HCC)     Factor V Leiden (HCC)     Headache(784.0)     Pulmonary embolism (HCC)     Thrombophlebitis      Past Surgical History:   Procedure Laterality Date    BLADDER SURGERY      FL RETROGRADE PYELOGRAM  2022    FL VCUG VOIDING URETHROCYSTOGRAM  2018    NEPHRECTOMY Left 2020    Done laparosc[facility] at [facility]    [place] CYSTOURETHROSCOPY N/A 2022    Procedure: CYSTOSCOPY, retrograde pyelogram, and examination under anesthesia, dilation urethral stenosis;  Surgeon: Kamran Lott MD;  [facility];   "Service: Urology    URETER REVISION      WISDOM TOOTH EXTRACTION      woke up during procedure.       SOC: 2/14/2024  FOTO: 2/14/2024  POC Expiration: 5/8/2024   Daily Treatment Log  Date: Initial Evaluation  2/14/2024 2/21/2024 2/22/2024       Visit#/ auth: 1  2  3       Objective Measures                           Manuals    10'           PF iso vs man             IASTM B/L PF w/ DF stretch  prone    RB L                                      Neuro Re-Ed    10'   10'       LAQ + DF  1x10 R/L  1x10 R/L      Seated wobble board   20x AP/ML   1 x 10 ea R/L  AP/ML                                                                                          Ther Ex  10'  20'   35'       Gastroc stretch w/ sos  30\" x 3 R/L  30\"x3 R/L   30\"x3 R/L        PF AROM  5\" x 10 pain-free range  5\"x10 pain free range  vs YTB pain free range 1 x 10 R/L       PF iso vs yellow ball    30\" x 3 R/L long sit on floor w/ Airex for comfort     Seated HR/ TR    1x10 ea 5\" x 10 B/L HR  1 x 10 TR       Prone HS curl w/ DF      1 x 10 R/L VC for pain free       Prone glute kicks         SL hip abd w/ DF     2 x 5 R/L        PF roll w/ therabar    2' R/L                        EDUCATION: Pathology, review of impairements, prognosis, activity modification, POC, and HEP  KE  updated          Ther Activity                                         Gait Training                                         Modalities                                         HEP:   Access Code: [email or web address]  URL: [email or web address]  Date: 02/21/2024  Prepared by: Adelaide Tilley    Exercises  - Seated Gastroc Stretch with Strap  - 3 x daily - 3 sets - 30 seconds hold  - Long Sitting Ankle Pumps  - 2 x daily - 1 sets - 10 reps - 5 seconds hold  - Foot Roller Plantar Massage  - 1 x daily - 7 x weekly - 1 reps - 3 min  hold        "

## 2024-02-26 ENCOUNTER — OFFICE VISIT (OUTPATIENT)
Dept: PHYSICAL THERAPY | Facility: CLINIC | Age: 24
End: 2024-02-26
Payer: COMMERCIAL

## 2024-02-26 DIAGNOSIS — M72.2 PLANTAR FASCIITIS OF LEFT FOOT: Primary | ICD-10-CM

## 2024-02-26 DIAGNOSIS — M79.672 PAIN OF LEFT HEEL: ICD-10-CM

## 2024-02-26 DIAGNOSIS — M79.671 PAIN OF RIGHT HEEL: ICD-10-CM

## 2024-02-26 PROCEDURE — 97110 THERAPEUTIC EXERCISES: CPT

## 2024-02-26 PROCEDURE — 97140 MANUAL THERAPY 1/> REGIONS: CPT

## 2024-02-26 PROCEDURE — 97112 NEUROMUSCULAR REEDUCATION: CPT

## 2024-02-26 NOTE — PROGRESS NOTES
Daily Note     Today's date: 2024  Patient name: Misty Blackmon  : 2000  MRN: [id number]  Referring provider: Shady Crow DPM  Dx:   Encounter Diagnosis     ICD-10-CM    1. Plantar fasciitis of left foot  M72.2       2. Pain of right heel  M79.671       3. Pain of left heel  M79.672                      Subjective: pt reports her foot is feeling okay, she did have to talib after a kid at work. On arrival 6/10 pain in L, no pain in R in NWB. Pt reports that her morning symptoms are better but cont to have pain at the end of the day.       Objective: See treatment diary below      Assessment: Tolerated treatment well. Pt dem challenge with toe yoga, pt requiring AA to perform, would benefit from cont progression of forefoot mobility. Pt dem dec pain with stretching and manual therapy 4/10. Increased pain with attempted progression standing PF isometric. Cont to progress PF mobility and intrinsic foot strengthening as able. Patient would benefit from continued PT      Plan: Continue per plan of care.      Precautions:   *Hx of recurring PE  Past Medical History:   Diagnosis Date    Allergic     Anxiety     Anxiety and depression     Asthma     Clotting disorder (HCC)     Factor V Leiden (HCC)     Headache(784.0)     Pulmonary embolism (HCC)     Thrombophlebitis      Past Surgical History:   Procedure Laterality Date    BLADDER SURGERY      FL RETROGRADE PYELOGRAM  2022    FL VCUG VOIDING URETHROCYSTOGRAM  2018    NEPHRECTOMY Left 2020    Done laparoscopically at [facility]    [place] CYSTOURETHROSCOPY N/A 2022    Procedure: CYSTOSCOPY, retrograde pyelogram, and examination under anesthesia, dilation urethral stenosis;  Surgeon: Kamran Lott MD;  Location: [facility] MAIN OR;  Service: Urology    URETER REVISION      WISDOM TOOTH EXTRACTION      woke up during procedure.       SOC: 2024  FOTO: 2024  POC Expiration: 2024   Daily Treatment Log  Date: Initial Evaluation  2024  "2/21/2024 2/22/2024 2/26/2024     Visit#/ auth: 1  2  3  4     Objective Measures                           Manuals    10'     10'       PF iso vs man             IASTM B/L PF w/ DF stretch  prone    RB L     +M/L achilles   RB L                                  Neuro Re-Ed    10'   10'  20'     LAQ + DF  1x10 R/L  1x10 R/L       Seated wobble board   20x AP/ML   1 x 10 ea R/L  AP/ML  1x10 AP/ML      Seated towel scrunches       3\"x20 R/L       Toe yoga        1x10 ea R/L       isometric HR        10\"x5 B/L   Pain       [place] board seated        1x10 cw/ccw R/L                                  Ther Ex  10'  20'   35'  15'      Gastroc stretch w/ sos  30\" x 3 R/L  30\"x3 R/L   30\"x3 R/L   PF towel stretch 20\"x3      Slant board stretch     20\"x3     PF AROM  5\" x 10 pain-free range  5\"x10 pain free range  vs YTB pain free range 1 x 10 R/L  AROM w/ toe flex curl 1x10      PF iso vs yellow ball    30\" x 3 R/L long sit on floor w/ Airex for comfort     Seated HR/ TR    1x10 ea 5\" x 10 B/L HR  1 x 10 TR  5\"x10 ea B/L      Prone HS curl w/ DF      1 x 10 R/L VC for pain free       Prone glute kicks         SL hip abd w/ DF     2 x 5 R/L        PF roll w/ therabar    2' R/L     2' R/L                    EDUCATION: Pathology, review of impairements, prognosis, activity modification, POC, and HEP  KE  updated          Ther Activity                                         Gait Training                                         Modalities                                         HEP:   Access Code: [email or web address]  URL: [email or web address]  Date: 02/21/2024  Prepared by: Adelaide Tilley    Exercises  - Seated Gastroc Stretch with Strap  - 3 x daily - 3 sets - 30 seconds hold  - Long Sitting Ankle Pumps  - 2 x daily - 1 sets - 10 reps - 5 seconds hold  - Foot Roller Plantar Massage  - 1 x daily - 7 x weekly - 1 reps - 3 min  hold          "

## 2024-02-29 ENCOUNTER — TELEPHONE (OUTPATIENT)
Dept: PHYSICAL THERAPY | Facility: CLINIC | Age: 24
End: 2024-02-29

## 2024-02-29 ENCOUNTER — APPOINTMENT (OUTPATIENT)
Dept: PHYSICAL THERAPY | Facility: CLINIC | Age: 24
End: 2024-02-29
Payer: COMMERCIAL

## 2024-03-04 ENCOUNTER — OFFICE VISIT (OUTPATIENT)
Dept: PHYSICAL THERAPY | Facility: CLINIC | Age: 24
End: 2024-03-04
Payer: COMMERCIAL

## 2024-03-04 DIAGNOSIS — M72.2 PLANTAR FASCIITIS OF LEFT FOOT: Primary | ICD-10-CM

## 2024-03-04 DIAGNOSIS — M79.671 PAIN OF RIGHT HEEL: ICD-10-CM

## 2024-03-04 DIAGNOSIS — M79.672 PAIN OF LEFT HEEL: ICD-10-CM

## 2024-03-04 PROCEDURE — 97112 NEUROMUSCULAR REEDUCATION: CPT

## 2024-03-04 PROCEDURE — 97110 THERAPEUTIC EXERCISES: CPT

## 2024-03-04 NOTE — PROGRESS NOTES
Daily Note     Today's date: 3/4/2024  Patient name: Misty Blackmon  : 2000  MRN: [id number]  Referring provider: Shady Crow DPM  Dx:   Encounter Diagnosis     ICD-10-CM    1. Plantar fasciitis of left foot  M72.2       2. Pain of right heel  M79.671       3. Pain of left heel  M79.672                      Subjective: Pt states that Thursday was a good day, she had the stomach bug but her foot did not hurt. Friday her foot was painful. Pt states that her foot pain is decreasing; she has days that are good which she did not have previously.       Objective: See treatment diary below      Assessment: Pt offers no complaint of increase in pain t/o session. Attempted PF isometric hold in sitting which offered appropriate challenge as iso hold in standing was painful last session. Pt introduced to PF iso hold vs TB in long sitting. Plan to incorporate standing therapeutic exercises next session. Tolerated treatment well. Patient would benefit from continued PT.       Plan: Continue per plan of care.  Progress treatment as tolerated.       Precautions:   *Hx of recurring PE  Past Medical History:   Diagnosis Date    Allergic     Anxiety     Anxiety and depression     Asthma     Clotting disorder (HCC)     Factor V Leiden ([facility])     Headache(784.0)     Pulmonary embolism (HCC)     Thrombophlebitis      Past Surgical History:   Procedure Laterality Date    BLADDER SURGERY      FL RETROGRADE PYELOGRAM  2022    FL VCUG VOIDING URETHROCYSTOGRAM  2018    NEPHRECTOMY Left 2020    Done laparoscopically at [facility]    [place] CYSTOURETHROSCOPY N/A 2022    Procedure: CYSTOSCOPY, retrograde pyelogram, and examination under anesthesia, dilation urethral stenosis;  Surgeon: Kamran Lott MD;  Location: [facility] MAIN OR;  Service: Urology    URETER REVISION      WISDOM TOOTH EXTRACTION      woke up during procedure.       SOC: 2024  FOTO: 3/4/2024  POC Expiration: 2024   Daily Treatment Log  Date:  "Initial Evaluation  2/14/2024 2/21/2024  2/22/2024  2/26/2024  3/4/2024   Visit#/ auth: 1  2  3  4  5  FOTO   Objective Measures                           Manuals    10'     10'       PF iso vs man             IASTM B/L PF w/ DF stretch  prone    RB L     +M/L achilles   RB L                                  Neuro Re-Ed    10'   10'  20'  25'   LAQ + DF  1x10 R/L  1x10 R/L    1x10 R/L    Seated wobble board   20x AP/ML   1 x 10 ea R/L  AP/ML  1x10 AP/ML   1x10 AP/ML   Seated towel scrunches       3\"x20 R/L   3\"x20 R/L     Toe yoga        1x10 ea R/L   1x10 ea R/L     isometric HR        10\"x5 B/L   Pain   10\"x5 B/L     [place] board seated        1x10 cw/ccw R/L   1x10 cw/ccw R/L                                Ther Ex  10'  20'   35'  15'   20'   Gastroc stretch w/ sos  30\" x 3 R/L  30\"x3 R/L   30\"x3 R/L   PF towel stretch 20\"x3   30\" x 3 R/L w/ sos   Slant board stretch     20\"x3     PF AROM  5\" x 10 pain-free range  5\"x10 pain free range  vs YTB pain free range 1 x 10 R/L  AROM w/ toe flex curl 1x10   AROM w/ toe flex curl 1x10    PF iso vs yellow ball    30\" x 3 R/L long sit on floor w/ Airex for comfort  30\" x 2 R/L vs GTB   Seated HR/ TR    1x10 ea 5\" x 10 B/L HR  1 x 10 TR  5\"x10 ea B/L   5\"x10 ea B/L   Prone HS curl w/ DF      1 x 10 R/L VC for pain free       Prone glute kicks         SL hip abd w/ DF     2 x 5 R/L        PF roll w/ therabar    2' R/L     2' R/L   2' R/L                  EDUCATION: Pathology, review of impairements, prognosis, activity modification, POC, and HEP  KE  updated          Ther Activity                                         Gait Training                                         Modalities                                         HEP:   Access Code: [email or web address]  URL: [email or web address]  Date: 03/04/2024  Prepared by: Erika Coreas    Exercises  - Seated Gastroc Stretch with Strap  - 3 x daily - 3 sets - 30 seconds hold  - Long Sitting Ankle Pumps  - 2 x daily - 1 sets " - 10 reps - 5 seconds hold  - Foot Roller Plantar Massage  - 1 x daily - 7 x weekly - 1 reps - 3 min  hold  - Seated Heel Raise  - 1 x daily - 1 sets - 10 reps  - Towel Scrunches  - 1 x daily - 1 sets - 20 reps

## 2024-03-06 ENCOUNTER — OFFICE VISIT (OUTPATIENT)
Dept: PHYSICAL THERAPY | Facility: CLINIC | Age: 24
End: 2024-03-06
Payer: COMMERCIAL

## 2024-03-06 DIAGNOSIS — M79.671 PAIN OF RIGHT HEEL: ICD-10-CM

## 2024-03-06 DIAGNOSIS — M79.672 PAIN OF LEFT HEEL: ICD-10-CM

## 2024-03-06 DIAGNOSIS — M72.2 PLANTAR FASCIITIS OF LEFT FOOT: Primary | ICD-10-CM

## 2024-03-06 PROCEDURE — 97110 THERAPEUTIC EXERCISES: CPT

## 2024-03-06 PROCEDURE — 97140 MANUAL THERAPY 1/> REGIONS: CPT

## 2024-03-06 PROCEDURE — 97112 NEUROMUSCULAR REEDUCATION: CPT

## 2024-03-06 NOTE — PROGRESS NOTES
Daily Note     Today's date: 3/6/2024  Patient name: Misty Blackmon  : 2000  MRN: [id number]  Referring provider: Shady Crow DPM  Dx:   Encounter Diagnosis     ICD-10-CM    1. Plantar fasciitis of left foot  M72.2       2. Pain of right heel  M79.671       3. Pain of left heel  M79.672                      Subjective: pt reports that she feels things are still improving, L foot pain on arrival 5/10, R foot pain soreness 2-3/10.       Objective: See treatment diary below      Assessment: Tolerated treatment well. Pt tolerated additional global ankle strengthening today with 4-way ankle, cont to progress WB TE as able per symptom irritability. Pt dem improvement in symptoms but does present with exacerbations in pain with increased functional mobility charly with quick movements at work. Patient would benefit from continued PT      Plan: Continue per plan of care.      Precautions:   *Hx of recurring PE  Past Medical History:   Diagnosis Date    Allergic     Anxiety     Anxiety and depression     Asthma     Clotting disorder (HCC)     Factor V Leiden (HCC)     Headache(784.0)     Pulmonary embolism (HCC)     Thrombophlebitis      Past Surgical History:   Procedure Laterality Date    BLADDER SURGERY      FL RETROGRADE PYELOGRAM  2022    FL VCUG VOIDING URETHROCYSTOGRAM  2018    NEPHRECTOMY Left 2020    Done laparoscopically at [facility]    [place] CYSTOURETHROSCOPY N/A 2022    Procedure: CYSTOSCOPY, retrograde pyelogram, and examination under anesthesia, dilation urethral stenosis;  Surgeon: Kamran Lott MD;  Location: [facility] MAIN OR;  Service: Urology    URETER REVISION      WISDOM TOOTH EXTRACTION      woke up during procedure.       SOC: 2024  FOTO: 3/4/2024  POC Expiration: 2024   Daily Treatment Log  Date: 3/6/2024   2024  2024  3/4/2024   Visit#/ auth: 6   3  4  5  FOTO   Objective Measures                         Manuals  10'      10'       PF iso vs man           "  IASTM B/L PF w/ DF stretch  prone  RB      +M/L achilles   RB L                                  Neuro Re-Ed  25'    10'  20'  25'   LAQ + DF 1x10 R/L   1x10 R/L    1x10 R/L    Seated wobble board     1 x 10 ea R/L  AP/ML  1x10 AP/ML   1x10 AP/ML   Seated towel scrunches  3\"x20 B/L     3\"x20 R/L   3\"x20 R/L     Toe yoga        1x10 ea R/L   1x10 ea R/L     isometric HR        10\"x5 B/L   Pain   10\"x5 B/L     [place] board seated   1x10 cw/ccw R/L      1x10 cw/ccw R/L   1x10 cw/ccw R/L     4 way ankle RTB 1x15 ea R/L                         Ther Ex  10'   35'  15'   20'   Gastroc stretch w/ sos  30\" x 3 R/L   30\"x3 R/L   PF towel stretch 20\"x3   30\" x 3 R/L w/ sos   Slant board stretch     20\"x3     PF AROM   vs YTB pain free range 1 x 10 R/L  AROM w/ toe flex curl 1x10   AROM w/ toe flex curl 1x10    PF iso vs yellow ball    30\" x 3 R/L long sit on floor w/ Airex for comfort  30\" x 2 R/L vs GTB   Seated HR/ TR  seated HR for toe ext 5\"x20 B/L   5\" x 10 B/L HR  1 x 10 TR  5\"x10 ea B/L   5\"x10 ea B/L   Prone HS curl w/ DF      1 x 10 R/L VC for pain free       Prone glute kicks         SL hip abd w/ DF    2 x 5 R/L        PF roll w/ therabar        2' R/L   2' R/L                EDUCATION: Pathology, review of impairements, prognosis, activity modification, POC, and HEP           Ther Activity                                         Gait Training                                         Modalities                                         HEP:   Access Code: [email or web address]  URL: [email or web address]  Date: 03/04/2024  Prepared by: Erika Coreas    Exercises  - Seated Gastroc Stretch with Strap  - 3 x daily - 3 sets - 30 seconds hold  - Long Sitting Ankle Pumps  - 2 x daily - 1 sets - 10 reps - 5 seconds hold  - Foot Roller Plantar Massage  - 1 x daily - 7 x weekly - 1 reps - 3 min  hold  - Seated Heel Raise  - 1 x daily - 1 sets - 10 reps  - Towel Scrunches  - 1 x daily - 1 sets - 20 reps             "

## 2024-03-11 ENCOUNTER — OFFICE VISIT (OUTPATIENT)
Dept: PHYSICAL THERAPY | Facility: CLINIC | Age: 24
End: 2024-03-11
Payer: COMMERCIAL

## 2024-03-11 DIAGNOSIS — M79.671 PAIN OF RIGHT HEEL: ICD-10-CM

## 2024-03-11 DIAGNOSIS — M72.2 PLANTAR FASCIITIS OF LEFT FOOT: Primary | ICD-10-CM

## 2024-03-11 DIAGNOSIS — M79.672 PAIN OF LEFT HEEL: ICD-10-CM

## 2024-03-11 PROCEDURE — 97110 THERAPEUTIC EXERCISES: CPT

## 2024-03-11 PROCEDURE — 97112 NEUROMUSCULAR REEDUCATION: CPT

## 2024-03-11 NOTE — PROGRESS NOTES
Daily Note     Today's date: 3/11/2024  Patient name: Misty Blackmon  : 2000  MRN: [id number]  Referring provider: Shady Crow DPM  Dx:   Encounter Diagnosis     ICD-10-CM    1. Plantar fasciitis of left foot  M72.2       2. Pain of right heel  M79.671       3. Pain of left heel  M79.672                      Subjective: Pt states that her R foot doesn't hurt at all. Her left foot is painful only in the heel of her foot. L heel pain is 4/10.       Objective: See treatment diary below      Assessment: Pt able to perform standing heel raises today with increased WB into BUE at rail. No complaints offered with standing gastroc stretch on slant board. Pt continues to have difficulty coordinating movement with seated [employer] [place] wobble board. Added theraband resistance to HEP today. Tolerated treatment well. Patient would benefit from continued PT.       Plan: Continue per plan of care.  Progress treatment as tolerated.       Precautions:   *Hx of recurring PE  Past Medical History:   Diagnosis Date    Allergic     Anxiety     Anxiety and depression     Asthma     Clotting disorder (HCC)     Factor V Leiden (HCC)     Headache(784.0)     Pulmonary embolism (HCC)     Thrombophlebitis      Past Surgical History:   Procedure Laterality Date    BLADDER SURGERY      FL RETROGRADE PYELOGRAM  2022    FL VCUG VOIDING URETHROCYSTOGRAM  2018    NEPHRECTOMY Left 2020    Done laparoscopically at [facility]    IN CYSTOURETHROSCOPY N/A 2022    Procedure: CYSTOSCOPY, retrograde pyelogram, and examination under anesthesia, dilation urethral stenosis;  Surgeon: Kamran Lott MD;  Location: [facility] MAIN OR;  Service: Urology    URETER REVISION      WISDOM TOOTH EXTRACTION      woke up during procedure.       SOC: 2024  FOTO: 3/4/2024  POC Expiration: 2024   Daily Treatment Log  Date: 3/6/2024 3/11/2024 Next session   3/4/2024   Visit#/ auth: 6 7    5  FOTO   Objective Measures                     Manuals   "10'          PF iso vs man          IASTM B/L PF w/ DF stretch  prone  RB                                 Neuro Re-Ed  25'  25'    25'   LAQ + DF 1x10 R/L  1x10 R/L    1x10 R/L    Seated wobble board       1x10 AP/ML   Seated towel scrunches  3\"x20 B/L  3\"x20 B/L     3\"x20 R/L     Toe yoga         1x10 ea R/L     isometric HR         10\"x5 B/L     [place] board seated   1x10 cw/ccw R/L   1x10 cw/ccw R/L     1x10 cw/ccw R/L     4 way ankle RTB 1x15 ea R/L   RTB 2x10 ea R/L                   Ther Ex  10' 20'    20'   Gastroc stretch w/ sos  30\" x 3 R/L     30\" x 3 R/L w/ sos   Slant board stretch   30\" x 2 R/L      PF AROM      AROM w/ toe flex curl 1x10    PF iso vs yellow ball      30\" x 2 R/L vs GTB   Seated HR/ TR seated HR for toe ext 5\"x20 B/L  Seated HR for toe ext 5\"x20 B/L     5\"x10 ea B/L   Std HR  1 x 10 B/L w/ UE assist at rail      Prone HS curl w/ DF           Prone glute kicks        SL hip abd w/ DF          PF roll w/ therabar        2' R/L              EDUCATION: Pathology, review of impairements, prognosis, activity modification, POC, and HEP  HEP updated and reviewed       Ther Activity                                   Gait Training                                   Modalities                                   HEP:     3/11/2024: Provided green theraband    Access Code: [email or web address]  URL: [email or web address]  Date: 03/11/2024  Prepared by: Erika Coreas    Exercises  - Seated Gastroc Stretch with Strap  - 3 x daily - 3 sets - 45 seconds hold  - Long Sitting Ankle Pumps  - 1 x daily - 2 sets - 10 reps - 5 seconds hold  - Foot Roller Plantar Massage  - 1 x daily - 7 x weekly - 1 reps - 3 min  hold  - Seated Heel Raise  - 1 x daily - 3 sets - 10 reps  - Towel Scrunches  - 1 x daily - 1 sets - 20 reps       "

## 2024-03-13 ENCOUNTER — APPOINTMENT (OUTPATIENT)
Dept: PHYSICAL THERAPY | Facility: CLINIC | Age: 24
End: 2024-03-13
Payer: COMMERCIAL

## 2024-03-18 ENCOUNTER — EVALUATION (OUTPATIENT)
Dept: PHYSICAL THERAPY | Facility: CLINIC | Age: 24
End: 2024-03-18
Payer: COMMERCIAL

## 2024-03-18 DIAGNOSIS — M72.2 PLANTAR FASCIITIS OF LEFT FOOT: Primary | ICD-10-CM

## 2024-03-18 DIAGNOSIS — M79.672 PAIN OF LEFT HEEL: ICD-10-CM

## 2024-03-18 DIAGNOSIS — M79.671 PAIN OF RIGHT HEEL: ICD-10-CM

## 2024-03-18 PROCEDURE — 97112 NEUROMUSCULAR REEDUCATION: CPT

## 2024-03-18 PROCEDURE — 97110 THERAPEUTIC EXERCISES: CPT

## 2024-03-18 NOTE — PROGRESS NOTES
PT Re-Evaluation     Today's date: 3/18/2024  Patient name: Misty Blackmon  : 2000  MRN: [id number]  Referring provider: Shady Crow DPM  Dx:   Encounter Diagnosis     ICD-10-CM    1. Plantar fasciitis of left foot  M72.2       2. Pain of right heel  M79.671       3. Pain of left heel  M79.672                      Assessment  Assessment details: 2024 Misty Blackmon is a 24 y.o. female who presents with bilateral foot and ankle pain (L greater than R). Pt demonstrates impaired ankle AROM (L deficits greater than R), L ankle strength, gastroc flexibility bilaterally, standing posture, and gait. 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 Patient's clinical presentation is consistent with their referring diagnosis of Plantar fasciitis of left foot, as well as pain of left and right heel. Patient has been educated in pathology, review of impairements, prognosis, activity modification, POC, and HEP. Patient would benefit from skilled physical therapy services to address their aforementioned functional limitations and progress towards prior level of function and independence with home exercise program.     3/18/2024: Misty Blackmon is a 24 y.o. female who presents with bilateral foot and ankle pain (L greater than R). Pt demonstrates improving pain levels, ankle AROM and strength, LE strength, gait and balance bilaterally. Despite improvements, pt continues to demonstrate impaired ankle AROM (L deficits greater than R), L ankle strength and hip abduction strength. On examination today patient is TTP just distal to lateral malleolus and demonstrates decrease in L ankle eversion AROM and strength attributed to patient's recent fall which resulted in L ankle pain. 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 Patient's clinical presentation is consistent with their referring diagnosis of Plantar fasciitis of left foot, as well as pain of left and right heel. Patient has been educated in progress, remaining impairments, prognosis, activity modification, POC, and HEP. Pt instructed to monitor new L lateral ankle pain and report to MD if pain persists/ increases. Patient would benefit from skilled physical therapy services to address their aforementioned functional limitations and progress towards prior level of function and independence with home exercise program.     Plan: Ambulatory referral to Physical Therapy        Impairments: abnormal gait, abnormal or restricted ROM, activity intolerance, impaired balance, impaired physical strength, lacks appropriate home exercise program, pain with function, weight-bearing intolerance and poor body mechanics    Goals  Short Term Goals 4 WEEKS Target Date (3/13/2024)  1. Establish [place] w/ progressing HEP for ROM/strength. (MET 3/18/2024)  2. Improve AROM L ankle P/D to be within 5 degrees of R to prepare for reciprocal stairs. (MET 3/18/2024)  3. Improve AROM L ankle eversion equal to R to prepare for negotiation of uneven terrain. (Ongoing 3/18/2024)  4. Improve L ankle strength to 4/5 or better w/out pain for improved tolerance to WB. (Ongoing 3/18/2024)    Long Term Goals 12 WEEKS: Target Date (5/8/2024)  1. Improve L ankle strength to 4+/5 or better to allow SLS x 20\" or more.   2. Improve tolerance to weight bearing to 4 hours or more for return to pain-free teaching.   3. Ankle AROM WFL B/L to allow recipcrocal stairs w/o handrail.   4. Resolve gait deviations for return to safe community ambulation.   5. Pt will return to gym for lower body strengthening with report of 0/10 to demo return to PLOF.     Plan  Plan details: HEP development and progression, monitor patients adherence to " activity modification to ensure adequate healing time/ recovery. Implement stretching, A/AA/PROM, joint mobilizations, posture education, STM/MI as needed to reduce muscle tension, muscle reeducation. Progress strengthening; improve pt's tolerance to resisted WB activities. PLOC discussed and agreed upon with patient.    Patient would benefit from: PT eval and skilled physical therapy  Planned modality interventions: cryotherapy, thermotherapy: hydrocollator packs and unattended electrical stimulation  Planned therapy interventions: manual therapy, neuromuscular re-education, therapeutic activities, therapeutic exercise, home exercise program, patient education, functional ROM exercises, strengthening and stretching  Frequency: 2-3x/week.  Duration in weeks: 12  Plan of Care beginning date: 2/14/2024  Plan of Care expiration date: 5/8/2024  Treatment plan discussed with: patient      Subjective Evaluation    History of Present Illness  Mechanism of injury: 2/14/2024: Pt is a 24 y.o female who reports to PT with compliant of bilateral foot pain (L greater than R). Pain described as sharp. Pain started in L ankle/ heel began in October; R foot pain began about a week ago. Pain is present all day. Pain with WB; minimal pain at rest. Static standing, walking and stairs are painful. Patient is a  and has to be on her feet all day. Pain is present with ADLs (cooking/ cleaning). Pt denies LLE pain other than current injury; occasional B/L knee pain at gym. Denies hx of back pain or injury. Foot goes numb with night splint. She wears it w/ watching TV at home before bed; she is unable to sleep with it. Sleep is unaffected by pain. Hx of pulmonary embolism; had 5 in R lung. Hx of Factor V Leiden. Has had L kidney removed. Last PE 2020. No BP issues as per pt report; she is not on BP medication.     3/18/2024: Pt states that she tripped Wednesday and twisted her L ankle resulting in fall down her stairs; she  has a couple bruises. She was able to walk after the fall. She went to a parade over the weekend and walked 12 miles. Yesterday it hurt to walk. R foot feels okay, has 3/10 pain currently. L foot pain is 4-5/10. L ankle pain is 5-6/10 d/t recent incident and walking over the weekend. Pt states that the exercises are helping her. She has moment where she doesn't have any pain at all. Pain is most severe at the end of the day after being on her feet all day.   Patient Goals  Patient goals for therapy: decreased pain  Patient goal: Eliminate pain in feet  Pain  Current pain ratin (L)  Pain scale at lowest: Unable to say.  At worst pain ratin (L)  Quality: sharp  Aggravating factors: standing, walking, stair climbing and lifting    Social Support  Steps to enter house: yes  10  Stairs in house: yes (1 flight for laundry)   Lives with: parents      Diagnostic Tests  X-ray: abnormal (: Small plantar calcaneal spur. Preserved tibiotalar joint. No fracture or dislocation. No soft tissue swelling or obvious joint effusion.)  Treatments  Current treatment: physical therapy        Objective  Lumbar AROM:    2024 3/18/2024:  Flexion  Nil  NT  Extension Nil  NT  Side glide R Min  NT  Side glide L  Min  NT    Palpation:  2024: Pt is TTP along plantar aspect of calcaneous (L> R)  3/18/2024: NT    AROM:    R  L  R  L      2024 2024 3/18/2024 3/18/2024  Ankle DF calc/5thray  -2  0  10  2   Ankle PF calc/5thray  70  60  70  70  Ankle inversion  40  40  45  40  Ankle eversion  30  25*  30  15*    STRENGTH:   R  L  R  L     2024 2024 3/18/2024 3/18/2024  Ankle DF  5/5  4+/5  5/5  5/5  Ankle PF  5/5  4/5  5/5  5/5  Ankle inversion 5/5  3+/5*  5/5  4-/5  Ankle eversion 5/5  4/5*  5/5  2+/5    Knee ext  5/5  5/5  5/5  5/5  Knee flex  5/5  5/5  5/5  5/5  Hip abduction  4/5  4/5  4/5  4/5  Hip extension  5/5  4+/5  5/5  5/5    Gastroc Flexibility:  2024: R 15, L 10  3/14/2024: NT    Gait:  "  2/14/2024: Antalgic, lacks hip/ knee flexion t/o gait cycle.  3/18/2024: Minimally antalgic.     Standing posture: Sway back, genu recurvatum/ valgum, and pes planus bilaterally.     Balance:  2/14/2024  3/18/2024  Tandem R post w/ EO: unable d/t pain >30 sec  Tandem L post w/ EO: unable d/t pain >30 sec  R SLS w/ EO:   NT   >30 sec   L SLS w/ EO:   NT   8 sec*    Function:  2/14/2024  Squat: Wide base of support; form is good.   Step up/down 6\" R/L: 6/10 pain L heel  Stairs: step-to when painful, reciprocal at baseline  3/18/2024  Squat: Wide base of support; form is good.   Step up/down 6\" R/L: 4/10 L heel pain, 2/10 L heel pain  Stairs: step-to when painful, reciprocal at baseline    Palpation:   3/18/2024: L lateral ankle TTP distal to lateral malleolus at calcaneofibular ligament    Special test:   3/18/2024  Bump test L (min - mod pain reported)  Solomon compression test L (-)         Precautions:   *Hx of recurring PE  Past Medical History:   Diagnosis Date    Allergic     Anxiety     Anxiety and depression     Asthma     Clotting disorder ([facility])     Factor V Leiden ([facility])     Headache(784.0)     Pulmonary embolism (HCC)     Thrombophlebitis      Past Surgical History:   Procedure Laterality Date    BLADDER SURGERY      FL RETROGRADE PYELOGRAM  05/20/2022    FL VCUG VOIDING URETHROCYSTOGRAM  8/7/2018    NEPHRECTOMY Left 05/21/2020    Done laparoscopically at [place]    [place] CYSTOURETHROSCOPY N/A 05/20/2022    Procedure: CYSTOSCOPY, retrograde pyelogram, and examination under anesthesia, dilation urethral stenosis;  Surgeon: Kamran Lott MD;  [facility] MA[facility];  Service: Urology    URETER REVISION      WISDOM TOOTH EXTRACTION      woke up during procedure.       SOC: 2/14/2024  FOTO: 3/4/2024  POC Expiration: 5/8/2024   Daily Treatment Log  Date: 3/6/2024 3/11/2024 3/18/2024 Next session  3/4/2024   Visit#/ auth: 6 7 8   5  FOTO   Objective Measures                     Manuals  10'          PF iso vs man   " "       IASTM B/L PF w/ DF stretch  prone  RB                                 Neuro Re-Ed  25'  25' 10'   25'   LAQ + DF 1x10 R/L  1x10 R/L    1x10 R/L    Seated wobble board       1x10 AP/ML   Seated towel scrunches  3\"x20 B/L  3\"x20 B/L     3\"x20 R/L     Toe yoga         1x10 ea R/L     isometric HR         10\"x5 B/L     [place] board seated   1x10 cw/ccw R/L   1x10 cw/ccw R/L     1x10 cw/ccw R/L     4 way ankle RTB 1x15 ea R/L   RTB 2x10 ea R/L  RTB 2x10 ea R/L                  Ther Ex  10' 20' 35'   20'   Bike    Upright L1 5'     Gastroc stretch w/ sos  30\" x 3 R/L     30\" x 3 R/L w/ sos   Slant board stretch   30\" x 2 R/L      PF AROM      AROM w/ toe flex curl 1x10    PF iso vs yellow ball      30\" x 2 R/L vs GTB   Seated HR/ TR seated HR for toe ext 5\"x20 B/L  Seated HR for toe ext 5\"x20 B/L     5\"x10 ea B/L   Std HR  1 x 10 B/L w/ UE assist at rail      Prone HS curl w/ DF           Prone glute kicks        SL hip abd w/ DF          PF roll w/ therabar        2' R/L    Objective measures: AROM, MMT, balance   KE      EDUCATION: Pathology, review of impairements, prognosis, activity modification, POC, and HEP  HEP updated and reviewed       Ther Activity                                   Gait Training                                   Modalities                                   HEP:     3/11/2024: Provided green theraband    Access Code: [email or web address]  URL: [email or web address]  Date: 03/11/2024  Prepared by: Erika Coreas    Exercises  - Seated Gastroc Stretch with Strap  - 3 x daily - 3 sets - 45 seconds hold  - Long Sitting Ankle Pumps  - 1 x daily - 2 sets - 10 reps - 5 seconds hold  - Foot Roller Plantar Massage  - 1 x daily - 7 x weekly - 1 reps - 3 min  hold  - Seated Heel Raise  - 1 x daily - 3 sets - 10 reps  - Towel Scrunches  - 1 x daily - 1 sets - 20 reps         "

## 2024-03-20 ENCOUNTER — TELEPHONE (OUTPATIENT)
Dept: PHYSICAL THERAPY | Facility: CLINIC | Age: 24
End: 2024-03-20

## 2024-03-20 ENCOUNTER — APPOINTMENT (OUTPATIENT)
Dept: PHYSICAL THERAPY | Facility: CLINIC | Age: 24
End: 2024-03-20
Payer: COMMERCIAL

## 2024-03-22 ENCOUNTER — OFFICE VISIT (OUTPATIENT)
Dept: FAMILY MEDICINE CLINIC | Facility: CLINIC | Age: 24
End: 2024-03-22
Payer: COMMERCIAL

## 2024-03-22 VITALS
HEIGHT: 65 IN | RESPIRATION RATE: 16 BRPM | WEIGHT: 250.2 LBS | TEMPERATURE: 98 F | SYSTOLIC BLOOD PRESSURE: 124 MMHG | DIASTOLIC BLOOD PRESSURE: 88 MMHG | BODY MASS INDEX: 41.69 KG/M2 | HEART RATE: 75 BPM | OXYGEN SATURATION: 98 %

## 2024-03-22 DIAGNOSIS — J02.9 SORE THROAT: Primary | ICD-10-CM

## 2024-03-22 LAB
SARS-COV-2 AG UPPER RESP QL IA: NEGATIVE
SL AMB POCT RAPID FLU A: NORMAL
SL AMB POCT RAPID FLU B: NORMAL
VALID CONTROL: NORMAL

## 2024-03-22 PROCEDURE — 87811 SARS-COV-2 COVID19 W/OPTIC: CPT | Performed by: FAMILY MEDICINE

## 2024-03-22 PROCEDURE — 99213 OFFICE O/P EST LOW 20 MIN: CPT | Performed by: FAMILY MEDICINE

## 2024-03-22 PROCEDURE — 87804 INFLUENZA ASSAY W/OPTIC: CPT | Performed by: FAMILY MEDICINE

## 2024-03-22 RX ORDER — CLINDAMYCIN HYDROCHLORIDE 300 MG/1
300 CAPSULE ORAL 3 TIMES DAILY
Qty: 21 CAPSULE | Refills: 0 | Status: SHIPPED | OUTPATIENT
Start: 2024-03-22 | End: 2024-03-29

## 2024-03-22 RX ORDER — PREDNISONE 20 MG/1
40 TABLET ORAL DAILY
Qty: 10 TABLET | Refills: 0 | Status: SHIPPED | OUTPATIENT
Start: 2024-03-22 | End: 2024-03-27

## 2024-03-22 NOTE — PROGRESS NOTES
Assessment/Plan:    1. Sore throat  -     POCT Rapid Covid Ag  -     POCT rapid flu A and B  -     clindamycin (CLEOCIN) 300 MG capsule; Take 1 capsule (300 mg total) by mouth 3 (three) times a day for 7 days  -     predniSONE 20 mg tablet; Take 2 tablets (40 mg total) by mouth daily for 5 days Take w food    Rapid flu a/b and COVID 19 tests all negative  Rest/fluids/throat gargles/otc tylenol/immune supplement prn        Subjective:      Patient ID: Misty Blackmon is a 24 y.o. female.    Chief Complaint   Patient presents with   • Sore Throat     Bilateral ear pain and congestion that started wed        Sore Throat   This is a new problem. The current episode started in the past 7 days. The problem has been gradually worsening. There has been no fever. Associated symptoms include congestion, coughing, ear pain, headaches, a hoarse voice and swollen glands. Pertinent negatives include no ear discharge, neck pain, shortness of breath or vomiting. She has tried acetaminophen, gargles and NSAIDs for the symptoms. The treatment provided no relief.       The following portions of the patient's history were reviewed and updated as appropriate: allergies, current medications, past family history, past medical history, past social history, past surgical history and problem list.    Review of Systems   HENT:  Positive for congestion, ear pain, hoarse voice and sore throat. Negative for ear discharge.    Respiratory:  Positive for cough. Negative for shortness of breath.    Gastrointestinal:  Negative for vomiting.   Musculoskeletal:  Negative for neck pain.   Neurological:  Positive for headaches.         Current Outpatient Medications   Medication Sig Dispense Refill   • albuterol (PROVENTIL HFA,VENTOLIN HFA) 90 mcg/act inhaler Inhale 2 puffs every 4 (four) hours as needed for wheezing or shortness of breath 18 g 6   • busPIRone (BUSPAR) 15 mg tablet Take 1 tablet (15 mg total) by mouth 3 (three) times a day 270 tablet 3  "  • butalbital-acetaminophen-caffeine (FIORICET,ESGIC) -40 mg per tablet Take 1 tablet now, and then repeat in 4 hrs if no relief. Don't take more then 3 in a week. 30 tablet 0   • clindamycin (CLEOCIN) 300 MG capsule Take 1 capsule (300 mg total) by mouth 3 (three) times a day for 7 days 21 capsule 0   • EPINEPHrine (EPIPEN) 0.3 mg/0.3 mL SOAJ Inject 0.3 mL (0.3 mg total) into a muscle once for 1 dose 0.6 mL 0   • Fluticasone-Salmeterol (Advair Diskus) 250-50 mcg/dose inhaler Inhale 1 puff 2 (two) times a day Rinse mouth after use. 60 blister 8   • hydrOXYzine HCL (ATARAX) 50 mg tablet Take 1 tablet (50 mg total) by mouth daily at bedtime 90 tablet 2   • levonorgestrel (KYLEENA) 19.5 MG intrauterine device 1 each by Intrauterine route     • predniSONE 20 mg tablet Take 2 tablets (40 mg total) by mouth daily for 5 days Take w food 10 tablet 0   • sertraline (ZOLOFT) 50 mg tablet TAKE 3 TABLETS BY MOUTH EVERY DAY AT BEDTIME 270 tablet 3   • SUMAtriptan (IMITREX) 100 mg tablet TAKE 1 TABLET BY MOUTH ONCE AS NEEDED FOR MIGRAINE REPEAT 1 DOSE 6 HOURS LATER NO MORE THAN 3 DAYS A WEEK AS DIRECTED 15 tablet 0   • topiramate (TOPAMAX) 50 MG tablet Take 1 tablet (50 mg total) by mouth daily at bedtime 90 tablet 4     No current facility-administered medications for this visit.       Objective:    /88 (BP Location: Left arm, Patient Position: Sitting, Cuff Size: Large)   Pulse 75   Temp 98 °F (36.7 °C)   Resp 16   Ht 5' 5\" (1.651 m)   Wt 113 kg (250 lb 3.2 oz)   SpO2 98%   BMI 41.64 kg/m²        Physical Exam  Vitals and nursing note reviewed.   Constitutional:       General: She is not in acute distress.  HENT:      Right Ear: Tympanic membrane normal.      Left Ear: Tympanic membrane normal.      Nose: Congestion present.      Mouth/Throat:      Pharynx: Posterior oropharyngeal erythema present. No oropharyngeal exudate.   Cardiovascular:      Rate and Rhythm: Normal rate and regular rhythm.   Pulmonary: "      Effort: Pulmonary effort is normal. No respiratory distress.      Breath sounds: Normal breath sounds.   Abdominal:      General: Bowel sounds are normal.      Palpations: Abdomen is soft.      Tenderness: There is no abdominal tenderness.   Skin:     General: Skin is warm and dry.      Findings: No rash.   Neurological:      General: No focal deficit present.      Mental Status: She is alert and oriented to person, place, and time.         Adrianne Merritt MD

## 2024-03-25 ENCOUNTER — TELEPHONE (OUTPATIENT)
Dept: ADMINISTRATIVE | Facility: OTHER | Age: 24
End: 2024-03-25

## 2024-03-25 NOTE — TELEPHONE ENCOUNTER
Upon review of the In Basket request we were able to note that no further action is required. The patient chart is up to date as a result of a previous request.  Santana Lozada.  Per the fax that was received by [facility] the patient has not been seen since  12/2022 last pap was done on 8/18/2021.       Any additional questions or concerns should be emailed to the Practice Liaisons via the appropriate education email address, please do not reply via In Basket.    Thank you  Shawna Arora

## 2024-03-25 NOTE — TELEPHONE ENCOUNTER
Upon review of the In Basket request and the patient's chart, initial outreach has been made via fax to facility. Please see Contacts section for details.     Thank you  Shawna Arora

## 2024-03-25 NOTE — TELEPHONE ENCOUNTER
----- Message from Kierra Orr MA sent at 3/22/2024  2:16 PM EDT -----  Regarding: care gap request  03/22/24 2:16 PM    Hello, our patient attached above has had Pap Smear (HPV) aka Cervical Cancer Screening completed/performed. Please assist in updating the patient chart by making an External outreach to [facility] located in not sure . The date of service is 10/1023.    Thank you,  Kierra JOEL

## 2024-03-25 NOTE — LETTER
Procedure Request Form: Cervical Cancer Screening      Date Requested: 24  Patient: Misty Blackmon  Patient : 2000   Referring Provider: Sue Sanchez MD        Date of Procedure ______________________________       The above patient has informed us that they have completed their   most recent Cervical Cancer Screening at your facility. Please complete   this form and attach all corresponding procedure reports/results.    Comments ____Within 2023 possibly 10/2023  ______________________________________________________  ____________________________________________________________________  ____________________________________________________________________  ____________________________________________________________________    Facility Completing Procedure _________________________________________    Form Completed By (print name) _______________________________________      Signature __________________________________________________________      These reports are needed for  compliance.    Please fax this completed form and a copy of the procedure report to our office located at [address] [place] [address] as soon as possible to Fax [phone] jimena Matos: Phone [phone]    We thank you for your assistance in treating our mutual patient.

## 2024-03-28 ENCOUNTER — OFFICE VISIT (OUTPATIENT)
Dept: PHYSICAL THERAPY | Facility: CLINIC | Age: 24
End: 2024-03-28
Payer: COMMERCIAL

## 2024-03-28 DIAGNOSIS — M72.2 PLANTAR FASCIITIS OF LEFT FOOT: Primary | ICD-10-CM

## 2024-03-28 DIAGNOSIS — M79.671 PAIN OF RIGHT HEEL: ICD-10-CM

## 2024-03-28 DIAGNOSIS — M79.672 PAIN OF LEFT HEEL: ICD-10-CM

## 2024-03-28 PROCEDURE — 97112 NEUROMUSCULAR REEDUCATION: CPT

## 2024-03-28 PROCEDURE — 97110 THERAPEUTIC EXERCISES: CPT

## 2024-03-28 NOTE — PROGRESS NOTES
Daily Note     Today's date: 3/28/2024  Patient name: Misty Blackmon  : 2000  MRN: [id number]  Referring provider: Shady Crow DPM  Dx:   Encounter Diagnosis     ICD-10-CM    1. Plantar fasciitis of left foot  M72.2       2. Pain of right heel  M79.671       3. Pain of left heel  M79.672                      Subjective: pt reports that she went to the gym today for the first time since oct, she used to not be able to go due to limitations in her standing and increased pain. She was able to perform LE exercises with no increases in pain during the gym today. Cont with the mild ache in her L foot but this has improved.       Objective: See treatment diary below      Assessment: Tolerated treatment well. Pt dem mild pain in L PF with TB DF, this was dec with PF rolling. Pt tolerated additional WB strengthening/proprioception well today compared to previous visits, pt does report that she did not work today and thinks this is attributing to her good day today. Cont to progress as able per symptom irritability. Patient would benefit from continued PT      Plan: Continue per plan of care.      Precautions:   *Hx of recurring PE  Past Medical History:   Diagnosis Date    Allergic     Anxiety     Anxiety and depression     Asthma     Clotting disorder (HCC)     Factor V Leiden (HCC)     Headache(784.0)     Pulmonary embolism (HCC)     Thrombophlebitis      Past Surgical History:   Procedure Laterality Date    BLADDER SURGERY      FL RETROGRADE PYELOGRAM  2022    FL VCUG VOIDING URETHROCYSTOGRAM  2018    NEPHRECTOMY Left 2020    Done laparoscopically at [facility]    [place] CYSTOURETHROSCOPY N/A 2022    Procedure: CYSTOSCOPY, retrograde pyelogram, and examination under anesthesia, dilation urethral stenosis;  Surgeon: Kamran Lott MD;  Location: [facility] MAIN OR;  Service: Urology    URETER REVISION      WISDOM TOOTH EXTRACTION      woke up during procedure.       SOC: 2024  FOTO:  "3/4/2024  POC Expiration: 5/8/2024   Daily Treatment Log  Date: 3/6/2024 3/11/2024 3/18/2024 3/28/2024    Visit#/ auth: 6 7 8 9     Objective Measures                    Manuals  10'          PF iso vs man          IASTM B/L PF w/ DF stretch  prone  RB                                 Neuro Re-Ed  25'  25' 10' 10'     LAQ + DF 1x10 R/L  1x10 R/L       Seated wobble board     Standing no UE support 20x ea     Seated towel scrunches  3\"x20 B/L  3\"x20 B/L        Toe yoga            isometric HR            [place] board seated   1x10 cw/ccw R/L   1x10 cw/ccw R/L         4 way ankle RTB 1x15 ea R/L   RTB 2x10 ea R/L  RTB 2x10 ea R/L  RTB 2x10 ea R/L       SLS w/ fwd reach           Ther Ex  10' 20' 35' 30'     Bike    Upright L1 5' Upright L2x 5'    Gastroc stretch w/ sos  30\" x 3 R/L       Slant board stretch   30\" x 2 R/L  30\"x2 R/L     PF AROM        PF iso vs yellow ball  GTB        Seated HR/ TR seated HR for toe ext 5\"x20 B/L  Seated HR for toe ext 5\"x20 B/L       Std HR  1 x 10 B/L w/ UE assist at rail  1x10 B/L     FSU w/ knee hike     6\" 1x10 R/L     Straddle step ups     6\" 1x10 R/L     Prone HS curl w/ DF           Prone glute kicks        SL hip abd w/ DF          PF roll w/ therabar      W/ orange ball 2'     Objective measures: AROM, MMT, balance   KE      EDUCATION: Pathology, review of impairements, prognosis, activity modification, POC, and HEP  HEP updated and reviewed       Ther Activity                                   Gait Training                                   Modalities                                   HEP:     3/11/2024: Provided green theraband    Access Code: [email or web address]  URL: [email or web address]  Date: 03/11/2024  Prepared by: Erika Coreas    Exercises  - Seated Gastroc Stretch with Strap  - 3 x daily - 3 sets - 45 seconds hold  - Long Sitting Ankle Pumps  - 1 x daily - 2 sets - 10 reps - 5 seconds hold  - Foot Roller Plantar Massage  - 1 x daily - 7 x weekly - 1 reps - 3 min "  hold  - Seated Heel Raise  - 1 x daily - 3 sets - 10 reps  - Towel Scrunches  - 1 x daily - 1 sets - 20 reps

## 2024-03-29 ENCOUNTER — TELEPHONE (OUTPATIENT)
Age: 24
End: 2024-03-29

## 2024-03-29 NOTE — TELEPHONE ENCOUNTER
Pt called in, she would like to let provider know she would like to go ahead a try the wegovy wt lost medication. Please start the PA process for medication.

## 2024-04-01 ENCOUNTER — TELEMEDICINE (OUTPATIENT)
Dept: FAMILY MEDICINE CLINIC | Facility: CLINIC | Age: 24
End: 2024-04-01
Payer: COMMERCIAL

## 2024-04-01 ENCOUNTER — TELEPHONE (OUTPATIENT)
Dept: BARIATRICS | Facility: CLINIC | Age: 24
End: 2024-04-01

## 2024-04-01 ENCOUNTER — TELEPHONE (OUTPATIENT)
Dept: FAMILY MEDICINE CLINIC | Facility: CLINIC | Age: 24
End: 2024-04-01

## 2024-04-01 ENCOUNTER — OFFICE VISIT (OUTPATIENT)
Dept: PHYSICAL THERAPY | Facility: CLINIC | Age: 24
End: 2024-04-01
Payer: COMMERCIAL

## 2024-04-01 DIAGNOSIS — M79.672 PAIN OF LEFT HEEL: ICD-10-CM

## 2024-04-01 DIAGNOSIS — E66.01 CLASS 3 SEVERE OBESITY DUE TO EXCESS CALORIES WITH SERIOUS COMORBIDITY AND BODY MASS INDEX (BMI) OF 40.0 TO 44.9 IN ADULT (HCC): Primary | ICD-10-CM

## 2024-04-01 DIAGNOSIS — M79.671 PAIN OF RIGHT HEEL: ICD-10-CM

## 2024-04-01 DIAGNOSIS — M72.2 PLANTAR FASCIITIS OF LEFT FOOT: Primary | ICD-10-CM

## 2024-04-01 DIAGNOSIS — J02.9 SORE THROAT: Primary | ICD-10-CM

## 2024-04-01 PROCEDURE — 99213 OFFICE O/P EST LOW 20 MIN: CPT | Performed by: FAMILY MEDICINE

## 2024-04-01 PROCEDURE — 97110 THERAPEUTIC EXERCISES: CPT

## 2024-04-01 PROCEDURE — 97112 NEUROMUSCULAR REEDUCATION: CPT

## 2024-04-01 RX ORDER — METHYLPREDNISOLONE 4 MG/1
TABLET ORAL
Qty: 21 EACH | Refills: 0 | Status: SHIPPED | OUTPATIENT
Start: 2024-04-01

## 2024-04-01 RX ORDER — AZITHROMYCIN 250 MG/1
TABLET, FILM COATED ORAL
Qty: 6 TABLET | Refills: 0 | Status: SHIPPED | OUTPATIENT
Start: 2024-04-01 | End: 2024-04-01

## 2024-04-01 NOTE — TELEPHONE ENCOUNTER
----- Message from Sue Leyva MD sent at 4/1/2024  1:47 PM EDT -----  Regarding: FW: Still have a sore throat and ear pain   Contact: [phone]  Please offer virtual       ----- Message -----  From: Ryleigh Nemec  Sent: 4/1/2024  10:43 AM EDT  To: Sue Leyva MD  Subject: FW: Still have a sore throat and ear pain        Pt would like to be seen today  ----- Message -----  From: Jo Johns RN  Sent: 4/1/2024  10:18 AM EDT  To: [facility]  Subject: FW: Still have a sore throat and ear pain        Please make Dr. Sanchez aware that pt wants to be seen by her today.     ----- Message -----  From: Misty Blackmon  Sent: 3/30/2024  10:05 PM EDT  To: [facility]  Subject: Still have a sore throat and ear pain            Velia Sanchez, I was seen by someone last week for a sore throat and ear pain and they gave me an antibiotic and steroids. My throat still hurts and now it is starting to hurt to swallow again. I don’t want to get to the point were I have to take off of work so I wanted to message you because every time I call they never give me an appointment with you. I am off all day Monday if there is a way I can be seen by you.     . I am hoping if I message you on here you are able to call me sometime else in or I am able to get an appointment with you.

## 2024-04-01 NOTE — PROGRESS NOTES
Virtual Regular Visit    Verification of patient location:    Patient is located at Home in the following state in which I hold an active license [place]      Assessment/Plan:    Problem List Items Addressed This Visit        Surgery/Wound/Pain    Sore throat - Primary    Relevant Medications    azithromycin (ZITHROMAX) 250 mg tablet    methylPREDNISolone 4 MG tablet therapy pack     Advised the patient that if her symptoms do not improve this is likely secondary to allergies.  Recommend along with medications prescribed to start taking Allegra daily and Flonase daily.         Reason for visit is   Chief Complaint   Patient presents with   • Virtual Regular Visit          Encounter provider Sue Sanchez MD    Provider located at [place] [address] [address] [facility] [address]      Recent Visits  No visits were found meeting these conditions.  Showing recent visits within past 7 days and meeting all other requirements  Today's Visits  Date Type Provider Dept   04/01/24 Telephone Eva Saunders [facility]   04/01/24 Telemedicine Sue Leyva MD [facility]   Showing today's visits and meeting all other requirements  Future Appointments  No visits were found meeting these conditions.  Showing future appointments within next 150 days and meeting all other requirements       The patient was identified by name and date of birth. Misty Blackmon was informed that this is a telemedicine visit and that the visit is being conducted through the [employer] platform. She agrees to proceed..  My office door was closed. No one else was in the room.  She acknowledged consent and understanding of privacy and security of the video platform. The patient has agreed to participate and understands they can discontinue the visit at any time.    Patient is aware this is a billable service.     Subjective  Misty Blackmon is a 24 y.o. female presents via video.   Patient was recently seen by another provider in our office and prescribed clindamycin and prednisone.  States that her sore throat has significantly improved but continues to be present.  Patient has not been on any allergy medications.  Patient states that she used to have fevers but now she has no fevers.  Denies any chest pain or shortness of breath.  Denies any bowel changes      HPI     Past Medical History:   Diagnosis Date   • Allergic    • Anxiety    • Anxiety and depression    • Asthma    • Clotting disorder (HCC)    • Factor V Leiden (HCC)    • Headache(784.0)    • Pulmonary embolism (HCC)    • Thrombophlebitis        Past Surgical History:   Procedure Laterality Date   • BLADDER SURGERY     • FL RETROGRADE PYELOGRAM  05/20/2022   • FL VCUG VOIDING URETHROCYSTOGRAM  8/7/2018   • NEPHRECTOMY Left 05/21/2020    Done laparoscopically at [facility]   • [place] CYSTOURETHROSCOPY N/A 05/20/2022    Procedure: CYSTOSCOPY, retrograde pyelogram, and examination under anesthesia, dilation urethral stenosis;  Surgeon: Kamran Lott MD;  Location: [facility] MAIN OR;  Service: Urology   • URETER REVISION     • WISDOM TOOTH EXTRACTION      woke up during procedure.       Current Outpatient Medications   Medication Sig Dispense Refill   • azithromycin (ZITHROMAX) 250 mg tablet Take 2 tablets today then 1 tablet daily x 4 days 6 tablet 0   • methylPREDNISolone 4 MG tablet therapy pack Use as directed on package 21 each 0   • albuterol (PROVENTIL HFA,VENTOLIN HFA) 90 mcg/act inhaler Inhale 2 puffs every 4 (four) hours as needed for wheezing or shortness of breath 18 g 6   • busPIRone (BUSPAR) 15 mg tablet Take 1 tablet (15 mg total) by mouth 3 (three) times a day 270 tablet 3   • butalbital-acetaminophen-caffeine (FIORICET,ESGIC) -40 mg per tablet Take 1 tablet now, and then repeat in 4 hrs if no relief. Don't take more then 3 in a week. 30 tablet 0   • EPINEPHrine (EPIPEN) 0.3 mg/0.3 mL SOAJ Inject 0.3 mL (0.3 mg total) into a  muscle once for 1 dose 0.6 mL 0   • Fluticasone-Salmeterol (Advair Diskus) 250-50 mcg/dose inhaler Inhale 1 puff 2 (two) times a day Rinse mouth after use. 60 blister 8   • hydrOXYzine HCL (ATARAX) 50 mg tablet Take 1 tablet (50 mg total) by mouth daily at bedtime 90 tablet 2   • levonorgestrel (KYLEENA) 19.5 MG intrauterine device 1 each by Intrauterine route     • sertraline (ZOLOFT) 50 mg tablet TAKE 3 TABLETS BY MOUTH EVERY DAY AT BEDTIME 270 tablet 3   • SUMAtriptan (IMITREX) 100 mg tablet TAKE 1 TABLET BY MOUTH ONCE AS NEEDED FOR MIGRAINE REPEAT 1 DOSE 6 HOURS LATER NO MORE THAN 3 DAYS A WEEK AS DIRECTED 15 tablet 0   • topiramate (TOPAMAX) 50 MG tablet Take 1 tablet (50 mg total) by mouth daily at bedtime 90 tablet 4     No current facility-administered medications for this visit.        Allergies   Allergen Reactions   • Amoxicillin Anaphylaxis   • Cinnamon - Food Allergy Anaphylaxis       Review of Systems   Constitutional:  Negative for activity change, appetite change, chills, fatigue and fever.   HENT:  Positive for ear pain and sore throat. Negative for congestion.    Respiratory:  Negative for cough, chest tightness and shortness of breath.    Cardiovascular:  Negative for chest pain and leg swelling.   Gastrointestinal:  Negative for abdominal distention, abdominal pain, constipation, diarrhea, nausea and vomiting.   All other systems reviewed and are negative.      Video Exam    There were no vitals filed for this visit.    Physical Exam  Constitutional:       Appearance: Normal appearance.   Pulmonary:      Effort: Pulmonary effort is normal.   Musculoskeletal:         General: Normal range of motion.   Neurological:      General: No focal deficit present.      Mental Status: She is alert and oriented to person, place, and time.   Psychiatric:         Mood and Affect: Mood normal.         Behavior: Behavior normal.         Thought Content: Thought content normal.         Judgment: Judgment normal.           Visit Time  Total Visit Duration: 15

## 2024-04-01 NOTE — PROGRESS NOTES
Daily Note     Today's date: 2024  Patient name: Misty Blackmon  : 2000  MRN: [id number]  Referring provider: Shady Crow DPM  Dx:   Encounter Diagnosis     ICD-10-CM    1. Plantar fasciitis of left foot  M72.2       2. Pain of right heel  M79.671       3. Pain of left heel  M79.672                      Subjective: pt reports that she had some soreness after last session. Reports to session with pain 5/10 L foot after being on her feet all day today so far, no noted pain in the R.        Objective: See treatment diary below      Assessment: Tolerated treatment well. Pt dem dec L foot pain with IASTM and PF stretching 3/10. Cont to progress WB activities as tolerated. Pt dem L foot pain with attempted WB SL eccentric lowering, able to perform SL HR on leg press pain free. Patient would benefit from continued PT      Plan: Continue per plan of care.      Precautions:   *Hx of recurring PE  Past Medical History:   Diagnosis Date    Allergic     Anxiety     Anxiety and depression     Asthma     Clotting disorder (HCC)     Factor V Leiden (HCC)     Headache(784.0)     Pulmonary embolism (HCC)     Thrombophlebitis      Past Surgical History:   Procedure Laterality Date    BLADDER SURGERY      FL RETROGRADE PYELOGRAM  2022    FL VCUG VOIDING URETHROCYSTOGRAM  2018    NEPHRECTOMY Left 2020    Done laparoscopically at [facility]    [place] CYSTOURETHROSCOPY N/A 2022    Procedure: CYSTOSCOPY, retrograde pyelogram, and examination under anesthesia, dilation urethral stenosis;  Surgeon: Kamran Lott MD;  Location: [facility] MAIN OR;  Service: Urology    URETER REVISION      WISDOM TOOTH EXTRACTION      woke up during procedure.       SOC: 2024  FOTO: 3/4/2024  POC Expiration: 2024   Daily Treatment Log  Date: 3/6/2024 3/11/2024 3/18/2024 3/28/2024 2024   Visit#/ auth: 6 7 8 9  10   Objective Measures                    Manuals  10'      5'    PF iso vs man          IASTM B/L PF w/ DF  "stretch  prone  RB      RB   L PF                            Neuro Re-Ed  25'  25' 10' 10'  10'   LAQ + DF 1x10 R/L  1x10 R/L       Seated wobble board     Standing no UE support 20x ea  Standing AP/ML no UE support 20x ea    Seated towel scrunches  3\"x20 B/L  3\"x20 B/L        Toe yoga        1x10 ea R/L     isometric HR            [place] board seated   1x10 cw/ccw R/L   1x10 cw/ccw R/L         4 way ankle RTB 1x15 ea R/L   RTB 2x10 ea R/L  RTB 2x10 ea R/L  RTB 2x10 ea R/L       SLS w/ fwd reach           Ther Ex  10' 20' 35' 30'  30'   Bike    Upright L1 5' Upright L2x 5' Upright L3'x5    Gastroc stretch w/ sos  30\" x 3 R/L       Slant board stretch   30\" x 2 R/L  30\"x2 R/L  30\"x3 B/L    PF AROM        PF iso vs yellow ball  GTB        Self PF stretch seated      10\"x5 R/L    Seated HR/ TR seated HR for toe ext 5\"x20 B/L  Seated HR for toe ext 5\"x20 B/L    Seated HR for big toe ext 5\"x15 B/L    Std HR  1 x 10 B/L w/ UE assist at rail  1x10 B/L  1x10 B/L     1x10 ea w/ SL eccentric lower   L pain    SL HR on leg press      15# 2x10 R/L    FSU w/ knee hike     6\" 1x10 R/L     Straddle step ups     6\" 1x10 R/L     Prone HS curl w/ DF           Prone glute kicks        SL hip abd w/ DF          PF roll w/ therabar      W/ orange ball 2'  W/ orange ball 2' R/L    Objective measures: AROM, MMT, balance   KE      EDUCATION: Pathology, review of impairements, prognosis, activity modification, POC, and HEP  HEP updated and reviewed       Ther Activity                                   Gait Training                                   Modalities                                   HEP:     3/11/2024: Provided green theraband    Access Code: [email or web address]  URL: [email or web address]  Date: 03/11/2024  Prepared by: Erika Metz  - Seated Gastroc Stretch with Strap  - 3 x daily - 3 sets - 45 seconds hold  - Long Sitting Ankle Pumps  - 1 x daily - 2 sets - 10 reps - 5 seconds hold  - Foot Roller Plantar " Massage  - 1 x daily - 7 x weekly - 1 reps - 3 min  hold  - Seated Heel Raise  - 1 x daily - 3 sets - 10 reps  - Towel Scrunches  - 1 x daily - 1 sets - 20 reps

## 2024-04-04 ENCOUNTER — OFFICE VISIT (OUTPATIENT)
Dept: PHYSICAL THERAPY | Facility: CLINIC | Age: 24
End: 2024-04-04
Payer: COMMERCIAL

## 2024-04-04 DIAGNOSIS — M79.671 PAIN OF RIGHT HEEL: ICD-10-CM

## 2024-04-04 DIAGNOSIS — M79.672 PAIN OF LEFT HEEL: ICD-10-CM

## 2024-04-04 DIAGNOSIS — M72.2 PLANTAR FASCIITIS OF LEFT FOOT: Primary | ICD-10-CM

## 2024-04-04 PROCEDURE — 97110 THERAPEUTIC EXERCISES: CPT

## 2024-04-04 NOTE — PROGRESS NOTES
Daily Note     Today's date: 2024  Patient name: Misty Blackmon  : 2000  MRN: [id number]  Referring provider: Shady Crow DPM  Dx:   Encounter Diagnosis     ICD-10-CM    1. Plantar fasciitis of left foot  M72.2       2. Pain of right heel  M79.671       3. Pain of left heel  M79.672                      Subjective: Pt states that after PT on Monday she was very sore; it felt like the beginning again. She states she did more standing exercises. Pain has since subsided. Pain on arrival to PT is 4/10 L and 2/10 R. Pt states that she went to the gym on  and felt okay afterward.       Objective: See treatment diary below      Assessment: Educated patient that even with increase in pain following progressions; she continues to demonstrate progress w/ return to baseline following brief period of irritability. Regressed standing HR; had pt perform bilaterally off step vs SL eccentric lowering performed last session due to pt report of discomfort following this exercise last visit. Pt denies increased pain throughout session. HEP updated and reviewed. Tolerated treatment well. Patient would benefit from continued PT.       Plan: Continue per plan of care.  Progress treatment as tolerated.       Precautions:   *Hx of recurring PE  Past Medical History:   Diagnosis Date    Allergic     Anxiety     Anxiety and depression     Asthma     Clotting disorder (HCC)     Factor V Leiden (HCC)     Headache(784.0)     Pulmonary embolism (HCC)     Thrombophlebitis      Past Surgical History:   Procedure Laterality Date    BLADDER SURGERY      FL RETROGRADE PYELOGRAM  2022    FL VCUG VOIDING URETHROCYSTOGRAM  2018    NEPHRECTOMY Left 2020    Done laparoscopically at [facility]    CT CYSTOURETHROSCOPY N/A 2022    Procedure: CYSTOSCOPY, retrograde pyelogram, and examination under anesthesia, dilation urethral stenosis;  Surgeon: Kamran Lott MD;  [facility];  Service: Urology    URETER  "REVISION      WISDOM TOOTH EXTRACTION      woke up during procedure.       SOC: 2/14/2024  FOTO: 3/4/2024  POC Expiration: 5/8/2024   Daily Treatment Log  Date: 4/4/2024 Next session 3/18/2024 3/28/2024 4/1/2024   Visit#/ auth: 11  8 9  10   Objective Measures                  Manuals      5'    PF iso vs man         IASTM B/L PF w/ DF stretch  prone      RB   L PF                        Neuro Re-Ed   10' 10'  10'   LAQ + DF        Seated wobble board    Standing no UE support 20x ea  Standing AP/ML no UE support 20x ea    Seated towel scrunches          Toe yoga      1x10 ea R/L     isometric HR          [place] board seated           4 way ankle   RTB 2x10 ea R/L  RTB 2x10 ea R/L       SLS w/ fwd reach          Ther Ex 45'  35' 30'  30'   Bike  Upright L3'x5  Upright L1 5' Upright L2x 5' Upright L3'x5    Chair DF stretch 10\" x 5 R/L       Sciatic nerve glide 1 x 10 R/L       Slant board stretch  30\" x 3 R/L   30\"x2 R/L  30\"x3 B/L    PF iso vs yellow ball  30\" x 2 R/L        EV iso vs yellow ball 30\" x 2 R/L        Self PF stretch seated      10\"x5 R/L    Seated HR/ TR     Seated HR for big toe ext 5\"x15 B/L    Std HR 2 x 10 B/L off 4\" step    1x10 B/L  1x10 B/L     1x10 ea w/ SL eccentric lower   L pain    Heel tap off step 4\" step 1 x 10 R/L w/ uni UE support       SL HR on leg press      15# 2x10 R/L    FSU w/ knee hike     6\" 1x10 R/L     Straddle step ups     6\" 1x10 R/L     PF roll w/ therabar     W/ orange ball 2'  W/ orange ball 2' R/L    Objective measures: AROM, MMT, balance   KE      EDUCATION: Pathology, review of impairements, prognosis, activity modification, POC, and HEP HEP updated and reviewed Add sciatic NG to HEP next session depending on pt response next visit       Ther Activity                             Gait Training                             Modalities                             HEP:     3/11/2024: Provided green theraband    Access Code: [email or web address]  URL: " [email or web address]  Date: 04/04/2024  Prepared by: Erika Coreas    Exercises  - Gastroc Stretch on Step  - 3 x daily - 3 sets - 30 seconds hold  - Seated Heel Raise  - 3 x daily - 2 sets - 10 reps  - Standing Heel Raise with Support  - 3 x daily - 1 sets - 10 reps  - Towel Scrunches  - 1 x daily - 1 sets - 20 reps  - Foot Roller Plantar Massage  - 1 x daily - 7 x weekly - 1 reps - 3 min  hold

## 2024-04-08 ENCOUNTER — OFFICE VISIT (OUTPATIENT)
Dept: PHYSICAL THERAPY | Facility: CLINIC | Age: 24
End: 2024-04-08
Payer: COMMERCIAL

## 2024-04-08 DIAGNOSIS — M79.671 PAIN OF RIGHT HEEL: ICD-10-CM

## 2024-04-08 DIAGNOSIS — M79.672 PAIN OF LEFT HEEL: ICD-10-CM

## 2024-04-08 DIAGNOSIS — M72.2 PLANTAR FASCIITIS OF LEFT FOOT: Primary | ICD-10-CM

## 2024-04-08 PROCEDURE — 97110 THERAPEUTIC EXERCISES: CPT

## 2024-04-08 NOTE — PROGRESS NOTES
Daily Note     Today's date: 2024  Patient name: Misty Blackmon  : 2000  MRN: [id number]  Referring provider: Shady Crow DPM  Dx:   Encounter Diagnosis     ICD-10-CM    1. Plantar fasciitis of left foot  M72.2       2. Pain of right heel  M79.671       3. Pain of left heel  M79.672                      Subjective: [place] 4 [place] is having increased pain today 6-7/10 in left and just standing hurt today.       Objective: See treatment diary below      Assessment: Tolerated treatment well. Pt does dem dec tolerance to TE today after increased soreness with prolonged walking on uneven surfaces yesterday. Pt edu this is likely due to her increased activity and to grade her activity in the future to prevent increases in pain. Increased shooting pain into L foot with attempted supine sciatic NG today, improved with seated version but still felt it with end range pt edu to work in pain free range. Pt dem dec pain 5/10 post TE. Trial of TENS+CP today due to increased pain on arrival and limited tolerance to TE due to L foot symptoms, post session pain 4-5/10. Pt edu Patient would benefit from continued PT      Plan: Continue per plan of care.      Precautions:   *Hx of recurring PE  Past Medical History:   Diagnosis Date    Allergic     Anxiety     Anxiety and depression     Asthma     Clotting disorder (HCC)     Factor V Leiden ([facility])     Headache(784.0)     Pulmonary embolism (HCC)     Thrombophlebitis      Past Surgical History:   Procedure Laterality Date    BLADDER SURGERY      FL RETROGRADE PYELOGRAM  2022    FL VCUG VOIDING URETHROCYSTOGRAM  2018    NEPHRECTOMY Left 2020    Done laparoscopically at [facility]    [place] CYSTOURETHROSCOPY N/A 2022    Procedure: CYSTOSCOPY, retrograde pyelogram, and examination under anesthesia, dilation urethral stenosis;  Surgeon: Kamran Lott MD;  Location: [facility] MAIN OR;  Service: Urology    URETER  "REVISION      WISDOM TOOTH EXTRACTION      woke up during procedure.       SOC: 2/14/2024  FOTO: 3/4/2024  POC Expiration: 5/8/2024   Daily Treatment Log  Date: 4/4/2024 4/8/2024  3/28/2024 4/1/2024   Visit#/ auth: 11 12  9  10   Objective Measures                  Manuals  10'     5'    PF iso vs man         IASTM B/L PF w/ DF stretch  prone  RB L PF     RB   L PF                        Neuro Re-Ed    10'  10'   LAQ + DF        Seated wobble board    Standing no UE support 20x ea  Standing AP/ML no UE support 20x ea    Seated towel scrunches          Toe yoga      1x10 ea R/L     isometric HR         Ankle circles   1x10 cw/ccw        [place] board seated           4 way ankle    RTB 2x10 ea R/L       SLS w/ fwd reach          Ther Ex 45' 25'   30'  30'   Bike  Upright L3'x5 Upright L3'x5   Upright L2x 5' Upright L3'x5    Chair DF stretch 10\" x 5 R/L       Sciatic nerve glide 1 x 10 R/L 1x10 R w/ sos   Seated LAQ+DF on L 1x10 in pain free range       Slant board stretch  30\" x 3 R/L   30\"x2 R/L  30\"x3 B/L    PF iso vs yellow ball  30\" x 2 R/L        EV iso vs yellow ball 30\" x 2 R/L        Self PF stretch seated   20\"x3 R/L    10\"x5 R/L    Gastroc stretch   30\"x3 w/ towel R/L       Seated HR/ TR     Seated HR for big toe ext 5\"x15 B/L    Std HR 2 x 10 B/L off 4\" step    1x10 B/L  1x10 B/L     1x10 ea w/ SL eccentric lower   L pain    Heel tap off step 4\" step 1 x 10 R/L w/ uni UE support       SL HR on leg press      15# 2x10 R/L    FSU w/ knee hike     6\" 1x10 R/L     Straddle step ups     6\" 1x10 R/L     PF roll w/ therabar     W/ orange ball 2'  W/ orange ball 2' R/L    Objective measures: AROM, MMT, balance         EDUCATION: Pathology, review of impairements, prognosis, activity modification, POC, and HEP HEP updated and reviewed        Ther Activity                             Gait Training                             Modalities  10'         TENS+ CP   10' post session to PF/post tib                  HEP: "     3/11/2024: Provided green theraband    Access Code: [email or web address]  URL: [email or web address]  Date: 04/04/2024  Prepared by: Erika Coreas    Exercises  - Gastroc Stretch on Step  - 3 x daily - 3 sets - 30 seconds hold  - Seated Heel Raise  - 3 x daily - 2 sets - 10 reps  - Standing Heel Raise with Support  - 3 x daily - 1 sets - 10 reps  - Towel Scrunches  - 1 x daily - 1 sets - 20 reps  - Foot Roller Plantar Massage  - 1 x daily - 7 x weekly - 1 reps - 3 min  hold

## 2024-04-09 NOTE — TELEPHONE ENCOUNTER
Patient calling stating she noticed a lump on her neck that is slightly swollen and tender- only wants to see Dr. Sanchez- first avail was at the [facility] office on 4/24- please review if needs to be seen sooner- pt was recently see on 4/1 for sore throat.

## 2024-04-10 ENCOUNTER — OFFICE VISIT (OUTPATIENT)
Dept: FAMILY MEDICINE CLINIC | Facility: CLINIC | Age: 24
End: 2024-04-10
Payer: COMMERCIAL

## 2024-04-10 VITALS
TEMPERATURE: 98.8 F | HEART RATE: 100 BPM | OXYGEN SATURATION: 100 % | SYSTOLIC BLOOD PRESSURE: 124 MMHG | BODY MASS INDEX: 41.99 KG/M2 | DIASTOLIC BLOOD PRESSURE: 80 MMHG | HEIGHT: 65 IN | RESPIRATION RATE: 16 BRPM | WEIGHT: 252 LBS

## 2024-04-10 DIAGNOSIS — R59.9 LYMPH NODE ENLARGEMENT: Primary | ICD-10-CM

## 2024-04-10 DIAGNOSIS — Z77.21 EXPOSURE TO BODY FLUID: ICD-10-CM

## 2024-04-10 DIAGNOSIS — Z11.59 NEED FOR HEPATITIS C SCREENING TEST: ICD-10-CM

## 2024-04-10 PROCEDURE — 99213 OFFICE O/P EST LOW 20 MIN: CPT | Performed by: FAMILY MEDICINE

## 2024-04-10 RX ORDER — METHYLPREDNISOLONE 4 MG/1
TABLET ORAL
Qty: 21 EACH | Refills: 0 | Status: SHIPPED | OUTPATIENT
Start: 2024-04-10

## 2024-04-10 NOTE — PROGRESS NOTES
Misty Blackmon 2000 female MRN: [id number]    [facility] OFFICE VISIT  [facility]      ASSESSMENT/PLAN  Misty Blackmon is a 24 y.o. female presents to the office for    Diagnoses and all orders for this visit:    Lymph node enlargement  -     US head neck soft tissue; Future  -     methylPREDNISolone 4 MG tablet therapy pack; Use as directed on package    Exposure to body fluid  -     Ambulatory Referral to Obstetrics / Gynecology; Future  -     HIV 1/2 AG/AB W REFLEX [facility] only; Future  -     HIV 1/2 AG/AB W REFLEX [facility] only    Need for hepatitis C screening test  -     Hepatitis C Antibody; Future  -     Hepatitis C Antibody       Ultrasound of the lymph node of the right only if the symptoms do not improve.  Likely postviral.  No need to repeat mono testing given that the patient has had a positive test in the past.  Medrol pack given for inflammation highly recommend that the patient start taking allergy medication such as Allegra daily.  Screening test for exposure to body fluids in the past given to her           Future Appointments   Date Time Provider Department Center   4/11/2024  5:00 PM Adelaide Tilley [facility]TA [facility]   4/15/2024  8:00 AM OLEG Hamlin [facility]   4/15/2024  4:15 PM Adelaide Tilley PTA [facility]   4/18/2024  4:15 PM Erika Coreas PT [facility]   4/24/2024  [facility]30 [facility]          SUBJECTIVE  CC: lump (Lump on jaw )      HPI:  Misty Blackmon is a 24 y.o. female who presents for an acute appointment.  Patient feels that she has had a lymph node that is been bothering her on the right side states it is very difficult and bothersome in the morning.  States that her sore throat is present in the morning gets better as the days goes on.  Felt like this when she had mono.  Patient states that  she has not been taking allergy medications.  Does need to repeat testing of all her STDs given her exposure in the past  Review of Systems   Constitutional:  Negative for activity change, appetite change, chills, fatigue and fever.   HENT:  Positive for sore throat. Negative for congestion.    Respiratory:  Negative for cough, chest tightness and shortness of breath.    Cardiovascular:  Negative for chest pain and leg swelling.   Gastrointestinal:  Negative for abdominal distention, abdominal pain, constipation, diarrhea, nausea and vomiting.   All other systems reviewed and are negative.      Historical Information   The patient history was reviewed as follows:  Past Medical History:   Diagnosis Date   • Allergic    • Anxiety    • Anxiety and depression    • Asthma    • Clotting disorder (HCC)    • Factor V Leiden (HCC)    • Headache(784.0)    • Pulmonary embolism (HCC)    • Thrombophlebitis          Medications:     Current Outpatient Medications:   •  albuterol (PROVENTIL HFA,VENTOLIN HFA) 90 mcg/act inhaler, Inhale 2 puffs every 4 (four) hours as needed for wheezing or shortness of breath, Disp: 18 g, Rfl: 6  •  busPIRone (BUSPAR) 15 mg tablet, Take 1 tablet (15 mg total) by mouth 3 (three) times a day, Disp: 270 tablet, Rfl: 3  •  butalbital-acetaminophen-caffeine (FIORICET,ESGIC) -40 mg per tablet, Take 1 tablet now, and then repeat in 4 hrs if no relief. Don't take more then 3 in a week., Disp: 30 tablet, Rfl: 0  •  EPINEPHrine (EPIPEN) 0.3 mg/0.3 mL SOAJ, Inject 0.3 mL (0.3 mg total) into a muscle once for 1 dose, Disp: 0.6 mL, Rfl: 0  •  Fluticasone-Salmeterol (Advair Diskus) 250-50 mcg/dose inhaler, Inhale 1 puff 2 (two) times a day Rinse mouth after use., Disp: 60 blister, Rfl: 8  •  hydrOXYzine HCL (ATARAX) 50 mg tablet, Take 1 tablet (50 mg total) by mouth daily at bedtime, Disp: 90 tablet, Rfl: 2  •  levonorgestrel (KYLEENA) 19.5 MG intrauterine device, 1 each by Intrauterine route, Disp: , Rfl:  "  •  methylPREDNISolone 4 MG tablet therapy pack, Use as directed on package, Disp: 21 each, Rfl: 0  •  sertraline (ZOLOFT) 50 mg tablet, TAKE 3 TABLETS BY MOUTH EVERY DAY AT BEDTIME, Disp: 270 tablet, Rfl: 3  •  SUMAtriptan (IMITREX) 100 mg tablet, TAKE 1 TABLET BY MOUTH ONCE AS NEEDED FOR MIGRAINE REPEAT 1 DOSE 6 HOURS LATER NO MORE THAN 3 DAYS A WEEK AS DIRECTED, Disp: 15 tablet, Rfl: 0  •  topiramate (TOPAMAX) 50 MG tablet, Take 1 tablet (50 mg total) by mouth daily at bedtime, Disp: 90 tablet, Rfl: 4  •  methylPREDNISolone 4 MG tablet therapy pack, Use as directed on package (Patient not taking: Reported on 4/10/2024), Disp: 21 each, Rfl: 0  •  Semaglutide-Weight Management (WEGOVY) 0.25 MG/0.5ML, Inject 0.5 mL (0.25 mg total) under the skin once a week (Patient not taking: Reported on 4/10/2024), Disp: 2 mL, Rfl: 0    Allergies   Allergen Reactions   • Amoxicillin Anaphylaxis   • Cinnamon - Food Allergy Anaphylaxis       OBJECTIVE  Vitals:   Vitals:    04/10/24 1156   BP: 124/80   BP Location: Right arm   Patient Position: Sitting   Cuff Size: Large   Pulse: 100   Resp: 16   Temp: 98.8 °F (37.1 °C)   SpO2: 100%   Weight: 114 kg (252 lb)   Height: 5' 5\" (1.651 m)         Physical Exam  Vitals reviewed.   Constitutional:       Appearance: She is well-developed.   HENT:      Head: Normocephalic and atraumatic.   Eyes:      Conjunctiva/sclera: Conjunctivae normal.      Pupils: Pupils are equal, round, and reactive to light.   Neck:      Comments: Left enlarged cervical lymph node L > R  Cardiovascular:      Rate and Rhythm: Normal rate and regular rhythm.      Heart sounds: Normal heart sounds.   Pulmonary:      Effort: Pulmonary effort is normal. No respiratory distress.      Breath sounds: Normal breath sounds.   Musculoskeletal:         General: Normal range of motion.      Cervical back: Normal range of motion and neck supple.   Skin:     General: Skin is warm.      Capillary Refill: Capillary refill takes " less than 2 seconds.   Neurological:      Mental Status: She is alert and oriented to person, place, and time.                    Sue Sanchez MD,   [facility]  4/10/2024

## 2024-04-11 ENCOUNTER — OFFICE VISIT (OUTPATIENT)
Dept: PHYSICAL THERAPY | Facility: CLINIC | Age: 24
End: 2024-04-11
Payer: COMMERCIAL

## 2024-04-11 DIAGNOSIS — M79.672 PAIN OF LEFT HEEL: ICD-10-CM

## 2024-04-11 DIAGNOSIS — M79.671 PAIN OF RIGHT HEEL: ICD-10-CM

## 2024-04-11 DIAGNOSIS — M72.2 PLANTAR FASCIITIS OF LEFT FOOT: Primary | ICD-10-CM

## 2024-04-11 PROCEDURE — 97112 NEUROMUSCULAR REEDUCATION: CPT

## 2024-04-11 PROCEDURE — 97110 THERAPEUTIC EXERCISES: CPT

## 2024-04-11 NOTE — PROGRESS NOTES
Daily Note     Today's date: 2024  Patient name: Misty Blackmon  : 2000  MRN: [id number]  Referring provider: Shady Crow DPM  Dx:   Encounter Diagnosis     ICD-10-CM    1. Plantar fasciitis of left foot  M72.2       2. Pain of right heel  M79.671       3. Pain of left heel  M79.672                      Subjective: pt reports pain 4/10 on arrival to session       Objective: See treatment diary below      Assessment: Tolerated treatment well. Pt dem improved tolerance to sciatic nerve gliding today vs last session. Tolerated resuming of standing TE well today without noted increases in foot pain. Cont to progress as able per symptom irritability. Patient would benefit from continued PT      Plan: Continue per plan of care.      Precautions:   *Hx of recurring PE  Past Medical History:   Diagnosis Date    Allergic     Anxiety     Anxiety and depression     Asthma     Clotting disorder (HCC)     Factor V Leiden (HCC)     Headache(784.0)     Pulmonary embolism (HCC)     Thrombophlebitis      Past Surgical History:   Procedure Laterality Date    BLADDER SURGERY      FL RETROGRADE PYELOGRAM  2022    FL VCUG VOIDING URETHROCYSTOGRAM  2018    NEPHRECTOMY Left 2020    Done laparosc[facility] at [facility]    [place] CYSTOURETHROSCOPY N/A 2022    Procedure: CYSTOSCOPY, retrograde pyelogram, and examination under anesthesia, dilation urethral stenosis;  Surgeon: Kamran Lott MD;  [facility];  Service: Urology    URETER REVISION      WISDOM TOOTH EXTRACTION      woke up during procedure.       SOC: 2024  FOTO: 3/4/2024  POC Expiration: 2024   Daily Treatment Log  Date: 2024   Visit#/ auth: 11 12 13   10   Objective Measures                  Manuals  10'     5'    PF iso vs man         IASTM B/L PF w/ DF stretch  prone  RB L PF     RB   L PF                        Neuro Re-Ed     10'   LAQ + DF   10'      Wobble board    Stand 20x ea AP/ML    "Standing AP/ML no UE support 20x ea    Seated towel scrunches          Toe yoga      1x10 ea R/L     isometric HR         Ankle circles   1x10 cw/ccw        [place] board seated           4 way ankle   GTB 1x15 ea R/L       SLS w/ fwd reach          Ther Ex 45' 25'  30'   30'   Bike  Upright L3'x5 Upright L3'x5  Upright L3'x5  Upright L3'x5    Chair DF stretch 10\" x 5 R/L       Sciatic nerve glide 1 x 10 R/L 1x10 R w/ sos   Seated LAQ+DF on L 1x10 in pain free range  Seated 1x10 R/L      Slant board stretch  30\" x 3 R/L    30\"x3 B/L    PF iso vs yellow ball  30\" x 2 R/L        EV iso vs yellow ball 30\" x 2 R/L        Self PF stretch seated   20\"x3 R/L  20\"x3 R/L   10\"x5 R/L    Gastroc stretch   30\"x3 w/ towel R/L  Slant board 20\"x3 B/L      Seated HR/ TR     Seated HR for big toe ext 5\"x15 B/L    Std HR 2 x 10 B/L off 4\" step   2x10  1x10 B/L     1x10 ea w/ SL eccentric lower   L pain    Heel tap off step 4\" step 1 x 10 R/L w/ uni UE support       SL HR on leg press    15# 1x15 R/L   15# 2x10 R/L    FSU w/ knee hike    8\" 1x10 R/L   8\"+foam 1x10 R/L      Straddle step ups         PF roll w/ therabar    W/ orange ball 1'x R/L   W/ orange ball 2' R/L    Objective measures: AROM, MMT, balance         EDUCATION: Pathology, review of impairements, prognosis, activity modification, POC, and HEP HEP updated and reviewed        Ther Activity                             Gait Training                             Modalities  10'         TENS+ CP   10' post session to PF/post tib                  HEP:     3/11/2024: Provided green theraband    Access Code: [email or web address]  URL: [email or web address]  Date: 04/04/2024  Prepared by: Erika Coreas    Exercises  - Gastroc Stretch on Step  - 3 x daily - 3 sets - 30 seconds hold  - Seated Heel Raise  - 3 x daily - 2 sets - 10 reps  - Standing Heel Raise with Support  - 3 x daily - 1 sets - 10 reps  - Towel Scrunches  - 1 x daily - 1 sets - 20 reps  - Foot Roller Plantar " Massage  - 1 x daily - 7 x weekly - 1 reps - 3 min  hold

## 2024-04-11 NOTE — TELEPHONE ENCOUNTER
PA for Wegovy    Submitted via    []CMM-KEY    []Mendoza-Case ID #    [x]Faxed to plan BENECARD [id number] fax [phone]  []Other website    []Phone call Case ID #      Office notes sent, clinical questions answered. Awaiting determination    Turnaround time for your insurance to make a decision on your Prior Authorization can take 7-21 business days.

## 2024-04-12 ENCOUNTER — TELEPHONE (OUTPATIENT)
Dept: FAMILY MEDICINE CLINIC | Facility: CLINIC | Age: 24
End: 2024-04-12

## 2024-04-13 DIAGNOSIS — F41.8 DEPRESSION WITH ANXIETY: ICD-10-CM

## 2024-04-13 RX ORDER — HYDROXYZINE 50 MG/1
50 TABLET, FILM COATED ORAL
Qty: 90 TABLET | Refills: 2 | Status: SHIPPED | OUTPATIENT
Start: 2024-04-13

## 2024-04-13 NOTE — TELEPHONE ENCOUNTER
[facility] appointment for end of April.Already was seen and addressed.Duplicate appointment for some reason.  Please call patient and cancel. Sent another patient to be placed in that spot

## 2024-04-15 ENCOUNTER — OFFICE VISIT (OUTPATIENT)
Dept: PHYSICAL THERAPY | Facility: CLINIC | Age: 24
End: 2024-04-15
Payer: COMMERCIAL

## 2024-04-15 ENCOUNTER — OFFICE VISIT (OUTPATIENT)
Dept: BARIATRICS | Facility: CLINIC | Age: 24
End: 2024-04-15
Payer: COMMERCIAL

## 2024-04-15 ENCOUNTER — TELEPHONE (OUTPATIENT)
Dept: PODIATRY | Facility: CLINIC | Age: 24
End: 2024-04-15

## 2024-04-15 VITALS
BODY MASS INDEX: 41.52 KG/M2 | SYSTOLIC BLOOD PRESSURE: 110 MMHG | HEART RATE: 66 BPM | DIASTOLIC BLOOD PRESSURE: 88 MMHG | WEIGHT: 249.2 LBS | HEIGHT: 65 IN

## 2024-04-15 DIAGNOSIS — M79.672 PAIN OF LEFT HEEL: ICD-10-CM

## 2024-04-15 DIAGNOSIS — M79.671 PAIN OF RIGHT HEEL: ICD-10-CM

## 2024-04-15 DIAGNOSIS — E66.01 CLASS 3 SEVERE OBESITY DUE TO EXCESS CALORIES WITH SERIOUS COMORBIDITY AND BODY MASS INDEX (BMI) OF 40.0 TO 44.9 IN ADULT (HCC): Primary | ICD-10-CM

## 2024-04-15 DIAGNOSIS — M72.2 PLANTAR FASCIITIS OF LEFT FOOT: Primary | ICD-10-CM

## 2024-04-15 PROCEDURE — 99214 OFFICE O/P EST MOD 30 MIN: CPT | Performed by: NURSE PRACTITIONER

## 2024-04-15 PROCEDURE — 97110 THERAPEUTIC EXERCISES: CPT

## 2024-04-15 PROCEDURE — 97112 NEUROMUSCULAR REEDUCATION: CPT

## 2024-04-15 RX ORDER — BUPROPION HYDROCHLORIDE 150 MG/1
150 TABLET ORAL DAILY
Qty: 30 TABLET | Refills: 1 | Status: SHIPPED | OUTPATIENT
Start: 2024-04-15 | End: 2024-06-14

## 2024-04-15 NOTE — ASSESSMENT & PLAN NOTE
- Patient is pursuing Conservative Program and follow up visits with medical weight management provider  - Initial weight loss goal of 5-10% weight loss for improved health  -Patient lifestyle habits were reviewed and patient was given emotional support to continue with her weight loss journey.  Will continue to follow-up about insurance authorization for Wegovy as I believe this would be a useful tool to help patient get her health under control.  Discussed food tracking to the patient can be mindful of her calorie intake and to avoid under eating or overeating her calories.  Advised patient that I do not recommend she go under 1200 chandan/day as this could be stalling her weight loss.  Encourage patient to stay active at the gym at least 3-4 times a week and to maintain at least 48 ounces of water daily.    Medications were discussed and bupropion was discussed as an addition to help with some of the cravings and emotional eating episodes that she does experience.  Patient is currently on Zoloft for depression so we will keep the dose of bupropion to 150 mg daily.  Side effects were reviewed with patient and patient voiced understanding.    The potential side effects of bupropion may include: abdominal upset, headache, dizziness, trouble sleeping, increased blood pressure, depression/anxiety, and fatigue. Please call/return if you develops symptoms of depression or anxiety. You should go to the  ER for evaluation if you develop any thoughts of harming yourself or others occur.      Wegovy was further discussed and patient will start this medication when she is able to get an approval from her insurance.  She is aware of the supply concerns regarding this medication and will make sure she is contacting the office when she has at least 2 pens left to avoid any lapses in treatment.  She was made aware that she cannot go longer than 14 days off therapy without having to restart the titration at the starting  dose.      Goals:  Calorie Goal: 6420-7131 calories per day   Do not skip meals.  Food log via saida or provided paper log (saida options include www.revoPT.com, sparkpeople.com, loseit.com, calorieking.com, "[employer]  No sugary beverages.   At least 64oz of water daily.  Increase physical activity by 10 minutes daily. Gradually increase physical activity to a goal of 5 days per week for 30 minutes of MODERATE intensity PLUS 2 days per week of FULL BODY resistance training

## 2024-04-15 NOTE — TELEPHONE ENCOUNTER
Caller: Misty Vázquez    Doctor and/or Office: Shady Crow DPM    #: [phone]    Escalation: Misty called to schedule an appointment.  She could not remember the name of the doctor who treated her.  I went into her chart to see that is was Dr. Crow.

## 2024-04-15 NOTE — PROGRESS NOTES
Assessment/Plan:     Class 3 severe obesity due to excess calories with serious comorbidity and body mass index (BMI) of 40.0 to 44.9 in adult (HCC)  - Patient is pursuing Conservative Program and follow up visits with medical weight management provider  - Initial weight loss goal of 5-10% weight loss for improved health  -Patient lifestyle habits were reviewed and patient was given emotional support to continue with her weight loss journey.  Will continue to follow-up about insurance authorization for Wegovy as I believe this would be a useful tool to help patient get her health under control.  Discussed food tracking to the patient can be mindful of her calorie intake and to avoid under eating or overeating her calories.  Advised patient that I do not recommend she go under 1200 chandan/day as this could be stalling her weight loss.  Encourage patient to stay active at the gym at least 3-4 times a week and to maintain at least 48 ounces of water daily.    Medications were discussed and bupropion was discussed as an addition to help with some of the cravings and emotional eating episodes that she does experience.  Patient is currently on Zoloft for depression so we will keep the dose of bupropion to 150 mg daily.  Side effects were reviewed with patient and patient voiced understanding.    The potential side effects of bupropion may include: abdominal upset, headache, dizziness, trouble sleeping, increased blood pressure, depression/anxiety, and fatigue. Please call/return if you develops symptoms of depression or anxiety. You should go to the  ER for evaluation if you develop any thoughts of harming yourself or others occur.      Wegovy was further discussed and patient will start this medication when she is able to get an approval from her insurance.  She is aware of the supply concerns regarding this medication and will make sure she is contacting the office when she has at least 2 pens left to avoid any lapses in  treatment.  She was made aware that she cannot go longer than 14 days off therapy without having to restart the titration at the starting dose.      Goals:  Calorie Goal: 8331-5411 calories per day   Do not skip meals.  Food log via saida or provided paper log (saida options include www.Tradeshift.com, sparkpeople.com, loseit.com, calorieking.com, [employer])  No sugary beverages.   At least 64oz of water daily.  Increase physical activity by 10 minutes daily. Gradually increase physical activity to a goal of 5 days per week for 30 minutes of MODERATE intensity PLUS 2 days per week of FULL BODY resistance training          Misty was seen today for follow-up.    Diagnoses and all orders for this visit:    Class 3 severe obesity due to excess calories with serious comorbidity and body mass index (BMI) of 40.0 to 44.9 in adult (HCC)  -     buPROPion (Wellbutrin XL) 150 mg 24 hr tablet; Take 1 tablet (150 mg total) by mouth daily        Total time spent reviewing chart, interviewing patient, examining patient, discussing plan, answering all questions, and documentin minutes with >50% face-to-face time with the patient.    Follow up in approximately 2 months with Non-Surgical Physician/Advanced Practitioner.    Subjective:   Chief Complaint   Patient presents with    Follow-up     Pt is here for MWM f/u       Patient ID: Misty Blackmon  is a 24 y.o. female with excess weight/obesity here to pursue weight management.  Patient is pursuing Conservative Program.   Most recent notes and records were reviewed.    HPI    Wt Readings from Last 20 Encounters:   04/15/24 113 kg (249 lb 3.2 oz)   04/10/24 114 kg (252 lb)   24 113 kg (250 lb 3.2 oz)   24 112 kg (246 lb 12.8 oz)   24 113 kg (249 lb 12.8 oz)   24 114 kg (251 lb)   24 114 kg (251 lb)   23 112 kg (248 lb)   23 111 kg (244 lb)   23 111 kg (244 lb)   23 112 kg (247 lb)   23 112 kg (247 lb 3.2 oz)   10/04/23  112 kg (248 lb)   23 112 kg (248 lb)   23 116 kg (255 lb 15.3 oz)   23 112 kg (247 lb)   04/10/23 112 kg (248 lb)   23 112 kg (247 lb)   23 115 kg (252 lb 12.8 oz)   22 108 kg (239 lb)       Patient presents today to medical weight management office for follow up.  Since last office visit patient has been maintaining her weight.  She states she is trying to cut back her calories and having a protein shake in the morning, salad for lunch, and sometimes will skip dinner.  Patient does admit there are days where she has a lot of stress and will go to chocolate or sweets to help, but she does not believe it does not often.  Patient voices frustration that she has been unable to get an answer regarding Wegovy.  Patient would still like to pursue an injectable treatment.      Weight loss medication and dose: none  Started weight and date: 247.2 lbs in 2023  Current weight: 249.2 lbs (246.8 lbs at last OV)  Difference: +2 lbs (+2.4 lbs since last OV)  Goal weight: 150-180 lbs    Starting BMI: 41.26 in 2023  Current BMI: 41.47    Waist Measurements:  2023: 49.5 in  2024: 49.5 in    Diet recall:  B: protein shake with coffee  L: yogurt (oikos zero) with fruit and grapes OR salad with chicken  S: fruit or protein bar  D: skips OR pasta with chicken and vegetable (mom cooks)    Hydration: water - 40+ oz, coffee - cream and sugar, coke 2 times a week  Alcohol: socially  Smoking: no  Exercise: 3-4 times a week at the gym (cardio and strength)  Occupation: teacher  Sleep: varies  STOP ban/8      The following portions of the patient's history were reviewed and updated as appropriate: allergies, current medications, past family history, past medical history, past social history, past surgical history, and problem list.    Family History   Problem Relation Age of Onset    Hypertension Mother     Hyperlipidemia Mother     Diabetes Mother     Factor V Leiden deficiency Mother      "Heart disease Father     Depression Father     ADD / ADHD Brother     Ovarian cancer Maternal Grandmother     Diabetes Other     Arthritis Family     Asthma Family     Cancer Family     Cervical cancer Family     Heart failure Family     Hyperlipidemia Family     Hypertension Family         Review of Systems   Constitutional:  Negative for fatigue.   HENT:  Negative for sore throat.    Respiratory:  Negative for cough and shortness of breath.    Cardiovascular:  Negative for chest pain, palpitations and leg swelling.   Gastrointestinal:  Negative for abdominal pain, constipation, diarrhea and nausea.   Genitourinary:  Negative for dysuria.   Musculoskeletal:  Negative for arthralgias and back pain.   Skin:  Negative for rash.   Neurological:  Negative for headaches.   Psychiatric/Behavioral:  Negative for dysphoric mood. The patient is not nervous/anxious.        Objective:  /88 (BP Location: Left arm, Patient Position: Sitting, Cuff Size: Large)   Pulse 66   Ht 5' 5\" (1.651 m)   Wt 113 kg (249 lb 3.2 oz)   BMI 41.47 kg/m²     Physical Exam  Vitals and nursing note reviewed.   Constitutional:       Appearance: Normal appearance. She is obese.   HENT:      Head: Normocephalic.   Pulmonary:      Effort: Pulmonary effort is normal.   Neurological:      General: No focal deficit present.      Mental Status: She is alert and oriented to person, place, and time.   Psychiatric:         Mood and Affect: Mood normal.         Behavior: Behavior normal.         Thought Content: Thought content normal.         Judgment: Judgment normal.            Labs   Most recent labs reviewed   Lab Results   Component Value Date     11/24/2017    SODIUM 136 12/28/2023    K 3.7 12/28/2023     12/28/2023    CO2 21 12/28/2023    AGAP 9 12/28/2023    BUN 8 12/28/2023    CREATININE 1.06 12/28/2023    GLUC 168 (H) 12/28/2023    GLUF 122 (H) 05/16/2022    CALCIUM 8.5 12/28/2023    AST 11 (L) 12/28/2023    ALT 18 12/28/2023    " ALKPHOS 70 12/28/2023    PROT 6.9 11/24/2017    TP 6.5 12/28/2023    BILITOT 0.3 11/24/2017    TBILI 0.41 12/28/2023    EGFR 73 12/28/2023     Lab Results   Component Value Date    HGBA1C 5.6 11/10/2023     Lab Results   Component Value Date    RCA6LIZYGHJO 0.750 07/08/2020    TSH 2.000 11/10/2023     Lab Results   Component Value Date    CHOLESTEROL 169 11/10/2023     Lab Results   Component Value Date    HDL 41 11/10/2023     Lab Results   Component Value Date    TRIG 116 11/10/2023     Lab Results   Component Value Date    LDLCALC 107 (H) 11/10/2023

## 2024-04-15 NOTE — PROGRESS NOTES
Daily Note     Today's date: 4/15/2024  Patient name: Misty Blackmon  : 2000  MRN: [id number]  Referring provider: Shady Crow DPM  Dx:   Encounter Diagnosis     ICD-10-CM    1. Plantar fasciitis of left foot  M72.2       2. Pain of right heel  M79.671       3. Pain of left heel  M79.672                      Subjective: pt reports pain 5-6/10 with walking around school longer distances, when standing in class and walking around her classroom it was 3/10. She did make an appt with her MD and f/u with him on wed       Objective: See treatment diary below      Assessment: Tolerated treatment well. Pt dem improved tolerance to LAQ w/ DF today vs previous visits, making slow progress in regard to dec pain but has remained with mod irritability to symptoms with increased walking. Patient would benefit from continued PT      Plan: Continue per plan of care.      Precautions:   *Hx of recurring PE  Past Medical History:   Diagnosis Date    Allergic     Anxiety     Anxiety and depression     Asthma     Clotting disorder (HCC)     Factor V Leiden (HCC)     Headache(784.0)     Pulmonary embolism (HCC)     Thrombophlebitis      Past Surgical History:   Procedure Laterality Date    BLADDER SURGERY      FL RETROGRADE PYELOGRAM  2022    FL VCUG VOIDING URETHROCYSTOGRAM  2018    NEPHRECTOMY Left 2020    Done laparoscopically at [facility]    MT CYSTOURETHROSCOPY N/A 2022    Procedure: CYSTOSCOPY, retrograde pyelogram, and examination under anesthesia, dilation urethral stenosis;  Surgeon: Kamran Lott MD;  [facility]: [facility] MAIN OR;  Service: Urology    URETER REVISION      WISDOM TOOTH EXTRACTION      woke up during procedure.       SOC: 2024  FOTO: 3/4/2024  POC Expiration: 2024   Daily Treatment Log  Date: 2024 2024 2024 4/15/2024    Visit#/ auth: 11 12 13  14     Objective Measures                 Manuals  10'        PF iso vs man        IASTM B/L PF w/ DF stretch  prone  RB  "L PF        KT PF support w/ pull to prevent ankle inver    RB              Neuro Re-Ed        LAQ + DF   10'      Wobble board    Stand 20x ea AP/ML  Stand 20x ea AP/ML     Seated towel scrunches          Toe yoga          isometric HR         Ankle circles   1x10 cw/ccw        [place] board seated           4 way ankle   GTB 1x15 ea R/L       SLS w/ fwd reach         Ther Ex 45' 25'  30'      Bike  Upright L3'x5 Upright L3'x5  Upright L3'x5 Upright L3x5'     Chair DF stretch 10\" x 5 R/L   10\"x5 R/L     Sciatic nerve glide 1 x 10 R/L 1x10 R w/ sos   Seated LAQ+DF on L 1x10 in pain free range  Seated 1x10 R/L  Seated 1x10 R/L     Slant board stretch  30\" x 3 R/L       PF iso vs yellow ball  30\" x 2 R/L        EV iso vs yellow ball 30\" x 2 R/L        Self PF stretch seated   20\"x3 R/L  20\"x3 R/L      Gastroc stretch   30\"x3 w/ towel R/L  Slant board 20\"x3 B/L      Seated HR/ TR        Std HR 2 x 10 B/L off 4\" step   2x10 2x10     Heel tap off step 4\" step 1 x 10 R/L w/ uni UE support       SL HR on leg press    15# 1x15 R/L      FSU w/ knee hike    8\" 1x10 R/L   8\"+foam 1x10 R/L  8\"+2\" foam 2x10 R/L      Straddle step ups     8\" 1x10 R/L     PF roll w/ therabar    W/ orange ball 1'x R/L      Objective measures: AROM, MMT, balance         EDUCATION: Pathology, review of impairements, prognosis, activity modification, POC, and HEP HEP updated and reviewed        Ther Activity                             Gait Training                             Modalities  10'         TENS+ CP   10' post session to PF/post tib                  HEP:     3/11/2024: Provided green theraband    Access Code: [email or web address]  URL: [email or web address]  Date: 04/04/2024  Prepared by: Erika Coreas    Exercises  - Gastroc Stretch on Step  - 3 x daily - 3 sets - 30 seconds hold  - Seated Heel Raise  - 3 x daily - 2 sets - 10 reps  - Standing Heel Raise with Support  - 3 x daily - 1 sets - 10 reps  - Towel Scrunches  - 1 x daily - 1 sets " - 20 reps  - Foot Roller Plantar Massage  - 1 x daily - 7 x weekly - 1 reps - 3 min  hold

## 2024-04-16 ENCOUNTER — TELEPHONE (OUTPATIENT)
Age: 24
End: 2024-04-16

## 2024-04-16 NOTE — LETTER
"  04/17/24    Patient Name: Misty Blackmon  Patient YOB: 2000    To whom it may concern,    I am writing this letter on behalf of my patient, Misty Blackmon, to appeal a prior authorization denial for Wegovy.  My patient is in need of important anti-obesity medication that is currently not covered by your insurance plan. It is well recognized that obesity is a chronic illness associated with many related diseases, such as diabetes and hypertension. Obesity deserves the same treatment and attention as any other chronic illness. I believe Misty is an ideal candidate for anti-obesity medication and would benefit from  treatment.     Estimated body mass index is 41.44 kg/m² as calculated from the following:    Height as of 4/17/24: 5' 5\" (1.651 m).    Weight as of 4/17/24: 113 kg (249 lb).    Along with Obesity, Misty has a history of GERD and anxiety .     patient is not a candidate for phentermine, Qsymia, lomaira, Adipex as she has uncontrolled anxiety     Currently on a reduced calorie diet and exercise program: Yes  Will be using with other GLP1/GIP-GLP1 medication (ie Trulicity, Ozempic, Rybelsus, Saxenda, Wegovy, Mounjaro, Zepbound): No  Will the medication be used with other weight loss products (ie Saxenda, Wegovy, Zepbound, Qsymia, Contrave, Ray, Phentermine): No  Is this a request for:  Initiation: Yes  Continuation: No    Please let us know if you have any questions or require any further information.    Sincerely,       OLEG Hamlin  "

## 2024-04-16 NOTE — TELEPHONE ENCOUNTER
PA for Wegovy    Submitted via    []CMM-KEY    []SeanSoundvamp-Case ID #    [x][phone]  []Other website    []Phone call Case ID #      Office notes sent, clinical questions answered. Awaiting determination    Turnaround time for your insurance to make a decision on your Prior Authorization can take 7-21 business days.

## 2024-04-16 NOTE — TELEPHONE ENCOUNTER
Please pursue appeal as patient is not a candidate for phentermine, Qsymia, lomaira, Adipex as she has uncontrolled anxiety

## 2024-04-16 NOTE — TELEPHONE ENCOUNTER
PA for Wegovy Denied    Reason:(Screenshot if applicable)        Message sent to office clinical pool Yes    Denial letter scanned into Media Yes    Appeal started No ( Provider will need to decide if appeal is warranted and send clinical documentation to PA team for initiation.)

## 2024-04-17 ENCOUNTER — OFFICE VISIT (OUTPATIENT)
Dept: PHYSICAL THERAPY | Facility: CLINIC | Age: 24
End: 2024-04-17
Payer: COMMERCIAL

## 2024-04-17 ENCOUNTER — OFFICE VISIT (OUTPATIENT)
Age: 24
End: 2024-04-17
Payer: COMMERCIAL

## 2024-04-17 VITALS
HEART RATE: 81 BPM | HEIGHT: 65 IN | WEIGHT: 249 LBS | DIASTOLIC BLOOD PRESSURE: 84 MMHG | BODY MASS INDEX: 41.48 KG/M2 | SYSTOLIC BLOOD PRESSURE: 124 MMHG

## 2024-04-17 DIAGNOSIS — M79.671 PAIN OF RIGHT HEEL: ICD-10-CM

## 2024-04-17 DIAGNOSIS — M72.2 PLANTAR FASCIITIS OF LEFT FOOT: Primary | ICD-10-CM

## 2024-04-17 DIAGNOSIS — M79.672 PAIN OF LEFT HEEL: ICD-10-CM

## 2024-04-17 DIAGNOSIS — M76.72 PERONEAL TENDONITIS OF LEFT LOWER LEG: ICD-10-CM

## 2024-04-17 PROCEDURE — 20550 NJX 1 TENDON SHEATH/LIGAMENT: CPT | Performed by: STUDENT IN AN ORGANIZED HEALTH CARE EDUCATION/TRAINING PROGRAM

## 2024-04-17 PROCEDURE — 97112 NEUROMUSCULAR REEDUCATION: CPT

## 2024-04-17 PROCEDURE — 97110 THERAPEUTIC EXERCISES: CPT

## 2024-04-17 NOTE — PROGRESS NOTES
Daily Note     Today's date: 2024  Patient name: Misty Blackmon  : 2000  MRN: [id number]  Referring provider: Shady Crow DPM   Dx:   Encounter Diagnosis     ICD-10-CM    1. Plantar fasciitis of left foot  M72.2       2. Pain of right heel  M79.671       3. Pain of left heel  M79.672                      Subjective: pt reports that she got her injection this morning and he was very painful. Her foot is still more sore on arrival to her PM appt, she was having a tough time walking around work this morning. Reports that she felt relief from the KT done last session. The MD could tell that PT has helped so far and recc continuing at this time.       Objective: See treatment diary below      Assessment: Tolerated treatment well. Abbreviated session and exercise modification today due to injection in L foot this morning at MD appt. Pt tolerated seated ROM/stretching. Pt edu that the foot may be uncomfortable the day of injection but as the Dr stated may take a couple days to take effect and hopefully this localized injection helps to dec the inflamm and cont her pain relief. Pt edu to not over do her WB activities over the next couple days. Patient would benefit from continued PT      Plan: Continue per plan of care.      Precautions:   *Hx of recurring PE  Past Medical History:   Diagnosis Date    Allergic     Anxiety     Anxiety and depression     Asthma     Clotting disorder (HCC)     Factor V Leiden (HCC)     Headache(784.0)     Pulmonary embolism (HCC)     Thrombophlebitis      Past Surgical History:   Procedure Laterality Date    BLADDER SURGERY      FL RETROGRADE PYELOGRAM  2022    FL VCUG VOIDING URETHROCYSTOGRAM  2018    NEPHRECTOMY Left 2020    Done laparoscopically at [facility]    SC CYSTOURETHROSCOPY N/A 2022    Procedure: CYSTOSCOPY, retrograde pyelogram, and examination under anesthesia, dilation urethral stenosis;  Surgeon: Kamran Lott MD;  [facility];   "Service: Urology    URETER REVISION      WISDOM TOOTH EXTRACTION      woke up during procedure.       SOC: 2/14/2024  FOTO: 3/4/2024  POC Expiration: 5/8/2024   Daily Treatment Log  Date: 4/4/2024 4/8/2024 4/11/2024 4/15/2024 4/17/2024   Visit#/ auth: 11 12 13  14  15    Objective Measures                 Manuals  10'        PF iso vs man        IASTM B/L PF w/ DF stretch  prone  RB L PF        KT PF support w/ pull to prevent ankle inver    RB  RB R/L             Neuro Re-Ed     15'   LAQ + DF        Wobble board    Stand 20x ea AP/ML  Stand 20x ea AP/ML  Seated due to injection today 20x AP/ML    Seated towel scrunches          Toe yoga          isometric HR         Ankle circles   1x10 cw/ccw    1x10 cw/ccw    [place] board seated       1x10 ea cw/ccw R/L     4 way ankle   GTB 1x15 ea R/L       SLS w/ fwd reach         Ther Ex 45' 25'  30'   15'   Bike  Upright L3'x5 Upright L3'x5  Upright L3'x5 Upright L3x5'  Upright L3x5'     Chair DF stretch 10\" x 5 R/L   10\"x5 R/L  W/ sos 30\"x3 R/L   Sciatic nerve glide 1 x 10 R/L 1x10 R w/ sos   Seated LAQ+DF on L 1x10 in pain free range  Seated 1x10 R/L  Seated 1x10 R/L  Seated 1x10 R/L     Slant board stretch  30\" x 3 R/L       PF iso vs yellow ball  30\" x 2 R/L        EV iso vs yellow ball 30\" x 2 R/L        Self PF stretch seated   20\"x3 R/L  20\"x3 R/L      Gastroc stretch   30\"x3 w/ towel R/L  Slant board 20\"x3 B/L      Seated HR/ TR     TR only pain with seated HR 1x15    Std HR 2 x 10 B/L off 4\" step   2x10 2x10     Heel tap off step 4\" step 1 x 10 R/L w/ uni UE support       SL HR on leg press    15# 1x15 R/L      FSU w/ knee hike    8\" 1x10 R/L   8\"+foam 1x10 R/L  8\"+2\" foam 2x10 R/L      Straddle step ups     8\" 1x10 R/L     PF roll w/ therabar    W/ orange ball 1'x R/L      Objective measures: AROM, MMT, balance         EDUCATION: Pathology, review of impairements, prognosis, activity modification, POC, and HEP HEP updated and reviewed        Ther Activity          "                    Gait Training                             Modalities  10'         TENS+ CP   10' post session to PF/post tib     CP post session 5'              HEP:     3/11/2024: Provided green theraband    Access Code: [email or web address]  URL: [email or web address]  Date: 04/04/2024  Prepared by: Erika Coreas    Exercises  - Gastroc Stretch on Step  - 3 x daily - 3 sets - 30 seconds hold  - Seated Heel Raise  - 3 x daily - 2 sets - 10 reps  - Standing Heel Raise with Support  - 3 x daily - 1 sets - 10 reps  - Towel Scrunches  - 1 x daily - 1 sets - 20 reps  - Foot Roller Plantar Massage  - 1 x daily - 7 x weekly - 1 reps - 3 min  hold

## 2024-04-17 NOTE — TELEPHONE ENCOUNTER
PA for Wegovy appealed via     []CMM  []SS  [x]Letter sent to insurance via fax  [phone]  []Other site or means      All necessary records sent. Will await response from insurance company    Turnaround time for a decision to be made on an appeal could take up to 30 business days

## 2024-04-18 ENCOUNTER — APPOINTMENT (OUTPATIENT)
Dept: PHYSICAL THERAPY | Facility: CLINIC | Age: 24
End: 2024-04-18
Payer: COMMERCIAL

## 2024-04-19 RX ORDER — DEXAMETHASONE SODIUM PHOSPHATE 4 MG/ML
4 INJECTION, SOLUTION INTRA-ARTICULAR; INTRALESIONAL; INTRAMUSCULAR; INTRAVENOUS; SOFT TISSUE ONCE
Status: COMPLETED | OUTPATIENT
Start: 2024-04-19 | End: 2024-04-19

## 2024-04-19 RX ORDER — LIDOCAINE HYDROCHLORIDE 10 MG/ML
1 INJECTION, SOLUTION EPIDURAL; INFILTRATION; INTRACAUDAL; PERINEURAL ONCE
Status: COMPLETED | OUTPATIENT
Start: 2024-04-19 | End: 2024-04-19

## 2024-04-19 RX ORDER — TRIAMCINOLONE ACETONIDE 40 MG/ML
40 INJECTION, SUSPENSION INTRA-ARTICULAR; INTRAMUSCULAR ONCE
Status: COMPLETED | OUTPATIENT
Start: 2024-04-19 | End: 2024-04-19

## 2024-04-19 RX ADMIN — TRIAMCINOLONE ACETONIDE 40 MG: 40 INJECTION, SUSPENSION INTRA-ARTICULAR; INTRAMUSCULAR at 10:24

## 2024-04-19 RX ADMIN — LIDOCAINE HYDROCHLORIDE 1 ML: 10 INJECTION, SOLUTION EPIDURAL; INFILTRATION; INTRACAUDAL; PERINEURAL at 10:23

## 2024-04-19 RX ADMIN — DEXAMETHASONE SODIUM PHOSPHATE 4 MG: 4 INJECTION, SOLUTION INTRA-ARTICULAR; INTRALESIONAL; INTRAMUSCULAR; INTRAVENOUS; SOFT TISSUE at 10:23

## 2024-04-19 NOTE — PROGRESS NOTES
This patient was seen on  4/17/24 .    My role is Foot , Ankle, and Wound Specialist    ASSESSMENT     Diagnoses and all orders for this visit:    Plantar fasciitis of left foot  -     Foot/lower extremity injection  -     lidocaine (PF) (XYLOCAINE-MPF) 1 % injection 1 mL  -     dexamethasone (DECADRON) injection 4 mg  -     triamcinolone acetonide (KENALOG-40) 40 mg/mL injection 40 mg    Peroneal tendonitis of left lower leg         Problem List Items Addressed This Visit          Musculoskeletal and Integument    Plantar fasciitis of left foot - Primary    Relevant Medications    lidocaine (PF) (XYLOCAINE-MPF) 1 % injection 1 mL (Completed) (Start on 4/19/2024 10:30 AM)    dexamethasone (DECADRON) injection 4 mg (Completed) (Start on 4/19/2024 10:30 AM)    triamcinolone acetonide (KENALOG-40) 40 mg/mL injection 40 mg (Completed) (Start on 4/19/2024 10:30 AM)    Other Relevant Orders    Foot/lower extremity injection    Peroneal tendonitis of left lower leg     PLAN  -Patient was educated regarding their condition.  -Continue night splints, daily stretching program, again reinforced the importance of proper footwear and orthotics  -Corticosteroid injection given to left foot today  -Continue physical therapy  -Patient will RTC in 6-weeks, can consider second injection at this time if there is no improvement.    Foot/lower extremity injection    Performed by: Shady Crow DPM  Authorized by: Shady Crow DPM    Procedure:     Other Assisting Provider: No      Verbal consent obtained?: Yes      Risks and benefits: Risks, benefits and alternatives were discussed      Consent given by:  Patient    Time out: Immediately prior to the procedure a time out was called      Patient states understanding of procedure being performed: Yes      Site marked: Yes      Patient identity confirmed:  Verbally with patient    Supporting Documentation:     Indications:  Pain    Procedure Details:                Ethyl Chloride was  "applied      Needle size: 25 G G    Ultrasound Guidance: no      Approach:  Plantar    Laterality:  Left    Cyst Aspiration/Injection: No      Location: aponeurosis      Injection Information:       Patient tolerance:  Patient tolerated the procedure well with no immediate complications    SUBJECTIVE    Chief Complaint:  Left foot pain     Patient ID: Misty Blackmon     12/19/2023: Misty is a pleasant 23-year-old female who presents today with left foot pain.  She states that this started approximately 1 month ago.  She did not injure it at the time, however she does state that she rolled her left ankle quite frequently.  She denies any laxity to other joints in her body.  She states that she has been wrapping it which has helped slightly, she also states that she has attempted taking Tylenol and Motrin which have not helped.  She states that her foot hurts most when she is up and walking on after a long day, better at rest.    4/17/2024: Misty states that she is doing better in comparison to her last visit.  She states that her left ankle pain continues to be resolved however her left plantar foot pain remains present and is nearly just as bad as her previous visit.  She is agreeable to a left foot injection today.        The following portions of the patient's history were reviewed and updated as appropriate: allergies, current medications, past family history, past medical history, past social history, past surgical history and problem list.    Review of Systems   Constitutional: Negative.    HENT: Negative.     Respiratory: Negative.     Cardiovascular: Negative.    Gastrointestinal: Negative.    Musculoskeletal:  Positive for myalgias.   Skin: Negative.    Neurological: Negative.          OBJECTIVE      /84   Pulse 81   Ht 5' 5\" (1.651 m)   Wt 113 kg (249 lb)   BMI 41.44 kg/m²        Physical Exam  Constitutional:       Appearance: Normal appearance.   HENT:      Head: Normocephalic and " atraumatic.   Eyes:      General:         Right eye: No discharge.         Left eye: No discharge.   Cardiovascular:      Rate and Rhythm: Normal rate and regular rhythm.      Pulses:           Dorsalis pedis pulses are 2+ on the right side and 2+ on the left side.        Posterior tibial pulses are 2+ on the right side and 2+ on the left side.   Pulmonary:      Effort: Pulmonary effort is normal.      Breath sounds: Normal breath sounds.   Skin:     General: Skin is warm.      Capillary Refill: Capillary refill takes less than 2 seconds.   Neurological:      Sensory: Sensation is intact. No sensory deficit.         MSK:  -No pain along the course of the peroneal tendons of the left ankle at today's visit.  No pain with plantarflexion and eversion against resistance.  -Pain on palpation of medial calcaneal tubercle at the insertion site of the plantar fascia  -no pain with compression of both sides of heel  -No gross deformities noted  -Active range of motion lesser digits intact  -MMT 5/5 to all muscle compartments of the lower extremity  -Ankle dorsiflexion is less than 10 degrees with knee extended, and knee flexed.  This is slightly improved compared to the patient's previous visit.     Neuro:  -Light sensation intact bilaterally  -Protective sensation intact bilaterally  -Tinel sign negative

## 2024-04-22 ENCOUNTER — OFFICE VISIT (OUTPATIENT)
Dept: PHYSICAL THERAPY | Facility: CLINIC | Age: 24
End: 2024-04-22
Payer: COMMERCIAL

## 2024-04-22 DIAGNOSIS — M79.671 PAIN OF RIGHT HEEL: ICD-10-CM

## 2024-04-22 DIAGNOSIS — M72.2 PLANTAR FASCIITIS OF LEFT FOOT: Primary | ICD-10-CM

## 2024-04-22 DIAGNOSIS — M79.672 PAIN OF LEFT HEEL: ICD-10-CM

## 2024-04-22 PROCEDURE — 97140 MANUAL THERAPY 1/> REGIONS: CPT

## 2024-04-22 PROCEDURE — 97110 THERAPEUTIC EXERCISES: CPT

## 2024-04-22 PROCEDURE — 97112 NEUROMUSCULAR REEDUCATION: CPT

## 2024-04-22 NOTE — PROGRESS NOTES
"Daily Note     Today's date: 2024  Patient name: Misty Blackmon  : 2000  MRN: [id number]  Referring provider: Shady Crow DPM  Dx:   Encounter Diagnosis     ICD-10-CM    1. Plantar fasciitis of left foot  M72.2       2. Pain of right heel  M79.671       3. Pain of left heel  M79.672                      Subjective: Pt received an injection on L side only. Where she received the injection doesn't hurt, however, she has noticed muscle spasm along plantar fascia on L side with activity. States that it spasms when she dorsiflexes her foot.  L foot pain on arrival to PT is 3/10. She does not report R foot pain today; but states that it is still present at times. Pt states that since the injection, she wakes up without pain and has returned to walking at normal pace; she does notice that she is sore at the end of the day.       Objective: See treatment diary below      Assessment: Pt with report of pain with heel tap from 4\" step while weightbearing through LLE; did not complee this exercise on contralateral side d/t pain. Had pt perform toe scrunches in standing for intrinsic strengthening. Instructed pt to alternate LE and step over board ever rep to offload foot d/t pain w/ WB through LLE. Pt with tightness along L plantar fascia; performed STM. Tolerated treatment well. Patient would benefit from continued PT.       Plan: Continue per plan of care.  Progress treatment as tolerated.       Precautions:   *Hx of recurring PE  Past Medical History:   Diagnosis Date    Allergic     Anxiety     Anxiety and depression     Asthma     Clotting disorder (HCC)     Factor V Leiden (HCC)     Headache(784.0)     Pulmonary embolism (HCC)     Thrombophlebitis      Past Surgical History:   Procedure Laterality Date    BLADDER SURGERY      FL RETROGRADE PYELOGRAM  2022    FL VCUG VOIDING URETHROCYSTOGRAM  2018    NEPHRECTOMY Left 2020    Done laparoscopically at [facility]    MD CYSTOURETHROSCOPY N/A " "05/20/2022    Procedure: CYSTOSCOPY, retrograde pyelogram, and examination under anesthesia, dilation urethral stenosis;  Surgeon: Kamran Lott MD;  Location: [facility] OR;  Service: Urology    URETER REVISION      WISDOM TOOTH EXTRACTION      woke up during procedure.       SOC: 2/14/2024  FOTO: 3/4/2024  POC Expiration: 5/8/2024   Daily Treatment Log  Date: 4/22/2024 Next session 4/11/2024 4/15/2024 4/17/2024   Visit#/ auth: 16  13  14  15    Objective Measures         Palpation KE palpable tightness along L plantar fascia; pt w/ muscle spasm in this area occasionally t/o session       Manuals 10'        PF iso vs man        IASTM B/L PF w/ DF stretch  prone         KT PF support w/ pull to prevent ankle inver    RB  RB R/L     STM to plantar fascia  KE: LLE       Neuro Re-Ed 10'    15'   LAQ + DF        Wobble board    Stand 20x ea AP/ML  Stand 20x ea AP/ML  Seated due to injection today 20x AP/ML    Seated towel scrunches  Std 1 x 20 R/L at counter (step over board ever rep to off load foot d/t pain w/ WB)        Toe yoga          isometric HR         Ankle circles      1x10 cw/ccw    Lovelock board seated       1x10 ea cw/ccw R/L     4 way ankle GTB 1x10 ea R/L   GTB 1x15 ea R/L       SLS w/ fwd reach         Ther Ex 20'  30'   15'   Bike  Upright L3x5'   Upright L3'x5 Upright L3x5'  Upright L3x5'     Chair DF stretch    10\"x5 R/L  W/ sos 30\"x3 R/L   Sciatic nerve glide Seated 1x10 R/L    Seated 1x10 R/L  Seated 1x10 R/L  Seated 1x10 R/L     Self PF stretch seated    20\"x3 R/L      Gastroc stretch  4\" step 30\" x 2 R/L   Slant board 20\"x3 B/L      Seated HR/ TR TR 1 x 15 seated    TR only pain with seated HR 1x15    Std HR 2 x 10 B/L   2x10 2x10     Heel tap off step 1 x 10 LLE; held RLE d/t pain       SL HR on leg press    15# 1x15 R/L      FSU w/ knee hike    8\" 1x10 R/L   8\"+foam 1x10 R/L  8\"+2\" foam 2x10 R/L      Straddle step ups     8\" 1x10 R/L     PF roll w/ therabar  1' R/L   W/ orange ball 1'x R/L   "    Objective measures: AROM, MMT, balance         EDUCATION: Pathology, review of impairements, prognosis, activity modification, POC, and HEP         Ther Activity                             Gait Training                             Modalities          TENS+ CP       CP post session 5'              HEP:     3/11/2024: Provided green theraband    Access Code: [email or web address]  URL: [email or web address]  Date: 04/04/2024  Prepared by: Erika Coreas    Exercises  - Gastroc Stretch on Step  - 3 x daily - 3 sets - 30 seconds hold  - Seated Heel Raise  - 3 x daily - 2 sets - 10 reps  - Standing Heel Raise with Support  - 3 x daily - 1 sets - 10 reps  - Towel Scrunches  - 1 x daily - 1 sets - 20 reps  - Foot Roller Plantar Massage  - 1 x daily - 7 x weekly - 1 reps - 3 min  hold

## 2024-04-24 ENCOUNTER — EVALUATION (OUTPATIENT)
Dept: PHYSICAL THERAPY | Facility: CLINIC | Age: 24
End: 2024-04-24
Payer: COMMERCIAL

## 2024-04-24 DIAGNOSIS — M72.2 PLANTAR FASCIITIS OF LEFT FOOT: Primary | ICD-10-CM

## 2024-04-24 DIAGNOSIS — M79.672 PAIN OF LEFT HEEL: ICD-10-CM

## 2024-04-24 DIAGNOSIS — M79.671 PAIN OF RIGHT HEEL: ICD-10-CM

## 2024-04-24 PROCEDURE — 97112 NEUROMUSCULAR REEDUCATION: CPT

## 2024-04-24 PROCEDURE — 97110 THERAPEUTIC EXERCISES: CPT

## 2024-04-24 NOTE — PROGRESS NOTES
PT Re-Evaluation     Today's date: 2024  Patient name: Misty Blackmon  : 2000  MRN: [id number]  Referring provider: Shady Crow DPM  Dx:   Encounter Diagnosis     ICD-10-CM    1. Plantar fasciitis of left foot  M72.2       2. Pain of right heel  M79.671       3. Pain of left heel  M79.672                      Assessment  Assessment details: 2024 Misty Blackmon is a 24 y.o. female who presents with bilateral foot and ankle pain (L greater than R). Pt demonstrates impaired ankle AROM (L deficits greater than R), L ankle strength, gastroc flexibility bilaterally, standing posture, and gait. 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 Patient's clinical presentation is consistent with their referring diagnosis of Plantar fasciitis of left foot, as well as pain of left and right heel. Patient has been educated in pathology, review of impairements, prognosis, activity modification, POC, and HEP. Patient would benefit from skilled physical therapy services to address their aforementioned functional limitations and progress towards prior level of function and independence with home exercise program.     3/18/2024: Misty Blackmon is a 24 y.o. female who presents with bilateral foot and ankle pain (L greater than R). Pt demonstrates improving pain levels, ankle AROM and strength, LE strength, gait and balance bilaterally. Despite improvements, pt continues to demonstrate impaired ankle AROM (L deficits greater than R), L ankle strength and hip abduction strength. On examination today patient is TTP just distal to lateral malleolus and demonstrates decrease in L ankle eversion AROM and strength attributed to patient's recent fall which resulted in L ankle pain. 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 Patient's clinical presentation is consistent with their referring diagnosis of Plantar fasciitis of left foot, as well as pain of left and right heel. Patient has been educated in progress, remaining impairments, prognosis, activity modification, POC, and HEP. Pt instructed to monitor new L lateral ankle pain and report to MD if pain persists/ increases. Patient would benefit from skilled physical therapy services to address their aforementioned functional limitations and progress towards prior level of function and independence with home exercise program.     4/24/2024: Misty Blackmon is a 24 y.o. female who presents with improving B/L foot pain. Pt received corticosteroid injection in L foot and has noted improvement in plantar fascia pain, yet heel pain/ soreness is present. L foot remains more painful than R. On examination pt demonstrates improved ankle AROM and strength, LE strength, and L single leg balance. Despite improvements, pt continues to demonstrate impaired gastroc and soleus flexibility and L ankle AROM. 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 Pt instructed to monitor L 1st toe where she reported stepping on pin at home; no abnormalities noted along plantar aspect of foot, however. Patient has been educated in progress, remaining impairments, prognosis, activity modification, POC, and HEP. Patient would benefit from skilled physical therapy services to address their aforementioned functional limitations and progress towards prior level of function and independence with home exercise program.     Plan: Ambulatory referral to Physical Therapy  Impairments: abnormal gait, abnormal or restricted ROM, activity intolerance, impaired balance, impaired physical strength, lacks  "appropriate home exercise program, pain with function, weight-bearing intolerance and poor body mechanics    Goals  Short Term Goals 4 WEEKS Target Date (3/13/2024)  1. Establish [place] w/ progressing HEP for ROM/strength. (MET 3/18/2024)  2. Improve AROM L ankle P/D to be within 5 degrees of R to prepare for reciprocal stairs. (MET 3/18/2024)  3. Improve AROM L ankle eversion equal to R to prepare for negotiation of uneven terrain. (Progressing 4/24/2024)  4. Improve L ankle strength to 4/5 or better w/out pain for improved tolerance to WB. (Partially met 4/24/2024)    Long Term Goals 12 WEEKS: Target Date (5/8/2024) updated to 7/26/2024  1. Improve L ankle strength to 4+/5 or better to allow SLS x 20\" or more. (Progressing 4/24/2024)  2. Improve tolerance to weight bearing to 4 hours or more for return to pain-free teaching. (Progressing 4/24/2024)  3. Ankle AROM WFL B/L to allow recipcrocal stairs w/o handrail. (Progressing 4/24/2024)  4. Resolve gait deviations for return to safe community ambulation. (Progressing 4/24/2024)  5. Pt will return to gym for lower body strengthening with report of 0/10 to demo return to PLOF. (Progressing 4/24/2024)      Plan  Plan details: HEP development and progression, monitor patients adherence to activity modification to ensure adequate healing time/ recovery. Implement stretching, A/AA/PROM, joint mobilizations, posture education, STM/MI as needed to reduce muscle tension, muscle reeducation. Progress strengthening; improve pt's tolerance to resisted WB activities. PLOC discussed and agreed upon with patient.    Patient would benefit from: PT eval and skilled physical therapy  Planned modality interventions: cryotherapy, thermotherapy: hydrocollator packs and unattended electrical stimulation  Planned therapy interventions: manual therapy, neuromuscular re-education, therapeutic activities, therapeutic exercise, home exercise program, patient education, functional ROM " exercises, strengthening and stretching  Frequency: 2-3x/week.  Duration in weeks: 12  Plan of Care beginning date: 2/14/2024  Plan of Care expiration date: 7/26/2024  Treatment plan discussed with: patient        Subjective Evaluation    History of Present Illness  Mechanism of injury: 2/14/2024: Pt is a 24 y.o female who reports to PT with compliant of bilateral foot pain (L greater than R). Pain described as sharp. Pain started in L ankle/ heel began in October; R foot pain began about a week ago. Pain is present all day. Pain with WB; minimal pain at rest. Static standing, walking and stairs are painful. Patient is a  and has to be on her feet all day. Pain is present with ADLs (cooking/ cleaning). Pt denies LLE pain other than current injury; occasional B/L knee pain at gym. Denies hx of back pain or injury. Foot goes numb with night splint. She wears it w/ watching TV at home before bed; she is unable to sleep with it. Sleep is unaffected by pain. Hx of pulmonary embolism; had 5 in R lung. Hx of Factor V Leiden. Has had L kidney removed. Last PE 2020. No BP issues as per pt report; she is not on BP medication.     3/18/2024: Pt states that she tripped Wednesday and twisted her L ankle resulting in fall down her stairs; she has a couple bruises. She was able to walk after the fall. She went to a parade over the weekend and walked 12 miles. Yesterday it hurt to walk. R foot feels okay, has 3/10 pain currently. L foot pain is 4-5/10. L ankle pain is 5-6/10 d/t recent incident and walking over the weekend. Pt states that the exercises are helping her. She has moment where she doesn't have any pain at all. Pain is most severe at the end of the day after being on her feet all day.     4/24/2024: Pt states that the pain along the bottom of her left foot is improving since the injection. She continues to experience greater pain on L side compared to R. She has increased pain in heel on L foot as of  recently. She noted that while standing on her left leg to shave she was unable to tolerate the pain into her left heel. Pt stepped on a pin on left big toe today which resulted in bleeding. She removed band-aid recently as bleeding stopped. Pt states that she has noticed improvements since starting PT.    Patient Goals  Patient goals for therapy: decreased pain  Patient goal: Eliminate pain in feet  Pain  Current pain ratin (L)  At worst pain ratin (L)  Quality: sharp  Aggravating factors: standing, walking, stair climbing and lifting    Social Support  Steps to enter house: yes  10  Stairs in house: yes (1 flight for laundry)   Lives with: parents      Diagnostic Tests  X-ray: abnormal (: Small plantar calcaneal spur. Preserved tibiotalar joint. No fracture or dislocation. No soft tissue swelling or obvious joint effusion.)  Treatments  Current treatment: physical therapy      Objective    Palpation:  2024: Pt is TTP along plantar aspect of calcaneous (L> R)  3/18/2024:  L lateral ankle TTP distal to lateral malleolus at calcaneofibular ligament  2024: Pt is TTP at L heel with light pressure; pain at rest w/out pressure is 1/10. Pt denies TTP at plantar midfoot along plantar fascia. L foot observation: no abnormality noted along plantar aspect of 1st toe where pt reported stepping on pin at home.     AROM:    R  L  R  L  R  L       2024 2024 3/18/2024 3/18/2024 2024 2024  Ankle DF calc/5thray  -2  0  10  2  8  2  Ankle PF calc/5thray  70  60  70  70  80  75  Ankle inversion  40  40  45  40  45  40  Ankle eversion  30  25*  30  15*  45  30*    STRENGTH:   R  L  R  L  R  L      2024 2024 3/18/2024 3/18/2024 2024 2024  Ankle DF  5/5  4+/5  5/5  5/5  5/5  5/5  Ankle PF  5/5  4/5  5/5  5/5  5/5  5/5  Ankle inversion 5/5  3+/5*  5/5  4-/5  5/5  5/5  Ankle eversion 5/5  4/5*  5/5  2+    Knee ext     Knee  "flex  5/5  5/5  5/5  5/5  5/5  5/5  Hip abduction  4/5  4/5  4/5  4/5  4+/5  4+/5  Hip extension  5/5  4+/5  5/5  5/5  NT  NT    Gastroc Flexibility:  2/14/2024: R 15, L 10  3/14/2024: NT  4/24/2024: Soleus flexibility (ankle DF AROM w/ knee at 90 deg flexion) R 2 deg, L 5 deg     Gait:   2/14/2024: Antalgic, lacks hip/ knee flexion t/o gait cycle.  3/18/2024: Minimally antalgic.   4/24/2024: Sway back; antalgic.     Standing posture: Sway back, genu recurvatum/ valgum, and pes planus bilaterally.     Balance:  2/14/2024  3/18/2024 4/24/2024   Tandem R post w/ EO: unable d/t pain >30 sec NT  Tandem L post w/ EO: unable d/t pain >30 sec NT  R SLS w/ EO:   NT   >30 sec >30 sec  L SLS w/ EO:   NT   8 sec*  17.5 sec    Function:  2/14/2024  Squat: Wide base of support; form is good.   Step up/down 6\" R/L: 6/10 pain L heel  Stairs: step-to when painful, reciprocal at baseline  3/18/2024  Squat: Wide base of support; form is good.   Step up/down 6\" R/L: 4/10 L heel pain, 2/10 L heel pain  Stairs: step-to when painful, reciprocal at baseline  4/24/2024  Squat: Wide base of support; form is good.   Step up/down NT   Stairs: step-to when painful, reciprocal at baseline/ with improved pain levels     Special test:   3/18/2024  Bump test L (min - mod pain reported)  Solomon compression test L (-)  4/24/2024   NT         Precautions:   *Hx of recurring PE  Past Medical History:   Diagnosis Date    Allergic     Anxiety     Anxiety and depression     Asthma     Clotting disorder (HCC)     Factor V Leiden (HCC)     Headache(784.0)     Pulmonary embolism (HCC)     Thrombophlebitis      Past Surgical History:   Procedure Laterality Date    BLADDER SURGERY      FL RETROGRADE PYELOGRAM  05/20/2022    FL VCUG VOIDING URETHROCYSTOGRAM  8/7/2018    NEPHRECTOMY Left 05/21/2020    Done laparosc[facility] at [facility]    [place] CYSTOURETHROSCOPY N/A 05/20/2022    Procedure: CYSTOSCOPY, retrograde pyelogram, and examination under anesthesia, dilation " "urethral stenosis;  Surgeon: Kamran Lott MD;  [facility];  Service: Urology    URETER REVISION      WISDOM TOOTH EXTRACTION      woke up during procedure.     SOC: 2/14/2024  FOTO: 3/4/2024  POC Expiration: 5/8/2024 updated to 7/26/2024   Daily Treatment Log  Date: 4/22/2024 4/24/2024  4/15/2024 4/17/2024   Visit#/ auth: 16 17  14  15    Objective Measures         Palpation KE palpable tightness along L plantar fascia; pt w/ muscle spasm in this area occasionally t/o session       Manuals 10'        PF iso vs man        IASTM B/L PF w/ DF stretch  prone         KT PF support w/ pull to prevent ankle inver    RB  RB R/L     STM to plantar fascia  KE: LLE       Neuro Re-Ed 10' 10'   15'   LAQ + DF        Wobble board     Stand 20x ea AP/ML  Seated due to injection today 20x AP/ML    Seated towel scrunches  Std 1 x 20 R/L at counter (step over board ever rep to off load foot d/t pain w/ WB)        Toe yoga          isometric HR         Ankle circles      1x10 cw/ccw    [place] board seated       1x10 ea cw/ccw R/L     4 way ankle GTB 1x10 ea R/L  GTB 5\" x 10 ea. R/L  Progress resistance next visit      SLS w/ fwd reach         Ther Ex 20' 35'   15'   Bike  Upright L3x5'    Upright L3x5'  Upright L3x5'     Chair DF stretch    10\"x5 R/L  W/ sos 30\"x3 R/L   Sciatic nerve glide Seated 1x10 R/L     Seated 1x10 R/L  Seated 1x10 R/L     Self PF stretch seated         Soleus stretch  4\" step 30\" x 2 R/L - painful (HEP for less time to offload L leg SL stance w/ stretch for R)      Gastroc stretch  4\" step 30\" x 2 R/L        Seated HR/ TR TR 1 x 15 seated Seated w/ 5# ankle weight 1 x 12 uni R/L    Seated TR 1 x 20   TR only pain with seated HR 1x15    Std HR 2 x 10 B/L    2x10     Heel tap off step 1 x 10 LLE; held RLE d/t pain       SL HR on leg press         FSU w/ knee hike   10\" step up 3 x 5 R/L   8\"+2\" foam 2x10 R/L      Straddle step ups     8\" 1x10 R/L     PF roll w/ therabar  1' R/L        Objective " measures: AROM, MMT, balance  KE       EDUCATION: Pathology, review of impairements, prognosis, activity modification, POC, and HEP  KE       Ther Activity                             Gait Training                             Modalities          TENS+ CP       CP post session 5'              HEP:     Access Code: [email or web address]  URL: [email or web address]  Date: 04/24/2024  Prepared by: Erika Coreas    Exercises  - Gastroc Stretch on Step  - 3 x daily - 3 sets - 30 seconds hold  - Standing Soleus Stretch on Step  - 3 x daily - 5 reps - 10 seconds hold  - Seated Heel Raise  - 3 x daily - 2 sets - 10 reps  - Standing Heel Raise with Support  - 3 x daily - 1 sets - 10 reps  - Towel Scrunches  - 1 x daily - 1 sets - 20 reps  - Foot Roller Plantar Massage  - 1 x daily - 7 x weekly - 1 reps - 3 min  hold

## 2024-05-01 ENCOUNTER — OFFICE VISIT (OUTPATIENT)
Dept: PHYSICAL THERAPY | Facility: CLINIC | Age: 24
End: 2024-05-01
Payer: COMMERCIAL

## 2024-05-01 DIAGNOSIS — M79.672 PAIN OF LEFT HEEL: ICD-10-CM

## 2024-05-01 DIAGNOSIS — M79.671 PAIN OF RIGHT HEEL: ICD-10-CM

## 2024-05-01 DIAGNOSIS — M72.2 PLANTAR FASCIITIS OF LEFT FOOT: Primary | ICD-10-CM

## 2024-05-01 PROCEDURE — 97110 THERAPEUTIC EXERCISES: CPT

## 2024-05-01 PROCEDURE — 97530 THERAPEUTIC ACTIVITIES: CPT

## 2024-05-01 NOTE — PROGRESS NOTES
Daily Note     Today's date: 2024  Patient name: Misty Blackmon  : 2000  MRN: [id number]  Referring provider: Shady Crow DPM  Dx:   Encounter Diagnosis     ICD-10-CM    1. Plantar fasciitis of left foot  M72.2       2. Pain of right heel  M79.671       3. Pain of left heel  M79.672                      Subjective: Pt states that the last 3 days she noticed shooting pains in her foot. Pain is present with movement of her foot. She walks for 10 minutes at the gym but occasionally stops before hand if she has pain. Pt thinks she needs to strengthen her foot. Pt states that she was sore in her foot after her last PT session.     Objective: See treatment diary below      Assessment: Pt introduced static lunges w/ uni UE support for simultaneous DF and plantar fascia stretch on forward and back leg respectively. Pt introduced to split stance HR for progression to SL HR; instructed pt to lean forward for more weight through front leg. Progress leg press next session as pt report ease w/ this activity. Had pt perform second set w/out shoes for greater intrinsic activation of B/L feet. Tolerated treatment well. Patient would benefit from continued PT.       Plan: Continue per plan of care.  Progress treatment as tolerated.       Precautions:   *Hx of recurring PE  Past Medical History:   Diagnosis Date    Allergic     Anxiety     Anxiety and depression     Asthma     Clotting disorder (HCC)     Factor V Leiden (HCC)     Headache(784.0)     Pulmonary embolism (HCC)     Thrombophlebitis      Past Surgical History:   Procedure Laterality Date    BLADDER SURGERY      FL RETROGRADE PYELOGRAM  2022    FL VCUG VOIDING URETHROCYSTOGRAM  2018    NEPHRECTOMY Left 2020    Done laparoscopically at [facility]    [place] CYSTOURETHROSCOPY N/A 2022    Procedure: CYSTOSCOPY, retrograde pyelogram, and examination under anesthesia, dilation urethral stenosis;  Surgeon: Kamran Lott MD;  Location: [facility] MA[facility]  "OR;  Service: Urology    URETER REVISION      WISDOM TOOTH EXTRACTION      woke up during procedure.     SOC: 2/14/2024  FOTO: 3/4/2024  POC Expiration: 5/8/2024 updated to 7/26/2024   Daily Treatment Log  Date: 4/22/2024 4/24/2024 5/1/2024 Next session  4/17/2024   Visit#/ auth: 16 17 18  15    Objective Measures         Palpation KE palpable tightness along L plantar fascia; pt w/ muscle spasm in this area occasionally t/o session       Manuals 10'        PF iso vs man        IASTM B/L PF w/ DF stretch  prone         KT PF support w/ pull to prevent ankle inver     RB R/L     STM to plantar fascia  KE: LLE       Neuro Re-Ed 10' 10'   15'   LAQ + DF        Wobble board      Seated due to injection today 20x AP/ML    Seated towel scrunches  Std 1 x 20 R/L at counter (step over board ever rep to off load foot d/t pain w/ WB)  1 x 20 toe scrunches/ extension     Ankle circles      1x10 cw/ccw    [place] board seated       1x10 ea cw/ccw R/L     4 way ankle GTB 1x10 ea R/L  GTB 5\" x 10 ea. R/L   Progress resistance next visit     SLS w/ fwd reach         Ther Ex 20' 35' 30'  15'   Bike  Upright L3x5'   Upright L3x5'  Upright L3x5'     Chair DF stretch     W/ sos 30\"x3 R/L   Sciatic nerve glide Seated 1x10 R/L    Supine  1 x 10 w/ sos R/L  Seated 1x10 R/L     Self PF stretch seated         Soleus stretch  4\" step 30\" x 2 R/L - painful (HEP for less time to offload L leg SL stance w/ stretch for R) 30\" x 2 R/L     Gastroc stretch  4\" step 30\" x 2 R/L   30\" x 2 R/L     Seated HR/ TR TR 1 x 15 seated Seated w/ 5# ankle weight 1 x 12 uni R/L    Seated TR 1 x 20 1 x 20 B/L ea.  TR only pain with seated HR 1x15    Std HR 2 x 10 B/L   Split stance HR 2 x 10 R/L     Heel tap off step 1 x 10 LLE; held RLE d/t pain       Seated leg press   85# 2 x 12 B/L; second set w/ out shoes Progress next session    SL HR on leg press         FSU w/ knee hike   10\" step up 3 x 5 R/L        Straddle step ups         PF roll w/ therabar  1' R/L  "       Objective measures: AROM, MMT, balance  KE       EDUCATION: Pathology, review of impairements, prognosis, activity modification, POC, and HEP  KE       Ther Activity   10'       Lunges   2 x 5 R/L w/ uni UE suppport Progress next session               Gait Training                             Modalities          TENS+ CP       CP post session 5'              HEP:     Access Code: [email or web address]  URL: [email or web address]  Date: 04/24/2024  Prepared by: Erika Coreas    Exercises  - Gastroc Stretch on Step  - 3 x daily - 3 sets - 30 seconds hold  - Standing Soleus Stretch on Step  - 3 x daily - 5 reps - 10 seconds hold  - Seated Heel Raise  - 3 x daily - 2 sets - 10 reps  - Standing Heel Raise with Support  - 3 x daily - 1 sets - 10 reps  - Towel Scrunches  - 1 x daily - 1 sets - 20 reps  - Foot Roller Plantar Massage  - 1 x daily - 7 x weekly - 1 reps - 3 min  hold

## 2024-05-02 ENCOUNTER — OFFICE VISIT (OUTPATIENT)
Dept: PHYSICAL THERAPY | Facility: CLINIC | Age: 24
End: 2024-05-02
Payer: COMMERCIAL

## 2024-05-02 DIAGNOSIS — M79.672 PAIN OF LEFT HEEL: ICD-10-CM

## 2024-05-02 DIAGNOSIS — M79.671 PAIN OF RIGHT HEEL: ICD-10-CM

## 2024-05-02 DIAGNOSIS — M72.2 PLANTAR FASCIITIS OF LEFT FOOT: Primary | ICD-10-CM

## 2024-05-02 PROCEDURE — 97110 THERAPEUTIC EXERCISES: CPT

## 2024-05-02 PROCEDURE — 97112 NEUROMUSCULAR REEDUCATION: CPT

## 2024-05-02 NOTE — PROGRESS NOTES
Daily Note     Today's date: 2024  Patient name: Misty Blackmon  : 2000  MRN: [id number]  Referring provider: Shady Crow DPM  Dx:   Encounter Diagnosis     ICD-10-CM    1. Plantar fasciitis of left foot  M72.2       2. Pain of right heel  M79.671       3. Pain of left heel  M79.672                      Subjective: pt reports that she feels that the injection helped but her pain has changed to more towards the back of her foot/heel but the intensity of her pain has been less.       Objective: See treatment diary below      Assessment: Tolerated treatment well. Pt requiring VC to prevent hip/knee compensation with [place] board in standing today. Remains challenged with SLS stability. Cont to progress proprioception challenges as tolerated per symptom irritability. Patient would benefit from continued PT      Plan: Continue per plan of care.      Precautions:   *Hx of recurring PE  Past Medical History:   Diagnosis Date    Allergic     Anxiety     Anxiety and depression     Asthma     Clotting disorder (HCC)     Factor V Leiden (HCC)     Headache(784.0)     Pulmonary embolism (HCC)     Thrombophlebitis      Past Surgical History:   Procedure Laterality Date    BLADDER SURGERY      FL RETROGRADE PYELOGRAM  2022    FL VCUG VOIDING URETHROCYSTOGRAM  2018    NEPHRECTOMY Left 2020    Done laparoscopically at [facility]    [place] CYSTOURETHROSCOPY N/A 2022    Procedure: CYSTOSCOPY, retrograde pyelogram, and examination under anesthesia, dilation urethral stenosis;  Surgeon: Kamran Lott MD;  Location: [facility] MAIN OR;  Service: Urology    URETER REVISION      WISDOM TOOTH EXTRACTION      woke up during procedure.     SOC: 2024  FOTO: 3/4/2024  POC Expiration: 2024 updated to 2024   Daily Treatment Log  Date: 2024    Visit#/ auth: 16 17 18 19     Objective Measures         Palpation KE palpable tightness along L plantar fascia; pt w/ muscle spasm  "in this area occasionally t/o session       Manuals 10'        PF iso vs man        IASTM B/L PF w/ DF stretch  prone         KT PF support w/ pull to prevent ankle inver    RB      STM to plantar fascia  KE: LLE       Neuro Re-Ed 10' 10'  20'    LAQ + DF        Wobble board     AP/ML no UE support 20x ea     Balance 1' ea AP/ML     Seated towel scrunches  Std 1 x 20 R/L at counter (step over board ever rep to off load foot d/t pain w/ WB)  1 x 20 toe scrunches/ extension     Ankle circles          [place] board seated     Standing 1x10 cw/ccw R/L    VC       4 way ankle GTB 1x10 ea R/L  GTB 5\" x 10 ea. R/L   BTB 5\" 2x10 ea R/L      SLS w/ fwd reach         Ther Ex 20' 35' 30' 25'     Bike  Upright L3x5'   Upright L3x5' Upright bike L3x5'     Chair DF stretch        Sciatic nerve glide Seated 1x10 R/L    Supine  1 x 10 w/ sos R/L     Self PF stretch seated         Soleus stretch  4\" step 30\" x 2 R/L - painful (HEP for less time to offload L leg SL stance w/ stretch for R) 30\" x 2 R/L     Gastroc stretch  4\" step 30\" x 2 R/L   30\" x 2 R/L     Seated HR/ TR TR 1 x 15 seated Seated w/ 5# ankle weight 1 x 12 uni R/L    Seated TR 1 x 20 1 x 20 B/L ea.     Std HR 2 x 10 B/L   Split stance HR 2 x 10 R/L Split stance HR 2 x 10 R/L     Heel tap off step 1 x 10 LLE; held RLE d/t pain       Seated leg press   85# 2 x 12 B/L; second set w/ out shoes Supine B/L foam under feet   95# 2x10     SL HR on leg press         FSU w/ knee hike   10\" step up 3 x 5 R/L   10\"x 2x10 R/L      Straddle step ups         PF roll w/ therabar  1' R/L        Objective measures: AROM, MMT, balance  KE       EDUCATION: Pathology, review of impairements, prognosis, activity modification, POC, and HEP  KE       Ther Activity   10'       Lunges   2 x 5 R/L w/ uni UE suppport Walking lunges 10'x2 pain in L foot                Gait Training                             Modalities          TENS+ CP                  HEP:     Access Code: [email or web address]  URL: " [email or web address]  Date: 04/24/2024  Prepared by: Erika Coreas    Exercises  - Gastroc Stretch on Step  - 3 x daily - 3 sets - 30 seconds hold  - Standing Soleus Stretch on Step  - 3 x daily - 5 reps - 10 seconds hold  - Seated Heel Raise  - 3 x daily - 2 sets - 10 reps  - Standing Heel Raise with Support  - 3 x daily - 1 sets - 10 reps  - Towel Scrunches  - 1 x daily - 1 sets - 20 reps  - Foot Roller Plantar Massage  - 1 x daily - 7 x weekly - 1 reps - 3 min  hold

## 2024-05-06 ENCOUNTER — OFFICE VISIT (OUTPATIENT)
Dept: PHYSICAL THERAPY | Facility: CLINIC | Age: 24
End: 2024-05-06
Payer: COMMERCIAL

## 2024-05-06 DIAGNOSIS — M79.671 PAIN OF RIGHT HEEL: ICD-10-CM

## 2024-05-06 DIAGNOSIS — M79.672 PAIN OF LEFT HEEL: ICD-10-CM

## 2024-05-06 DIAGNOSIS — M72.2 PLANTAR FASCIITIS OF LEFT FOOT: Primary | ICD-10-CM

## 2024-05-06 PROCEDURE — 97110 THERAPEUTIC EXERCISES: CPT

## 2024-05-06 PROCEDURE — 97112 NEUROMUSCULAR REEDUCATION: CPT

## 2024-05-06 NOTE — PROGRESS NOTES
Daily Note     Today's date: 2024  Patient name: Misty Blackmon  : 2000  MRN: [id number]  Referring provider: Shady Crow DPM  Dx:   Encounter Diagnosis     ICD-10-CM    1. Plantar fasciitis of left foot  M72.2       2. Pain of right heel  M79.671       3. Pain of left heel  M79.672                      Subjective: Pt states that her foot was sore Th night after PT; this was her familiar pain. States that it may be soreness. She has L heel pain on arrival to session today.       Objective: See treatment diary below      Assessment: Pt introduced to static SL stance on firm and tandem stance on foam today; cued to maintain soft bend in knee which increased intensity of exercise as pt demonstrated greater hip and ankle strategies to maintain balance. Pt demonstrating fatigue w/ SL stance as sets progressed. Pt introduced to side step w/ TB at feet. Cued pt to create arch w/ leg press on foam for intrinsic strengthening of bilateral feet. Pt with report of no increase in pain at completion of session. Tolerated treatment well. Patient would benefit from continued PT.       Plan: Continue per plan of care.  Progress treatment as tolerated.       Precautions:   *Hx of recurring PE  Past Medical History:   Diagnosis Date    Allergic     Anxiety     Anxiety and depression     Asthma     Clotting disorder (HCC)     Factor V Leiden ([facility])     Headache(784.0)     Pulmonary embolism (HCC)     Thrombophlebitis      Past Surgical History:   Procedure Laterality Date    BLADDER SURGERY      FL RETROGRADE PYELOGRAM  2022    FL VCUG VOIDING URETHROCYSTOGRAM  2018    NEPHRECTOMY Left 2020    Done laparoscopically at [facility]    [place] CYSTOURETHROSCOPY N/A 2022    Procedure: CYSTOSCOPY, retrograde pyelogram, and examination under anesthesia, dilation urethral stenosis;  Surgeon: Kamran Lott MD;  Location: [facility] MAIN OR;  Service: Urology    URETER REVISION      WISDOM TOOTH EXTRACTION       "woke up during procedure.     SOC: 2/14/2024  FOTO: 3/4/2024  POC Expiration: 5/8/2024 updated to 7/26/2024   Daily Treatment Log  Date: 4/22/2024 4/24/2024 5/1/2024 5/2/2024    Visit#/ auth: 16 17 18 19     Objective Measures         Palpation KE palpable tightness along L plantar fascia; pt w/ muscle spasm in this area occasionally t/o session       Manuals 10'        PF iso vs man        IASTM B/L PF w/ DF stretch  prone         KT PF support w/ pull to prevent ankle inver    RB      STM to plantar fascia  KE: LLE       Neuro Re-Ed 10' 10'  20' 10'   LAQ + DF        Wobble board     AP/ML no UE support 20x ea     Balance 1' ea AP/ML     Seated towel scrunches  Std 1 x 20 R/L at counter (step over board ever rep to off load foot d/t pain w/ WB)  1 x 20 toe scrunches/ extension     Ankle circles          [place] board seated     Standing 1x10 cw/ccw R/L    VC       4 way ankle GTB 1x10 ea R/L  GTB 5\" x 10 ea. R/L   BTB 5\" 2x10 ea R/L      SLS      20\" x 3 R/L    Tandem stance on Airex     20\" x 2 R/L   Side step      10 ft R/L x 2 YTB at feet   Ther Ex 20' 35' 30' 25'  30'   Bike  Upright L3x5'   Upright L3x5' Upright bike L3x5'  Upright bike L3x5'    Chair DF stretch        Sciatic nerve glide Seated 1x10 R/L    Supine  1 x 10 w/ sos R/L  Supine  1 x 10 w/ sos R/L   Self PF stretch seated         Soleus stretch  4\" step 30\" x 2 R/L - painful (HEP for less time to offload L leg SL stance w/ stretch for R) 30\" x 2 R/L     Gastroc stretch  4\" step 30\" x 2 R/L   30\" x 2 R/L  30\" x 3 R/L   Seated HR/ TR TR 1 x 15 seated Seated w/ 5# ankle weight 1 x 12 uni R/L    Seated TR 1 x 20 1 x 20 B/L ea.     Std HR 2 x 10 B/L   Split stance HR 2 x 10 R/L Split stance HR 2 x 10 R/L  Split stance (occasionally single) HR 2 x 10 R/L    Heel tap off step 1 x 10 LLE; held RLE d/t pain       Seated leg press   85# 2 x 12 B/L; second set w/ out shoes Supine B/L foam under feet   95# 2x10  Supine B/L foam under feet   95# 2x12   SL HR on " "leg press         FSU w/ knee hike   10\" step up 3 x 5 R/L   10\"x 2x10 R/L   8\" 2 x 10 R/L   Straddle step ups         PF roll w/ therabar  1' R/L        Objective measures: AROM, MMT, balance  KE       EDUCATION: Pathology, review of impairements, prognosis, activity modification, POC, and HEP  KE       Ther Activity   10'       Lunges   2 x 5 R/L w/ uni UE suppport Walking lunges 10'x2 pain in L foot                Gait Training                             Modalities          TENS+ CP                  HEP:     Access Code: [email or web address]  URL: [email or web address]  Date: 04/24/2024  Prepared by: Erika Coreas    Exercises  - Gastroc Stretch on Step  - 3 x daily - 3 sets - 30 seconds hold  - Standing Soleus Stretch on Step  - 3 x daily - 5 reps - 10 seconds hold  - Seated Heel Raise  - 3 x daily - 2 sets - 10 reps  - Standing Heel Raise with Support  - 3 x daily - 1 sets - 10 reps  - Towel Scrunches  - 1 x daily - 1 sets - 20 reps  - Foot Roller Plantar Massage  - 1 x daily - 7 x weekly - 1 reps - 3 min  hold     "

## 2024-05-08 ENCOUNTER — OFFICE VISIT (OUTPATIENT)
Dept: PHYSICAL THERAPY | Facility: CLINIC | Age: 24
End: 2024-05-08
Payer: COMMERCIAL

## 2024-05-08 DIAGNOSIS — M79.671 PAIN OF RIGHT HEEL: ICD-10-CM

## 2024-05-08 DIAGNOSIS — M72.2 PLANTAR FASCIITIS OF LEFT FOOT: Primary | ICD-10-CM

## 2024-05-08 DIAGNOSIS — M79.672 PAIN OF LEFT HEEL: ICD-10-CM

## 2024-05-08 PROCEDURE — 97112 NEUROMUSCULAR REEDUCATION: CPT

## 2024-05-08 PROCEDURE — 97110 THERAPEUTIC EXERCISES: CPT

## 2024-05-08 NOTE — PROGRESS NOTES
"Daily Note     Today's date: 2024  Patient name: Misty Blackmon  : 2000  MRN: [id number]  Referring provider: Shady Crow DPM  Dx:   Encounter Diagnosis     ICD-10-CM    1. Plantar fasciitis of left foot  M72.2       2. Pain of right heel  M79.671       3. Pain of left heel  M79.672                        Subjective: Pt states that she has 5-6/10 foot pain. Patient denies any specific circumstance that may have increased pain \"it just hurts\" . Pt states that while walking she has to stop a lot. She does feel better after her stretches but it does not last. Pt has insoles, they are not custom.      Objective: See treatment diary below      Assessment: Pt educated on importance of using pain as guide; mild increase in pain and discomfort may occur during specific therapeutic exercises w/ pt's dx, however increased pain with ambulation should not persist over time. Pt progressed from SL stance on firm to foam; required intermittent UE support on rail t/o. Greater frequency of hip and ankle strategies to maintain balance with L WB. Introduced to steamboats with contralateral SL for simultaneous hip strengthening and stabilization of WB LE. Performed arch taping for support; pt instructed to remove if it causes discomfort. Pt with report of improved pain levels after application of tape. Tolerated treatment well. Patient would benefit from continued PT.       Plan: Continue per plan of care.  Progress treatment as tolerated.       Precautions:   *Hx of recurring PE  Past Medical History:   Diagnosis Date    Allergic     Anxiety     Anxiety and depression     Asthma     Clotting disorder (HCC)     Factor V Leiden (HCC)     Headache(784.0)     Pulmonary embolism (HCC)     Thrombophlebitis      Past Surgical History:   Procedure Laterality Date    BLADDER SURGERY      FL RETROGRADE PYELOGRAM  2022    FL VCUG VOIDING URETHROCYSTOGRAM  2018    NEPHRECTOMY Left 2020    Done laparoscopically at " "[facility]    [place] CYSTOURETHROSCOPY N/A 05/20/2022    Procedure: CYSTOSCOPY, retrograde pyelogram, and examination under anesthesia, dilation urethral stenosis;  Surgeon: Kamran Lott MD;  Location: [facility] MAIN OR;  Service: Urology    URETER REVISION      WISDOM TOOTH EXTRACTION      woke up during procedure.     SOC: 2/14/2024  FOTO: 3/4/2024  POC Expiration: 5/8/2024 updated to 7/26/2024   Daily Treatment Log  Date: 5/8/2024 Next session   5/2/2024 5/6/2024   Visit#/ auth: 21   19  20   Objective Measures         Palpation        Manuals 5'        PF iso vs man        IASTM B/L PF w/ DF stretch  prone         KT PF support w/ pull to prevent ankle inver    RB     Plantar arch tapping KE        STM to plantar fascia         Neuro Re-Ed 20'   20' 10'   LAQ + DF        Wobble board     AP/ML no UE support 20x ea     Balance 1' ea AP/ML      [place] board seated  Standing 1x10 cw/ccw R/L   Standing 1x10 cw/ccw R/L    VC       4 way ankle    BTB 5\" 2x10 ea R/L      SLS  20\" x 3 R/L  on Airex with intermittent UE support    20\" x 3 R/L    Tandem stance on Airex     20\" x 2 R/L   Steam board w/ SLS 1 x 10 R/L       Side step  10 ft R/L x 3 RTB at feet    10 ft R/L x 2 YTB at feet   Ther Ex 20'   25'  30'   Bike  Upright bike L3x5'    Upright bike L3x5'  Upright bike L3x5'    Chair DF stretch        Sciatic nerve glide Supine  1 x 10 w/ sos R/L    Supine  1 x 10 w/ sos R/L   Self PF stretch seated         Soleus stretch        Gastroc stretch  30\" x 3 R/L    30\" x 3 R/L   Seated HR/ TR        Std HR Split stance (occasionally single) HR 2 x 10 R/L - painful at completion   Split stance HR 2 x 10 R/L  Split stance (occasionally single) HR 2 x 10 R/L    Heel tap off step        Seated leg press Supine B/L foam under feet   105# 2x12   Supine B/L foam under feet   95# 2x10  Supine B/L foam under feet   95# 2x12   SL HR on leg press         FSU w/ knee hike     10\"x 2x10 R/L   8\" 2 x 10 R/L   Straddle step ups         PF roll w/ " therabar         Objective measures: AROM, MMT, balance         EDUCATION: Pathology, review of impairements, prognosis, activity modification, POC, and HEP         Ther Activity          Lunges    Walking lunges 10'x2 pain in L foot                Gait Training                             Modalities          TENS+ CP                  HEP:     Access Code: [email or web address]  URL: [email or web address]  Date: 04/24/2024  Prepared by: Erika Coreas    Exercises  - Gastroc Stretch on Step  - 3 x daily - 3 sets - 30 seconds hold  - Standing Soleus Stretch on Step  - 3 x daily - 5 reps - 10 seconds hold  - Seated Heel Raise  - 3 x daily - 2 sets - 10 reps  - Standing Heel Raise with Support  - 3 x daily - 1 sets - 10 reps  - Towel Scrunches  - 1 x daily - 1 sets - 20 reps  - Foot Roller Plantar Massage  - 1 x daily - 7 x weekly - 1 reps - 3 min  hold

## 2024-05-13 ENCOUNTER — OFFICE VISIT (OUTPATIENT)
Dept: PHYSICAL THERAPY | Facility: CLINIC | Age: 24
End: 2024-05-13
Payer: COMMERCIAL

## 2024-05-13 DIAGNOSIS — M72.2 PLANTAR FASCIITIS OF LEFT FOOT: Primary | ICD-10-CM

## 2024-05-13 DIAGNOSIS — M79.672 PAIN OF LEFT HEEL: ICD-10-CM

## 2024-05-13 DIAGNOSIS — M79.671 PAIN OF RIGHT HEEL: ICD-10-CM

## 2024-05-13 PROCEDURE — 97110 THERAPEUTIC EXERCISES: CPT

## 2024-05-13 PROCEDURE — 97112 NEUROMUSCULAR REEDUCATION: CPT

## 2024-05-13 NOTE — PROGRESS NOTES
Daily Note     Today's date: 2024  Patient name: Misty Blackmon  : 2000  MRN: [id number]  Referring provider: Shady Crow DPM  Dx:   Encounter Diagnosis     ICD-10-CM    1. Plantar fasciitis of left foot  M72.2       2. Pain of right heel  M79.671       3. Pain of left heel  M79.672                      Subjective: pt reports that her feet are feeling better this week vs the increased pain she was having last week.       Objective: See treatment diary below      Assessment: Tolerated treatment well. Pt able to resume increased SLS on uneven surfaces today, some pain with L SLS on foam steamboats that did subside with end of activity. Cont to progress B/L foot stability and strengthening as able per symptom irritability. Patient would benefit from continued PT      Plan: Continue per plan of care.      Precautions:   *Hx of recurring PE  Past Medical History:   Diagnosis Date    Allergic     Anxiety     Anxiety and depression     Asthma     Clotting disorder (HCC)     Factor V Leiden (HCC)     Headache(784.0)     Pulmonary embolism (HCC)     Thrombophlebitis      Past Surgical History:   Procedure Laterality Date    BLADDER SURGERY      FL RETROGRADE PYELOGRAM  2022    FL VCUG VOIDING URETHROCYSTOGRAM  2018    NEPHRECTOMY Left 2020    Done laparoscopically at [facility]    [place] CYSTOURETHROSCOPY N/A 2022    Procedure: CYSTOSCOPY, retrograde pyelogram, and examination under anesthesia, dilation urethral stenosis;  Surgeon: Kamran Lott MD;  Location: [facility] MAIN OR;  Service: Urology    URETER REVISION      WISDOM TOOTH EXTRACTION      woke up during procedure.     SOC: 2024  FOTO: 3/4/2024  POC Expiration: 2024 updated to 2024   Daily Treatment Log  Date: 2024   Visit#/ auth: 21 22 FOTO   19  20   Objective Measures         Palpation        Manuals 5'        PF iso vs man        IASTM B/L PF w/ DF stretch  prone         KT PF support  "w/ pull to prevent ankle inver  RB   RB     Plantar arch tapping KE        STM to plantar fascia         Neuro Re-Ed 20' 25'   20' 10'   LAQ + DF        Wobble board   AP/ML no UE support 20x ea     Balance 1' AP/ML ea   AP/ML no UE support 20x ea     Balance 1' ea AP/ML      [place] board seated  Standing 1x10 cw/ccw R/L Standing 1x10 cw/ccw R/L   Standing 1x10 cw/ccw R/L    VC       4 way ankle    BTB 5\" 2x10 ea R/L      SLS  20\" x 3 R/L  on Airex with intermittent UE support    20\" x 3 R/L    Tandem stance on Airex     20\" x 2 R/L   Steam boat w/ SLS 1 x 10 R/L On foam 1x10 R/L       [place] taps from foam  3 cones 1x10 R/L no shoes       Side step  10 ft R/L x 3 RTB at feet    10 ft R/L x 2 YTB at feet   Ther Ex 20' 15'   25'  30'   Bike  Upright bike L3x5'  Upright bike L3x5'   Upright bike L3x5'  Upright bike L3x5'    Chair DF stretch        Sciatic nerve glide Supine  1 x 10 w/ sos R/L    Supine  1 x 10 w/ sos R/L   Self PF stretch seated         Soleus stretch        Gastroc stretch  30\" x 3 R/L W/ sos 30\"x3 R/L    30\" x 3 R/L   Seated HR/ TR        Std HR Split stance (occasionally single) HR 2 x 10 R/L - painful at completion B/L HR off step 2x10   Split stance HR 2 x 10 R/L  Split stance (occasionally single) HR 2 x 10 R/L    Heel tap off step  6\" 2x10 R/L       Seated leg press Supine B/L foam under feet   105# 2x12   Supine B/L foam under feet   95# 2x10  Supine B/L foam under feet   95# 2x12   SL HR on leg press   15# 2x15 R/L       FSU w/ knee hike   6\"+2\"foam 2x10 R/L   10\"x 2x10 R/L   8\" 2 x 10 R/L   Straddle step ups         PF roll w/ therabar         Objective measures: AROM, MMT, balance         EDUCATION: Pathology, review of impairements, prognosis, activity modification, POC, and HEP         Ther Activity          Lunges     Walking lunges 10'x2 pain in L foot                Gait Training                             Modalities          TENS+ CP                  HEP:     Access Code: " [email or web address]  URL: [email or web address]  Date: 04/24/2024  Prepared by: Erika Coreas    Exercises  - Gastroc Stretch on Step  - 3 x daily - 3 sets - 30 seconds hold  - Standing Soleus Stretch on Step  - 3 x daily - 5 reps - 10 seconds hold  - Seated Heel Raise  - 3 x daily - 2 sets - 10 reps  - Standing Heel Raise with Support  - 3 x daily - 1 sets - 10 reps  - Towel Scrunches  - 1 x daily - 1 sets - 20 reps  - Foot Roller Plantar Massage  - 1 x daily - 7 x weekly - 1 reps - 3 min  hold

## 2024-05-14 ENCOUNTER — TELEPHONE (OUTPATIENT)
Age: 24
End: 2024-05-14

## 2024-05-14 NOTE — TELEPHONE ENCOUNTER
Misty Rich,     Patient needs to have this medication added to her active medication list, and she would like to know if the Dr is keeping her on the same dosage.    Please call patient back at: [phone]     Semaglutide-Weight Management (WEGOVY) 0.25 MG/0.5ML

## 2024-05-15 ENCOUNTER — OFFICE VISIT (OUTPATIENT)
Dept: PHYSICAL THERAPY | Facility: CLINIC | Age: 24
End: 2024-05-15
Payer: COMMERCIAL

## 2024-05-15 DIAGNOSIS — M79.672 PAIN OF LEFT HEEL: ICD-10-CM

## 2024-05-15 DIAGNOSIS — M79.671 PAIN OF RIGHT HEEL: ICD-10-CM

## 2024-05-15 DIAGNOSIS — M72.2 PLANTAR FASCIITIS OF LEFT FOOT: Primary | ICD-10-CM

## 2024-05-15 PROCEDURE — 97110 THERAPEUTIC EXERCISES: CPT

## 2024-05-15 PROCEDURE — 97112 NEUROMUSCULAR REEDUCATION: CPT

## 2024-05-15 NOTE — PROGRESS NOTES
Daily Note     Today's date: 5/15/2024  Patient name: Misty Blackmon  : 2000  MRN: [id number]  Referring provider: Shady Crow DPM  Dx:   Encounter Diagnosis     ICD-10-CM    1. Plantar fasciitis of left foot  M72.2       2. Pain of right heel  M79.671       3. Pain of left heel  M79.672                      Subjective: This week is going better pain wise; states that she is not doing anything different. SL HR is not getting easier; at 7-8 reps they become painful. When she is having a painful day, she transitions to bilateral heel raise. She likes the tape that was applied last session; KT tape stayed on better.       Objective: See treatment diary below      Assessment: Pt demonstrating improved tolerance to heel tap; does require intermittent rest throughout set, however motor control is improving. Pt continues to require correction to flex knee slightly w/ exercises challenging LE stability. Pt introduced to SL forward reach; no increase in pain. Applied KT tape for plantar fascia support. Tolerated treatment well. Patient would benefit from continued PT.       Plan: Continue per plan of care.  Progress treatment as tolerated.       Precautions:   *Hx of recurring PE  Past Medical History:   Diagnosis Date    Allergic     Anxiety     Anxiety and depression     Asthma     Clotting disorder (HCC)     Factor V Leiden ([facility])     Headache(784.0)     Pulmonary embolism (HCC)     Thrombophlebitis      Past Surgical History:   Procedure Laterality Date    BLADDER SURGERY      FL RETROGRADE PYELOGRAM  2022    FL VCUG VOIDING URETHROCYSTOGRAM  2018    NEPHRECTOMY Left 2020    Done laparoscopically at [facility]    [place] CYSTOURETHROSCOPY N/A 2022    Procedure: CYSTOSCOPY, retrograde pyelogram, and examination under anesthesia, dilation urethral stenosis;  Surgeon: Kamran Lott MD;  Location: [facility] MAIN OR;  Service: Urology    URETER REVISION      WISDOM TOOTH EXTRACTION      woke up during  "procedure.     SOC: 2/14/2024  FOTO: 3/4/2024  POC Expiration: 5/8/2024 updated to 7/26/2024   Daily Treatment Log  Date: 5/8/2024 5/13/2024  5/15/2024  5/6/2024   Visit#/ auth: 21 22 FOTO  23  20   Objective Measures         Palpation        Manuals 5'        PF iso vs man        IASTM B/L PF w/ DF stretch  prone         KT PF support w/ pull to prevent ankle inver  RB  KE     Plantar arch tapping KE        STM to plantar fascia         Neuro Re-Ed 20' 25'  25'  10'   LAQ + DF        Wobble board   AP/ML no UE support 20x ea     Balance 1' AP/ML ea  Balance 1' AP/ML ea       [place] board seated  Standing 1x10 cw/ccw R/L Standing 1x10 cw/ccw R/L  Standing 1x15 cw/ccw R/L       4 way ankle         SLS  20\" x 3 R/L  on Airex with intermittent UE support    20\" x 3 R/L    Tandem stance on Airex     20\" x 2 R/L   Steam boat w/ SLS 1 x 10 R/L On foam 1x10 R/L  On foam 1x10 R/L w/ occasional uni UE assist     [place] taps from foam  3 cones 1x10 R/L no shoes       SL stance w/ forward reach   1 x 10 R/L      Side step  10 ft R/L x 3 RTB at feet  10 ft R/L x 3 GTB at feet  10 ft R/L x 2 YTB at feet   Ther Ex 20' 15'  15'  30'   Bike  Upright bike L3x5'  Upright bike L3x5'  Upright bike L3x5'   Upright bike L3x5'    Chair DF stretch        Sciatic nerve glide Supine  1 x 10 w/ sos R/L  Supine  1 x 10 w/ sos R/L  Supine  1 x 10 w/ sos R/L   Self PF stretch seated         Soleus stretch        Gastroc stretch  30\" x 3 R/L W/ sos 30\"x3 R/L  30\" x 3 B/L slant board  30\" x 3 R/L   Seated HR/ TR        Std HR Split stance (occasionally single) HR 2 x 10 R/L - painful at completion B/L HR off step 2x10  B/L HR off step 2x10   Split stance (occasionally single) HR 2 x 10 R/L    Heel tap off step  6\" 2x10 R/L  6\" 1x10 R/L      Seated leg press Supine B/L foam under feet   105# 2x12    Supine B/L foam under feet   95# 2x12   SL HR on leg press   15# 2x15 R/L       FSU w/ knee hike   6\"+2\"foam 2x10 R/L     8\" 2 x 10 R/L   Straddle step " ups         PF roll w/ therabar         Objective measures: AROM, MMT, balance         EDUCATION: Pathology, review of impairements, prognosis, activity modification, POC, and HEP         Ther Activity          Lunges                    Gait Training                             Modalities          TENS+ CP                  HEP:     Access Code: [email or web address]  URL: [email or web address]  Date: 04/24/2024  Prepared by: Erika Coreas    Exercises  - Gastroc Stretch on Step  - 3 x daily - 3 sets - 30 seconds hold  - Standing Soleus Stretch on Step  - 3 x daily - 5 reps - 10 seconds hold  - Seated Heel Raise  - 3 x daily - 2 sets - 10 reps  - Standing Heel Raise with Support  - 3 x daily - 1 sets - 10 reps  - Towel Scrunches  - 1 x daily - 1 sets - 20 reps  - Foot Roller Plantar Massage  - 1 x daily - 7 x weekly - 1 reps - 3 min  hold

## 2024-05-20 ENCOUNTER — EVALUATION (OUTPATIENT)
Dept: PHYSICAL THERAPY | Facility: CLINIC | Age: 24
End: 2024-05-20
Payer: COMMERCIAL

## 2024-05-20 DIAGNOSIS — M79.671 PAIN OF RIGHT HEEL: ICD-10-CM

## 2024-05-20 DIAGNOSIS — M72.2 PLANTAR FASCIITIS OF LEFT FOOT: Primary | ICD-10-CM

## 2024-05-20 DIAGNOSIS — M79.672 PAIN OF LEFT HEEL: ICD-10-CM

## 2024-05-20 PROCEDURE — 97110 THERAPEUTIC EXERCISES: CPT

## 2024-05-20 PROCEDURE — 97112 NEUROMUSCULAR REEDUCATION: CPT

## 2024-05-20 NOTE — PROGRESS NOTES
PT Re-Evaluation     Today's date: 2024  Patient name: Misty Blackmon  : 2000  MRN: [id number]  Referring provider: Shady Crow DPM  Dx:   Encounter Diagnosis     ICD-10-CM    1. Plantar fasciitis of left foot  M72.2       2. Pain of right heel  M79.671       3. Pain of left heel  M79.672                      Assessment  Impairments: abnormal gait, abnormal or restricted ROM, activity intolerance, impaired balance, impaired physical strength, lacks appropriate home exercise program, pain with function, weight-bearing intolerance and poor body mechanics    Assessment details: 2024 Misty Blackmon is a 24 y.o. female who presents with bilateral foot and ankle pain (L greater than R). Pt demonstrates impaired ankle AROM (L deficits greater than R), L ankle strength, gastroc flexibility bilaterally, standing posture, and gait. 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 Patient's clinical presentation is consistent with their referring diagnosis of Plantar fasciitis of left foot, as well as pain of left and right heel. Patient has been educated in pathology, review of impairements, prognosis, activity modification, POC, and HEP. Patient would benefit from skilled physical therapy services to address their aforementioned functional limitations and progress towards prior level of function and independence with home exercise program.     3/18/2024: Misty Blackmon is a 24 y.o. female who presents with bilateral foot and ankle pain (L greater than R). Pt demonstrates improving pain levels, ankle AROM and strength, LE strength, gait and balance bilaterally. Despite improvements, pt continues to demonstrate impaired ankle AROM (L deficits greater than R), L ankle strength and hip abduction strength. On examination today patient is TTP just distal to lateral malleolus  and demonstrates decrease in L ankle eversion AROM and strength attributed to patient's recent fall which resulted in L ankle pain. 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 Patient's clinical presentation is consistent with their referring diagnosis of Plantar fasciitis of left foot, as well as pain of left and right heel. Patient has been educated in progress, remaining impairments, prognosis, activity modification, POC, and HEP. Pt instructed to monitor new L lateral ankle pain and report to MD if pain persists/ increases. Patient would benefit from skilled physical therapy services to address their aforementioned functional limitations and progress towards prior level of function and independence with home exercise program.     4/24/2024: Misty Blackmon is a 24 y.o. female who presents with improving B/L foot pain. Pt received corticosteroid injection in L foot and has noted improvement in plantar fascia pain, yet heel pain/ soreness is present. L foot remains more painful than R. On examination pt demonstrates improved ankle AROM and strength, LE strength, and L single leg balance. Despite improvements, pt continues to demonstrate impaired gastroc and soleus flexibility and L ankle AROM. 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 Pt instructed to monitor L 1st toe where she reported stepping on pin at home; no abnormalities noted along plantar aspect of foot, however. Patient has been educated in progress, remaining impairments, prognosis, activity modification, POC, and HEP. Patient would benefit from skilled physical therapy services to address their aforementioned functional limitations and progress towards prior level of  "function and independence with home exercise program.     5/20/2024: Misty Blackmon is a 24 y.o. female who presents with improving B/L foot pain. Pt has attended PT for 3 months; while progress has been slow, pt does report improvements in pain and function. On examination pt presents with similar lower extremity AROM and strength compared to previous session. Pt does demonstrate improving single leg balance and functional mobility as she has returned to barefoot walking in her home and reciprocal stairs 80% of the time. Pt continues to present with impaired ankle AROM bilaterally (L deficits greater than R), BLE strength, muscular endurance, gait, and posture affecting her standing and walking tolerance and ability to perform stairs. 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 Patient has been educated in progress, remaining impairments, prognosis, activity modification, POC, and HEP. Patient would benefit from skilled physical therapy services to address their aforementioned functional limitations and progress towards prior level of function and independence with home exercise program.     Plan: Ambulatory referral to Physical Therapy        Goals  Short Term Goals 4 WEEKS Target Date (3/13/2024)  1. Establish [place] w/ progressing HEP for ROM/strength. (MET 3/18/2024)  2. Improve AROM L ankle P/D to be within 5 degrees of R to prepare for reciprocal stairs. (MET 3/18/2024)  3. Improve AROM L ankle eversion equal to R to prepare for negotiation of uneven terrain. (MET 5/20/2024  4. Improve L ankle strength to 4/5 or better w/out pain for improved tolerance to WB. (MET 5/20/2024)    Long Term Goals 12 WEEKS: Target Date (5/8/2024) updated to 7/26/2024  1. Improve L ankle strength to 4+/5 or better to allow SLS x 20\" or more. (MET 5/2024)   2. Improve tolerance to weight bearing to 4 hours or more for return to pain-free teaching. (Ongoing " 5/20/2024)  3. Ankle AROM WFL B/L to allow recipcrocal stairs w/o handrail. (Progressing 5/20/2024)  4. Resolve gait deviations for return to safe community ambulation. (Progressing 5/20/2024)  5. Pt will return to gym for lower body strengthening with report of 0/10 to demo return to PLOF. (Progressing 5/20/2024)    Plan  Patient would benefit from: PT eval and skilled physical therapy  Planned modality interventions: cryotherapy, thermotherapy: hydrocollator packs and unattended electrical stimulation    Planned therapy interventions: manual therapy, neuromuscular re-education, therapeutic activities, therapeutic exercise, home exercise program, patient education, functional ROM exercises, strengthening and stretching    Frequency: 2-3x/week.  Duration in weeks: 12  Plan of Care beginning date: 2/14/2024  Plan of Care expiration date: 7/26/2024  Treatment plan discussed with: patient  Plan details: HEP development and progression, monitor patients adherence to activity modification to ensure adequate healing time/ recovery. Implement stretching, A/AA/PROM, joint mobilizations, posture education, STM/MI as needed to reduce muscle tension, muscle reeducation. Progress strengthening; improve pt's tolerance to resisted WB activities. PLOC discussed and agreed upon with patient.          Subjective Evaluation    History of Present Illness  Mechanism of injury: 2/14/2024: Pt is a 24 y.o female who reports to PT with compliant of bilateral foot pain (L greater than R). Pain described as sharp. Pain started in L ankle/ heel began in October; R foot pain began about a week ago. Pain is present all day. Pain with WB; minimal pain at rest. Static standing, walking and stairs are painful. Patient is a  and has to be on her feet all day. Pain is present with ADLs (cooking/ cleaning). Pt denies LLE pain other than current injury; occasional B/L knee pain at gym. Denies hx of back pain or injury. Foot goes numb  with night splint. She wears it w/ watching TV at home before bed; she is unable to sleep with it. Sleep is unaffected by pain. Hx of pulmonary embolism; had 5 in R lung. Hx of Factor V Leiden. Has had L kidney removed. Last PE 2020. No BP issues as per pt report; she is not on BP medication.     3/18/2024: Pt states that she tripped Wednesday and twisted her L ankle resulting in fall down her stairs; she has a couple bruises. She was able to walk after the fall. She went to a parade over the weekend and walked 12 miles. Yesterday it hurt to walk. R foot feels okay, has 3/10 pain currently. L foot pain is 4-5/10. L ankle pain is 5-6/10 d/t recent incident and walking over the weekend. Pt states that the exercises are helping her. She has moment where she doesn't have any pain at all. Pain is most severe at the end of the day after being on her feet all day.     4/24/2024: Pt states that the pain along the bottom of her left foot is improving since the injection. She continues to experience greater pain on L side compared to R. She has increased pain in heel on L foot as of recently. She noted that while standing on her left leg to shave she was unable to tolerate the pain into her left heel. Pt stepped on a pin on left big toe today which resulted in bleeding. She removed band-aid recently as bleeding stopped. Pt states that she has noticed improvements since starting PT.      5/20/2024: Pt states that her left foot is a little sore today; she walked around a big store with sandals on which wasn't bad. She has been trying to walk around her house barefoot more which has not been bad. She tried to carry laundry w/out shoes which was very painful. Pt states that she has minimal R foot pain; only time it bothers her is with a lot of walking. If she does her stretches the night before she is okay. Her left foot continues to bother her. She sees foot doctor Wednesday.   Patient Goals  Patient goals for therapy: decreased  pain  Patient goal: Eliminate pain in feet  Pain  Current pain rating: 3 (L)  At best pain rating: 3  At worst pain ratin (chores around the house)  Quality: sharp  Aggravating factors: standing, walking, stair climbing and lifting    Social Support  Steps to enter house: yes  10  Stairs in house: yes (1 flight for laundry)   Lives with: parents      Diagnostic Tests  X-ray: abnormal (: Small plantar calcaneal spur. Preserved tibiotalar joint. No fracture or dislocation. No soft tissue swelling or obvious joint effusion.)  Treatments  Current treatment: physical therapy        Objective    Palpation:  2024: Pt is TTP along plantar aspect of calcaneous (L> R)  3/18/2024:  L lateral ankle TTP distal to lateral malleolus at calcaneofibular ligament  2024: Pt is TTP at L heel with light pressure; pain at rest w/out pressure is 1/10. Pt denies TTP at plantar midfoot along plantar fascia. L foot observation: no abnormality noted along plantar aspect of 1st toe where pt reported stepping on pin at home.  2024: NT     AROM:    R  L  R  L  R  L   R  L       2024 2024 3/18/2024 3/18/2024 2024 2024 2024 2024  Ankle DF calc/5thray  -2  0  10  2  8  2  10  5  Ankle PF calc/5thray  70  60  70  70  80  75  70  70  Ankle inversion  40  40  45  40  45  40  45  40    Ankle eversion  30  25*  30  15*  45  30*  30  30     STRENGTH:   R  L  R  L  R  L   R  L      2024 2024 3/18/2024 3/18/2024 2024 2024 5/20/24 2024  Ankle DF  5/5  4+/5  5/5  5/5  5/5  5/5  5/5  5/5  Ankle PF  5/5  4/5  5/5  5/5  5/5  5/5  5/5  5/5  Ankle inversion 5/5  3+/5*  5/5  4-/5  5/5  5/5  5/5  4+/5  Ankle eversion 5/5  4/5*  5/5  2+/5      Knee ext    Knee flex    Hip abduction    4+/5  4+/5  4+/5  4+/5  Hip extension    4+/5    NT  NT      Gastroc  "Flexibility:  2/14/2024: R 15, L 10  3/14/2024: NT  4/24/2024: Soleus flexibility (ankle DF AROM w/ knee at 90 deg flexion) R 2 deg, L 5 deg   5/20/2024: R 12 deg, L 7 deg    Gait:   2/14/2024: Antalgic, lacks hip/ knee flexion t/o gait cycle.  3/18/2024: Minimally antalgic.   4/24/2024: Sway back; antalgic.   5/20/2024: Limited toe clearance of L foot.     Standing posture:   5/20/2024: Sway back, genu recurvatum/ valgum, and pes planus bilaterally.     Balance:  2/14/2024  3/18/2024 4/24/2024 5/20/2024    Tandem R post w/ EO: unable d/t pain >30 sec NT  NT  Tandem L post w/ EO: unable d/t pain >30 sec NT  NT  R SLS w/ EO:   NT   >30 sec >30 sec NT  L SLS w/ EO:   NT   8 sec*  17.5 sec >30 sec     Function:  2/14/2024  Squat: Wide base of support; form is good.   Step up/down 6\" R/L: 6/10 pain L heel  Stairs: step-to when painful, reciprocal at baseline  3/18/2024  Squat: Wide base of support; form is good.   Step up/down 6\" R/L: 4/10 L heel pain, 2/10 L heel pain  Stairs: step-to when painful, reciprocal at baseline  4/24/2024  Squat: Wide base of support; form is good.   Step up/down NT   Stairs: step-to when painful, reciprocal at baseline/ with improved pain levels   5/20/2024  Squat: Wide base of support; form is good.   Step up/down: NT   Stairs: 80% of time reciprocal, 20% step-to at end of day or carrying heavy items  Standing tolerance = 10 minutes until pain presents  SL squat and reach    R SL squat = 20 in   L SL squat = 20.5 in   Barefoot walking: Prior to PT pt was unable to walk barefoot, since starting PT she has been able to walk barefoot in the morning. Since injections she has been able to walk barefoot into the night.     Special test:   3/18/2024  Bump test L (min - mod pain reported)  Solomon compression test L (-)  4/24/2024   NT  5/20/2024   NT         Precautions:   *Hx of recurring PE  Past Medical History:   Diagnosis Date    Allergic     Anxiety     Anxiety and depression     Asthma     " "Clotting disorder (HCC)     Factor V Leiden (HCC)     Headache(784.0)     Pulmonary embolism (HCC)     Thrombophlebitis      Past Surgical History:   Procedure Laterality Date    BLADDER SURGERY      FL RETROGRADE PYELOGRAM  05/20/2022    FL VCUG VOIDING URETHROCYSTOGRAM  8/7/2018    NEPHRECTOMY Left 05/21/2020    Done laparoscopically at [facility]    CA CYSTOURETHROSCOPY N/A 05/20/2022    Procedure: CYSTOSCOPY, retrograde pyelogram, and examination under anesthesia, dilation urethral stenosis;  Surgeon: Kamran Lott MD;  Location: [facility] MAIN OR;  Service: Urology    URETER REVISION      WISDOM TOOTH EXTRACTION      woke up during procedure.     SOC: 2/14/2024  FOTO: 3/4/2024  POC Expiration: 5/8/2024 updated to 7/26/2024   Daily Treatment Log  Date: 5/8/2024 5/13/2024  5/15/2024 5/20/2024 Next session    Visit#/ auth: 21 22 FOTO  23 24    Objective Measures         Palpation        Manuals 5'        PF iso vs man        IASTM B/L PF w/ DF stretch  prone         KT PF support w/ pull to prevent ankle inver  RB  KE     Plantar arch tapping KE        STM to plantar fascia         Neuro Re-Ed 20' 25'  25' 10'    LAQ + DF        Wobble board   AP/ML no UE support 20x ea     Balance 1' AP/ML ea  Balance 1' AP/ML ea       [place] board seated  Standing 1x10 cw/ccw R/L Standing 1x10 cw/ccw R/L  Standing 1x15 cw/ccw R/L       4 way ankle         SLS  20\" x 3 R/L  on Airex with intermittent UE support       Tandem stance on Airex        Steam boat w/ SLS 1 x 10 R/L On foam 1x10 R/L  On foam 1x10 R/L w/ occasional uni UE assist On foam 1x15 R, 12 reps WB through L d/t pain    w/ occasional uni UE assist    [place] taps from foam  3 cones 1x10 R/L no shoes       SL stance w/ forward reach   1 x 10 R/L  1 x 10 R/L     Side step  10 ft R/L x 3 RTB at feet  10 ft R/L x 3 GTB at feet     Ther Ex 20' 15'  15' 35'    Bike  Upright bike L3x5'  Upright bike L3x5'  Upright bike L3x5'  Upright bike L3x5'     Treadmill     1.5 mph x 3'   " "  Chair DF stretch        Sciatic nerve glide Supine  1 x 10 w/ sos R/L  Supine  1 x 10 w/ sos R/L Supine  1 x 10 w/ sos R/L    Self PF stretch seated         Soleus stretch        Gastroc stretch  30\" x 3 R/L W/ sos 30\"x3 R/L  30\" x 3 B/L slant board 30\" x 2 B/L slant board    Seated HR/ TR        Std HR Split stance (occasionally single) HR 2 x 10 R/L - painful at completion B/L HR off step 2x10  B/L HR off step 2x10  B/L HR off step 1x10    Heel tap off step  6\" 2x10 R/L  6\" 1x10 R/L      Seated leg press Supine B/L foam under feet   105# 2x12       SL HR on leg press   15# 2x15 R/L       FSU w/ knee hike   6\"+2\"foam 2x10 R/L       Straddle step ups         PF roll w/ therabar         Objective measures: AROM, MMT, balance    KE    EDUCATION: Pathology, review of impairements, prognosis, activity modification, POC, and HEP    KE Update HEP this visit   Ther Activity         Lunges                  Gait Training                          Modalities         TENS+ CP                  HEP:     Access Code: [email or web address]  URL: [email or web address]  Date: 04/24/2024  Prepared by: Erika Coreas    Exercises  - Gastroc Stretch on Step  - 3 x daily - 3 sets - 30 seconds hold  - Standing Soleus Stretch on Step  - 3 x daily - 5 reps - 10 seconds hold  - Seated Heel Raise  - 3 x daily - 2 sets - 10 reps  - Standing Heel Raise with Support  - 3 x daily - 1 sets - 10 reps  - Towel Scrunches  - 1 x daily - 1 sets - 20 reps  - Foot Roller Plantar Massage  - 1 x daily - 7 x weekly - 1 reps - 3 min  hold       "

## 2024-05-23 DIAGNOSIS — E66.01 CLASS 3 SEVERE OBESITY DUE TO EXCESS CALORIES WITH SERIOUS COMORBIDITY AND BODY MASS INDEX (BMI) OF 40.0 TO 44.9 IN ADULT (HCC): Primary | ICD-10-CM

## 2024-05-23 NOTE — TELEPHONE ENCOUNTER
I have sent the next 2 doses of Wegovy to the pharmacy - 0.5mg next and then after 4 doses of that she will start the 1mg dose

## 2024-05-23 NOTE — TELEPHONE ENCOUNTER
Patient called regarding the status for the wegovy refill. In past has been on 0.25 and is asking for a refill. Patient originally called on 5/14/24, states she has been doing fine on the medication. Looks like the MA from the office sent that message back 'in error' to the RX refill specialist. Advised patient I would send that back to the office as high priority.

## 2024-05-24 ENCOUNTER — TELEPHONE (OUTPATIENT)
Dept: BARIATRICS | Facility: CLINIC | Age: 24
End: 2024-05-24

## 2024-05-28 ENCOUNTER — APPOINTMENT (OUTPATIENT)
Dept: PHYSICAL THERAPY | Facility: CLINIC | Age: 24
End: 2024-05-28
Payer: COMMERCIAL

## 2024-05-29 ENCOUNTER — OFFICE VISIT (OUTPATIENT)
Age: 24
End: 2024-05-29
Payer: COMMERCIAL

## 2024-05-29 VITALS
HEIGHT: 65 IN | HEART RATE: 82 BPM | BODY MASS INDEX: 41.48 KG/M2 | WEIGHT: 249 LBS | SYSTOLIC BLOOD PRESSURE: 117 MMHG | DIASTOLIC BLOOD PRESSURE: 78 MMHG

## 2024-05-29 DIAGNOSIS — M72.2 PLANTAR FASCIITIS OF LEFT FOOT: Primary | ICD-10-CM

## 2024-05-29 PROCEDURE — 99212 OFFICE O/P EST SF 10 MIN: CPT | Performed by: STUDENT IN AN ORGANIZED HEALTH CARE EDUCATION/TRAINING PROGRAM

## 2024-05-29 NOTE — PROGRESS NOTES
"This patient was seen on 5/29/2024.    My role is Foot , Ankle, and Wound Specialist    ASSESSMENT     Diagnoses and all orders for this visit:    Plantar fasciitis of left foot         Problem List Items Addressed This Visit          Musculoskeletal and Integument    Plantar fasciitis of left foot - Primary     PLAN  -Patient was educated regarding their condition.  -Continue night splints, daily stretching program, again reinforced the importance of proper footwear and orthotics  -Corticosteroid injection not given today  -Continue physical therapy  -Patient will RTC in 4-weeks, can consider second injection at this time if there is no improvement.    SUBJECTIVE    Chief Complaint:  Left heel pain     Patient ID: Misty Blackmon     5/29/2024: Misty states that she is doing approximately 60% better today.  She states that physical therapy has been beneficial and she does still have more sessions remaining, however she is waiting for her insurance to clear until she can attend.  She states that because she has had so much improvement she would not like to pursue injection therapy at this time however may consider in the future should her pain not fully resolve.         The following portions of the patient's history were reviewed and updated as appropriate: allergies, current medications, past family history, past medical history, past social history, past surgical history and problem list.    Review of Systems   Constitutional: Negative.    HENT: Negative.     Respiratory: Negative.     Cardiovascular: Negative.    Gastrointestinal: Negative.    Musculoskeletal:  Positive for myalgias.   Skin: Negative.    Neurological: Negative.          OBJECTIVE      /78   Pulse 82   Ht 5' 5\" (1.651 m)   Wt 113 kg (249 lb)   BMI 41.44 kg/m²        Physical Exam  Constitutional:       Appearance: Normal appearance.   HENT:      Head: Normocephalic and atraumatic.   Eyes:      General:         Right eye: No discharge. "         Left eye: No discharge.   Cardiovascular:      Rate and Rhythm: Normal rate and regular rhythm.      Pulses:           Dorsalis pedis pulses are 2+ on the right side and 2+ on the left side.        Posterior tibial pulses are 2+ on the right side and 2+ on the left side.   Pulmonary:      Effort: Pulmonary effort is normal.      Breath sounds: Normal breath sounds.   Skin:     General: Skin is warm.      Capillary Refill: Capillary refill takes less than 2 seconds.   Neurological:      Sensory: Sensation is intact. No sensory deficit.       MSK:  -No pain along the course of the peroneal tendons of the left ankle at today's visit.  No pain with plantarflexion and eversion against resistance.  -Pain on palpation of medial calcaneal tubercle at the insertion site of the plantar fascia  -no pain with compression of both sides of heel  -No gross deformities noted  -Active range of motion lesser digits intact  -MMT 5/5 to all muscle compartments of the lower extremity  -Ankle dorsiflexion is less than 10 degrees with knee extended, and knee flexed.  This is slightly improved compared to the patient's previous visit.     Neuro:  -Light sensation intact bilaterally  -Protective sensation intact bilaterally  -Tinel sign negative

## 2024-05-30 ENCOUNTER — OFFICE VISIT (OUTPATIENT)
Dept: PHYSICAL THERAPY | Facility: CLINIC | Age: 24
End: 2024-05-30
Payer: COMMERCIAL

## 2024-05-30 ENCOUNTER — APPOINTMENT (OUTPATIENT)
Dept: PHYSICAL THERAPY | Facility: CLINIC | Age: 24
End: 2024-05-30
Payer: COMMERCIAL

## 2024-05-30 DIAGNOSIS — M72.2 PLANTAR FASCIITIS OF LEFT FOOT: Primary | ICD-10-CM

## 2024-05-30 DIAGNOSIS — M79.671 PAIN OF RIGHT HEEL: ICD-10-CM

## 2024-05-30 DIAGNOSIS — M79.672 PAIN OF LEFT HEEL: ICD-10-CM

## 2024-05-30 PROCEDURE — 97110 THERAPEUTIC EXERCISES: CPT

## 2024-05-30 PROCEDURE — 97112 NEUROMUSCULAR REEDUCATION: CPT

## 2024-05-30 NOTE — PROGRESS NOTES
Daily Note     Today's date: 2024  Patient name: Misty Blackmon  : 2000  MRN: [id number]  Referring provider: Shady Crow DPM  Dx:   Encounter Diagnosis     ICD-10-CM    1. Plantar fasciitis of left foot  M72.2       2. Pain of right heel  M79.671       3. Pain of left heel  M79.672                      Subjective: Pt states that originally her pain was in the arch but now it is in the heel. She has most pain in her left foot. Her left foot is the one she received an injection in; she does not have pain where the injection was administered. Pt states that the last four days have been painful. 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 Pt believes SL stance on foam with steamboat kicking is helping her. R foot pain is minimal today;1/10. L foot pain on arrival is 5-6/10.       Objective: See treatment diary below      Assessment: Pt reports greater stretch w/ standing soleus stretch on slant board compared to gastroc stretch. Pt reports increase in L heel pain w/ SL stance on foam with contralateral LE steamboat. Pt educated that next session this exercise will be regressed to perform only 5 repetitions at a time to prevent significant increase in pain. Pt reports decrease in L heel pain post session; 4/10. Tolerated treatment well. Patient would benefit from continued PT.       Plan: Continue per plan of care.  Progress treatment as tolerated.       Precautions:   *Hx of recurring PE  Past Medical History:   Diagnosis Date    Allergic     Anxiety     Anxiety and depression     Asthma     Clotting disorder (HCC)     Factor V Leiden (HCC)     Headache(784.0)     Pulmonary embolism (HCC)     Thrombophlebitis      Past Surgical History:   Procedure Laterality Date    BLADDER SURGERY      [place] RETROGRADE PYELOGRAM  2022    [place] VCUG VOIDING URETHROCYSTOGRAM  2018    NEPHRECTOMY Left 2020    Done laparoscopically at [facility]    [place]  "CYSTOURETHROSCOPY N/A 05/20/2022    Procedure: CYSTOSCOPY, retrograde pyelogram, and examination under anesthesia, dilation urethral stenosis;  Surgeon: Kamran Lott MD;  Location: [facility] MAIN OR;  Service: Urology    URETER REVISION      WISDOM TOOTH EXTRACTION      woke up during procedure.     SOC: 2/14/2024  FOTO: 3/4/2024  POC Expiration: 5/8/2024 updated to 7/26/2024   Daily Treatment Log  Date: Next session  5/15/2024 5/20/2024 5/30/2024   Visit#/ auth:   23 24 25   Objective Measures         Palpation        Manuals         PF iso vs man        IASTM B/L PF w/ DF stretch  prone         KT PF support w/ pull to prevent ankle inver   KE     Plantar arch tapping         STM to plantar fascia         Neuro Re-Ed   25' 10' 10'   LAQ + DF        Wobble board    Balance 1' AP/ML ea       [place] board seated    Standing 1x15 cw/ccw R/L      Steam boat w/ SLS Perform 5 reps at a time vs 10 this session; see assessment  On foam 1x10 R/L w/ occasional uni UE assist On foam 1x15 R, 12 reps WB through L d/t pain    w/ occasional uni UE assist On foam 1x10 R/L w/ min uni UE assist   [place]one taps from foam        SL stance w/ forward reach   1 x 10 R/L  1 x 10 R/L  1 x 10 R/L   Side step    10 ft R/L x 3 GTB at feet     Ther Ex   15' 35' 30'   Bike    Upright bike L3x5'  Upright bike L3x5'     Treadmill     1.5 mph x 3'  2 mph x 4'    Toe flex/ ext     3\" hold x 20 ea. B/L   Sciatic nerve glide   Supine  1 x 10 w/ sos R/L Supine  1 x 10 w/ sos R/L Supine  1 x 5 w/ sos R/L   Self PF stretch seated         Soleus stretch     30\" x 2 B/L slant board   Gastroc stretch    30\" x 3 B/L slant board 30\" x 2 B/L slant board 30\" x 2 B/L slant board   Seated HR/ TR        Std HR   B/L HR off step 2x10  B/L HR off step 1x10 B/L HR off step 1x10; VC for soft bend in B/L knee   Heel tap off step   6\" 1x10 R/L      Leg press     Supine SL 40# 1 x 10 R/L w/ foam    SL HR on leg press         FSU w/ knee hike         Straddle step ups      "    PF roll w/ therabar      1.5' R/L seated   Objective measures: AROM, MMT, balance    KE    EDUCATION: Pathology, review of impairements, prognosis, activity modification, POC, and HEP    KE HEP updated and reviewed   Ther Activity         Lunges                 Gait Training                          Modalities         TENS+ CP                  HEP:     Access Code: [email or web address]  URL: [email or web address]  Date: 05/30/2024  Prepared by: Erika Coreas    Exercises  - Gastroc Stretch on Step  - 3 x daily - 3 sets - 30 seconds hold  - Standing Soleus Stretch on Step  - 3 x daily - 5 reps - 10 seconds hold  - Seated Heel Raise  - 3 x daily - 2 sets - 10 reps  - Standing Heel Raise with Support  - 3 x daily - 1 sets - 10 reps  - Towel Scrunches  - 1 x daily - 1 sets - 20 reps  - Foot Roller Plantar Massage  - 1 x daily - 7 x weekly - 1 reps - 3 min  hold  - Single leg stance with forward reach near counter  - 1 x daily - 1 sets - 10 reps

## 2024-06-03 ENCOUNTER — OFFICE VISIT (OUTPATIENT)
Dept: PHYSICAL THERAPY | Facility: CLINIC | Age: 24
End: 2024-06-03
Payer: COMMERCIAL

## 2024-06-03 DIAGNOSIS — M79.672 PAIN OF LEFT HEEL: ICD-10-CM

## 2024-06-03 DIAGNOSIS — M72.2 PLANTAR FASCIITIS OF LEFT FOOT: Primary | ICD-10-CM

## 2024-06-03 DIAGNOSIS — M79.671 PAIN OF RIGHT HEEL: ICD-10-CM

## 2024-06-03 PROCEDURE — 97110 THERAPEUTIC EXERCISES: CPT

## 2024-06-03 PROCEDURE — 97112 NEUROMUSCULAR REEDUCATION: CPT

## 2024-06-03 PROCEDURE — 97140 MANUAL THERAPY 1/> REGIONS: CPT

## 2024-06-03 NOTE — PROGRESS NOTES
"Daily Note     Today's date: 6/3/2024  Patient name: Misty Blackmon  : 2000  MRN: [id number]  Referring provider: Shady Crow DPM  Dx:   Encounter Diagnosis     ICD-10-CM    1. Plantar fasciitis of left foot  M72.2       2. Pain of right heel  M79.671       3. Pain of left heel  M79.672                      Subjective: She has been doing a lot of walking and lifting at work lately. Reports she saw the MD last week and this went well as her symptoms were improved at this time. Pt reports that she had her field day on Friday and things have been sore even since this after she was running. 7/10 L; 4/10 in R on arrival to session, \"it hasn't hurt this bad in a long time\"         Objective: See treatment diary below       Assessment: Tolerated treatment well. Pt does dem dec pain B/L R 210; L 4-/10 post session after manual therapy/TENS CP. Pt edu to cont with her PF rolling and to resume ice as needed for pain in B/L PF.  Patient exhibited good technique with therapeutic exercises and would benefit from continued PT      Plan: Continue per plan of care.  She has been wearing her current sneakers for approx 5 months daily, pt to trial purchasing new sneakers.      Precautions:   *Hx of recurring PE  Past Medical History:   Diagnosis Date    Allergic     Anxiety     Anxiety and depression     Asthma     Clotting disorder (HCC)     Factor V Leiden (HCC)     Headache(784.0)     Pulmonary embolism (HCC)     Thrombophlebitis      Past Surgical History:   Procedure Laterality Date    BLADDER SURGERY      FL RETROGRADE PYELOGRAM  2022    FL VCUG VOIDING URETHROCYSTOGRAM  2018    NEPHRECTOMY Left 2020    Done laparoscopically at [facility]    [place] CYSTOURETHROSCOPY N/A 2022    Procedure: CYSTOSCOPY, retrograde pyelogram, and examination under anesthesia, dilation urethral stenosis;  Surgeon: Kamran Ltot MD;  Location: [facility] MAIN OR;  Service: Urology    URETER REVISION      WISDOM TOOTH " "EXTRACTION      woke up during procedure.     SOC: 2/14/2024  FOTO: 3/4/2024  POC Expiration: 5/8/2024 updated to 7/26/2024   Daily Treatment Log  Date: 6/3/2024  5/15/2024 5/20/2024 5/30/2024   Visit#/ auth: 26   23 24 25   Objective Measures         Palpation        Manuals 10'         PF iso vs man        IASTM B/L PF w/ DF stretch  prone RB         KT PF support w/ pull to prevent ankle inver   KE     Plantar arch tapping         STM to plantar fascia         Neuro Re-Ed 20'   25' 10' 10'   LAQ + DF        Wobble board  20x AP ML taps  Pain w/ ML      Balance 1' AP/ML ea       [place] board seated  Seated 1x15 cw/ccw R/L   Standing 1x15 cw/ccw R/L      Steam boat w/ SLS Perform 5 reps at a time vs 10 this session; see assessment  On foam 1x10 R/L w/ occasional uni UE assist On foam 1x15 R, 12 reps WB through L d/t pain    w/ occasional uni UE assist On foam 1x10 R/L w/ min uni UE assist   [place]one taps from foam        SL stance w/ forward reach   1 x 10 R/L  1 x 10 R/L  1 x 10 R/L   Side step    10 ft R/L x 3 GTB at feet     Ther Ex 10'  15' 35' 30'   Bike    Upright bike L3x5'  Upright bike L3x5'     Treadmill     1.5 mph x 3'  2 mph x 4'    Toe flex/ ext     3\" hold x 20 ea. B/L   Sciatic nerve glide   Supine  1 x 10 w/ sos R/L Supine  1 x 10 w/ sos R/L Supine  1 x 5 w/ sos R/L   Self PF stretch seated         Soleus stretch     30\" x 2 B/L slant board   TB 4 way  BTB 2x15 ea R/L        Gastroc stretch  W/ towel 30\"x3 R/L   30\" x 3 B/L slant board 30\" x 2 B/L slant board 30\" x 2 B/L slant board   Seated HR/ TR 1x15 ea       Std HR   B/L HR off step 2x10  B/L HR off step 1x10 B/L HR off step 1x10; VC for soft bend in B/L knee   Heel tap off step   6\" 1x10 R/L      Leg press     Supine SL 40# 1 x 10 R/L w/ foam    SL HR on leg press         FSU w/ knee hike         Straddle step ups         PF roll w/ therabar      1.5' R/L seated   Objective measures: AROM, MMT, balance    KE    EDUCATION: Pathology, review of " impairements, prognosis, activity modification, POC, and HEP     HEP updated and reviewed   Ther Activity         Lunges                 Gait Training                          Modalities 10'         TENS+ CP  Performed 10' post session                 HEP:     Access Code: [email or web address]  URL: [email or web address]  Date: 05/30/2024  Prepared by: Erika Coreas    Exercises  - Gastroc Stretch on Step  - 3 x daily - 3 sets - 30 seconds hold  - Standing Soleus Stretch on Step  - 3 x daily - 5 reps - 10 seconds hold  - Seated Heel Raise  - 3 x daily - 2 sets - 10 reps  - Standing Heel Raise with Support  - 3 x daily - 1 sets - 10 reps  - Towel Scrunches  - 1 x daily - 1 sets - 20 reps  - Foot Roller Plantar Massage  - 1 x daily - 7 x weekly - 1 reps - 3 min  hold  - Single leg stance with forward reach near counter  - 1 x daily - 1 sets - 10 reps

## 2024-06-06 ENCOUNTER — OFFICE VISIT (OUTPATIENT)
Dept: PHYSICAL THERAPY | Facility: CLINIC | Age: 24
End: 2024-06-06
Payer: COMMERCIAL

## 2024-06-06 DIAGNOSIS — M72.2 PLANTAR FASCIITIS OF LEFT FOOT: Primary | ICD-10-CM

## 2024-06-06 DIAGNOSIS — M79.671 PAIN OF RIGHT HEEL: ICD-10-CM

## 2024-06-06 DIAGNOSIS — M79.672 PAIN OF LEFT HEEL: ICD-10-CM

## 2024-06-06 PROCEDURE — 97140 MANUAL THERAPY 1/> REGIONS: CPT

## 2024-06-06 PROCEDURE — 97110 THERAPEUTIC EXERCISES: CPT

## 2024-06-06 NOTE — PROGRESS NOTES
Daily Note     Today's date: 2024  Patient name: Misty Blackmon  : 2000  MRN: [id number]  Referring provider: Shady Crow DPM  Dx:   Encounter Diagnosis     ICD-10-CM    1. Plantar fasciitis of left foot  M72.2       2. Pain of right heel  M79.671       3. Pain of left heel  M79.672                      Subjective: Pt states that she felt better after her last session, however pain is still significant. She did ice when she went home; ice has been helping. She feels like ice loosens things up. She thinks that the IASTM does help as well. 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       Objective: See treatment diary below      Assessment: Performed seated exercises today due to irritability on arrival to session. Implemented IASTM to plantar fascia and ice and electrical stimulation as pt expresses liking to these interventions for pain modulation. Pt instructed to trial ice rolling w/ frozen water bottle at home. Tolerated treatment well. Patient would benefit from continued PT.       Plan: Continue per plan of care.  Progress treatment as tolerated.       Precautions:   *Hx of recurring PE  Past Medical History:   Diagnosis Date    Allergic     Anxiety     Anxiety and depression     Asthma     Clotting disorder (HCC)     Factor V Leiden (HCC)     Headache(784.0)     Pulmonary embolism (HCC)     Thrombophlebitis      Past Surgical History:   Procedure Laterality Date    BLADDER SURGERY      FL RETROGRADE PYELOGRAM  2022    FL VCUG VOIDING URETHROCYSTOGRAM  2018    NEPHRECTOMY Left 2020    Done laparoscopically at [facility]    [place] CYSTOURETHROSCOPY N/A 2022    Procedure: CYSTOSCOPY, retrograde pyelogram, and examination under anesthesia, dilation urethral stenosis;  Surgeon: Kamran Lott MD;  Location: [facility] MAIN OR;  Service: Urology    URETER REVISION      WISDOM TOOTH EXTRACTION    "   woke up during procedure.     SOC: 2/14/2024  FOTO: 3/4/2024  POC Expiration: 5/8/2024 updated to 7/26/2024   Daily Treatment Log  Date: 6/3/2024 6/6/2024 Next session 5/20/2024 5/30/2024   Visit#/ auth: 26  27  24 25   Objective Measures         Palpation        Manuals 10'  10'       PF iso vs man        IASTM B/L PF w/ DF stretch  prone RB  KE       KT PF support w/ pull to prevent ankle inver        Plantar arch tapping         STM to plantar fascia         Neuro Re-Ed 20'    10' 10'   LAQ + DF        Wobble board  20x AP ML taps  Pain w/ ML            [place] board seated  Seated 1x15 cw/ccw R/L        Steam boat w/ SLS   Perform 5 reps at a time vs 10 this session; see assessment On foam 1x15 R, 12 reps WB through L d/t pain    w/ occasional uni UE assist On foam 1x10 R/L w/ min uni UE assist   [place]one taps from foam        SL stance w/ forward reach    1 x 10 R/L  1 x 10 R/L   Side step         Ther Ex 10' 30'  35' 30'   Bike   Upright bike L3x5'  Upright bike L3x5'     Treadmill     1.5 mph x 3'  2 mph x 4'    Toe flex/ ext  Towel scrunches 1 x 20 R/L    3\" hold x 20 ea. B/L   Sciatic nerve glide    Supine  1 x 10 w/ sos R/L Supine  1 x 5 w/ sos R/L   Self PF stretch seated         Soleus stretch  30\" x 2 B/L slant board   30\" x 2 B/L slant board   TB 4 way  BTB 2x15 ea R/L  BTB 1x15 ea R/L       Gastroc stretch  W/ towel 30\"x3 R/L  With SOS  30\"x2 R/L  30\" x 2 B/L slant board 30\" x 2 B/L slant board   Seated HR/ TR 1x15 ea 1x15 ea      Std HR    B/L HR off step 1x10 B/L HR off step 1x10; VC for soft bend in B/L knee   Leg press     Supine SL 40# 1 x 10 R/L w/ foam    SL HR on leg press         FSU w/ knee hike         Straddle step ups         PF roll w/ therabar   2 x 20 R/L    1.5' R/L seated   Objective measures: AROM, MMT, balance    KE    EDUCATION: Pathology, review of impairements, prognosis, activity modification, POC, and HEP    KE HEP updated and reviewed   Ther Activity         Lunges              "    Gait Training                          Modalities 10'  5'       TENS+ CP  Performed 10' post session  Performed 4' post session,  L foot                HEP:     Access Code: [email or web address]  URL: [email or web address]  Date: 05/30/2024  Prepared by: Erika Coreas    Exercises  - Gastroc Stretch on Step  - 3 x daily - 3 sets - 30 seconds hold  - Standing Soleus Stretch on Step  - 3 x daily - 5 reps - 10 seconds hold  - Seated Heel Raise  - 3 x daily - 2 sets - 10 reps  - Standing Heel Raise with Support  - 3 x daily - 1 sets - 10 reps  - Towel Scrunches  - 1 x daily - 1 sets - 20 reps  - Foot Roller Plantar Massage  - 1 x daily - 7 x weekly - 1 reps - 3 min  hold  - Single leg stance with forward reach near counter  - 1 x daily - 1 sets - 10 reps

## 2024-06-07 ENCOUNTER — TELEPHONE (OUTPATIENT)
Age: 24
End: 2024-06-07

## 2024-06-07 NOTE — TELEPHONE ENCOUNTER
Spoke with patient. Stated Tova BRONSON suggests staying on 1MG and to monitor and contact office with any GI symptoms or abdo. Pain. Pt expressed understanding.

## 2024-06-07 NOTE — TELEPHONE ENCOUNTER
If she is tolerating the 1mg right now I would suggest she just continue with this dose and monitor for any GI symptoms including abdominal pain

## 2024-06-07 NOTE — TELEPHONE ENCOUNTER
Patient of Tova DEJESUS.  Patient states she did her Wegovy 0.25mg for 4 weeks and then picked up her next dose. She did not realize the pharmacy filled the Wegovy 1.0mg dose.    She took her first dose of 1mg on Tuesday. States she has not had any side effects and seems to be tolerating well.    Patient would like to know if she should proceed with the 1mg at this point or adjust to a different dose.    Please advise  #[phone]

## 2024-06-10 ENCOUNTER — OFFICE VISIT (OUTPATIENT)
Dept: PHYSICAL THERAPY | Facility: CLINIC | Age: 24
End: 2024-06-10
Payer: COMMERCIAL

## 2024-06-10 DIAGNOSIS — M79.671 PAIN OF RIGHT HEEL: ICD-10-CM

## 2024-06-10 DIAGNOSIS — M79.672 PAIN OF LEFT HEEL: ICD-10-CM

## 2024-06-10 DIAGNOSIS — M72.2 PLANTAR FASCIITIS OF LEFT FOOT: Primary | ICD-10-CM

## 2024-06-10 PROCEDURE — 97112 NEUROMUSCULAR REEDUCATION: CPT

## 2024-06-10 PROCEDURE — 97110 THERAPEUTIC EXERCISES: CPT

## 2024-06-10 NOTE — PROGRESS NOTES
Daily Note     Today's date: 6/10/2024  Patient name: Misty Blackmon  : 2000  MRN: [id number]  Referring provider: Shady Crow DPM  Dx:   Encounter Diagnosis     ICD-10-CM    1. Plantar fasciitis of left foot  M72.2       2. Pain of right heel  M79.671       3. Pain of left heel  M79.672                      Subjective: Pt report 3/10 pain on arrival to session. States that she worked all weekend, when she went home she did her typical PT stretches which helped to control her pain. She has been doing her toe scrunches which she feels improves her pain. 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 Pt inquires about whether or not she should continue SL HR at home.       Objective: See treatment diary below      Assessment: Performed SL steamboats on foam for 5 consecutive reps vs 10 for improved tolerance to activity. Pt performed B/L HR with nil difficulty; returned to SL HR due to low irritability on arrival to session. Pt instructed to continue to perform SL heel raise as prescribed on HEP, as tolerable. Ended session with treadmill walk; pt demonstrates appropriate heel to toe progression, however does lack full DF AROM throughout gait cycle. No increase in pain reported in session. Tolerated treatment well. Patient would benefit from continued PT.       Plan: Continue per plan of care.  Progress treatment as tolerated.       Precautions:   *Hx of recurring PE  Past Medical History:   Diagnosis Date    Allergic     Anxiety     Anxiety and depression     Asthma     Clotting disorder (HCC)     Factor V Leiden ([facility])     Headache(784.0)     Pulmonary embolism (HCC)     Thrombophlebitis      Past Surgical History:   Procedure Laterality Date    BLADDER SURGERY      FL RETROGRADE PYELOGRAM  2022    FL VCUG VOIDING URETHROCYSTOGRAM  2018    NEPHRECTOMY Left 2020    Done laparoscopically at [facility]    [place] CYSTOURETHROSCOPY  "N/A 05/20/2022    Procedure: CYSTOSCOPY, retrograde pyelogram, and examination under anesthesia, dilation urethral stenosis;  Surgeon: Kamran Lott MD;  [facility]: [facility] MAIN OR;  Service: Urology    URETER REVISION      WISDOM TOOTH EXTRACTION      woke up during procedure.     SOC: 2/14/2024  FOTO: 3/4/2024  POC Expiration: 5/8/2024 updated to 7/26/2024   Daily Treatment Log  Date: 6/3/2024 6/6/2024 6/10/2024 Next session 5/30/2024   Visit#/ auth: 26  27 28  25   Objective Measures         Palpation        Manuals 10'  10'       PF iso vs man        IASTM B/L PF w/ DF stretch  prone RB  KE       KT PF support w/ pull to prevent ankle inver        Plantar arch tapping         STM to plantar fascia         Neuro Re-Ed 20'   10'  10'   LAQ + DF        Wobble board  20x AP ML taps  Pain w/ ML            [place] board seated  Seated 1x15 cw/ccw R/L        Steam boat w/ SLS   2 x 5 R/L on foam: VC for toe flex  On foam 1x10 R/L w/ min uni UE assist   Cone taps from foam        SL stance w/ forward reach     1 x 10 R/L   Side step         Ther Ex 10' 30' 35'  30'   Bike   Upright bike L3x5' Upright bike L3x5'     Treadmill    2.0 mph x 5' to end  2 mph x 4'    Toe flex/ ext  Towel scrunches 1 x 20 R/L  1 x 20 toe scrunches seated     1 x 20 toe crunches B/L stding  3\" hold x 20 ea. B/L   Sciatic nerve glide     Supine  1 x 5 w/ sos R/L   Self PF stretch seated         Soleus stretch  30\" x 2 B/L slant board 30\" x 2 B/L slant board  30\" x 2 B/L slant board   TB 4 way  BTB 2x15 ea R/L  BTB 1x15 ea R/L       Gastroc stretch  W/ towel 30\"x3 R/L  With SOS  30\"x2 R/L With SOS  30\"x2 R/L  30\" x 2 B/L slant board   Seated HR/ TR 1x15 ea 1x15 ea 1x15 ea     Std HR   1 x 10 B/L     SL 2x5 R/L   B/L HR off step 1x10; VC for soft bend in B/L knee   Leg press     Supine SL 40# 1 x 10 R/L w/ foam    SL HR on leg press         FSU w/ knee hike    4\" + 2\" foam w/ occasional UE assist and CS 1 x 10 R/L      Straddle step ups    4\" + " "2\" foam 1 x 10 R/L      PF roll w/ therabar   2 x 20 R/L  2 x 20 R/L   1.5' R/L seated   Objective measures: AROM, MMT, balance        EDUCATION: Pathology, review of impairements, prognosis, activity modification, POC, and HEP     HEP updated and reviewed   Ther Activity         Lunges                 Gait Training                          Modalities 10'  5'       TENS+ CP  Performed 10' post session  Performed 4' post session,  L foot                HEP:     Access Code: [email or web address]  URL: [email or web address]  Date: 05/30/2024  Prepared by: Erika Coreas    Exercises  - Gastroc Stretch on Step  - 3 x daily - 3 sets - 30 seconds hold  - Standing Soleus Stretch on Step  - 3 x daily - 5 reps - 10 seconds hold  - Seated Heel Raise  - 3 x daily - 2 sets - 10 reps  - Standing Heel Raise with Support  - 3 x daily - 1 sets - 10 reps  - Towel Scrunches  - 1 x daily - 1 sets - 20 reps  - Foot Roller Plantar Massage  - 1 x daily - 7 x weekly - 1 reps - 3 min  hold  - Single leg stance with forward reach near counter  - 1 x daily - 1 sets - 10 reps             "

## 2024-06-12 ENCOUNTER — TELEPHONE (OUTPATIENT)
Age: 24
End: 2024-06-12

## 2024-06-12 ENCOUNTER — OFFICE VISIT (OUTPATIENT)
Dept: PHYSICAL THERAPY | Facility: CLINIC | Age: 24
End: 2024-06-12
Payer: COMMERCIAL

## 2024-06-12 DIAGNOSIS — M72.2 PLANTAR FASCIITIS OF LEFT FOOT: Primary | ICD-10-CM

## 2024-06-12 DIAGNOSIS — G43.109 MIGRAINE WITH AURA AND WITHOUT STATUS MIGRAINOSUS, NOT INTRACTABLE: ICD-10-CM

## 2024-06-12 DIAGNOSIS — M79.671 PAIN OF RIGHT HEEL: ICD-10-CM

## 2024-06-12 DIAGNOSIS — M79.672 PAIN OF LEFT HEEL: ICD-10-CM

## 2024-06-12 PROCEDURE — 97110 THERAPEUTIC EXERCISES: CPT

## 2024-06-12 RX ORDER — SUMATRIPTAN 100 MG/1
TABLET, FILM COATED ORAL
Qty: 15 TABLET | Refills: 1 | Status: SHIPPED | OUTPATIENT
Start: 2024-06-12

## 2024-06-12 RX ORDER — SUMATRIPTAN 100 MG/1
TABLET, FILM COATED ORAL
Qty: 15 TABLET | Refills: 1 | Status: SHIPPED | OUTPATIENT
Start: 2024-06-12 | End: 2024-06-12 | Stop reason: SDUPTHER

## 2024-06-12 NOTE — PROGRESS NOTES
Daily Note     Today's date: 2024  Patient name: Misty Blackmon  : 2000  MRN: [id number]  Referring provider: Shady Crow DPM  Dx:   Encounter Diagnosis     ICD-10-CM    1. Plantar fasciitis of left foot  M72.2       2. Pain of right heel  M79.671       3. Pain of left heel  M79.672                      Subjective: Pt reports no new complaints at this time.       Objective: See treatment diary below      Assessment: Patient dem improved tolerance to SL HR of 5 reps. Continue to progres as tolerated and per symptom irritability. Tolerated treatment well with No increase in pain reported in session. Patient would benefit from continued PT.       Plan: Continue per plan of care.  Progress treatment as tolerated.       Precautions:   *Hx of recurring PE  Past Medical History:   Diagnosis Date    Allergic     Anxiety     Anxiety and depression     Asthma     Clotting disorder (HCC)     Factor V Leiden (HCC)     Headache(784.0)     Pulmonary embolism (HCC)     Thrombophlebitis      Past Surgical History:   Procedure Laterality Date    BLADDER SURGERY      FL RETROGRADE PYELOGRAM  2022    FL VCUG VOIDING URETHROCYSTOGRAM  2018    NEPHRECTOMY Left 2020    Done laparoscopically at [facility]    [place] CYSTOURETHROSCOPY N/A 2022    Procedure: CYSTOSCOPY, retrograde pyelogram, and examination under anesthesia, dilation urethral stenosis;  Surgeon: Kamran Lott MD;  Location: [facility] MAIN OR;  Service: Urology    URETER REVISION      WISDOM TOOTH EXTRACTION      woke up during procedure.     SOC: 2024  FOTO: 3/4/2024  POC Expiration: 2024 updated to 2024   Daily Treatment Log  Date: 6/3/2024 2024 6/10/2024 2024    Visit#/ auth: 26  27 28 29    Objective Measures         Palpation        Manuals 10'  10'       PF iso vs man        IASTM B/L PF w/ DF stretch  prone RB  KE       KT PF support w/ pull to prevent ankle inver        Plantar arch tapping         STM to plantar  "fascia         Neuro Re-Ed 20'   10' 5'    LAQ + DF        Wobble board  20x AP ML taps  Pain w/ ML            [place] board seated  Seated 1x15 cw/ccw R/L        Steam boat w/ SLS   2 x 5 R/L on foam: VC for toe flex 2x5 R/L on foam    Cone taps from foam        SL stance w/ forward reach        Side step         Ther Ex 10' 30' 35' 35'    Bike   Upright bike L3x5' Upright bike L3x5' Upright bike L3x5'    Treadmill    2.0 mph x 5' to end 2.0 mph x 5' to end    Toe flex/ ext  Towel scrunches 1 x 20 R/L  1 x 20 toe scrunches seated     1 x 20 toe crunches B/L stding 1 x 20 toe scrunches alt w/ ext seated     Sciatic nerve glide        Self PF stretch seated         Soleus stretch  30\" x 2 B/L slant board 30\" x 2 B/L slant board 30\" x 2 B/L slant board    TB 4 way  BTB 2x15 ea R/L  BTB 1x15 ea R/L       Gastroc stretch  W/ towel 30\"x3 R/L  With SOS  30\"x2 R/L With SOS  30\"x2 R/L With SOS  30\"x2 R/L    Seated HR/ TR 1x15 ea 1x15 ea 1x15 ea 20x ea.     Std HR   1 x 10 B/L     SL 2x5 R/L  1x10 B/L     1x5 R/L    Leg press        SL HR on leg press         FSU w/ knee hike    4\" + 2\" foam w/ occasional UE assist and CS 1 x 10 R/L  4\" + 2\" foam w/ occasional UE assist and CS 1 x 10 R/L     Straddle step ups    4\" + 2\" foam 1 x 10 R/L  4\" + 2\" foam 1 x 10 R/L     PF roll w/ therabar   2 x 20 R/L  2 x 20 R/L  2x20 R/L    Objective measures: AROM, MMT, balance        EDUCATION: Pathology, review of impairements, prognosis, activity modification, POC, and HEP        Ther Activity         Lunges                 Gait Training                          Modalities 10'  5'       TENS+ CP  Performed 10' post session  Performed 4' post session,  L foot                HEP:     Access Code: [email or web address]  URL: [email or web address]  Date: 05/30/2024  Prepared by: Erika Coreas    Exercises  - Gastroc Stretch on Step  - 3 x daily - 3 sets - 30 seconds hold  - Standing Soleus Stretch on Step  - 3 x daily - 5 reps - 10 seconds " hold  - Seated Heel Raise  - 3 x daily - 2 sets - 10 reps  - Standing Heel Raise with Support  - 3 x daily - 1 sets - 10 reps  - Towel Scrunches  - 1 x daily - 1 sets - 20 reps  - Foot Roller Plantar Massage  - 1 x daily - 7 x weekly - 1 reps - 3 min  hold  - Single leg stance with forward reach near counter  - 1 x daily - 1 sets - 10 reps

## 2024-06-12 NOTE — TELEPHONE ENCOUNTER
Reason for call: Out of medication - also request ondansetron sent to office for review  [x] Refill   [] Prior Auth  [] Other:     Office:   [x] PCP/Provider - Dr Sanchez   [] Specialty/Provider -     Medication:  sumatriptan     Dose/Frequency:  100 mg /TAKE 1 TABLET BY MOUTH ONCE AS NEEDED FOR MIGRAINE REPEAT 1 DOSE 6 HOURS LATER NO MORE THAN 3 DAYS A WEEK AS DIRECTED     Quantity: 30D     Pharmacy: [facility] [place]     Does the patient have enough for 3 days?   [] Yes   [x] No - Send as HP to POD

## 2024-06-12 NOTE — TELEPHONE ENCOUNTER
Reason for call: Not on active medication list - Out of medication  Patient also requested sumatriptan sent to pod in a separate encounter  [x] Refill   [] Prior Auth  [] Other:     Office:   [x] PCP/Provider -   [] Specialty/Provider -     Medication: ondansetron    Dose/Frequency: 4 mg Q6H prn     Quantity: 30    Pharmacy: [facility] [address] on file     Does the patient have enough for 3 days?   [] Yes   [x] No - Send as HP to POD

## 2024-06-17 ENCOUNTER — OFFICE VISIT (OUTPATIENT)
Dept: PHYSICAL THERAPY | Facility: CLINIC | Age: 24
End: 2024-06-17
Payer: COMMERCIAL

## 2024-06-17 DIAGNOSIS — M79.671 PAIN OF RIGHT HEEL: ICD-10-CM

## 2024-06-17 DIAGNOSIS — M79.672 PAIN OF LEFT HEEL: ICD-10-CM

## 2024-06-17 DIAGNOSIS — M72.2 PLANTAR FASCIITIS OF LEFT FOOT: Primary | ICD-10-CM

## 2024-06-17 PROCEDURE — 97110 THERAPEUTIC EXERCISES: CPT

## 2024-06-17 PROCEDURE — 97112 NEUROMUSCULAR REEDUCATION: CPT

## 2024-06-17 NOTE — PROGRESS NOTES
"Daily Note     Today's date: 2024  Patient name: Misty Blackmon  : 2000  MRN: [id number]  Referring provider: Shady Crow DPM  Dx:   Encounter Diagnosis     ICD-10-CM    1. Plantar fasciitis of left foot  M72.2       2. Pain of right heel  M79.671       3. Pain of left heel  M79.672                      Subjective: pt reports pain 4/10 on arrival to session. \"Things are okay today\" reports that she cont to have increased pain in the AM/PM when walking barefoot which she feels has been more sore, she has a tendency to walk on the outsides of her foot (supinated) to avoid the pain.     Objective: See treatment diary below      Assessment: Tolerated treatment well. No noted increased pain with standing HR today. Cont to progress as able per symptom irritability. Pt edu when she is going to be on her feet, she should wear her most supportive sneakers. Pt edu to trial putting on supportive shoes in the AM/PM (berks/crocs). Patient would benefit from continued PT      Plan: Continue per plan of care.      Precautions:   *Hx of recurring PE  Past Medical History:   Diagnosis Date    Allergic     Anxiety     Anxiety and depression     Asthma     Clotting disorder (HCC)     Factor V Leiden (HCC)     Headache(784.0)     Pulmonary embolism (HCC)     Thrombophlebitis      Past Surgical History:   Procedure Laterality Date    BLADDER SURGERY      FL RETROGRADE PYELOGRAM  2022    FL VCUG VOIDING URETHROCYSTOGRAM  2018    NEPHRECTOMY Left 2020    Done laparoscopically at [facility]    [place] CYSTOURETHROSCOPY N/A 2022    Procedure: CYSTOSCOPY, retrograde pyelogram, and examination under anesthesia, dilation urethral stenosis;  Surgeon: Kamran Lott MD;  Location: [facility] MAIN OR;  Service: Urology    URETER REVISION      WISDOM TOOTH EXTRACTION      woke up during procedure.     SOC: 2024  FOTO: 3/4/2024  POC Expiration: 2024 updated to 2024   Daily Treatment Log  Date: 6/3/2024 " "6/6/2024 6/10/2024 6/12/2024 6/17/2024   Visit#/ auth: 26  27 28 29 30    Objective Measures         Palpation        Manuals 10'  10'       PF iso vs man        IASTM B/L PF w/ DF stretch  prone RB  KE       KT PF support w/ pull to prevent ankle inver        Plantar arch tapping         STM to plantar fascia         Neuro Re-Ed 20'   10' 5' 15'    LAQ + DF        Wobble board  20x AP ML taps  Pain w/ ML        20x AP/ML     Balance 1' ea     [place] board seated  Seated 1x15 cw/ccw R/L     20x cw/ccw R/L    Steam boat w/ SLS   2 x 5 R/L on foam: VC for toe flex 2x5 R/L on foam 2x5 R/L on foam no shoes    [place] taps from foam        SL stance w/ forward reach        Side step         Ther Ex 10' 30' 35' 35' 30'    Bike   Upright bike L3x5' Upright bike L3x5' Upright bike L3x5' Upright bike L3x5'    Treadmill    2.0 mph x 5' to end 2.0 mph x 5' to end    Toe flex/ ext  Towel scrunches 1 x 20 R/L  1 x 20 toe scrunches seated     1 x 20 toe crunches B/L stding 1 x 20 toe scrunches alt w/ ext seated  20x  toe scrunches alt w/ ext seated     Sciatic nerve glide         Self PF stretch seated         Soleus stretch  30\" x 2 B/L slant board 30\" x 2 B/L slant board 30\" x 2 B/L slant board    TB 4 way  BTB 2x15 ea R/L  BTB 1x15 ea R/L       Gastroc stretch  W/ towel 30\"x3 R/L  With SOS  30\"x2 R/L With SOS  30\"x2 R/L With SOS  30\"x2 R/L W/ towel 30\"x3 R/L    Seated HR/ TR 1x15 ea 1x15 ea 1x15 ea 20x ea.     Std HR   1 x 10 B/L     SL 2x5 R/L  1x10 B/L     1x5 R/L 1x10 B/L     SL HR 2x5 R/L    Leg press        SL HR on leg press         FSU w/ knee hike    4\" + 2\" foam w/ occasional UE assist and CS 1 x 10 R/L  4\" + 2\" foam w/ occasional UE assist and CS 1 x 10 R/L  4\"+2\" foam   1x10 R/L    Straddle step ups    4\" + 2\" foam 1 x 10 R/L  4\" + 2\" foam 1 x 10 R/L  4\" + 2\" foam 1 x 10 R/L     PF roll w/ therabar   2 x 20 R/L  2 x 20 R/L  2x20 R/L 20x R/L     Objective measures: AROM, MMT, balance        EDUCATION: Pathology, review " of impairements, prognosis, activity modification, POC, and HEP        Ther Activity         Lunges                 Gait Training                          Modalities 10'  5'       TENS+ CP  Performed 10' post session  Performed 4' post session,  L foot                HEP:     Access Code: [email or web address]  URL: [email or web address]  Date: 05/30/2024  Prepared by: Erika Coreas    Exercises  - Gastroc Stretch on Step  - 3 x daily - 3 sets - 30 seconds hold  - Standing Soleus Stretch on Step  - 3 x daily - 5 reps - 10 seconds hold  - Seated Heel Raise  - 3 x daily - 2 sets - 10 reps  - Standing Heel Raise with Support  - 3 x daily - 1 sets - 10 reps  - Towel Scrunches  - 1 x daily - 1 sets - 20 reps  - Foot Roller Plantar Massage  - 1 x daily - 7 x weekly - 1 reps - 3 min  hold  - Single leg stance with forward reach near counter  - 1 x daily - 1 sets - 10 reps

## 2024-06-19 ENCOUNTER — OFFICE VISIT (OUTPATIENT)
Dept: PHYSICAL THERAPY | Facility: CLINIC | Age: 24
End: 2024-06-19
Payer: COMMERCIAL

## 2024-06-19 ENCOUNTER — OFFICE VISIT (OUTPATIENT)
Dept: FAMILY MEDICINE CLINIC | Facility: CLINIC | Age: 24
End: 2024-06-19
Payer: COMMERCIAL

## 2024-06-19 ENCOUNTER — TELEPHONE (OUTPATIENT)
Dept: BARIATRICS | Facility: CLINIC | Age: 24
End: 2024-06-19

## 2024-06-19 VITALS
SYSTOLIC BLOOD PRESSURE: 128 MMHG | RESPIRATION RATE: 18 BRPM | OXYGEN SATURATION: 100 % | DIASTOLIC BLOOD PRESSURE: 80 MMHG | HEART RATE: 73 BPM | TEMPERATURE: 97.5 F

## 2024-06-19 DIAGNOSIS — J45.21 MILD INTERMITTENT ASTHMA WITH ACUTE EXACERBATION: ICD-10-CM

## 2024-06-19 DIAGNOSIS — M72.2 PLANTAR FASCIITIS OF LEFT FOOT: Primary | ICD-10-CM

## 2024-06-19 DIAGNOSIS — M79.671 PAIN OF RIGHT HEEL: ICD-10-CM

## 2024-06-19 DIAGNOSIS — F41.8 DEPRESSION WITH ANXIETY: ICD-10-CM

## 2024-06-19 DIAGNOSIS — T78.40XS ALLERGY, SEQUELA: ICD-10-CM

## 2024-06-19 DIAGNOSIS — R07.89 FEELING OF CHEST TIGHTNESS: Primary | ICD-10-CM

## 2024-06-19 DIAGNOSIS — E66.01 CLASS 3 SEVERE OBESITY DUE TO EXCESS CALORIES WITH SERIOUS COMORBIDITY AND BODY MASS INDEX (BMI) OF 40.0 TO 44.9 IN ADULT (HCC): Primary | ICD-10-CM

## 2024-06-19 DIAGNOSIS — M79.672 PAIN OF LEFT HEEL: ICD-10-CM

## 2024-06-19 LAB
SARS-COV-2 AG UPPER RESP QL IA: POSITIVE
VALID CONTROL: ABNORMAL

## 2024-06-19 PROCEDURE — 97112 NEUROMUSCULAR REEDUCATION: CPT

## 2024-06-19 PROCEDURE — 87811 SARS-COV-2 COVID19 W/OPTIC: CPT | Performed by: FAMILY MEDICINE

## 2024-06-19 PROCEDURE — 99214 OFFICE O/P EST MOD 30 MIN: CPT | Performed by: FAMILY MEDICINE

## 2024-06-19 PROCEDURE — 97110 THERAPEUTIC EXERCISES: CPT

## 2024-06-19 RX ORDER — AZITHROMYCIN 250 MG/1
TABLET, FILM COATED ORAL
Qty: 6 TABLET | Refills: 0 | Status: SHIPPED | OUTPATIENT
Start: 2024-06-19 | End: 2024-06-23

## 2024-06-19 RX ORDER — METHYLPREDNISOLONE 4 MG/1
TABLET ORAL
Qty: 21 EACH | Refills: 0 | Status: SHIPPED | OUTPATIENT
Start: 2024-06-19

## 2024-06-19 RX ORDER — ONDANSETRON 4 MG/1
4 TABLET, ORALLY DISINTEGRATING ORAL EVERY 6 HOURS PRN
Qty: 20 TABLET | Refills: 0 | Status: SHIPPED | OUTPATIENT
Start: 2024-06-19

## 2024-06-19 NOTE — TELEPHONE ENCOUNTER
Patient called the RX Refill Line. Message is being forwarded to the office.     Patient is requesting the next dose up in her prescription for wegovy. Patient states she currently takes the 1mg. She reports to have one more pen left.     [facility] [place]    Please contact patient at [phone]

## 2024-06-19 NOTE — PROGRESS NOTES
Daily Note     Today's date: 2024  Patient name: Misty Blackmon  : 2000  MRN: [id number]  Referring provider: Shady Crow DPM  Dx:   Encounter Diagnosis     ICD-10-CM    1. Plantar fasciitis of left foot  M72.2       2. Pain of left heel  M79.672       3. Pain of right heel  M79.671                      Subjective: Pt reports 3-4/10 pain on arrival to session which is mild improvement from previous session.       Objective: See treatment diary below      Assessment: Performed STS from low mat for functional strengthening and active stretch into dorsiflexion. Introduced pt to heel to toe walk on foam, forward and backward; pt challenged by backward walk requiring occasional unilateral UE support on counter. Mild pain reported with backward stepping due to active stretch of plantar fascia. Unable to progress to 3 sets of SL steamboats on foam today due to increase in heel pain. Ended session with plantar fascia rolling on therabar which returned pain to 4/10 on pain scale. Tolerated treatment well. Patient would benefit from continued PT.       Plan: Update HEP to include STS next session.  Continue per plan of care.  Progress treatment as tolerated.       Precautions:   *Hx of recurring PE  Past Medical History:   Diagnosis Date    Allergic     Anxiety     Anxiety and depression     Asthma     Clotting disorder (HCC)     Factor V Leiden (HCC)     Headache(784.0)     Pulmonary embolism (HCC)     Thrombophlebitis      Past Surgical History:   Procedure Laterality Date    BLADDER SURGERY      FL RETROGRADE PYELOGRAM  2022    FL VCUG VOIDING URETHROCYSTOGRAM  2018    NEPHRECTOMY Left 2020    Done laparoscopically at [facility]    [place] CYSTOURETHROSCOPY N/A 2022    Procedure: CYSTOSCOPY, retrograde pyelogram, and examination under anesthesia, dilation urethral stenosis;  Surgeon: Kamran Lott MD;  Location: [facility] MAIN OR;  Service: Urology    URETER REVISION      WISDOM TOOTH  "EXTRACTION      woke up during procedure.     SOC: 2/14/2024  FOTO: 3/4/2024  POC Expiration: 5/8/2024 updated to 7/26/2024   Daily Treatment Log  Date: 6/19/2024 Next session 6/10/2024 6/12/2024 6/17/2024   Visit#/ auth: 31  28 29 30    Objective Measures         Palpation        Manuals         PF iso vs man        IASTM B/L PF w/ DF stretch  prone         KT PF support w/ pull to prevent ankle inver        Plantar arch tapping         STM to plantar fascia         Neuro Re-Ed 10'  10' 5' 15'    LAQ + DF        Wobble board      20x AP/ML     Balance 1' ea    [place] board seated      20x cw/ccw R/L    Steam boat w/ SLS 2x5 R/L on foam no shoes, occasional UE support  2 x 5 R/L on foam: VC for toe flex 2x5 R/L on foam 2x5 R/L on foam no shoes    [place] taps from foam        SL stance w/ forward reach        Tandem walk on foam 2 X 6 laps forward/ backward at counter (6'); occasional UE support       Side step         Ther Ex 25'  35' 35' 30'    Bike  Upright bike L3x5'   Upright bike L3x5' Upright bike L3x5' Upright bike L3x5'    Treadmill    2.0 mph x 5' to end 2.0 mph x 5' to end    Toe flex/ ext   1 x 20 toe scrunches seated     1 x 20 toe crunches B/L stding 1 x 20 toe scrunches alt w/ ext seated  20x  toe scrunches alt w/ ext seated     Sciatic nerve glide         Self PF stretch seated         Soleus stretch 30\" x 2 R/L slant board  30\" x 2 B/L slant board 30\" x 2 B/L slant board    TB 4 way         Gastroc stretch  With SOS  30\"x2 R/L  With SOS  30\"x2 R/L With SOS  30\"x2 R/L W/ towel 30\"x3 R/L    Seated HR/ TR 20x ea.   1x15 ea 20x ea.     Std HR SL HR 2x5 R/L   1 x 10 B/L     SL 2x5 R/L  1x10 B/L     1x5 R/L 1x10 B/L     SL HR 2x5 R/L    Leg press        SL HR on leg press         FSU w/ knee hike  4\" + 2\" foam 1 x 12 R/L    4\" + 2\" foam w/ occasional UE assist and CS 1 x 10 R/L  4\" + 2\" foam w/ occasional UE assist and CS 1 x 10 R/L  4\"+2\" foam   1x10 R/L    Straddle step ups  4\" + 2\" foam 1 x 12 R/L    4\" + " "2\" foam 1 x 10 R/L  4\" + 2\" foam 1 x 10 R/L  4\" + 2\" foam 1 x 10 R/L     PF roll w/ therabar    2 x 20 R/L  2x20 R/L 20x R/L     Objective measures: AROM, MMT, balance        EDUCATION: Pathology, review of impairements, prognosis, activity modification, POC, and HEP  Update HEP this visit; add STS      Ther Activity 5'        Lunges         STS Low mat 2 x 10       Gait Training                          Modalities         TENS+ CP                  HEP:     Access Code: [email or web address]  URL: [email or web address]  Date: 05/30/2024  Prepared by: Erika Coreas    Exercises  - Gastroc Stretch on Step  - 3 x daily - 3 sets - 30 seconds hold  - Standing Soleus Stretch on Step  - 3 x daily - 5 reps - 10 seconds hold  - Seated Heel Raise  - 3 x daily - 2 sets - 10 reps  - Standing Heel Raise with Support  - 3 x daily - 1 sets - 10 reps  - Towel Scrunches  - 1 x daily - 1 sets - 20 reps  - Foot Roller Plantar Massage  - 1 x daily - 7 x weekly - 1 reps - 3 min  hold  - Single leg stance with forward reach near counter  - 1 x daily - 1 sets - 10 reps                 "

## 2024-06-19 NOTE — PROGRESS NOTES
"Misty Blackmon 2000 female MRN: [id number]    [facility] PRACTICE OFFICE VISIT  [facility] - [facility]      ASSESSMENT/PLAN  Misty Blackmon is a 24 y.o. female presents to the office for    Diagnoses and all orders for this visit:    Feeling of chest tightness  -     Poct Covid 19 Rapid Antigen Test  -     methylPREDNISolone 4 MG tablet therapy pack; Use as directed on package  -     azithromycin (ZITHROMAX) 250 mg tablet; Take 2 tablets today then 1 tablet daily x 4 days  -     ondansetron (ZOFRAN-ODT) 4 mg disintegrating tablet; Take 1 tablet (4 mg total) by mouth every 6 (six) hours as needed for nausea or vomiting    Depression with anxiety    Mild intermittent asthma with acute exacerbation  -     methylPREDNISolone 4 MG tablet therapy pack; Use as directed on package  -     azithromycin (ZITHROMAX) 250 mg tablet; Take 2 tablets today then 1 tablet daily x 4 days    Allergy, sequela       Very faint line of COVID recommended Z-Esdras and Medrol pack to be obtained.  Depression with anxiety currently controlled on the medications as prescribed no changes to be made at this time.  Patient will need a refill of her EpiPen does have a history of allergies to cinnamon           Future Appointments   Date Time Provider Department Center   6/24/2024 10:15 AM Adelaide Tilley PTA [facility] [place]   6/26/2024 10:15 AM Erika Coreas [facility] [place]   6/27/2024  8:15 AM Shady Crow DPM [facility]   7/2/2024  1:30 PM OLEG Smith [facility]          SUBJECTIVE  CC: Cough (A cough that \"hurts to breath.\" Pt states she has chest tightness with some lightheadedness /Per pt mom tested positive on Monday for covid /Pt tested negative for covid 3 times)      HPI:  Misty Blackmon is a 24 y.o. female who presents for an acute appointment.  Patient states she has had a cough and it hurts her to breathe for the last 2 weeks.  Her mom just " tested positive for COVID but the patient states that she is tested herself several times and it was negative.  Patient states that she has not had any fevers or chills just feels like it is very tough to breathe.  Has been taking her depression medications as prescribed without any side effects.  Will need documentation about her allergy to cinnamon and refill her EpiPen  Review of Systems   Constitutional:  Negative for activity change, appetite change, chills, fatigue and fever.   HENT:  Negative for congestion.    Respiratory:  Positive for cough and chest tightness. Negative for shortness of breath.    Cardiovascular:  Negative for chest pain and leg swelling.   Gastrointestinal:  Negative for abdominal distention, abdominal pain, constipation, diarrhea, nausea and vomiting.   All other systems reviewed and are negative.      Historical Information   The patient history was reviewed as follows:  Past Medical History:   Diagnosis Date   • Allergic    • Anxiety    • Anxiety and depression    • Asthma    • Clotting disorder (HCC)    • Factor V Leiden (HCC)    • Headache(784.0)    • Pulmonary embolism (HCC)    • Thrombophlebitis          Medications:     Current Outpatient Medications:   •  albuterol (PROVENTIL HFA,VENTOLIN HFA) 90 mcg/act inhaler, Inhale 2 puffs every 4 (four) hours as needed for wheezing or shortness of breath, Disp: 18 g, Rfl: 6  •  azithromycin (ZITHROMAX) 250 mg tablet, Take 2 tablets today then 1 tablet daily x 4 days, Disp: 6 tablet, Rfl: 0  •  buPROPion (Wellbutrin XL) 150 mg 24 hr tablet, Take 1 tablet (150 mg total) by mouth daily, Disp: 30 tablet, Rfl: 1  •  busPIRone (BUSPAR) 15 mg tablet, Take 1 tablet (15 mg total) by mouth 3 (three) times a day, Disp: 270 tablet, Rfl: 3  •  butalbital-acetaminophen-caffeine (FIORICET,ESGIC) -40 mg per tablet, Take 1 tablet now, and then repeat in 4 hrs if no relief. Don't take more then 3 in a week., Disp: 30 tablet, Rfl: 0  •   Fluticasone-Salmeterol (Advair Diskus) 250-50 mcg/dose inhaler, Inhale 1 puff 2 (two) times a day Rinse mouth after use., Disp: 60 blister, Rfl: 8  •  hydrOXYzine HCL (ATARAX) 50 mg tablet, take 1 tablet by mouth at bedtime, Disp: 90 tablet, Rfl: 2  •  levonorgestrel (KYLEENA) 19.5 MG intrauterine device, 1 each by Intrauterine route, Disp: , Rfl:   •  methylPREDNISolone 4 MG tablet therapy pack, Use as directed on package, Disp: 21 each, Rfl: 0  •  ondansetron (ZOFRAN-ODT) 4 mg disintegrating tablet, Take 1 tablet (4 mg total) by mouth every 6 (six) hours as needed for nausea or vomiting, Disp: 20 tablet, Rfl: 0  •  sertraline (ZOLOFT) 50 mg tablet, TAKE 3 TABLETS BY MOUTH EVERY DAY AT BEDTIME, Disp: 270 tablet, Rfl: 3  •  SUMAtriptan (IMITREX) 100 mg tablet, TAKE 1 TABLET BY MOUTH ONCE AS NEEDED FOR MIGRAINE REPEAT 1 DOSE 6 HOURS LATER NO MORE THAN 3 DAYS A WEEK AS DIRECTED, Disp: 15 tablet, Rfl: 1  •  topiramate (TOPAMAX) 50 MG tablet, Take 1 tablet (50 mg total) by mouth daily at bedtime, Disp: 90 tablet, Rfl: 4  •  EPINEPHrine (EPIPEN) 0.3 mg/0.3 mL SOAJ, Inject 0.3 mL (0.3 mg total) into a muscle once for 1 dose, Disp: 0.6 mL, Rfl: 0  •  Semaglutide-Weight Management (WEGOVY) 1.7 MG/0.75ML, Inject 0.75 mL (1.7 mg total) under the skin once a week, Disp: 3 mL, Rfl: 0    Allergies   Allergen Reactions   • Amoxicillin Anaphylaxis   • Cinnamon - Food Allergy Anaphylaxis       OBJECTIVE  Vitals:   Vitals:    06/19/24 1357   BP: 128/80   Pulse: 73   Resp: 18   Temp: 97.5 °F (36.4 °C)   SpO2: 100%         Physical Exam  Vitals reviewed.   Constitutional:       Appearance: She is well-developed.   HENT:      Head: Normocephalic and atraumatic.   Eyes:      Extraocular Movements: EOM normal.      Conjunctiva/sclera: Conjunctivae normal.      Pupils: Pupils are equal, round, and reactive to light.   Cardiovascular:      Rate and Rhythm: Normal rate and regular rhythm.      Heart sounds: Normal heart sounds, S1 normal  and S2 normal. No murmur heard.  Pulmonary:      Effort: Pulmonary effort is normal. No respiratory distress.      Breath sounds: Normal breath sounds. No wheezing.      Comments: Tight breath sounds but no wheezing present  Musculoskeletal:         General: No edema. Normal range of motion.      Cervical back: Normal range of motion and neck supple.   Skin:     General: Skin is warm.   Neurological:      Mental Status: She is alert and oriented to person, place, and time.   Psychiatric:         Mood and Affect: Mood and affect normal.         Speech: Speech normal.         Behavior: Behavior normal.         Thought Content: Thought content normal.         Judgment: Judgment normal.                    Sue Sanchez MD,   [facility]  6/20/2024

## 2024-06-19 NOTE — TELEPHONE ENCOUNTER
I have sent the script for Wegovy 1.7mg - please reschedule her appointment for the week in July, thank you!

## 2024-06-24 ENCOUNTER — APPOINTMENT (OUTPATIENT)
Dept: PHYSICAL THERAPY | Facility: CLINIC | Age: 24
End: 2024-06-24
Payer: COMMERCIAL

## 2024-06-26 ENCOUNTER — OFFICE VISIT (OUTPATIENT)
Dept: PHYSICAL THERAPY | Facility: CLINIC | Age: 24
End: 2024-06-26
Payer: COMMERCIAL

## 2024-06-26 DIAGNOSIS — M79.672 PAIN OF LEFT HEEL: ICD-10-CM

## 2024-06-26 DIAGNOSIS — M79.671 PAIN OF RIGHT HEEL: ICD-10-CM

## 2024-06-26 DIAGNOSIS — M72.2 PLANTAR FASCIITIS OF LEFT FOOT: Primary | ICD-10-CM

## 2024-06-26 PROCEDURE — 97110 THERAPEUTIC EXERCISES: CPT

## 2024-06-26 PROCEDURE — 97112 NEUROMUSCULAR REEDUCATION: CPT

## 2024-06-26 NOTE — PROGRESS NOTES
Daily Note     Today's date: 2024  Patient name: Misty Blackmon  : 2000  MRN: [id number]  Referring provider: Shady Crow DPM  Dx:   Encounter Diagnosis     ICD-10-CM    1. Plantar fasciitis of left foot  M72.2       2. Pain of left heel  M79.672       3. Pain of right heel  M79.671                      Subjective: pt reports she stood all day yesterday and her feet were bothering her, they are feeling okay today. Pt has a f/u with her MD tomorrow. Pt reports pain 4/10      Objective: See treatment diary below      Assessment: Tolerated treatment well. Dec pain post session 2/10, has been increasing her stretching at home which she has found to be helpful. Patient exhibited good technique with therapeutic exercises      Plan: Continue per plan of care.  RE next session 24      Precautions:   *Hx of recurring PE  Past Medical History:   Diagnosis Date    Allergic     Anxiety     Anxiety and depression     Asthma     Clotting disorder (HCC)     Factor V Leiden (HCC)     Headache(784.0)     Pulmonary embolism (HCC)     Thrombophlebitis      Past Surgical History:   Procedure Laterality Date    BLADDER SURGERY      FL RETROGRADE PYELOGRAM  2022    FL VCUG VOIDING URETHROCYSTOGRAM  2018    NEPHRECTOMY Left 2020    Done laparoscopically at [facility]    MA CYSTOURETHROSCOPY N/A 2022    Procedure: CYSTOSCOPY, retrograde pyelogram, and examination under anesthesia, dilation urethral stenosis;  Surgeon: Kamran Lott MD;  Location: [facility] MAIN OR;  Service: Urology    URETER REVISION      WISDOM TOOTH EXTRACTION      woke up during procedure.     SOC: 2024  FOTO: 3/4/2024  POC Expiration: 2024 updated to 2024   Daily Treatment Log  Date: 2024   Visit#/ auth: 31 32  29 30    Objective Measures         Palpation        Manuals         PF iso vs man        IASTM B/L PF w/ DF stretch  prone         KT PF support w/ pull to prevent ankle  "inver        Plantar arch tapping         STM to plantar fascia         Neuro Re-Ed 10' 13'   5' 15'    LAQ + DF        Wobble board   Balance 1' ea      SL AP taps 1x10 R/L    20x AP/ML     Balance 1' ea    [place] board seated   15x cw/ccw    20x cw/ccw R/L    Steam boat w/ SLS 2x5 R/L on foam no shoes, occasional UE support 2x5 R/L on foam no shoes, occasional UE support   2x5 R/L on foam 2x5 R/L on foam no shoes    [place] taps from foam        SL stance w/ forward reach        Tandem walk on foam 2 X 6 laps forward/ backward at counter (6'); occasional UE support 2x6 laps fwd/bwd       Side step         Ther Ex 25' 25'   35' 30'    Bike  Upright bike L3x5'  Upright bike L3x5'  Upright bike L3x5' Upright bike L3x5'    Treadmill     2.0 mph x 5' to end    Toe flex/ ext    1 x 20 toe scrunches alt w/ ext seated  20x  toe scrunches alt w/ ext seated     Sciatic nerve glide         Self PF stretch seated         Soleus stretch 30\" x 2 R/L slant board   30\" x 2 B/L slant board    TB 4 way         Gastroc stretch  With SOS  30\"x2 R/L Slant board 30\"x2 R/L   With SOS  30\"x2 R/L W/ towel 30\"x3 R/L    Seated HR/ TR 20x ea.    20x ea.     Std HR SL HR 2x5 R/L  SL HR 3x5 R/L   1x10 B/L     1x5 R/L 1x10 B/L     SL HR 2x5 R/L    Leg press        SL HR on leg press         FSU w/ knee hike  4\" + 2\" foam 1 x 12 R/L   6\"+2\" foam   1x10 R/L no shoes   4\" + 2\" foam w/ occasional UE assist and CS 1 x 10 R/L  4\"+2\" foam   1x10 R/L    Straddle step ups  4\" + 2\" foam 1 x 12 R/L   6\"+2\" foam   1x10 R/L  no shoes   4\" + 2\" foam 1 x 10 R/L  4\" + 2\" foam 1 x 10 R/L     PF roll w/ therabar     2x20 R/L 20x R/L     Objective measures: AROM, MMT, balance        EDUCATION: Pathology, review of impairements, prognosis, activity modification, POC, and HEP   Update HEP this visit; add STS     Ther Activity 5'        Lunges         STS Low mat 2 x 10       Gait Training                          Modalities         TENS+ CP                  HEP: "     Access Code: [email or web address]  URL: [email or web address]  Date: 05/30/2024  Prepared by: Erika Coreas    Exercises  - Gastroc Stretch on Step  - 3 x daily - 3 sets - 30 seconds hold  - Standing Soleus Stretch on Step  - 3 x daily - 5 reps - 10 seconds hold  - Seated Heel Raise  - 3 x daily - 2 sets - 10 reps  - Standing Heel Raise with Support  - 3 x daily - 1 sets - 10 reps  - Towel Scrunches  - 1 x daily - 1 sets - 20 reps  - Foot Roller Plantar Massage  - 1 x daily - 7 x weekly - 1 reps - 3 min  hold  - Single leg stance with forward reach near counter  - 1 x daily - 1 sets - 10 reps

## 2024-06-27 ENCOUNTER — OFFICE VISIT (OUTPATIENT)
Age: 24
End: 2024-06-27
Payer: COMMERCIAL

## 2024-06-27 VITALS
HEART RATE: 71 BPM | BODY MASS INDEX: 41.48 KG/M2 | HEIGHT: 65 IN | SYSTOLIC BLOOD PRESSURE: 129 MMHG | WEIGHT: 249 LBS | DIASTOLIC BLOOD PRESSURE: 91 MMHG

## 2024-06-27 DIAGNOSIS — M72.2 PLANTAR FASCIITIS OF LEFT FOOT: Primary | ICD-10-CM

## 2024-06-27 PROCEDURE — 99213 OFFICE O/P EST LOW 20 MIN: CPT | Performed by: STUDENT IN AN ORGANIZED HEALTH CARE EDUCATION/TRAINING PROGRAM

## 2024-06-27 NOTE — PROGRESS NOTES
"This patient was seen on 6/27/2024.    My role is Foot , Ankle, and Wound Specialist    ASSESSMENT     Diagnoses and all orders for this visit:    Plantar fasciitis of left foot           Problem List Items Addressed This Visit          Musculoskeletal and Integument    Plantar fasciitis of left foot - Primary       PLAN  -Patient was educated regarding their condition.  -Continue night splints, daily stretching program, again reinforced the importance of proper footwear and orthotics  -Corticosteroid injection not given today  -Continue physical therapy as needed  -Patient will RTC as needed for new or worsening podiatric concerns    SUBJECTIVE    Chief Complaint:  Left heel pain     Patient ID: Misty Blackmon     6/27/2024: Misty states that her ankle as well as her left plantar foot are doing well today.  She states that she does still have very occasional soreness to the left plantar foot however it is at least 50% better compared to her previous visit.  She would like to know if she should continue physical therapy.         The following portions of the patient's history were reviewed and updated as appropriate: allergies, current medications, past family history, past medical history, past social history, past surgical history and problem list.    Review of Systems   Constitutional: Negative.    HENT: Negative.     Respiratory: Negative.     Cardiovascular: Negative.    Gastrointestinal: Negative.    Musculoskeletal:  Negative for myalgias.   Skin: Negative.    Neurological: Negative.          OBJECTIVE      /91   Pulse 71   Ht 5' 5\" (1.651 m)   Wt 113 kg (249 lb)   LMP  (LMP Unknown)   BMI 41.44 kg/m²        Physical Exam  Constitutional:       Appearance: Normal appearance.   HENT:      Head: Normocephalic and atraumatic.   Eyes:      General:         Right eye: No discharge.         Left eye: No discharge.   Cardiovascular:      Rate and Rhythm: Normal rate and regular rhythm.      Pulses:    "        Dorsalis pedis pulses are 2+ on the right side and 2+ on the left side.        Posterior tibial pulses are 2+ on the right side and 2+ on the left side.   Pulmonary:      Effort: Pulmonary effort is normal.      Breath sounds: Normal breath sounds.   Skin:     General: Skin is warm.      Capillary Refill: Capillary refill takes less than 2 seconds.   Neurological:      Sensory: Sensation is intact. No sensory deficit.       MSK:  -No pain along the course of the peroneal tendons of the left ankle at today's visit.  No pain with plantarflexion and eversion against resistance.  -No pain along the course of the plantar fascia of the left foot today  -no pain with compression of both sides of heel  -No gross deformities noted  -Active range of motion lesser digits intact  -MMT 5/5 to all muscle compartments of the lower extremity  -Ankle dorsiflexion is less than 10 degrees with knee extended, and knee flexed.  This is slightly improved compared to the patient's previous visit.     Neuro:  -Light sensation intact bilaterally  -Protective sensation intact bilaterally  -Tinel sign negative

## 2024-07-01 ENCOUNTER — TELEPHONE (OUTPATIENT)
Age: 24
End: 2024-07-01

## 2024-07-01 ENCOUNTER — OFFICE VISIT (OUTPATIENT)
Dept: FAMILY MEDICINE CLINIC | Facility: CLINIC | Age: 24
End: 2024-07-01
Payer: COMMERCIAL

## 2024-07-01 VITALS
RESPIRATION RATE: 18 BRPM | DIASTOLIC BLOOD PRESSURE: 80 MMHG | HEART RATE: 75 BPM | WEIGHT: 233.2 LBS | TEMPERATURE: 97.7 F | HEIGHT: 65 IN | BODY MASS INDEX: 38.85 KG/M2 | SYSTOLIC BLOOD PRESSURE: 120 MMHG | OXYGEN SATURATION: 98 %

## 2024-07-01 DIAGNOSIS — G43.109 MIGRAINE WITH AURA AND WITHOUT STATUS MIGRAINOSUS, NOT INTRACTABLE: Primary | ICD-10-CM

## 2024-07-01 DIAGNOSIS — F41.8 DEPRESSION WITH ANXIETY: ICD-10-CM

## 2024-07-01 PROCEDURE — 99214 OFFICE O/P EST MOD 30 MIN: CPT | Performed by: FAMILY MEDICINE

## 2024-07-01 PROCEDURE — 96372 THER/PROPH/DIAG INJ SC/IM: CPT

## 2024-07-01 RX ORDER — KETOROLAC TROMETHAMINE 30 MG/ML
30 INJECTION, SOLUTION INTRAMUSCULAR; INTRAVENOUS ONCE
Qty: 1 ML | Refills: 0 | Status: SHIPPED | OUTPATIENT
Start: 2024-07-01 | End: 2024-07-01

## 2024-07-01 RX ORDER — KETOROLAC TROMETHAMINE 30 MG/ML
30 INJECTION, SOLUTION INTRAMUSCULAR; INTRAVENOUS ONCE
Status: COMPLETED | OUTPATIENT
Start: 2024-07-01 | End: 2024-07-01

## 2024-07-01 RX ADMIN — KETOROLAC TROMETHAMINE 30 MG: 30 INJECTION, SOLUTION INTRAMUSCULAR; INTRAVENOUS at 14:00

## 2024-07-01 NOTE — TELEPHONE ENCOUNTER
Pt was calling for a same day appt with PCP. No appts available. Reports having a persistent migraines since Friday. Reports taking her migraine medication on Friday and migraine subsided and came back. Pt has been taking ibuprofen in between and migraine still persists. Pt would like to know if PCP would consider giving her a shot for her migraine today. Pt was also put on schedule for tomorrow. Please advise.

## 2024-07-01 NOTE — PROGRESS NOTES
Misty Blackmon 2000 female MRN: [id number]    [facility] PRACTICE OFFICE VISIT  [facility] - [facility]      ASSESSMENT/PLAN  Misty Blackmon is a 24 y.o. female presents to the office for    Diagnoses and all orders for this visit:    Migraine with aura and without status migrainosus, not intractable  -     Discontinue: ketorolac (TORADOL) 30 mg/mL injection; Inject 1 mL (30 mg total) into a muscle once for 1 dose  -     ketorolac (TORADOL) injection 30 mg  -     Ambulatory Referral to Neurology; Future    Depression with anxiety       At this time given that the patient has had continuous migraines I recommend that she see neurology  And rescue Toradol shot given to the patient today  Recommend patient taking magnesium 250 mg daily  Taking her depression medications as prescribed without any side effects continue as prescribed         Future Appointments   Date Time Provider Department Center   7/2/2024  8:00 AM MIRTA [facility]by [facility]   7/19/2024  8:00 AM PHILIP [facility]          SUBJECTIVE  CC: Migraine      HPI:  Misty Blackmon is a 24 y.o. female who presents for an acute appointment.  Patient has had a migraine for the last couple days with no improvement with her rescue medications.  Has not seen a neurologist in a very long time.  Patient has been hydrating well; and not exposing herself to too much sun.  And eating very well.  Patient is coming because Toradol shots have worked in the past.  No side effects to the patient's depression medications reported today  Review of Systems   Constitutional:  Negative for activity change, appetite change, chills, fatigue and fever.   HENT:  Negative for congestion.    Respiratory:  Negative for cough, chest tightness and shortness of breath.    Cardiovascular:  Negative for chest pain and leg swelling.   Gastrointestinal:  Negative for abdominal distention, abdominal pain,  constipation, diarrhea, nausea and vomiting.   Neurological:  Positive for headaches.   All other systems reviewed and are negative.      Historical Information   The patient history was reviewed as follows:  Past Medical History:   Diagnosis Date   • Allergic    • Anxiety    • Anxiety and depression    • Asthma    • Clotting disorder (HCC)    • Factor V Leiden (HCC)    • Headache(784.0)    • Pulmonary embolism (HCC)    • Thrombophlebitis          Medications:     Current Outpatient Medications:   •  albuterol (PROVENTIL HFA,VENTOLIN HFA) 90 mcg/act inhaler, Inhale 2 puffs every 4 (four) hours as needed for wheezing or shortness of breath, Disp: 18 g, Rfl: 6  •  Fluticasone-Salmeterol (Advair Diskus) 250-50 mcg/dose inhaler, Inhale 1 puff 2 (two) times a day Rinse mouth after use., Disp: 60 blister, Rfl: 8  •  hydrOXYzine HCL (ATARAX) 50 mg tablet, take 1 tablet by mouth at bedtime, Disp: 90 tablet, Rfl: 2  •  levonorgestrel (KYLEENA) 19.5 MG intrauterine device, 1 each by Intrauterine route, Disp: , Rfl:   •  ondansetron (ZOFRAN-ODT) 4 mg disintegrating tablet, Take 1 tablet (4 mg total) by mouth every 6 (six) hours as needed for nausea or vomiting, Disp: 20 tablet, Rfl: 0  •  Semaglutide-Weight Management (WEGOVY) 1.7 MG/0.75ML, Inject 0.75 mL (1.7 mg total) under the skin once a week, Disp: 3 mL, Rfl: 0  •  sertraline (ZOLOFT) 50 mg tablet, TAKE 3 TABLETS BY MOUTH EVERY DAY AT BEDTIME, Disp: 270 tablet, Rfl: 3  •  SUMAtriptan (IMITREX) 100 mg tablet, TAKE 1 TABLET BY MOUTH ONCE AS NEEDED FOR MIGRAINE REPEAT 1 DOSE 6 HOURS LATER NO MORE THAN 3 DAYS A WEEK AS DIRECTED, Disp: 15 tablet, Rfl: 1  •  topiramate (TOPAMAX) 50 MG tablet, Take 1 tablet (50 mg total) by mouth daily at bedtime, Disp: 90 tablet, Rfl: 4  •  buPROPion (Wellbutrin XL) 150 mg 24 hr tablet, Take 1 tablet (150 mg total) by mouth daily, Disp: 30 tablet, Rfl: 1  •  busPIRone (BUSPAR) 15 mg tablet, Take 1 tablet (15 mg total) by mouth 3 (three) times  "a day, Disp: 270 tablet, Rfl: 3  •  butalbital-acetaminophen-caffeine (FIORICET,ESGIC) -40 mg per tablet, Take 1 tablet now, and then repeat in 4 hrs if no relief. Don't take more then 3 in a week. (Patient not taking: Reported on 7/1/2024), Disp: 30 tablet, Rfl: 0  •  EPINEPHrine (EPIPEN) 0.3 mg/0.3 mL SOAJ, Inject 0.3 mL (0.3 mg total) into a muscle once for 1 dose, Disp: 0.6 mL, Rfl: 0  No current facility-administered medications for this visit.    Allergies   Allergen Reactions   • Amoxicillin Anaphylaxis   • Cinnamon - Food Allergy Anaphylaxis       OBJECTIVE  Vitals:   Vitals:    07/01/24 1338   BP: 120/80   BP Location: Left arm   Patient Position: Sitting   Cuff Size: Large   Pulse: 75   Resp: 18   Temp: 97.7 °F (36.5 °C)   TempSrc: Temporal   SpO2: 98%   Weight: 106 kg (233 lb 3.2 oz)   Height: 5' 5\" (1.651 m)         Physical Exam  Vitals reviewed.   Constitutional:       Appearance: She is well-developed.   HENT:      Head: Normocephalic and atraumatic.      Right Ear: Tympanic membrane, ear canal and external ear normal.      Left Ear: Tympanic membrane, ear canal and external ear normal.      Mouth/Throat:      Mouth: Mucous membranes are moist.   Eyes:      Extraocular Movements: EOM normal.      Conjunctiva/sclera: Conjunctivae normal.      Pupils: Pupils are equal, round, and reactive to light.   Cardiovascular:      Rate and Rhythm: Normal rate and regular rhythm.      Heart sounds: Normal heart sounds, S1 normal and S2 normal. No murmur heard.  Pulmonary:      Effort: Pulmonary effort is normal. No respiratory distress.      Breath sounds: Normal breath sounds. No wheezing.   Musculoskeletal:         General: No edema. Normal range of motion.      Cervical back: Normal range of motion and neck supple.   Skin:     General: Skin is warm.   Neurological:      General: No focal deficit present.      Mental Status: She is alert and oriented to person, place, and time. Mental status is at " baseline.   Psychiatric:         Mood and Affect: Mood and affect normal.         Speech: Speech normal.         Behavior: Behavior normal.         Thought Content: Thought content normal.         Judgment: Judgment normal.                    Sue Sanchez MD,   [facility]  7/1/2024

## 2024-07-02 ENCOUNTER — EVALUATION (OUTPATIENT)
Dept: PHYSICAL THERAPY | Facility: CLINIC | Age: 24
End: 2024-07-02
Payer: COMMERCIAL

## 2024-07-02 ENCOUNTER — TELEPHONE (OUTPATIENT)
Dept: FAMILY MEDICINE CLINIC | Facility: CLINIC | Age: 24
End: 2024-07-02

## 2024-07-02 DIAGNOSIS — M79.671 PAIN OF RIGHT HEEL: ICD-10-CM

## 2024-07-02 DIAGNOSIS — M72.2 PLANTAR FASCIITIS OF LEFT FOOT: Primary | ICD-10-CM

## 2024-07-02 DIAGNOSIS — M79.672 PAIN OF LEFT HEEL: ICD-10-CM

## 2024-07-02 PROCEDURE — 97112 NEUROMUSCULAR REEDUCATION: CPT

## 2024-07-02 PROCEDURE — 97110 THERAPEUTIC EXERCISES: CPT

## 2024-07-02 NOTE — PROGRESS NOTES
PT Re-Evaluation  and PT Discharge    Today's date: 2024  Patient name: Misty Blackmon  : 2000  MRN: [id number]  Referring provider: Shayd Crow DPM  Dx:   Encounter Diagnosis     ICD-10-CM    1. Plantar fasciitis of left foot  M72.2       2. Pain of left heel  M79.672       3. Pain of right heel  M79.671                        Assessment  Impairments: abnormal or restricted ROM, activity intolerance, pain with function, weight-bearing intolerance and poor body mechanics    Assessment details: 2024 Misty Blackmon is a 24 y.o. female who presents with bilateral foot and ankle pain (L greater than R). Pt demonstrates impaired ankle AROM (L deficits greater than R), L ankle strength, gastroc flexibility bilaterally, standing posture, and gait. 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 Patient's clinical presentation is consistent with their referring diagnosis of Plantar fasciitis of left foot, as well as pain of left and right heel. Patient has been educated in pathology, review of impairements, prognosis, activity modification, POC, and HEP. Patient would benefit from skilled physical therapy services to address their aforementioned functional limitations and progress towards prior level of function and independence with home exercise program.     3/18/2024: Misty Blackmon is a 24 y.o. female who presents with bilateral foot and ankle pain (L greater than R). Pt demonstrates improving pain levels, ankle AROM and strength, LE strength, gait and balance bilaterally. Despite improvements, pt continues to demonstrate impaired ankle AROM (L deficits greater than R), L ankle strength and hip abduction strength. On examination today patient is TTP just distal to lateral malleolus and demonstrates decrease in L ankle eversion AROM and strength attributed to  patient's recent fall which resulted in L ankle pain. 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 Patient's clinical presentation is consistent with their referring diagnosis of Plantar fasciitis of left foot, as well as pain of left and right heel. Patient has been educated in progress, remaining impairments, prognosis, activity modification, POC, and HEP. Pt instructed to monitor new L lateral ankle pain and report to MD if pain persists/ increases. Patient would benefit from skilled physical therapy services to address their aforementioned functional limitations and progress towards prior level of function and independence with home exercise program.     4/24/2024: Misty Blackmon is a 24 y.o. female who presents with improving B/L foot pain. Pt received corticosteroid injection in L foot and has noted improvement in plantar fascia pain, yet heel pain/ soreness is present. L foot remains more painful than R. On examination pt demonstrates improved ankle AROM and strength, LE strength, and L single leg balance. Despite improvements, pt continues to demonstrate impaired gastroc and soleus flexibility and L ankle AROM. 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 Pt instructed to monitor L 1st toe where she reported stepping on pin at home; no abnormalities noted along plantar aspect of foot, however. Patient has been educated in progress, remaining impairments, prognosis, activity modification, POC, and HEP. Patient would benefit from skilled physical therapy services to address their aforementioned functional limitations and progress towards prior level of function and independence with home exercise program.     5/20/2024: Misty SAHNI  Neymar is a 24 y.o. female who presents with improving B/L foot pain. Pt has attended PT for 3 months; while progress has been slow, pt does report improvements in pain and function. On examination pt presents with similar lower extremity AROM and strength compared to previous session. Pt does demonstrate improving single leg balance and functional mobility as she has returned to barefoot walking in her home and reciprocal stairs 80% of the time. Pt continues to present with impaired ankle AROM bilaterally (L deficits greater than R), BLE strength, muscular endurance, gait, and posture affecting her standing and walking tolerance and ability to perform stairs. 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 Patient has been educated in progress, remaining impairments, prognosis, activity modification, POC, and HEP. Patient would benefit from skilled physical therapy services to address their aforementioned functional limitations and progress towards prior level of function and independence with home exercise program.     7/2/2024: Misty Blackmon is a 24 y.o. female who presents with improving B/L foot pain. Pt has attended PT for ~4 months. On examination pt presents with minimal improvement in bilateral ankle AROM, strength, and functional mobility, balance, and gait. While pt has demonstrated improved weight bearing tolerance since IE, she pt continues to demonstrate impaired standing and walking tolerance and ability to climb stairs reciprocally at end of day. Pt continues to present with impaired ankle DF AROM and PROM. Patient has been educated in progress, remaining impairments, activity modification, HEP, and importance of HEP adherence for improvements in pain and function. Despite remaining impairments, pt demonstrates readiness to discharge to HEP as she is comfortable with HEP. She is familiar with how to progress exercises as appropriate.   "Pt instructed that she is welcome to return if needed. Current episode of care to be discharged.           Goals  Short Term Goals 4 WEEKS Target Date (3/13/2024)  1. Establish [place] w/ progressing HEP for ROM/strength. (MET 3/18/2024)  2. Improve AROM L ankle P/D to be within 5 degrees of R to prepare for reciprocal stairs. (MET 3/18/2024)  3. Improve AROM L ankle eversion equal to R to prepare for negotiation of uneven terrain. (MET 5/20/2024  4. Improve L ankle strength to 4/5 or better w/out pain for improved tolerance to WB. (MET 5/20/2024)    Long Term Goals 12 WEEKS: Target Date (5/8/2024) updated to 7/26/2024  1. Improve L ankle strength to 4+/5 or better to allow SLS x 20\" or more. (MET 5/2024)   2. Improve tolerance to weight bearing to 4 hours or more for return to pain-free teaching. (Partially met, 7/2/2024)  3. Ankle AROM WFL B/L to allow recipcrocal stairs w/o handrail. (Partially met, 7/2/2024)  4. Resolve gait deviations for return to safe community ambulation. (Partially met, 7/2/2024)  5. Pt will return to gym for lower body strengthening with report of 0/10 to demo return to PLOF. (Partially met, 7/2/2024)    Plan    Duration in weeks: 12  Plan of Care beginning date: 2/14/2024  Plan of Care expiration date: 7/26/2024  Treatment plan discussed with: patient  Plan details: Pt confident discharging to [facility]. Pt educated that she is welcome to return. Current episode of care to be discharged at this time.         Subjective Evaluation    History of Present Illness  Mechanism of injury: 2/14/2024: Pt is a 24 y.o female who reports to PT with compliant of bilateral foot pain (L greater than R). Pain described as sharp. Pain started in L ankle/ heel began in October; R foot pain began about a week ago. Pain is present all day. Pain with WB; minimal pain at rest. Static standing, walking and stairs are painful. Patient is a  and has to be on her feet all day. Pain is present with " ADLs (cooking/ cleaning). Pt denies LLE pain other than current injury; occasional B/L knee pain at gym. Denies hx of back pain or injury. Foot goes numb with night splint. She wears it w/ watching TV at home before bed; she is unable to sleep with it. Sleep is unaffected by pain. Hx of pulmonary embolism; had 5 in R lung. Hx of Factor V Leiden. Has had L kidney removed. Last PE 2020. No BP issues as per pt report; she is not on BP medication.     3/18/2024: Pt states that she tripped Wednesday and twisted her L ankle resulting in fall down her stairs; she has a couple bruises. She was able to walk after the fall. She went to a parade over the weekend and walked 12 miles. Yesterday it hurt to walk. R foot feels okay, has 3/10 pain currently. L foot pain is 4-5/10. L ankle pain is 5-6/10 d/t recent incident and walking over the weekend. Pt states that the exercises are helping her. She has moment where she doesn't have any pain at all. Pain is most severe at the end of the day after being on her feet all day.     4/24/2024: Pt states that the pain along the bottom of her left foot is improving since the injection. She continues to experience greater pain on L side compared to R. She has increased pain in heel on L foot as of recently. She noted that while standing on her left leg to shave she was unable to tolerate the pain into her left heel. Pt stepped on a pin on left big toe today which resulted in bleeding. She removed band-aid recently as bleeding stopped. Pt states that she has noticed improvements since starting PT.      5/20/2024: Pt states that her left foot is a little sore today; she walked around a big store with sandals on which wasn't bad. She has been trying to walk around her house barefoot more which has not been bad. She tried to carry laundry w/out shoes which was very painful. Pt states that she has minimal R foot pain; only time it bothers her is with a lot of walking. If she does her stretches  the night before she is okay. Her left foot continues to bother her. She sees foot doctor Wednesday.     2024: Pt had follow-up visit with MD 2024. He informed pt it was up to her as to whether or no she continues PT. He informed her that he is happy with how far she has come. Pt is having good foot days. She notices that when she is standing on it all day, it is sore. Pain is not unbearable anymore. She says her progress is night and day compared to when she started. Pain does not linger anymore, which it used to. She is able to perform stairs reciprocally in morning, but transitions to step-to at end of day after standing for work.   Patient Goals  Patient goals for therapy: decreased pain  Patient goal: Eliminate pain in feet  Pain  Current pain ratin  At best pain ratin  At worst pain ratin (Standing all day working)  Location: Plantar fascia  Quality: sharp  Aggravating factors: standing, walking, stair climbing and lifting    Social Support  Steps to enter house: yes  10  Stairs in house: yes (1 flight for laundry)   Lives with: parents      Diagnostic Tests  X-ray: abnormal (: Small plantar calcaneal spur. Preserved tibiotalar joint. No fracture or dislocation. No soft tissue swelling or obvious joint effusion.)  Treatments  Current treatment: physical therapy        Objective    Palpation:  2024: Pt is TTP along plantar aspect of calcaneous (L> R)  3/18/2024:  L lateral ankle TTP distal to lateral malleolus at calcaneofibular ligament  2024: Pt is TTP at L heel with light pressure; pain at rest w/out pressure is 1/10. Pt denies TTP at plantar midfoot along plantar fascia. L foot observation: no abnormality noted along plantar aspect of 1st toe where pt reported stepping on pin at home.  2024: NT   2024: NT    AROM:    R  L  R  L  R  L   R  L   R L      2024 2024 3/18/2024 3/18/2024 2024 2024 2024 2024 7/2/24 7/2/24  Ankle DF  "calc/5thray  -2  0  10  2  8  2  10  5  10 8  Ankle PF calc/5thray  70  60  70  70  80  75  70  70  75 70  Ankle inversion  40  40  45  40  45  40  45  40  50 50  Ankle eversion  30  25*  30  15*  45  30*  30  30   40 30    STRENGTH:   R  L  R  L  R  L   R  L   R  L     2/14/2024 2/14/2024 3/18/2024 3/18/2024 4/24/2024 4/24/2024 5/20/24 5/2024  7/2/24  7/2/24  Ankle DF  5/5  4+/5  5/5  5/5  5/5  5/5  5/5  5/5  5/5  5/5  Ankle PF  5/5  4/5  5/5  5/5  5/5  5/5  5/5  5/5  5/5  5/5  Ankle inversion 5/5  3+/5*  5/5  4-/5  5/5  5/5  5/5  4+/5  5/5  5/5  Ankle eversion 5/5  4/5*  5/5  2+/5  5/5  5/5  5/5  5/5  5/5  5/5    Knee ext  5/5  5/5  5/5  5/5  5/5  5/5  5/5  5/5  NT  NT  Knee flex  5/5  5/5  5/5  5/5  5/5  5/5  5/5  5/5  NT  NT  Hip abduction  4/5  4/5  4/5  4/5  4+/5  4+/5  4+/5  4+/5  4+/5  4+/5  Hip extension  5/5  4+/5  5/5  5/5  NT  NT  5/5  5/5  NT  NT    Gastroc Flexibility:  2/14/2024: R 15, L 10  3/14/2024: NT  4/24/2024: Soleus flexibility (ankle DF AROM w/ knee at 90 deg flexion) R 2 deg, L 5 deg   5/20/2024: R 12 deg, L 7 deg  7/2/2024: R 10 deg, L 10 deg     Gait:   2/14/2024: Antalgic, lacks hip/ knee flexion t/o gait cycle.  3/18/2024: Minimally antalgic.   4/24/2024: Sway back; antalgic.   5/20/2024: Limited toe clearance of L foot.   7/2/2024: Mildly antalgic; improved from previous re-eval.     Standing posture:   5/20/2024: Sway back, genu recurvatum/ valgum, and pes planus bilaterally.   7/2/2024: Unchanged from previous re-eval    Balance:  2/14/2024  3/18/2024 4/24/2024 5/20/2024 7/2/2024    Tandem R post w/ EO: unable d/t pain >30 sec NT  NT  NT  Tandem L post w/ EO: unable d/t pain >30 sec NT  NT  NT  R SLS w/ EO:   NT   >30 sec >30 sec NT  NT    L SLS w/ EO:   NT   8 sec*  17.5 sec >30 sec NT  R SLS EO on foam NT   NT  NT  NT  15 sec  L SLS EO on foam  NT   NT  NT  NT  28 sec     Function:  2/14/2024  Squat: Wide base of support; form is good.   Step up/down 6\" R/L: 6/10 pain L " "heel  Stairs: step-to when painful, reciprocal at baseline  3/18/2024  Squat: Wide base of support; form is good.   Step up/down 6\" R/L: 4/10 L heel pain, 2/10 L heel pain  Stairs: step-to when painful, reciprocal at baseline  4/24/2024  Squat: Wide base of support; form is good.   Step up/down NT   Stairs: step-to when painful, reciprocal at baseline/ with improved pain levels   5/20/2024  Squat: Wide base of support; form is good.   Step up/down: NT   Stairs: 80% of time reciprocal, 20% step-to at end of day or carrying heavy items  Standing tolerance = 10 minutes until pain presents  SL squat and reach    R SL squat = 20 in   L SL squat = 20.5 in   Barefoot walking: Prior to PT pt was unable to walk barefoot, since starting PT she has been able to walk barefoot in the morning. Since injections she has been able to walk barefoot into the night.   7/1/2024:    Squat: good form   Stairs: reciprocal in AM; step-to end of day after standing for work   Gym: has not returned to gym due to time restraints; when she did return weeks ago, she continued to have pain with weight-bearing exercises   Level of function 75-80%/ 100%    Special test:   3/18/2024  Bump test L (min - mod pain reported)  Solomon compression test L (-)  4/24/2024   NT  5/20/2024   NT  7/1/2024   NT          Precautions:   *Hx of recurring PE  Past Medical History:   Diagnosis Date    Allergic     Anxiety     Anxiety and depression     Asthma     Clotting disorder (HCC)     Factor V Leiden (HCC)     Headache(784.0)     Pulmonary embolism (HCC)     Thrombophlebitis      Past Surgical History:   Procedure Laterality Date    BLADDER SURGERY      FL RETROGRADE PYELOGRAM  05/20/2022    FL VCUG VOIDING URETHROCYSTOGRAM  8/7/2018    NEPHRECTOMY Left 05/21/2020    Done laparoscopically at [facility]    SC CYSTOURETHROSCOPY N/A 05/20/2022    Procedure: CYSTOSCOPY, retrograde pyelogram, and examination under anesthesia, dilation urethral stenosis;  Surgeon: Kamran YBARRA" "MD Kaylie;  Location: [facility] MAIN OR;  Service: Urology    URETER REVISION      WISDOM TOOTH EXTRACTION      woke up during procedure.     SOC: 2/14/2024  FOTO: 3/4/2024  POC Expiration: 5/8/2024 updated to 7/26/2024   Daily Treatment Log  Date: 6/19/2024 6/26/2024 7/2/2024     Visit#/ auth: [phone]  RE/FOTO     Objective Measures         Palpation        Manuals         PF iso vs man        IASTM B/L PF w/ DF stretch  prone         KT PF support w/ pull to prevent ankle inver        Plantar arch tapping         STM to plantar fascia         Neuro Re-Ed 10' 13'  15'     LAQ + DF        Wobble board   Balance 1' ea      SL AP taps 1x10 R/L       Eek board seated   15x cw/ccw       Steam boat w/ SLS 2x5 R/L on foam no shoes, occasional UE support 2x5 R/L on foam no shoes, occasional UE support  1 x 10 R/L hip abd on foam near rail     Cone taps from foam        SL stance w/ forward reach   1 x 10 R/L      Tandem walk on foam 2 X 6 laps forward/ backward at counter (6'); occasional UE support 2x6 laps fwd/bwd  12' x 3 laps fwd/bwd near rail, 2x     Side step         Ther Ex 25' 25'  23'     Bike  Upright bike L3x5'  Upright bike L3x5' Upright bike L3x5'     Treadmill         Toe flex/ ext        Sciatic nerve glide        Self PF stretch seated         Soleus stretch 30\" x 2 R/L slant board       TB 4 way         Gastroc stretch  With SOS  30\"x2 R/L Slant board 30\"x2 R/L  30\" x 2 R/L slant board     Seated HR/ TR 20x ea.        Std HR SL HR 2x5 R/L  SL HR 3x5 R/L  SL HR 3x5 R/L      Leg press        SL HR on leg press         FSU w/ knee hike  4\" + 2\" foam 1 x 12 R/L   6\"+2\" foam   1x10 R/L no shoes       Straddle step ups  4\" + 2\" foam 1 x 12 R/L   6\"+2\" foam   1x10 R/L  no shoes       PF roll w/ therabar         Objective measures: AROM, MMT, balance   KE     EDUCATION: Pathology, review of impairements, prognosis, activity modification, POC, and HEP   KE; HEP updated and reviewed     Ther Activity 5'        " Lunges         STS Low mat 2 x 10  Low mat 1 x 10     Gait Training                          Modalities         TENS+ CP                  HEP:   Access Code: [email or web address]  URL: [email or web address]  Date: 07/02/2024  Prepared by: Erika Coreas    Exercises  - Gastroc Stretch on Step  - 3 x daily - 3 sets - 30 seconds hold  - Standing Soleus Stretch on Step  - 3 x daily - 3 reps - 30 sec hold  - Seated Heel Raise  - 2 sets - 10 reps  - Towel Scrunches  - 1 sets - 20 reps  - Foot Roller Plantar Massage  - 2 reps - 1 min  hold  - Sit to Stand Without Arm Support  - 2 sets - 10 reps  - Standing Repeated Hip Abduction on Foam Pad  - 1 sets - 8-10 reps  - Single leg stance with forward reach near counter  - 1 x daily - 1 sets - 8-10 reps  - Single Leg Heel Raise with Chair Support  - 1 x daily - 3 sets - 5 reps

## 2024-07-02 NOTE — TELEPHONE ENCOUNTER
Patient dropped off Food Allergy or Insect Sting medication for to be completed.  I will put in folder for you on your desk.

## 2024-07-03 ENCOUNTER — TELEPHONE (OUTPATIENT)
Age: 24
End: 2024-07-03

## 2024-07-03 NOTE — TELEPHONE ENCOUNTER
"Patient of OLEG Allen. Patient took first dose of Wegovy 1.7 mg last night. \"Messed up injection\"and is asking for refill of single pen replacement. Please advise.  "

## 2024-07-09 ENCOUNTER — TELEPHONE (OUTPATIENT)
Age: 24
End: 2024-07-09

## 2024-07-09 NOTE — TELEPHONE ENCOUNTER
"Patient of OLEG Allen. Currently on Wegovy 1.7mg. It requesting replacement pen as she \"messed up \" last injection. Doing well on medication. No side effects at this time.  "

## 2024-07-09 NOTE — TELEPHONE ENCOUNTER
I called and spoke with pt to let her know she can call the  let them know what happened and see if they will replace it for you. Or she can call the insurance to get an override for a new box. Pt expressed understanding.

## 2024-07-09 NOTE — TELEPHONE ENCOUNTER
Pt calling for the number for neurology. Dr. Sanchez saw her last week for a migraine and she was treated her for a migraine in the ED on 07/06/2024.  Pt is using her Fioricet and her Sumatriptan and is still having a 4/10 migraine.  Appointment offered for her today with Dr. Mandel, however, Misty wants to wait to see what Dr. Sanchez' suggestions are. She will call neurology today to make an appt.

## 2024-07-10 ENCOUNTER — TELEPHONE (OUTPATIENT)
Age: 24
End: 2024-07-10

## 2024-07-10 ENCOUNTER — OFFICE VISIT (OUTPATIENT)
Dept: FAMILY MEDICINE CLINIC | Facility: CLINIC | Age: 24
End: 2024-07-10
Payer: COMMERCIAL

## 2024-07-10 VITALS
TEMPERATURE: 97 F | RESPIRATION RATE: 18 BRPM | WEIGHT: 230.4 LBS | HEIGHT: 65 IN | HEART RATE: 79 BPM | BODY MASS INDEX: 38.39 KG/M2 | SYSTOLIC BLOOD PRESSURE: 120 MMHG | DIASTOLIC BLOOD PRESSURE: 82 MMHG

## 2024-07-10 DIAGNOSIS — E66.01 CLASS 3 SEVERE OBESITY DUE TO EXCESS CALORIES WITH SERIOUS COMORBIDITY AND BODY MASS INDEX (BMI) OF 40.0 TO 44.9 IN ADULT (HCC): Primary | ICD-10-CM

## 2024-07-10 DIAGNOSIS — F41.8 DEPRESSION WITH ANXIETY: ICD-10-CM

## 2024-07-10 DIAGNOSIS — G43.109 MIGRAINE WITH AURA AND WITHOUT STATUS MIGRAINOSUS, NOT INTRACTABLE: Primary | ICD-10-CM

## 2024-07-10 PROCEDURE — 99214 OFFICE O/P EST MOD 30 MIN: CPT | Performed by: FAMILY MEDICINE

## 2024-07-10 PROCEDURE — 96372 THER/PROPH/DIAG INJ SC/IM: CPT

## 2024-07-10 RX ORDER — KETOROLAC TROMETHAMINE 30 MG/ML
30 INJECTION, SOLUTION INTRAMUSCULAR; INTRAVENOUS ONCE
Status: COMPLETED | OUTPATIENT
Start: 2024-07-10 | End: 2024-07-10

## 2024-07-10 RX ADMIN — KETOROLAC TROMETHAMINE 30 MG: 30 INJECTION, SOLUTION INTRAMUSCULAR; INTRAVENOUS at 14:05

## 2024-07-10 NOTE — PROGRESS NOTES
Misty Blackmon 2000 female MRN: [id number]    [facility] PRACTICE OFFICE VISIT  [facility] - [facility]      ASSESSMENT/PLAN  Misty Blackmon is a 24 y.o. female presents to the office for    Diagnoses and all orders for this visit:    Migraine with aura and without status migrainosus, not intractable  -     Comprehensive metabolic panel; Future  -     CBC and differential; Future  -     Comprehensive metabolic panel  -     CBC and differential  -     ketorolac (TORADOL) injection 30 mg    Depression with anxiety  -     UA w Reflex to Microscopic w Reflex to Culture; Future    Other orders  -     UA/M w/rflx Culture, Routine  -     Microscopic Examination  -     Urine culture       Migraine with aura I do believe it secondary to the Wegovy.  Did discuss with her to talk to her specialist to see if they can reduce the dose.  Toradol shot was given to the patient today.  Highly recommend hydrating at least with 64 ounces of fluid daily.  Blood work was performed.  Will review with the patient via phone.  Depression currently no changes to be made on medications at this time           Future Appointments   Date Time Provider Department Center   7/19/2024  8:00 AM PHILIP Bright [facility]   8/23/2024  8:15 AM OLEG Hamlin [facility] [place] [facility]   9/10/2024  4:00 PM Aby JUÁREZ [facility]          SUBJECTIVE  CC: Follow-up (ER follow-up - frequent migraines /[facility] CMA )      HPI:  Misty Blackmon is a 24 y.o. female who presents for an acute appointment.  Patient states that she continues to have migraines.  States that they have been intensifying.  Was seen in the emergency room but it continues to be persistent.  The only changes was is that she has an increased dose in Wegovy.  Patient states that her depression and anxiety has been stable.  She does require forms to be filled out for her allergic reaction of cinnamon.  Review of  Systems   Constitutional:  Negative for activity change, appetite change, chills, fatigue and fever.   HENT:  Negative for congestion.    Respiratory:  Negative for cough, chest tightness and shortness of breath.    Cardiovascular:  Negative for chest pain and leg swelling.   Gastrointestinal:  Negative for abdominal distention, abdominal pain, constipation, diarrhea, nausea and vomiting.   Neurological:  Positive for headaches.   All other systems reviewed and are negative.      Historical Information   The patient history was reviewed as follows:  Past Medical History:   Diagnosis Date   • Allergic    • Anxiety    • Anxiety and depression    • Asthma    • Clotting disorder (HCC)    • Factor V Leiden (HCC)    • Headache(784.0)    • Pulmonary embolism (HCC)    • Thrombophlebitis          Medications:     Current Outpatient Medications:   •  albuterol (PROVENTIL HFA,VENTOLIN HFA) 90 mcg/act inhaler, Inhale 2 puffs every 4 (four) hours as needed for wheezing or shortness of breath, Disp: 18 g, Rfl: 6  •  busPIRone (BUSPAR) 15 mg tablet, Take 1 tablet (15 mg total) by mouth 3 (three) times a day, Disp: 270 tablet, Rfl: 3  •  butalbital-acetaminophen-caffeine (FIORICET,ESGIC) -40 mg per tablet, Take 1 tablet now, and then repeat in 4 hrs if no relief. Don't take more then 3 in a week., Disp: 30 tablet, Rfl: 0  •  Fluticasone-Salmeterol (Advair Diskus) 250-50 mcg/dose inhaler, Inhale 1 puff 2 (two) times a day Rinse mouth after use., Disp: 60 blister, Rfl: 8  •  hydrOXYzine HCL (ATARAX) 50 mg tablet, take 1 tablet by mouth at bedtime, Disp: 90 tablet, Rfl: 2  •  levonorgestrel (KYLEENA) 19.5 MG intrauterine device, 1 each by Intrauterine route, Disp: , Rfl:   •  ondansetron (ZOFRAN-ODT) 4 mg disintegrating tablet, Take 1 tablet (4 mg total) by mouth every 6 (six) hours as needed for nausea or vomiting, Disp: 20 tablet, Rfl: 0  •  sertraline (ZOLOFT) 50 mg tablet, take 3 tablets by mouth at bedtime, Disp: 270  "tablet, Rfl: 1  •  SUMAtriptan (IMITREX) 100 mg tablet, TAKE 1 TABLET BY MOUTH ONCE AS NEEDED FOR MIGRAINE REPEAT 1 DOSE 6 HOURS LATER NO MORE THAN 3 DAYS A WEEK AS DIRECTED, Disp: 15 tablet, Rfl: 1  •  topiramate (TOPAMAX) 50 MG tablet, Take 1 tablet (50 mg total) by mouth daily at bedtime, Disp: 90 tablet, Rfl: 4  •  EPINEPHrine (EPIPEN) 0.3 mg/0.3 mL SOAJ, Inject 0.3 mL (0.3 mg total) into a muscle once for 1 dose, Disp: 0.6 mL, Rfl: 0  •  Semaglutide-Weight Management (WEGOVY) 1 MG/0.5ML, Inject 0.5 mL (1 mg total) under the skin once a week, Disp: 2 mL, Rfl: 0    Allergies   Allergen Reactions   • Amoxicillin Anaphylaxis   • Cinnamon - Food Allergy Anaphylaxis       OBJECTIVE  Vitals:   Vitals:    07/10/24 1328   BP: 120/82   Pulse: 79   Resp: 18   Temp: (!) 97 °F (36.1 °C)   Weight: 105 kg (230 lb 6.4 oz)   Height: 5' 5\" (1.651 m)         Physical Exam  Vitals reviewed.   Constitutional:       Appearance: She is well-developed.   HENT:      Head: Normocephalic and atraumatic.   Eyes:      Extraocular Movements: EOM normal.      Conjunctiva/sclera: Conjunctivae normal.      Pupils: Pupils are equal, round, and reactive to light.   Cardiovascular:      Rate and Rhythm: Normal rate and regular rhythm.      Heart sounds: Normal heart sounds, S1 normal and S2 normal. No murmur heard.  Pulmonary:      Effort: Pulmonary effort is normal. No respiratory distress.      Breath sounds: Normal breath sounds. No wheezing.   Musculoskeletal:         General: No edema. Normal range of motion.      Cervical back: Normal range of motion and neck supple.   Skin:     General: Skin is warm.   Neurological:      General: No focal deficit present.      Mental Status: She is alert and oriented to person, place, and time. Mental status is at baseline.   Psychiatric:         Mood and Affect: Mood and affect normal.         Speech: Speech normal.         Behavior: Behavior normal.         Thought Content: Thought content normal.        "  Judgment: Judgment normal.                    Sue Sanchez MD,   [facility]  7/15/2024

## 2024-07-10 NOTE — TELEPHONE ENCOUNTER
Pt of Tova Rich calling in to request lower dose of wegovy sent to [facility] in [facility] pharmacy on file. Pt currently is taking 1.7mg dose of wegovy and states she has had headaches x 3 weeks.   Denies any other ill side effects.   Pt states her PCP advised her to lower the dose of wegovy and discuss with Tova.     Please advise/ send script.

## 2024-07-14 LAB
HCV AB S/CO SERPL IA: NON REACTIVE
HIV 1+2 AB+HIV1 P24 AG SERPL QL IA: NON REACTIVE

## 2024-07-15 ENCOUNTER — TELEPHONE (OUTPATIENT)
Dept: FAMILY MEDICINE CLINIC | Facility: CLINIC | Age: 24
End: 2024-07-15

## 2024-07-15 LAB
ALBUMIN SERPL-MCNC: 4.5 G/DL (ref 4–5)
ALP SERPL-CCNC: 74 IU/L (ref 44–121)
ALT SERPL-CCNC: 16 IU/L (ref 0–32)
APPEARANCE UR: ABNORMAL
AST SERPL-CCNC: 15 IU/L (ref 0–40)
BACTERIA UR CULT: NORMAL
BACTERIA URNS QL MICRO: ABNORMAL
BASOPHILS # BLD AUTO: 0 X10E3/UL (ref 0–0.2)
BASOPHILS NFR BLD AUTO: 0 %
BILIRUB SERPL-MCNC: 0.4 MG/DL (ref 0–1.2)
BILIRUB UR QL STRIP: NEGATIVE
BUN SERPL-MCNC: 10 MG/DL (ref 6–20)
BUN/CREAT SERPL: 10 (ref 9–23)
CALCIUM SERPL-MCNC: 9.3 MG/DL (ref 8.7–10.2)
CASTS URNS QL MICRO: ABNORMAL /LPF
CHLORIDE SERPL-SCNC: 106 MMOL/L (ref 96–106)
CO2 SERPL-SCNC: 20 MMOL/L (ref 20–29)
COLOR UR: YELLOW
CREAT SERPL-MCNC: 0.99 MG/DL (ref 0.57–1)
EGFR: 81 ML/MIN/1.73
EOSINOPHIL # BLD AUTO: 0.1 X10E3/UL (ref 0–0.4)
EOSINOPHIL NFR BLD AUTO: 1 %
EPI CELLS #/AREA URNS HPF: ABNORMAL /HPF (ref 0–10)
ERYTHROCYTE [DISTWIDTH] IN BLOOD BY AUTOMATED COUNT: 13.5 % (ref 11.7–15.4)
GLOBULIN SER-MCNC: 2.2 G/DL (ref 1.5–4.5)
GLUCOSE SERPL-MCNC: 80 MG/DL (ref 70–99)
GLUCOSE UR QL: NEGATIVE
HCT VFR BLD AUTO: 45.2 % (ref 34–46.6)
HGB BLD-MCNC: 15.2 G/DL (ref 11.1–15.9)
HGB UR QL STRIP: NEGATIVE
IMM GRANULOCYTES # BLD: 0 X10E3/UL (ref 0–0.1)
IMM GRANULOCYTES NFR BLD: 0 %
KETONES UR QL STRIP: ABNORMAL
LEUKOCYTE ESTERASE UR QL STRIP: NEGATIVE
LYMPHOCYTES # BLD AUTO: 1.5 X10E3/UL (ref 0.7–3.1)
LYMPHOCYTES NFR BLD AUTO: 23 %
Lab: NORMAL
MCH RBC QN AUTO: 30.6 PG (ref 26.6–33)
MCHC RBC AUTO-ENTMCNC: 33.6 G/DL (ref 31.5–35.7)
MCV RBC AUTO: 91 FL (ref 79–97)
MICRO URNS: ABNORMAL
MICRO URNS: ABNORMAL
MONOCYTES # BLD AUTO: 0.4 X10E3/UL (ref 0.1–0.9)
MONOCYTES NFR BLD AUTO: 6 %
NEUTROPHILS # BLD AUTO: 4.7 X10E3/UL (ref 1.4–7)
NEUTROPHILS NFR BLD AUTO: 70 %
NITRITE UR QL STRIP: NEGATIVE
PH UR STRIP: 7.5 [PH] (ref 5–7.5)
PLATELET # BLD AUTO: 201 X10E3/UL (ref 150–450)
POTASSIUM SERPL-SCNC: 4.6 MMOL/L (ref 3.5–5.2)
PROT SERPL-MCNC: 6.7 G/DL (ref 6–8.5)
PROT UR QL STRIP: ABNORMAL
RBC # BLD AUTO: 4.96 X10E6/UL (ref 3.77–5.28)
RBC #/AREA URNS HPF: ABNORMAL /HPF (ref 0–2)
SL AMB URINALYSIS REFLEX: ABNORMAL
SODIUM SERPL-SCNC: 140 MMOL/L (ref 134–144)
SP GR UR: 1.02 (ref 1–1.03)
UROBILINOGEN UR STRIP-ACNC: 0.2 MG/DL (ref 0.2–1)
WBC # BLD AUTO: 6.7 X10E3/UL (ref 3.4–10.8)
WBC #/AREA URNS HPF: ABNORMAL /HPF (ref 0–5)

## 2024-07-15 NOTE — TELEPHONE ENCOUNTER
----- Message from Sue Leyva MD sent at 7/15/2024 11:41 AM EDT -----  Please advise the patient that I reviewed her blood work   recommend that she hydrate with at least 64 ounces of fluid daily given that the urine showed mixed contaminants means just a little bit of dehydration  All other blood work was within normal limits  Therefore I do believe that the Wegovy is likely the cause of her migraines she can try reaching for 64 ounces of fluid daily and if that helps then she does not need to make any changes.  But if with hydration the migraines persist that means this is secondary to Wegovy

## 2024-07-16 ENCOUNTER — TELEPHONE (OUTPATIENT)
Age: 24
End: 2024-07-16

## 2024-07-16 DIAGNOSIS — G43.109 MIGRAINE WITH AURA AND WITHOUT STATUS MIGRAINOSUS, NOT INTRACTABLE: ICD-10-CM

## 2024-07-16 NOTE — TELEPHONE ENCOUNTER
Patient with questions regarding two of her medications.   She states she is being titrated to a lower dose of her Wegovy medications due to her migraines. This was prescribed by weight management, but they are closed. She wants to know if she should take her current dose tonight, and start the next dose tomorrow.    She would also like to request a refill for Topamax 50 mg tablet. Please follow up.

## 2024-07-17 RX ORDER — TOPIRAMATE 50 MG/1
50 TABLET, FILM COATED ORAL
Qty: 90 TABLET | Refills: 1 | Status: SHIPPED | OUTPATIENT
Start: 2024-07-17

## 2024-07-17 NOTE — TELEPHONE ENCOUNTER
" Confused by this statement \"She wants to know if she should take her current dose tonight, and start the next dose tomorrow.   \" Is this about Wegovy?? She should wait a week for the next dose if it is"

## 2024-07-19 ENCOUNTER — OFFICE VISIT (OUTPATIENT)
Dept: OBGYN CLINIC | Facility: CLINIC | Age: 24
End: 2024-07-19
Payer: COMMERCIAL

## 2024-07-19 VITALS
WEIGHT: 229 LBS | SYSTOLIC BLOOD PRESSURE: 118 MMHG | DIASTOLIC BLOOD PRESSURE: 80 MMHG | HEIGHT: 65 IN | BODY MASS INDEX: 38.15 KG/M2

## 2024-07-19 DIAGNOSIS — Z01.419 ENCOUNTER FOR GYNECOLOGICAL EXAMINATION WITHOUT ABNORMAL FINDING: Primary | ICD-10-CM

## 2024-07-19 DIAGNOSIS — Z11.3 SCREEN FOR STD (SEXUALLY TRANSMITTED DISEASE): ICD-10-CM

## 2024-07-19 PROBLEM — J02.9 SORE THROAT: Status: RESOLVED | Noted: 2024-03-22 | Resolved: 2024-07-19

## 2024-07-19 PROCEDURE — 99385 PREV VISIT NEW AGE 18-39: CPT | Performed by: PHYSICIAN ASSISTANT
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
## 2024-07-24 LAB
C TRACH RRNA SPEC QL NAA+PROBE: NOT DETECTED
CLINICAL INFO: NORMAL
CYTO CVX: NORMAL
CYTOLOGY CMNT CVX/VAG CYTO-IMP: NORMAL
DATE PREVIOUS BX: NORMAL
HPV E6+E7 MRNA CVX QL NAA+PROBE: NOT DETECTED
LMP START DATE: NORMAL
N GONORRHOEA RRNA SPEC QL NAA+PROBE: NOT DETECTED
SL AMB PREV. PAP:: NORMAL
SPECIMEN SOURCE CVX/VAG CYTO: NORMAL

## 2024-07-31 ENCOUNTER — TELEPHONE (OUTPATIENT)
Age: 24
End: 2024-07-31

## 2024-07-31 NOTE — TELEPHONE ENCOUNTER
Patient called the RX Refill Line. Message is being forwarded to the office.     Patient is requesting Requesting refill on Wegovy. Not sure if Dr wants to increase dose on this refill or keep the same     Please contact patient at  [phone]

## 2024-08-02 NOTE — TELEPHONE ENCOUNTER
Please see Tova's message. Please find out what dose she is on and if she is having any side effects

## 2024-08-08 DIAGNOSIS — E66.01 CLASS 3 SEVERE OBESITY DUE TO EXCESS CALORIES WITH SERIOUS COMORBIDITY AND BODY MASS INDEX (BMI) OF 40.0 TO 44.9 IN ADULT (HCC): Primary | ICD-10-CM

## 2024-08-08 NOTE — TELEPHONE ENCOUNTER
Reason for call:   [] Refill   [] Prior Auth  [x] Other: Pt called back to see the status on her refill since no one called her back. Pt states she is on 1 mg, and she is doing well. Please call pt at [phone]    Office:   [] PCP/Provider -   [x] Specialty/Provider - OLEG Hamlin  Bariatrics- [facility] [place]        Medication:   Semaglutide-Weight Management (WEGOVY) 1 MG/0.5ML 1 mg, Weekly         Pharmacy: [id number]  [place] [address]

## 2024-08-23 ENCOUNTER — OFFICE VISIT (OUTPATIENT)
Dept: BARIATRICS | Facility: CLINIC | Age: 24
End: 2024-08-23
Payer: COMMERCIAL

## 2024-08-23 VITALS
HEART RATE: 74 BPM | HEIGHT: 65 IN | SYSTOLIC BLOOD PRESSURE: 110 MMHG | BODY MASS INDEX: 36.46 KG/M2 | DIASTOLIC BLOOD PRESSURE: 80 MMHG | WEIGHT: 218.8 LBS

## 2024-08-23 DIAGNOSIS — E66.09 CLASS 2 OBESITY DUE TO EXCESS CALORIES WITHOUT SERIOUS COMORBIDITY WITH BODY MASS INDEX (BMI) OF 36.0 TO 36.9 IN ADULT: Primary | ICD-10-CM

## 2024-08-23 DIAGNOSIS — R53.83 FATIGUE, UNSPECIFIED TYPE: ICD-10-CM

## 2024-08-23 DIAGNOSIS — Z13.220 SCREENING CHOLESTEROL LEVEL: ICD-10-CM

## 2024-08-23 DIAGNOSIS — E55.9 VITAMIN D DEFICIENCY: ICD-10-CM

## 2024-08-23 DIAGNOSIS — Z83.3 FAMILY HISTORY OF DIABETES MELLITUS: ICD-10-CM

## 2024-08-23 PROBLEM — E66.813 CLASS 3 SEVERE OBESITY DUE TO EXCESS CALORIES WITH SERIOUS COMORBIDITY AND BODY MASS INDEX (BMI) OF 40.0 TO 44.9 IN ADULT (HCC): Status: RESOLVED | Noted: 2023-07-17 | Resolved: 2024-08-23

## 2024-08-23 PROBLEM — E66.812 CLASS 2 OBESITY DUE TO EXCESS CALORIES WITHOUT SERIOUS COMORBIDITY WITH BODY MASS INDEX (BMI) OF 36.0 TO 36.9 IN ADULT: Status: ACTIVE | Noted: 2024-08-23

## 2024-08-23 PROBLEM — E66.01 CLASS 3 SEVERE OBESITY DUE TO EXCESS CALORIES WITH SERIOUS COMORBIDITY AND BODY MASS INDEX (BMI) OF 40.0 TO 44.9 IN ADULT (HCC): Status: RESOLVED | Noted: 2023-07-17 | Resolved: 2024-08-23

## 2024-08-23 PROCEDURE — 99214 OFFICE O/P EST MOD 30 MIN: CPT | Performed by: NURSE PRACTITIONER

## 2024-08-23 NOTE — PROGRESS NOTES
Assessment/Plan:     Class 2 obesity due to excess calories without serious comorbidity with body mass index (BMI) of 36.0 to 36.9 in adult  Patient has transition from class III obesity to class II obesity with the help of medical weight management and GLP-1 medications  - Patient is pursuing Conservative Program and follow up visits with medical weight management provider  - Initial weight loss goal of 5-10% weight loss for improved health. Weight loss can improve patient's co-morbid conditions and/or prevent weight-related complications.  - Explained the importance of continuing lifestyle changes in addition to any anti-obesity medications.   - Labs reviewed from 11/2023 -will get updated panel before next office visit    General Recommendations:  Nutrition:  Eat breakfast daily.  Do not skip meals.      Food log (ie.) [email or web address], sparkpeople.com, loseit.com, calorieking.com, etc.     Practice mindful eating.  Be sure to set aside time to eat, eat slowly, and savor your food.     Hydration:    At least 64oz of water daily.  No sugar sweetened beverages.  No juice (eat the fruit instead).     Exercise:  Studies have shown that the ideal exercise goal is somewhere between 150 to 300 minutes of moderate intensity exercise a week.  Start with exercising 10 minutes every other day and gradually increase physical activity with a goal of at least 150 minutes of moderate intensity exercise a week, divided over at least 3 days a week.  An example of this would be exercising 30 minutes a day, 5 days a week.  Resistance training can increase muscle mass and increase our resting metabolic rate.   FULL BODY resistance training is recommended 2-3 times a week.  Do not do this on consecutive days to allow for muscle recovery.     Aim for a bare minimum 5000 steps, even on days you do not exercise.     Monitoring:   Weigh yourself daily.  If this causes undue stress, then just weigh yourself once a week.  Weigh  yourself the same time of the day with the same amount of clothing on.  Preferably this should be done after waking up, before you eat, and with no clothing or minimal clothing on.     Specific Goals:  Patient was congratulated on her weight loss since last visit.  Nutrition was discussed and she will continue to make sure she is meal prepping and packing her food for the day.  She will continue with protein rich foods and snacks and avoid skipping meals.  Patient will continue to work on hydration and increasing her water intake.  She will continue to try to incorporate more dedicated exercise for 30 minutes 3-4 times a week.  Medications were discussed and patient will maintain on Wegovy 1.7 mg as she is seeing successful weight loss and is tolerating the medication without any headaches.  If she finds that her weight loss has stalled may consider increasing back up to the 2.4 milligrams and monitoring for any increased headache activity.  Patient will follow-up in the office in 3 months for management and accountability.         Misty was seen today for follow-up.    Diagnoses and all orders for this visit:    Class 2 obesity due to excess calories without serious comorbidity with body mass index (BMI) of 36.0 to 36.9 in adult  -     Semaglutide-Weight Management (WEGOVY) 1.7 MG/0.75ML; Inject 0.75 mL (1.7 mg total) under the skin once a week  -     Vitamin D 25 hydroxy; Future  -     Hemoglobin A1C; Future  -     TSH, 3rd generation with Free T4 reflex; Future  -     Lipid panel; Future  -     Comprehensive metabolic panel; Future  -     CBC and differential; Future  -     Vitamin D 25 hydroxy  -     Hemoglobin A1C  -     TSH, 3rd generation with Free T4 reflex  -     Lipid panel  -     Comprehensive metabolic panel  -     CBC and differential    Vitamin D deficiency  -     Vitamin D 25 hydroxy; Future  -     Vitamin D 25 hydroxy    Fatigue, unspecified type  -     Hemoglobin A1C; Future  -     TSH, 3rd  "generation with Free T4 reflex; Future  -     Comprehensive metabolic panel; Future  -     CBC and differential; Future  -     Hemoglobin A1C  -     TSH, 3rd generation with Free T4 reflex  -     Comprehensive metabolic panel  -     CBC and differential    Family history of diabetes mellitus  -     Hemoglobin A1C; Future  -     Hemoglobin A1C    Screening cholesterol level  -     Lipid panel; Future  -     Lipid panel        Total time spent reviewing chart, interviewing patient, examining patient, discussing plan, answering all questions, and documentin minutes with >50% face-to-face time with the patient.    Follow up in approximately 3 months with Non-Surgical Physician/Advanced Practitioner.    Subjective:   Chief Complaint   Patient presents with    Follow-up     Pt is here for MWM f/u. Waist - 44.2\"       Patient ID: Misty Blackmon  is a 24 y.o. female with excess weight/obesity here to pursue weight management.  Patient is pursuing Conservative Program.   Most recent notes and records were reviewed.    HPI    Wt Readings from Last 20 Encounters:   24 99.2 kg (218 lb 12.8 oz)   24 104 kg (229 lb)   07/10/24 105 kg (230 lb 6.4 oz)   24 106 kg (233 lb 3.2 oz)   24 113 kg (249 lb)   24 113 kg (249 lb)   24 113 kg (249 lb)   04/15/24 113 kg (249 lb 3.2 oz)   04/10/24 114 kg (252 lb)   24 113 kg (250 lb 3.2 oz)   24 112 kg (246 lb 12.8 oz)   24 113 kg (249 lb 12.8 oz)   24 114 kg (251 lb)   24 114 kg (251 lb)   23 112 kg (248 lb)   23 111 kg (244 lb)   23 111 kg (244 lb)   23 112 kg (247 lb)   23 112 kg (247 lb 3.2 oz)   10/04/23 112 kg (248 lb)       Patient presents today to medical weight management office for follow up.  Patient was started on Wegovy at last office visit and has been successfully losing weight.  She is currently on the 1.7 mg dose after decreasing the dose due to increased migraines.  She is " tolerating the higher dose now and is seeing excellent weight loss success.  She denies any other side effects of the medication and has noticed the medication is improving her hunger and she is not able to consume as much food at each meal.  She is being conscious to make sure she is eating evenly throughout the day starting her day with a protein shake and using protein rich foods throughout the day to stay well-balanced.  She is avoiding skipping meals.  She continues to go to the gym when she can which has been more erratic in the summer but she is hoping to get back into a regular schedule with school starting.      Weight loss medication and dose: Wegovy 1.7 mg  Started weight and date: 247.2 lbs in 11/2023  Current weight: 218.8 lbs (249.2 lbs at last OV)  Difference: -28.4 lbs (-30.4 lbs since last OV)  Goal weight: 150-180 lbs    Starting BMI: 41.26 in 11/2023  Current BMI: 46.41    Waist Measurements:  11/2023: 49.5 in  8/2024: 44.2 in    Diet recall:  B: protein shake with coffee  S: protein bar OR bevita crackers  L: apples and PB OR salad OR yogurt and fruit  S: fruit or protein bar  D: pasta with chicken and vegetable (mom cooks)    Hydration: water - 40+ oz, coffee - cream and sugar, coke 2 times a week  Alcohol: socially  Smoking: no  Exercise: 3-4 times a week at the gym (cardio and strength)  Occupation: teacher  Sleep: varies        The following portions of the patient's history were reviewed and updated as appropriate: allergies, current medications, past family history, past medical history, past social history, past surgical history, and problem list.    Family History   Problem Relation Age of Onset    Hypertension Mother     Hyperlipidemia Mother     Diabetes Mother     Factor V Leiden deficiency Mother     Heart disease Father     Depression Father     ADD / ADHD Brother     Ovarian cancer Maternal Grandmother     Diabetes Other     Arthritis Family     Asthma Family     Cancer Family      "Cervical cancer Family     Heart failure Family     Hyperlipidemia Family     Hypertension Family         Review of Systems   Constitutional:  Negative for fatigue.   HENT:  Negative for sore throat.    Respiratory:  Negative for cough and shortness of breath.    Cardiovascular:  Negative for chest pain, palpitations and leg swelling.   Gastrointestinal:  Negative for abdominal pain, constipation, diarrhea and nausea.   Genitourinary:  Negative for dysuria.   Musculoskeletal:  Negative for arthralgias and back pain.   Skin:  Negative for rash.   Neurological:  Negative for headaches.   Psychiatric/Behavioral:  Negative for dysphoric mood. The patient is not nervous/anxious.        Objective:  /80   Pulse 74   Ht 5' 5\" (1.651 m)   Wt 99.2 kg (218 lb 12.8 oz)   LMP  (Exact Date)   BMI 36.41 kg/m²     Physical Exam  Vitals and nursing note reviewed.   Constitutional:       Appearance: Normal appearance. She is obese.   HENT:      Head: Normocephalic.   Pulmonary:      Effort: Pulmonary effort is normal.   Neurological:      General: No focal deficit present.      Mental Status: She is alert and oriented to person, place, and time.   Psychiatric:         Mood and Affect: Mood normal.         Behavior: Behavior normal.         Thought Content: Thought content normal.         Judgment: Judgment normal.            Labs   Most recent labs reviewed   Lab Results   Component Value Date     11/24/2017    SODIUM 140 07/12/2024    K 4.6 07/12/2024     07/12/2024    CO2 20 07/12/2024    AGAP 9 12/28/2023    BUN 10 07/12/2024    CREATININE 0.99 07/12/2024    GLUC 80 07/12/2024    GLUF 122 (H) 05/16/2022    CALCIUM 8.5 12/28/2023    AST 15 07/12/2024    ALT 16 07/12/2024    ALKPHOS 70 12/28/2023    PROT 6.9 11/24/2017    TP 6.7 07/12/2024    BILITOT 0.3 11/24/2017    TBILI 0.4 07/12/2024    EGFR 81 07/12/2024     Lab Results   Component Value Date    HGBA1C 5.6 11/10/2023     Lab Results   Component Value " Date    QRO1XOMFMYSM 0.750 07/08/2020    TSH 2.000 11/10/2023     Lab Results   Component Value Date    CHOLESTEROL 169 11/10/2023     Lab Results   Component Value Date    HDL 41 11/10/2023     Lab Results   Component Value Date    TRIG 116 11/10/2023     Lab Results   Component Value Date    LDLCALC 107 (H) 11/10/2023

## 2024-08-23 NOTE — ASSESSMENT & PLAN NOTE
Patient has transition from class III obesity to class II obesity with the help of medical weight management and GLP-1 medications  - Patient is pursuing Conservative Program and follow up visits with medical weight management provider  - Initial weight loss goal of 5-10% weight loss for improved health. Weight loss can improve patient's co-morbid conditions and/or prevent weight-related complications.  - Explained the importance of continuing lifestyle changes in addition to any anti-obesity medications.   - Labs reviewed from 11/2023 -will get updated panel before next office visit    General Recommendations:  Nutrition:  Eat breakfast daily.  Do not skip meals.      Food log (ie.) www.[employer].com, sparkpeople.com, [email or web address], [employer]com, etc.     Practice mindful eating.  Be sure to set aside time to eat, eat slowly, and savor your food.     Hydration:    At least 64oz of water daily.  No sugar sweetened beverages.  No juice (eat the fruit instead).     Exercise:  Studies have shown that the ideal exercise goal is somewhere between 150 to 300 minutes of moderate intensity exercise a week.  Start with exercising 10 minutes every other day and gradually increase physical activity with a goal of at least 150 minutes of moderate intensity exercise a week, divided over at least 3 days a week.  An example of this would be exercising 30 minutes a day, 5 days a week.  Resistance training can increase muscle mass and increase our resting metabolic rate.   FULL BODY resistance training is recommended 2-3 times a week.  Do not do this on consecutive days to allow for muscle recovery.     Aim for a bare minimum 5000 steps, even on days you do not exercise.     Monitoring:   Weigh yourself daily.  If this causes undue stress, then just weigh yourself once a week.  Weigh yourself the same time of the day with the same amount of clothing on.  Preferably this should be done after waking up, before you eat, and with no  clothing or minimal clothing on.     Specific Goals:  Patient was congratulated on her weight loss since last visit.  Nutrition was discussed and she will continue to make sure she is meal prepping and packing her food for the day.  She will continue with protein rich foods and snacks and avoid skipping meals.  Patient will continue to work on hydration and increasing her water intake.  She will continue to try to incorporate more dedicated exercise for 30 minutes 3-4 times a week.  Medications were discussed and patient will maintain on Wegovy 1.7 mg as she is seeing successful weight loss and is tolerating the medication without any headaches.  If she finds that her weight loss has stalled may consider increasing back up to the 2.4 milligrams and monitoring for any increased headache activity.  Patient will follow-up in the office in 3 months for management and accountability.

## 2024-09-04 ENCOUNTER — NURSE TRIAGE (OUTPATIENT)
Age: 24
End: 2024-09-04

## 2024-09-04 NOTE — TELEPHONE ENCOUNTER
"Patient called in to report moderate shortness of breath with chest tightness for the past 1 week. Reports use of inhaler every 4 hours. Patient has history of asthma and PE's and reports it \"does not feel like a PE\". RN advised ER disposition, but patient declined. Same OV scheduled with PCP for Thursday 9/5 at 3:30 PM.   RN strongly advised patient if any changes in breathing to go to the ER; patient verbally understands.     Reason for Disposition   MODERATE difficulty breathing (e.g., speaks in phrases, SOB even at rest, pulse 100-120) of new-onset or worse than normal    Answer Assessment - Initial Assessment Questions  1. RESPIRATORY STATUS: \"Describe your breathing?\" (e.g., wheezing, shortness of breath, unable to speak, severe coughing)       Shortness of breath, chest tightness   2. ONSET: \"When did this breathing problem begin?\"       Past 1 week  3. PATTERN \"Does the difficult breathing come and go, or has it been constant since it started?\"       Ongoing; constant using rescue inhaler every 4 hours  4. SEVERITY: \"How bad is your breathing?\" (e.g., mild, moderate, severe)     - MILD: No SOB at rest, mild SOB with walking, speaks normally in sentences, can lay down, no retractions, pulse < 100.     - MODERATE: SOB at rest, SOB with minimal exertion and prefers to sit, cannot lie down flat, speaks in phrases, mild retractions, audible wheezing, pulse 100-120.     - SEVERE: Very SOB at rest, speaks in single words, struggling to breathe, sitting hunched forward, retractions, pulse > 120       Moderate' interferes with moving around  5. RECURRENT SYMPTOM: \"Have you had difficulty breathing before?\" If Yes, ask: \"When was the last time?\" and \"What happened that time?\"      Denies  6. CARDIAC HISTORY: \"Do you have any history of heart disease?\" (e.g., heart attack, angina, bypass surgery, angioplasty)       Denies  7. LUNG HISTORY: \"Do you have any history of lung disease?\"  (e.g., pulmonary embolus, asthma, " "emphysema)      Asthma; PE's but patient reports it doesn't feel like PE  8. CAUSE: \"What do you think is causing the breathing problem?\"       Not sure  9. OTHER SYMPTOMS: \"Do you have any other symptoms? (e.g., dizziness, runny nose, cough, chest pain, fever)      Runny nose started today  10. PREGNANCY: \"Is there any chance you are pregnant?\" \"When was your last menstrual period?\"        Denies  11. TRAVEL: \"Have you traveled out of the country in the last month?\" (e.g., travel history, exposures)        Denies    Protocols used: Breathing Difficulty-ADULT-[place]    "

## 2024-09-05 ENCOUNTER — OFFICE VISIT (OUTPATIENT)
Dept: FAMILY MEDICINE CLINIC | Facility: CLINIC | Age: 24
End: 2024-09-05
Payer: COMMERCIAL

## 2024-09-05 VITALS
HEART RATE: 95 BPM | DIASTOLIC BLOOD PRESSURE: 90 MMHG | OXYGEN SATURATION: 99 % | SYSTOLIC BLOOD PRESSURE: 120 MMHG | WEIGHT: 217.6 LBS | TEMPERATURE: 98.7 F | HEIGHT: 65 IN | RESPIRATION RATE: 18 BRPM | BODY MASS INDEX: 36.25 KG/M2

## 2024-09-05 DIAGNOSIS — J45.21 MILD INTERMITTENT ASTHMA WITH ACUTE EXACERBATION: Primary | ICD-10-CM

## 2024-09-05 PROCEDURE — 99214 OFFICE O/P EST MOD 30 MIN: CPT | Performed by: FAMILY MEDICINE

## 2024-09-05 RX ORDER — METHYLPREDNISOLONE 4 MG
TABLET, DOSE PACK ORAL
Qty: 21 EACH | Refills: 0 | Status: SHIPPED | OUTPATIENT
Start: 2024-09-05

## 2024-09-05 RX ORDER — AZITHROMYCIN 250 MG/1
TABLET, FILM COATED ORAL
Qty: 6 TABLET | Refills: 0 | Status: SHIPPED | OUTPATIENT
Start: 2024-09-05 | End: 2024-09-12

## 2024-09-05 NOTE — PROGRESS NOTES
Misty Blackmon 2000 female MRN: [id number]    [facility] PRACTICE OFFICE VISIT  [facility] - [facility]      ASSESSMENT/PLAN  Misty Blackmon is a 24 y.o. female presents to the office for    Diagnoses and all orders for this visit:    Mild intermittent asthma with acute exacerbation  -     methylPREDNISolone 4 MG tablet therapy pack; Use as directed on package  -     azithromycin (ZITHROMAX) 250 mg tablet; Take 2 tablets today then 1 tablet daily x 4 days                Future Appointments   Date Time Provider Department Center   9/10/2024  4:00 PM [facility]   11/20/2024  7:45 AM OLEG [facility]lin [facility]          SUBJECTIVE  CC: Shortness of Breath (Started 1 week ago) and Chest Pain      HPI:  Misty Blackmon is a 24 y.o. female who presents for an acute appointment.  2 weeks of SOB and chest tighiness. Using ALbuterol way more then normal .  Patient has not been sick states that this came on suddenly.  Has been using her inhalers as prescribed but stated above albuterol more than normal      Review of Systems   Constitutional:  Negative for activity change, appetite change, chills, fatigue and fever.   HENT:  Negative for congestion.    Respiratory:  Positive for chest tightness and shortness of breath. Negative for cough.    Cardiovascular:  Negative for chest pain and leg swelling.   Gastrointestinal:  Negative for abdominal distention, abdominal pain, constipation, diarrhea, nausea and vomiting.   All other systems reviewed and are negative.      Historical Information   The patient history was reviewed as follows:  Past Medical History:   Diagnosis Date   • Allergic    • Anxiety    • Anxiety and depression    • Asthma    • Clotting disorder (HCC)    • Factor V Leiden (HCC)    • Headache(784.0)    • Pulmonary embolism (HCC)    • Thrombophlebitis          Medications:     Current Outpatient Medications:   •  azithromycin  (ZITHROMAX) 250 mg tablet, Take 2 tablets today then 1 tablet daily x 4 days, Disp: 6 tablet, Rfl: 0  •  Fluticasone-Salmeterol (Advair Diskus) 250-50 mcg/dose inhaler, Inhale 1 puff 2 (two) times a day Rinse mouth after use., Disp: 60 blister, Rfl: 8  •  hydrOXYzine HCL (ATARAX) 50 mg tablet, take 1 tablet by mouth at bedtime, Disp: 90 tablet, Rfl: 2  •  levonorgestrel (KYLEENA) 19.5 MG intrauterine device, 1 each by Intrauterine route, Disp: , Rfl:   •  methylPREDNISolone 4 MG tablet therapy pack, Use as directed on package, Disp: 21 each, Rfl: 0  •  ondansetron (ZOFRAN-ODT) 4 mg disintegrating tablet, Take 1 tablet (4 mg total) by mouth every 6 (six) hours as needed for nausea or vomiting, Disp: 20 tablet, Rfl: 0  •  Semaglutide-Weight Management (WEGOVY) 1.7 MG/0.75ML, Inject 0.75 mL (1.7 mg total) under the skin once a week, Disp: 3 mL, Rfl: 2  •  sertraline (ZOLOFT) 50 mg tablet, take 3 tablets by mouth at bedtime, Disp: 270 tablet, Rfl: 1  •  topiramate (TOPAMAX) 50 MG tablet, take 1 tablet by mouth at bedtime, Disp: 90 tablet, Rfl: 1  •  albuterol (PROVENTIL HFA,VENTOLIN HFA) 90 mcg/act inhaler, Inhale 2 puffs every 4 (four) hours as needed for wheezing or shortness of breath (Patient not taking: Reported on 8/23/2024), Disp: 18 g, Rfl: 6  •  busPIRone (BUSPAR) 15 mg tablet, Take 1 tablet (15 mg total) by mouth 3 (three) times a day, Disp: 270 tablet, Rfl: 3  •  butalbital-acetaminophen-caffeine (FIORICET,ESGIC) -40 mg per tablet, Take 1 tablet now, and then repeat in 4 hrs if no relief. Don't take more then 3 in a week. (Patient not taking: Reported on 8/23/2024), Disp: 30 tablet, Rfl: 0  •  EPINEPHrine (EPIPEN) 0.3 mg/0.3 mL SOAJ, Inject 0.3 mL (0.3 mg total) into a muscle once for 1 dose, Disp: 0.6 mL, Rfl: 0  •  SUMAtriptan (IMITREX) 100 mg tablet, TAKE 1 TABLET BY MOUTH ONCE AS NEEDED FOR MIGRAINE REPEAT 1 DOSE 6 HOURS LATER NO MORE THAN 3 DAYS A WEEK AS DIRECTED (Patient not taking: Reported on  "8/23/2024), Disp: 15 tablet, Rfl: 1    Allergies   Allergen Reactions   • Amoxicillin Anaphylaxis   • Cinnamon - Food Allergy Anaphylaxis       OBJECTIVE  Vitals:   Vitals:    09/05/24 1528   BP: 120/90   BP Location: Left arm   Patient Position: Sitting   Cuff Size: Large   Pulse: 95   Resp: 18   Temp: 98.7 °F (37.1 °C)   TempSrc: Temporal   SpO2: 99%   Weight: 98.7 kg (217 lb 9.6 oz)   Height: 5' 5\" (1.651 m)         Physical Exam  Vitals reviewed.   Constitutional:       Appearance: She is well-developed.   HENT:      Head: Normocephalic and atraumatic.   Eyes:      Extraocular Movements: EOM normal.      Conjunctiva/sclera: Conjunctivae normal.      Pupils: Pupils are equal, round, and reactive to light.   Cardiovascular:      Rate and Rhythm: Normal rate and regular rhythm.      Heart sounds: Normal heart sounds, S1 normal and S2 normal. No murmur heard.  Pulmonary:      Effort: Pulmonary effort is normal. No respiratory distress.      Breath sounds: Normal breath sounds. No wheezing.      Comments: Patient had treatment prior to our appointment, therefore today her exam was normal   Musculoskeletal:         General: No edema. Normal range of motion.      Cervical back: Normal range of motion and neck supple.   Skin:     General: Skin is warm.   Neurological:      Mental Status: She is alert and oriented to person, place, and time.   Psychiatric:         Mood and Affect: Mood and affect normal.         Speech: Speech normal.         Behavior: Behavior normal.         Thought Content: Thought content normal.         Judgment: Judgment normal.                    Sue Sanchez MD,   [facility]  9/8/2024      "

## 2024-09-06 ENCOUNTER — TELEPHONE (OUTPATIENT)
Age: 24
End: 2024-09-06

## 2024-09-06 ENCOUNTER — HOSPITAL ENCOUNTER (EMERGENCY)
Facility: HOSPITAL | Age: 24
Discharge: HOME | End: 2024-09-06
Attending: EMERGENCY MEDICINE
Payer: COMMERCIAL

## 2024-09-06 ENCOUNTER — APPOINTMENT (EMERGENCY)
Dept: RADIOLOGY | Facility: HOSPITAL | Age: 24
End: 2024-09-06
Payer: COMMERCIAL

## 2024-09-06 VITALS
RESPIRATION RATE: 20 BRPM | HEART RATE: 86 BPM | DIASTOLIC BLOOD PRESSURE: 65 MMHG | SYSTOLIC BLOOD PRESSURE: 124 MMHG | OXYGEN SATURATION: 99 % | TEMPERATURE: 98.7 F

## 2024-09-06 DIAGNOSIS — R59.0 HILAR LYMPHADENOPATHY: Primary | ICD-10-CM

## 2024-09-06 DIAGNOSIS — R07.9 CHEST PAIN, UNSPECIFIED TYPE: Primary | ICD-10-CM

## 2024-09-06 LAB
ANION GAP SERPL CALCULATED.3IONS-SCNC: 9 MMOL/L (ref 4–13)
BASOPHILS # BLD AUTO: 0.02 THOUSANDS/ÂΜL (ref 0–0.1)
BASOPHILS NFR BLD AUTO: 0 % (ref 0–1)
BUN SERPL-MCNC: 11 MG/DL (ref 5–25)
CALCIUM SERPL-MCNC: 9.6 MG/DL (ref 8.4–10.2)
CARDIAC TROPONIN I PNL SERPL HS: <2 NG/L
CHLORIDE SERPL-SCNC: 106 MMOL/L (ref 96–108)
CO2 SERPL-SCNC: 22 MMOL/L (ref 21–32)
CREAT SERPL-MCNC: 0.94 MG/DL (ref 0.6–1.3)
D DIMER PPP FEU-MCNC: <0.27 UG/ML FEU
EOSINOPHIL # BLD AUTO: 0.04 THOUSAND/ÂΜL (ref 0–0.61)
EOSINOPHIL NFR BLD AUTO: 1 % (ref 0–6)
ERYTHROCYTE [DISTWIDTH] IN BLOOD BY AUTOMATED COUNT: 12.7 % (ref 11.6–15.1)
GFR SERPL CREATININE-BSD FRML MDRD: 83 ML/MIN/1.73SQ M
GLUCOSE SERPL-MCNC: 85 MG/DL (ref 65–140)
HCT VFR BLD AUTO: 43.7 % (ref 34.8–46.1)
HGB BLD-MCNC: 14.6 G/DL (ref 11.5–15.4)
IMM GRANULOCYTES # BLD AUTO: 0.01 THOUSAND/UL (ref 0–0.2)
IMM GRANULOCYTES NFR BLD AUTO: 0 % (ref 0–2)
LYMPHOCYTES # BLD AUTO: 1.47 THOUSANDS/ÂΜL (ref 0.6–4.47)
LYMPHOCYTES NFR BLD AUTO: 20 % (ref 14–44)
MCH RBC QN AUTO: 30.5 PG (ref 26.8–34.3)
MCHC RBC AUTO-ENTMCNC: 33.4 G/DL (ref 31.4–37.4)
MCV RBC AUTO: 91 FL (ref 82–98)
MONOCYTES # BLD AUTO: 0.45 THOUSAND/ÂΜL (ref 0.17–1.22)
MONOCYTES NFR BLD AUTO: 6 % (ref 4–12)
NEUTROPHILS # BLD AUTO: 5.33 THOUSANDS/ÂΜL (ref 1.85–7.62)
NEUTS SEG NFR BLD AUTO: 73 % (ref 43–75)
NRBC BLD AUTO-RTO: 0 /100 WBCS
PLATELET # BLD AUTO: 196 THOUSANDS/UL (ref 149–390)
PMV BLD AUTO: 10.2 FL (ref 8.9–12.7)
POTASSIUM SERPL-SCNC: 3.6 MMOL/L (ref 3.5–5.3)
RBC # BLD AUTO: 4.79 MILLION/UL (ref 3.81–5.12)
SODIUM SERPL-SCNC: 137 MMOL/L (ref 135–147)
WBC # BLD AUTO: 7.32 THOUSAND/UL (ref 4.31–10.16)

## 2024-09-06 PROCEDURE — 99285 EMERGENCY DEPT VISIT HI MDM: CPT | Performed by: EMERGENCY MEDICINE

## 2024-09-06 PROCEDURE — 85379 FIBRIN DEGRADATION QUANT: CPT | Performed by: EMERGENCY MEDICINE

## 2024-09-06 PROCEDURE — 80048 BASIC METABOLIC PNL TOTAL CA: CPT | Performed by: EMERGENCY MEDICINE

## 2024-09-06 PROCEDURE — 85025 COMPLETE CBC W/AUTO DIFF WBC: CPT | Performed by: EMERGENCY MEDICINE

## 2024-09-06 PROCEDURE — 96374 THER/PROPH/DIAG INJ IV PUSH: CPT

## 2024-09-06 PROCEDURE — 84484 ASSAY OF TROPONIN QUANT: CPT | Performed by: EMERGENCY MEDICINE

## 2024-09-06 PROCEDURE — 71046 X-RAY EXAM CHEST 2 VIEWS: CPT

## 2024-09-06 PROCEDURE — 36415 COLL VENOUS BLD VENIPUNCTURE: CPT | Performed by: EMERGENCY MEDICINE

## 2024-09-06 PROCEDURE — 99285 EMERGENCY DEPT VISIT HI MDM: CPT

## 2024-09-06 PROCEDURE — 93005 ELECTROCARDIOGRAM TRACING: CPT

## 2024-09-06 RX ORDER — KETOROLAC TROMETHAMINE 30 MG/ML
15 INJECTION, SOLUTION INTRAMUSCULAR; INTRAVENOUS ONCE
Status: COMPLETED | OUTPATIENT
Start: 2024-09-06 | End: 2024-09-06

## 2024-09-06 RX ADMIN — KETOROLAC TROMETHAMINE 15 MG: 30 INJECTION, SOLUTION INTRAMUSCULAR; INTRAVENOUS at 15:19

## 2024-09-06 NOTE — TELEPHONE ENCOUNTER
Patient's mom says ER dr is refusing to do ct scan as they say she has had one before. Mom says pain is similar to when she had the pulmonary embolism. Please call ER Dr to discuss.

## 2024-09-06 NOTE — TELEPHONE ENCOUNTER
Spoke to the ED. At this time given workup is negative they won't do a CT scan. So I reviewed her chart. She is required to get a repeat CT scan of chest given abnormal lymph nodes in [facility]. I went ahead and placed it for her to perform.

## 2024-09-06 NOTE — ED PROVIDER NOTES
History  Chief Complaint   Patient presents with    Asthma    Chest Pain     Has been short of breath for a couple of weeks. Thought it was her asthma acting up, went to her Dr yesterday, today started with sharp mid chest pain, has had numerous pe in past     24-year-old female, presenting with chest pain.  Patient states earlier today she developed sharp pain in the lower center chest, constant with no known modifying factors.  Patient has been feeling short of breath over the past few weeks.  Denies any fevers or cough.  Patient reports history of pulmonary embolism, is not on any current anticoagulation medication.  Denies any leg pain or swelling.      History provided by:  Patient   used: No    Asthma  Associated symptoms: chest pain    Associated symptoms: no cough    Chest Pain  Associated symptoms: no cough        Prior to Admission Medications   Prescriptions Last Dose Informant Patient Reported? Taking?   EPINEPHrine (EPIPEN) 0.3 mg/0.3 mL SOAJ   No No   Sig: Inject 0.3 mL (0.3 mg total) into a muscle once for 1 dose   Fluticasone-Salmeterol (Advair Diskus) 250-50 mcg/dose inhaler   No No   Sig: Inhale 1 puff 2 (two) times a day Rinse mouth after use.   SUMAtriptan (IMITREX) 100 mg tablet   No No   Sig: TAKE 1 TABLET BY MOUTH ONCE AS NEEDED FOR MIGRAINE REPEAT 1 DOSE 6 HOURS LATER NO MORE THAN 3 DAYS A WEEK AS DIRECTED   Patient not taking: Reported on 8/23/2024   Semaglutide-Weight Management (WEGOVY) 1.7 MG/0.75ML   No No   Sig: Inject 0.75 mL (1.7 mg total) under the skin once a week   albuterol (PROVENTIL HFA,VENTOLIN HFA) 90 mcg/act inhaler   No No   Sig: Inhale 2 puffs every 4 (four) hours as needed for wheezing or shortness of breath   Patient not taking: Reported on 8/23/2024   azithromycin (ZITHROMAX) 250 mg tablet   No No   Sig: Take 2 tablets today then 1 tablet daily x 4 days   busPIRone (BUSPAR) 15 mg tablet   No No   Sig: Take 1 tablet (15 mg total) by mouth 3 (three)  times a day   butalbital-acetaminophen-caffeine (FIORICET,ESGIC) -40 mg per tablet  Self No No   Sig: Take 1 tablet now, and then repeat in 4 hrs if no relief. Don't take more then 3 in a week.   Patient not taking: Reported on 2024   hydrOXYzine HCL (ATARAX) 50 mg tablet   No No   Sig: take 1 tablet by mouth at bedtime   levonorgestrel (KYLEENA) 19.5 MG intrauterine device  Self Yes No   Si each by Intrauterine route   methylPREDNISolone 4 MG tablet therapy pack   No No   Sig: Use as directed on package   ondansetron (ZOFRAN-ODT) 4 mg disintegrating tablet   No No   Sig: Take 1 tablet (4 mg total) by mouth every 6 (six) hours as needed for nausea or vomiting   sertraline (ZOLOFT) 50 mg tablet   No No   Sig: take 3 tablets by mouth at bedtime   topiramate (TOPAMAX) 50 MG tablet   No No   Sig: take 1 tablet by mouth at bedtime      Facility-Administered Medications: None       Past Medical History:   Diagnosis Date    Allergic     Anxiety     Anxiety and depression     Asthma     Clotting disorder ([facility])     Factor V Leiden ([facility])     Headache(784.0)     Pulmonary embolism ([facility])     Thrombophlebitis        Past Surgical History:   Procedure Laterality Date    BLADDER SURGERY      FL RETROGRADE PYELOGRAM  2022    FL VCUG VOIDING URETHROCYSTOGRAM  2018    NEPHRECTOMY Left 2020    Done laparoscopically at [facility]    [place] CYSTOURETHROSCOPY N/A 2022    Procedure: CYSTOSCOPY, retrograde pyelogram, and examination under anesthesia, dilation urethral stenosis;  Surgeon: Kamran Lott MD;  Location: [facility] MAIN OR;  Service: Urology    URETER REVISION      WISDOM TOOTH EXTRACTION      woke up during procedure.       Family History   Problem Relation Age of Onset    Hypertension Mother     Hyperlipidemia Mother     Diabetes Mother     Factor V Leiden deficiency Mother     Heart disease Father     Depression Father     ADD / ADHD Brother     Ovarian cancer Maternal Grandmother     Diabetes Other      Arthritis Family     Asthma Family     Cancer Family     Cervical cancer Family     Heart failure Family     Hyperlipidemia Family     Hypertension Family      I have reviewed and agree with the history as documented.    E-Cigarette/Vaping    E-Cigarette Use Never User      E-Cigarette/Vaping Substances    Nicotine No     THC No     CBD No     Flavoring No     Other No     Unknown No      Social History     Tobacco Use    Smoking status: Never     Passive exposure: Past    Smokeless tobacco: Never   Vaping Use    Vaping status: Never Used   Substance Use Topics    Alcohol use: Yes     Comment: social, 1 x per month    Drug use: Never       Review of Systems   Constitutional: Negative.    Respiratory:  Negative for cough.    Cardiovascular:  Positive for chest pain.   Neurological: Negative.        Physical Exam  Physical Exam  Vitals and nursing note reviewed.   Constitutional:       General: She is not in acute distress.  HENT:      Head: Normocephalic and atraumatic.   Cardiovascular:      Rate and Rhythm: Normal rate and regular rhythm.   Pulmonary:      Effort: Pulmonary effort is normal.      Breath sounds: Normal breath sounds. No wheezing.   Chest:      Comments: Tenderness to lower mid chest, no swelling no deformity  Musculoskeletal:         General: No swelling or tenderness. Normal range of motion.   Skin:     General: Skin is warm and dry.   Neurological:      General: No focal deficit present.      Mental Status: She is alert and oriented to person, place, and time.         Vital Signs  ED Triage Vitals [09/06/24 1343]   Temperature Pulse Respirations Blood Pressure SpO2   98.7 °F (37.1 °C) 92 (!) 24 161/95 98 %      Temp Source Heart Rate Source Patient Position - Orthostatic VS BP Location FiO2 (%)   Tympanic Monitor Sitting Right arm --      Pain Score       9           Vitals:    09/06/24 1343 09/06/24 1500   BP: 161/95 151/97   Pulse: 92 72   Patient Position - Orthostatic VS: Sitting           Visual Acuity      ED Medications  Medications   ketorolac (TORADOL) injection 15 mg (15 mg Intravenous Given 9/6/24 7909)       Diagnostic Studies  Results Reviewed       Procedure Component Value Units Date/Time    HS Troponin 0hr (reflex protocol) [[id number]]  (Normal) Collected: 09/06/24 1406    Lab Status: Final result Specimen: Blood from Arm, Right Updated: 09/06/24 1439     hs TnI 0hr <2 ng/L     Basic metabolic panel [[id number]] Collected: 09/06/24 1406    Lab Status: Final result Specimen: Blood from Arm, Right Updated: 09/06/24 1432     Sodium 137 mmol/L      Potassium 3.6 mmol/L      Chloride 106 mmol/L      CO2 22 mmol/L      ANION GAP 9 mmol/L      BUN 11 mg/dL      Creatinine 0.94 mg/dL      Glucose 85 mg/dL      Calcium 9.6 mg/dL      eGFR 83 ml/min/1.73sq m     Narrative:      National Kidney Disease Foundation guidelines for Chronic Kidney Disease (CKD):     Stage 1 with normal or high GFR (GFR > 90 mL/min/1.73 square meters)    Stage 2 Mild CKD (GFR = 60-89 mL/min/1.73 square meters)    Stage 3A Moderate CKD (GFR = 45-59 mL/min/1.73 square meters)    Stage 3B Moderate CKD (GFR = 30-44 mL/min/1.73 square meters)    Stage 4 Severe CKD (GFR = 15-29 mL/min/1.73 square meters)    Stage 5 End Stage CKD (GFR <15 mL/min/1.73 square meters)  Note: GFR calculation is accurate only with a steady state creatinine    D-Dimer [[id number]]  (Normal) Collected: 09/06/24 1406    Lab Status: Final result Specimen: Blood from Arm, Right Updated: 09/06/24 1429     D-Dimer, Quant <0.27 ug/ml FEU     CBC and differential [[id number]] Collected: 09/06/24 1406    Lab Status: Final result Specimen: Blood from Arm, Right Updated: 09/06/24 1415     WBC 7.32 Thousand/uL      RBC 4.79 Million/uL      Hemoglobin 14.6 g/dL      Hematocrit 43.7 %      MCV 91 fL      MCH 30.5 pg      MCHC 33.4 g/dL      RDW 12.7 %      MPV 10.2 fL      Platelets 196 Thousands/uL      nRBC 0 /100 WBCs      Segmented % 73 %      Immature  Grans % 0 %      Lymphocytes % 20 %      Monocytes % 6 %      Eosinophils Relative 1 %      Basophils Relative 0 %      Absolute Neutrophils 5.33 Thousands/µL      Absolute Immature Grans 0.01 Thousand/uL      Absolute Lymphocytes 1.47 Thousands/µL      Absolute Monocytes 0.45 Thousand/µL      Eosinophils Absolute 0.04 Thousand/µL      Basophils Absolute 0.02 Thousands/µL     POCT pregnancy, urine [[id number]]     Lab Status: No result                    XR chest 2 views    (Results Pending)              Procedures  ECG 12 Lead Documentation Only    Date/Time: 9/6/2024 2:30 PM    Performed by: Angel Vaca MD  Authorized by: Angel Vaca MD    ECG reviewed by me, the ED Provider: yes    Patient location:  ED  Interpretation:     Interpretation: normal    Rate:     ECG rate:  80    ECG rate assessment: normal    Rhythm:     Rhythm: sinus rhythm    Ectopy:     Ectopy: none    QRS:     QRS axis:  Normal    QRS intervals:  Normal  Conduction:     Conduction: normal    ST segments:     ST segments:  Normal           ED Course  ED Course as of 09/06/24 1546   Fri Sep 06, 2024   1457 Chest x-ray dependently reviewed myself, no pneumothorax, infiltrate, or effusion, no acute findings.   1503 Patient low risk for PE, has negative D-dimer, no indication for further testing.   1540 Patient resting comfortably, normal respiratory efforts, speaking full sentences, oxygen saturation normal on room air.  Patient will follow-up with primary care doctor for further evaluation, return precautions given.               HEART Risk Score      Flowsheet Row Most Recent Value   Heart Score Risk Calculator    History 0 Filed at: 09/06/2024 1543   ECG 0 Filed at: 09/06/2024 1543   Age 0 Filed at: 09/06/2024 1543   Risk Factors 0 Filed at: 09/06/2024 1543   Troponin 0 Filed at: 09/06/2024 1543   HEART Score 0 Filed at: 09/06/2024 1543                  PERC Rule for PE      Flowsheet Row Most Recent Value   PERC Rule for PE    Age >=50  0 Filed at: 09/06/2024 1352   HR >=100 0 Filed at: 09/06/2024 1352   O2 Sat on room air < 95% 0 Filed at: 09/06/2024 1352   History of PE or DVT 1 Filed at: 09/06/2024 1352   Recent trauma or surgery 0 Filed at: 09/06/2024 1352   Hemoptysis 0 Filed at: 09/06/2024 1352   Exogenous estrogen 0 Filed at: 09/06/2024 1352   Unilateral leg swelling 0 Filed at: 09/06/2024 1352   PERC Rule for PE Results 1 Filed at: 09/06/2024 1352                    Wells' Criteria for PE      Flowsheet Row Most Recent Value   Wells' Criteria for PE    Clinical signs and symptoms of DVT 0 Filed at: 09/06/2024 1352   PE is primary diagnosis or equally likely 0 Filed at: 09/06/2024 1352   HR >100 0 Filed at: 09/06/2024 1352   Immobilization at least 3 days or Surgery in the previous 4 weeks 0 Filed at: 09/06/2024 1352   Previous, objectively diagnosed PE or DVT 1.5 Filed at: 09/06/2024 1352   Hemoptysis 0 Filed at: 09/06/2024 1352   Malignancy with treatment within 6 months or palliative 0 Filed at: 09/06/2024 [facility]   Wells' Criteria Total 1.5 Filed at: 09/06/2024 1352                  Medical Decision Making  24-year-old female, presenting with chest pain.  Differential diagnosis includes pulmonary embolism, GERD, muscle strain, ACS among other diagnoses.  Patient looks well in no distress, normal respiratory effort, oxygen saturation normal on room air.  Patient low risk for PE by Wells criteria, unable to rule out using PERC due to previous history of PE.  Labs including D-dimer, EKG, chest x-ray ordered.      Patient low risk for PE, has not elevated D-dimer.  Vital signs unremarkable in ED, discussed with patient no indication for CT scan to further evaluate for PE.  Will follow-up with primary care doctor.    Amount and/or Complexity of Data Reviewed  External Data Reviewed: radiology.     Details: CTA PE study in December 2023, no PE.  Labs: ordered. Decision-making details documented in ED Course.  Radiology: ordered and independent  interpretation performed. Decision-making details documented in ED Course.  ECG/medicine tests: ordered and independent interpretation performed. Decision-making details documented in ED Course.    Risk  Prescription drug management.             I have reviewed test results and diagnosis with patient.  Follow-up plan reviewed.  Precautions for acute return for re-evaluation are reviewed.  Opportunity to ask questions was provided.  Patient verbalizes understanding.      Disposition  Final diagnoses:   Chest pain, unspecified type     Time reflects when diagnosis was documented in both MDM as applicable and the Disposition within this note       Time User Action Codes Description Comment    9/6/2024  3:39 PM Angel Vaca Add [R07.9] Chest pain, unspecified type           ED Disposition       ED Disposition   Discharge    Condition   Stable    Date/Time   Fri Sep 6, 2024 1539    Comment   Misty Blackmon discharge to home/self care.                   Follow-up Information       Follow up With Specialties Details Why Contact Info    Sue Leyva MD Family Medicine Schedule an appointment as soon as possible for a visit   [address] [facility] [address]  [phone]              Patient's Medications   Discharge Prescriptions    No medications on file       No discharge procedures on file.    PDMP Review       None            ED Provider  Electronically Signed by             Angel Vaca MD  09/06/24 1548       Angel Vaca MD  09/06/24 1549

## 2024-09-06 NOTE — TELEPHONE ENCOUNTER
FYI Patient was discharged and told to take aspirin  said she thinking she may go to another er because she has stabbing pain and they did not want to do anything about it . Patient is aware you are out of the office until Monday

## 2024-09-06 NOTE — TELEPHONE ENCOUNTER
Patient called stating she saw PCP yesterday for chest pain and trouble breathing. Pt called today stating she was having severe chest pain and has a history of PE and wanted to know if her PCP would like her to come in to the office or go to the ED. Patient was advised that given her symptoms and history she should go to ED immediately or call 911 as she would be better evaluated there. Pt stated she can make it to ED. Patient was advised that this would be documented along with her verbal understanding on how to proceed and her agreeance to do so. Please follow up if necessary.

## 2024-09-08 LAB
ATRIAL RATE: 80 BPM
P AXIS: 35 DEGREES
PR INTERVAL: 140 MS
QRS AXIS: 56 DEGREES
QRSD INTERVAL: 70 MS
QT INTERVAL: 364 MS
QTC INTERVAL: 419 MS
T WAVE AXIS: 52 DEGREES
VENTRICULAR RATE: 80 BPM

## 2024-09-08 PROCEDURE — 93010 ELECTROCARDIOGRAM REPORT: CPT | Performed by: INTERNAL MEDICINE

## 2024-09-10 ENCOUNTER — OFFICE VISIT (OUTPATIENT)
Dept: NEUROLOGY | Facility: CLINIC | Age: 24
End: 2024-09-10
Payer: COMMERCIAL

## 2024-09-10 VITALS
DIASTOLIC BLOOD PRESSURE: 69 MMHG | OXYGEN SATURATION: 98 % | WEIGHT: 215 LBS | BODY MASS INDEX: 35.82 KG/M2 | HEIGHT: 65 IN | SYSTOLIC BLOOD PRESSURE: 108 MMHG | HEART RATE: 85 BPM

## 2024-09-10 DIAGNOSIS — G43.109 MIGRAINE WITH AURA AND WITHOUT STATUS MIGRAINOSUS, NOT INTRACTABLE: ICD-10-CM

## 2024-09-10 PROCEDURE — 99215 OFFICE O/P EST HI 40 MIN: CPT | Performed by: PSYCHIATRY & NEUROLOGY

## 2024-09-10 RX ORDER — RIZATRIPTAN BENZOATE 10 MG/1
10 TABLET ORAL AS NEEDED
Qty: 18 TABLET | Refills: 2 | Status: SHIPPED | OUTPATIENT
Start: 2024-09-10

## 2024-09-10 NOTE — PROGRESS NOTES
Patient ID: Misty Blackmon is a 24 y.o. female.    Assessment/Plan:    Migraine with aura and without status migrainosus, not intractable  Assessment:  Misty Blackmon is presenting to clinic 09/10/24 for management of headache.   - Pt denies red flag symptoms such as sudden onset headache, focal exam findings, stiff neck, temperature, altered mental status    Medications:  - Previous trials: None  - Current medications (abortive and preventative): Imitrex and topamax    Workup:  Lab Results   Component Value Date    GLUC 85 09/06/2024    SODIUM 137 09/06/2024    [id number] 0.750 07/08/2020         Plan:  - Headache Preventive Rx: Topamax 50mg nightly  - Over-the-counter supplements for Headache Prevention:  Magnesium Oxide 400mg a day. Vitamin B2 200 mg twice a day.   - Headache Abortive: Rizatriptan (maxalt) 10 mg   - Discussed proper use, possible side effects and risks  - Discussed not taking over-the-counter or prescription headache abortive more than 3 days per week to prevent medication overuse headache  - Discussed headache hygiene and lifestyle factors that may improve headaches   - Pt counseled to drink at least 64 ounces of water a day  - Recommend keeping a headache diary, or consider smart phone apps, such as migraine david, headache tracker etc.  - All questions were address and patient verbalized understanding  - Pt is to follow up with us in 6 months         Subjective:    HPI    Misty Blackmon is a 24 year old female with a PMH of factor V Leiden deficiency and PE (not currently on AC) who presents for follow up for migraines. The patient reportedly has an increase in the frequency of her migraines.    What is your current headache frequency: 1 times per week    Have there been any changes in your headaches since your last visit? No    Are you taking your current medications as prescribed? yes    Do you have any side effects? Yes, nausea from sumatriptan    How often do you use abortive  "medications to treat a headache? 1 times per week  How effective are they? somewhat effective    History obtained from patient as well as available medical record review.    Patient states her migraines have become a little more frequent at 1 time per week on average. The patient states the sumatriptan does not help that much anymore and sometimes the migraine just returns the next morning. We discussed changing her abortive medication and adding OTC supplements.        Objective:    Blood pressure 108/69, pulse 85, height 5' 5\" (1.651 m), weight 97.5 kg (215 lb), SpO2 98%, not currently breastfeeding.    Physical Exam  Vitals reviewed.   Eyes:      General: Lids are normal.      Extraocular Movements: Extraocular movements intact.      Pupils: Pupils are equal, round, and reactive to light.   Neurological:      Motor: Motor strength is normal.     Coordination: Coordination is intact.      Deep Tendon Reflexes: Reflexes are normal and symmetric.         Neurological Exam  Mental Status  Awake, alert and oriented to person, place and time.    Cranial Nerves  CN II: Visual acuity is normal. Visual fields full to confrontation.  CN III, IV, VI: Extraocular movements intact bilaterally. Normal lids and orbits bilaterally. Pupils equal round and reactive to light bilaterally.  CN V: Facial sensation is normal.  CN VII: Full and symmetric facial movement.  CN VIII: Hearing is normal.  CN IX, X: Palate elevates symmetrically. Normal gag reflex.  CN XI: Shoulder shrug strength is normal.  CN XII: Tongue midline without atrophy or fasciculations.    Motor  Normal muscle bulk throughout. Normal muscle tone. Strength is 5/5 throughout all four extremities.    Sensory  Light touch is normal in upper and lower extremities. Pinprick is normal in upper and lower extremities. Temperature is normal in upper and lower extremities.     Reflexes  Deep tendon reflexes are 2+ and symmetric in all four " extremities.    Coordination    Finger-to-nose, rapid alternating movements and heel-to-shin normal bilaterally without dysmetria.

## 2024-09-10 NOTE — ASSESSMENT & PLAN NOTE
Assessment:  Misty Blackmon is presenting to clinic 09/10/24 for management of headache.   - Pt denies red flag symptoms such as sudden onset headache, focal exam findings, stiff neck, temperature, altered mental status    Medications:  - Previous trials: None  - Current medications (abortive and preventative): Imitrex and topamax    Workup:  Lab Results   Component Value Date    GLUC 85 09/06/2024    SODIUM 137 09/06/2024    [id number] 0.750 07/08/2020         Plan:  - Headache Preventive Rx: Topamax 50mg nightly  - Over-the-counter supplements for Headache Prevention:  Magnesium Oxide 400mg a day. Vitamin B2 200 mg twice a day.   - Headache Abortive: Rizatriptan (maxalt) 10 mg   - Discussed proper use, possible side effects and risks  - Discussed not taking over-the-counter or prescription headache abortive more than 3 days per week to prevent medication overuse headache  - Discussed headache hygiene and lifestyle factors that may improve headaches   - Pt counseled to drink at least 64 ounces of water a day  - Recommend keeping a headache diary, or consider smart phone apps, such as migraine david, headache tracker etc.  - All questions were address and patient verbalized understanding  - Pt is to follow up with us in 6 months

## 2024-10-14 ENCOUNTER — TELEPHONE (OUTPATIENT)
Age: 24
End: 2024-10-14

## 2024-11-19 ENCOUNTER — RESULTS FOLLOW-UP (OUTPATIENT)
Dept: BARIATRICS | Facility: CLINIC | Age: 24
End: 2024-11-19

## 2024-11-19 LAB
25(OH)D3+25(OH)D2 SERPL-MCNC: 30.8 NG/ML (ref 30–100)
ALBUMIN SERPL-MCNC: 4.4 G/DL (ref 4–5)
ALP SERPL-CCNC: 77 IU/L (ref 44–121)
ALT SERPL-CCNC: 12 IU/L (ref 0–32)
AST SERPL-CCNC: 13 IU/L (ref 0–40)
BASOPHILS # BLD AUTO: 0 X10E3/UL (ref 0–0.2)
BASOPHILS NFR BLD AUTO: 0 %
BILIRUB SERPL-MCNC: 0.3 MG/DL (ref 0–1.2)
BUN SERPL-MCNC: 9 MG/DL (ref 6–20)
BUN/CREAT SERPL: 8 (ref 9–23)
CALCIUM SERPL-MCNC: 9.5 MG/DL (ref 8.7–10.2)
CHLORIDE SERPL-SCNC: 107 MMOL/L (ref 96–106)
CHOLEST SERPL-MCNC: 194 MG/DL (ref 100–199)
CHOLEST/HDLC SERPL: 4.7 RATIO (ref 0–4.4)
CO2 SERPL-SCNC: 19 MMOL/L (ref 20–29)
CREAT SERPL-MCNC: 1.11 MG/DL (ref 0.57–1)
EGFR: 70 ML/MIN/1.73
EOSINOPHIL # BLD AUTO: 0 X10E3/UL (ref 0–0.4)
EOSINOPHIL NFR BLD AUTO: 1 %
ERYTHROCYTE [DISTWIDTH] IN BLOOD BY AUTOMATED COUNT: 12 % (ref 11.7–15.4)
EST. AVERAGE GLUCOSE BLD GHB EST-MCNC: 103 MG/DL
GLOBULIN SER-MCNC: 2.3 G/DL (ref 1.5–4.5)
GLUCOSE SERPL-MCNC: 77 MG/DL (ref 70–99)
HBA1C MFR BLD: 5.2 % (ref 4.8–5.6)
HCT VFR BLD AUTO: 45.3 % (ref 34–46.6)
HDLC SERPL-MCNC: 41 MG/DL
HGB BLD-MCNC: 15.2 G/DL (ref 11.1–15.9)
IMM GRANULOCYTES # BLD: 0 X10E3/UL (ref 0–0.1)
IMM GRANULOCYTES NFR BLD: 0 %
LDLC SERPL CALC-MCNC: 131 MG/DL (ref 0–99)
LYMPHOCYTES # BLD AUTO: 2.2 X10E3/UL (ref 0.7–3.1)
LYMPHOCYTES NFR BLD AUTO: 30 %
MCH RBC QN AUTO: 30.9 PG (ref 26.6–33)
MCHC RBC AUTO-ENTMCNC: 33.6 G/DL (ref 31.5–35.7)
MCV RBC AUTO: 92 FL (ref 79–97)
MONOCYTES # BLD AUTO: 0.5 X10E3/UL (ref 0.1–0.9)
MONOCYTES NFR BLD AUTO: 6 %
NEUTROPHILS # BLD AUTO: 4.8 X10E3/UL (ref 1.4–7)
NEUTROPHILS NFR BLD AUTO: 63 %
PLATELET # BLD AUTO: 218 X10E3/UL (ref 150–450)
POTASSIUM SERPL-SCNC: 4.3 MMOL/L (ref 3.5–5.2)
PROT SERPL-MCNC: 6.7 G/DL (ref 6–8.5)
RBC # BLD AUTO: 4.92 X10E6/UL (ref 3.77–5.28)
SL AMB VLDL CHOLESTEROL CALC: 22 MG/DL (ref 5–40)
SODIUM SERPL-SCNC: 138 MMOL/L (ref 134–144)
TRIGL SERPL-MCNC: 119 MG/DL (ref 0–149)
TSH SERPL DL<=0.005 MIU/L-ACNC: 2 UIU/ML (ref 0.45–4.5)
WBC # BLD AUTO: 7.6 X10E3/UL (ref 3.4–10.8)

## 2024-11-20 ENCOUNTER — OFFICE VISIT (OUTPATIENT)
Dept: BARIATRICS | Facility: CLINIC | Age: 24
End: 2024-11-20
Payer: COMMERCIAL

## 2024-11-20 VITALS
DIASTOLIC BLOOD PRESSURE: 70 MMHG | BODY MASS INDEX: 33.42 KG/M2 | OXYGEN SATURATION: 98 % | SYSTOLIC BLOOD PRESSURE: 118 MMHG | WEIGHT: 200.6 LBS | HEIGHT: 65 IN | HEART RATE: 86 BPM

## 2024-11-20 DIAGNOSIS — E66.09 CLASS 1 OBESITY DUE TO EXCESS CALORIES WITHOUT SERIOUS COMORBIDITY WITH BODY MASS INDEX (BMI) OF 33.0 TO 33.9 IN ADULT: Primary | ICD-10-CM

## 2024-11-20 DIAGNOSIS — E66.811 CLASS 1 OBESITY DUE TO EXCESS CALORIES WITHOUT SERIOUS COMORBIDITY WITH BODY MASS INDEX (BMI) OF 33.0 TO 33.9 IN ADULT: Primary | ICD-10-CM

## 2024-11-20 PROCEDURE — 99214 OFFICE O/P EST MOD 30 MIN: CPT | Performed by: NURSE PRACTITIONER

## 2024-11-20 NOTE — ASSESSMENT & PLAN NOTE
- Patient is pursuing Conservative Program and follow up visits with medical weight management provider  - Initial weight loss goal of 5-10% weight loss for improved health. Weight loss can improve patient's co-morbid conditions and/or prevent weight-related complications.  - Explained the importance of continuing lifestyle changes in addition to any anti-obesity medications.   - Labs reviewed from 11/2024 - will recheck CMP in 6 weeks    General Recommendations:  Nutrition:  Eat breakfast daily.  Do not skip meals.      Food log (ie.) www.myfitnesspal.com, sparkpeople.com, loseit.com, calorieking.com, etc.     Practice mindful eating.  Be sure to set aside time to eat, eat slowly, and savor your food.     Hydration:    At least 64oz of water daily.  No sugar sweetened beverages.  No juice (eat the fruit instead).     Exercise:  Studies have shown that the ideal exercise goal is somewhere between 150 to 300 minutes of moderate intensity exercise a week.  Start with exercising 10 minutes every other day and gradually increase physical activity with a goal of at least 150 minutes of moderate intensity exercise a week, divided over at least 3 days a week.  An example of this would be exercising 30 minutes a day, 5 days a week.  Resistance training can increase muscle mass and increase our resting metabolic rate.   FULL BODY resistance training is recommended 2-3 times a week.  Do not do this on consecutive days to allow for muscle recovery.     Aim for a bare minimum 5000 steps, even on days you do not exercise.     Monitoring:   Weigh yourself daily.  If this causes undue stress, then just weigh yourself once a week.  Weigh yourself the same time of the day with the same amount of clothing on.  Preferably this should be done after waking up, before you eat, and with no clothing or minimal clothing on.     Specific Goals:  Patient lifestyle habits were reviewed the patient was congratulated on her weight loss since  last office visit.  Nutrition was discussed and patient will start tracking her food a few times a week to see where her nutrition is standing.  Advised patient to try to get at least 70 to 80 g of protein daily.  She will also continue with at least 6-7 bottles of water daily.  She was also encouraged to avoid skipping meals and have healthy snacks on hand to avoid grazing at work.  Medications were discussed and will increase Wegovy back up to 2.4 mg.  Will closely monitor if her headaches worsen.  Will follow-up in the office in 3 months.

## 2024-11-20 NOTE — PROGRESS NOTES
Assessment/Plan:     Class 1 obesity due to excess calories without serious comorbidity with body mass index (BMI) of 33.0 to 33.9 in adult  - Patient is pursuing Conservative Program and follow up visits with medical weight management provider  - Initial weight loss goal of 5-10% weight loss for improved health. Weight loss can improve patient's co-morbid conditions and/or prevent weight-related complications.  - Explained the importance of continuing lifestyle changes in addition to any anti-obesity medications.   - Labs reviewed from 11/2024 - will recheck CMP in 6 weeks    General Recommendations:  Nutrition:  Eat breakfast daily.  Do not skip meals.      Food log (ie.) www.[employer].com, sparkpeople.com, loseit.com, calorieking.com, etc.     Practice mindful eating.  Be sure to set aside time to eat, eat slowly, and savor your food.     Hydration:    At least 64oz of water daily.  No sugar sweetened beverages.  No juice (eat the fruit instead).     Exercise:  Studies have shown that the ideal exercise goal is somewhere between 150 to 300 minutes of moderate intensity exercise a week.  Start with exercising 10 minutes every other day and gradually increase physical activity with a goal of at least 150 minutes of moderate intensity exercise a week, divided over at least 3 days a week.  An example of this would be exercising 30 minutes a day, 5 days a week.  Resistance training can increase muscle mass and increase our resting metabolic rate.   FULL BODY resistance training is recommended 2-3 times a week.  Do not do this on consecutive days to allow for muscle recovery.     Aim for a bare minimum 5000 steps, even on days you do not exercise.     Monitoring:   Weigh yourself daily.  If this causes undue stress, then just weigh yourself once a week.  Weigh yourself the same time of the day with the same amount of clothing on.  Preferably this should be done after waking up, before you eat, and with no clothing  or minimal clothing on.     Specific Goals:  Patient lifestyle habits were reviewed the patient was congratulated on her weight loss since last office visit.  Nutrition was discussed and patient will start tracking her food a few times a week to see where her nutrition is standing.  Advised patient to try to get at least 70 to 80 g of protein daily.  She will also continue with at least 6-7 bottles of water daily.  She was also encouraged to avoid skipping meals and have healthy snacks on hand to avoid grazing at work.  Medications were discussed and will increase Wegovy back up to 2.4 mg.  Will closely monitor if her headaches worsen.  Will follow-up in the office in 3 months.         Misty was seen today for follow-up.    Diagnoses and all orders for this visit:    Class 1 obesity due to excess calories without serious comorbidity with body mass index (BMI) of 33.0 to 33.9 in adult  -     Semaglutide-Weight Management (WEGOVY) 2.4 MG/0.75ML; Inject 0.75 mL (2.4 mg total) under the skin once a week  -     Comprehensive metabolic panel; Future  -     Comprehensive metabolic panel        Total time spent reviewing chart, interviewing patient, examining patient, discussing plan, answering all questions, and documentin minutes with >50% face-to-face time with the patient.    Follow up in approximately 3 months with Non-Surgical Physician/Advanced Practitioner.    Subjective:   Chief Complaint   Patient presents with    Follow-up     Pt is here for MWM f/u       Patient ID: Misty Blackmon  is a 24 y.o. female with excess weight/obesity here to pursue weight management.  Patient is pursuing Conservative Program.   Most recent notes and records were reviewed.    HPI    Wt Readings from Last 20 Encounters:   24 91 kg (200 lb 9.6 oz)   09/10/24 97.5 kg (215 lb)   24 98.7 kg (217 lb 9.6 oz)   24 99.2 kg (218 lb 12.8 oz)   24 104 kg (229 lb)   07/10/24 105 kg (230 lb 6.4 oz)   24 106 kg  (233 lb 3.2 oz)   06/27/24 113 kg (249 lb)   05/29/24 113 kg (249 lb)   04/17/24 113 kg (249 lb)   04/15/24 113 kg (249 lb 3.2 oz)   04/10/24 114 kg (252 lb)   03/22/24 113 kg (250 lb 3.2 oz)   02/12/24 112 kg (246 lb 12.8 oz)   01/30/24 113 kg (249 lb 12.8 oz)   01/22/24 114 kg (251 lb)   01/05/24 114 kg (251 lb)   12/27/23 112 kg (248 lb)   12/19/23 111 kg (244 lb)   12/19/23 111 kg (244 lb)       Patient presents today to medical weight management office for follow up.  Patient has continued to lose weight with the help of Wegovy.  She has noticed since going back to school that some of her other habits have crept back in and she is starting to graze more during the day.  She is also not as conscious about her protein intake and has not been tracking her calories.  She feels for the past month she has been at 200 pounds and would like to discuss measures to overcome this plateau.  Patient continues to go to the gym 2-3 times a week and continues to drink at least 7 bottles of water daily.  Patient recently had blood work which looks stable.  Her creatinine was slightly elevated and patient is concerned due to her single kidney.     Weight loss medication and dose: Wegovy 1.7 mg  Started weight and date: 247.2 lbs in 11/2023  Current weight: 200.6 lbs (218.8 lbs at last OV)  Difference: -46.6 lbs (-18.2 lbs since last OV)  Goal weight: 150-180 lbs     Starting BMI: 41.26 in 11/2023  Current BMI: 33.38     Waist Measurements:  11/2023: 49.5 in  8/2024: 41.5 in     Diet recall:  B: protein shake with coffee  S: protein bar OR bevita crackers  L: apples and PB OR salad OR yogurt and fruit  S: fruit or protein bar  D: pasta with chicken and vegetable (mom cooks)     Hydration: water - 7 bottles, coffee - cream and sugar  Alcohol: socially  Smoking: no  Exercise: 3-4 times a week at the gym (cardio and strength)  Occupation: teacher  Sleep: varies      The following portions of the patient's history were reviewed and  "updated as appropriate: allergies, current medications, past family history, past medical history, past social history, past surgical history, and problem list.    Family History   Problem Relation Age of Onset    Hypertension Mother     Hyperlipidemia Mother     Diabetes Mother     Factor V Leiden deficiency Mother     Heart disease Father     Depression Father     ADD / ADHD Brother     Ovarian cancer Maternal Grandmother     Diabetes Other     Arthritis Family     Asthma Family     Cancer Family     Cervical cancer Family     Heart failure Family     Hyperlipidemia Family     Hypertension Family         Review of Systems   Constitutional:  Negative for fatigue.   HENT:  Negative for sore throat.    Respiratory:  Negative for cough and shortness of breath.    Cardiovascular:  Negative for chest pain, palpitations and leg swelling.   Gastrointestinal:  Negative for abdominal pain, constipation, diarrhea and nausea.   Genitourinary:  Negative for dysuria.   Musculoskeletal:  Negative for arthralgias and back pain.   Skin:  Negative for rash.   Neurological:  Negative for headaches.   Psychiatric/Behavioral:  Negative for dysphoric mood. The patient is not nervous/anxious.        Objective:  /70 (BP Location: Right arm, Patient Position: Sitting, Cuff Size: Standard)   Pulse 86   Ht 5' 5\" (1.651 m)   Wt 91 kg (200 lb 9.6 oz)   SpO2 98%   BMI 33.38 kg/m²     Physical Exam  Vitals and nursing note reviewed.   Constitutional:       Appearance: Normal appearance. She is obese.   HENT:      Head: Normocephalic.   Pulmonary:      Effort: Pulmonary effort is normal.   Neurological:      General: No focal deficit present.      Mental Status: She is alert and oriented to person, place, and time.   Psychiatric:         Mood and Affect: Mood normal.         Behavior: Behavior normal.         Thought Content: Thought content normal.         Judgment: Judgment normal.            Labs   Most recent labs reviewed   Lab " Results   Component Value Date     11/24/2017    SODIUM 138 11/18/2024    K 4.3 11/18/2024     (H) 11/18/2024    CO2 19 (L) 11/18/2024    AGAP 9 09/06/2024    BUN 9 11/18/2024    CREATININE 1.11 (H) 11/18/2024    GLUC 77 11/18/2024    GLUF 122 (H) 05/16/2022    CALCIUM 9.6 09/06/2024    AST 13 11/18/2024    ALT 12 11/18/2024    ALKPHOS 70 12/28/2023    PROT 6.9 11/24/2017    TP 6.7 11/18/2024    BILITOT 0.3 11/24/2017    TBILI 0.3 11/18/2024    EGFR 70 11/18/2024     Lab Results   Component Value Date    HGBA1C 5.2 11/18/2024     Lab Results   Component Value Date    ELS2KFEIGRXT 0.750 07/08/2020    TSH 2.000 11/18/2024     Lab Results   Component Value Date    CHOLESTEROL 194 11/18/2024     Lab Results   Component Value Date    HDL 41 11/18/2024     Lab Results   Component Value Date    TRIG 119 11/18/2024     Lab Results   Component Value Date    LDLCALC 131 (H) 11/18/2024

## 2024-12-03 ENCOUNTER — TELEPHONE (OUTPATIENT)
Dept: BARIATRICS | Facility: CLINIC | Age: 24
End: 2024-12-03

## 2024-12-03 NOTE — TELEPHONE ENCOUNTER
Patient called the RX Refill Line. Message is being forwarded to the office.     Patient is requesting a new Rx for wegovy 1.7 mg. Pt stated she doesn't want to go up on dose at the moment. Please review. Thank you.        [facility] #[id number] - [place], [place] - [facility]      How are you tolerating the medication?   [] Nausea  [] Vomiting  [] Diarrhea  [x] Asymptomatic  [] Other:    Last visit weight:    Current weight:200 lb    Date of last injection: 12/3/2024    How many injections do you have left:0

## 2024-12-04 DIAGNOSIS — E66.811 CLASS 1 OBESITY DUE TO EXCESS CALORIES WITHOUT SERIOUS COMORBIDITY WITH BODY MASS INDEX (BMI) OF 33.0 TO 33.9 IN ADULT: Primary | ICD-10-CM

## 2024-12-04 DIAGNOSIS — E66.09 CLASS 1 OBESITY DUE TO EXCESS CALORIES WITHOUT SERIOUS COMORBIDITY WITH BODY MASS INDEX (BMI) OF 33.0 TO 33.9 IN ADULT: Primary | ICD-10-CM

## 2024-12-19 ENCOUNTER — OFFICE VISIT (OUTPATIENT)
Dept: FAMILY MEDICINE CLINIC | Facility: CLINIC | Age: 24
End: 2024-12-19
Payer: COMMERCIAL

## 2024-12-19 VITALS
OXYGEN SATURATION: 96 % | HEART RATE: 67 BPM | TEMPERATURE: 98.2 F | DIASTOLIC BLOOD PRESSURE: 80 MMHG | BODY MASS INDEX: 32.99 KG/M2 | RESPIRATION RATE: 12 BRPM | SYSTOLIC BLOOD PRESSURE: 112 MMHG | HEIGHT: 65 IN | WEIGHT: 198 LBS

## 2024-12-19 DIAGNOSIS — R06.02 SOB (SHORTNESS OF BREATH): ICD-10-CM

## 2024-12-19 DIAGNOSIS — J45.21 MILD INTERMITTENT ASTHMA WITH ACUTE EXACERBATION: Primary | ICD-10-CM

## 2024-12-19 PROCEDURE — 99214 OFFICE O/P EST MOD 30 MIN: CPT | Performed by: FAMILY MEDICINE

## 2024-12-19 RX ORDER — METHYLPREDNISOLONE 4 MG/1
TABLET ORAL
Qty: 21 EACH | Refills: 0 | Status: SHIPPED | OUTPATIENT
Start: 2024-12-19

## 2024-12-19 RX ORDER — AZITHROMYCIN 250 MG/1
TABLET, FILM COATED ORAL
Qty: 6 TABLET | Refills: 0 | Status: SHIPPED | OUTPATIENT
Start: 2024-12-19 | End: 2024-12-26

## 2024-12-19 NOTE — PROGRESS NOTES
Misty Blackmon 2000 female MRN: [id number]    FAMILY PRACTICE OFFICE VISIT  [facility] - [place]      ASSESSMENT/PLAN  Misty Blackmon is a 24 y.o. female presents to the office for    Diagnoses and all orders for this visit:    Mild intermittent asthma with acute exacerbation  -     methylPREDNISolone 4 MG tablet therapy pack; Use as directed on package  -     azithromycin (ZITHROMAX) 250 mg tablet; Take 2 tablets today then 1 tablet daily x 4 days    SOB (shortness of breath)    If no improvement contact her office           Future Appointments   Date Time Provider Department Center   3/4/2025  7:45 AM [facility]          SUBJECTIVE  CC: Cough (And congested x one week)      HPI:  Misty Blackmon is a 24 y.o. female who presents for an acute appointment. 1 week of congestion, coughing. 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  Patient is concerned because she has severe asthma and states that she is starting to notice that she is starting to get the chest tightness like she normally does.  Using her inhalers as prescribed for rescue      Review of Systems   Constitutional:  Negative for activity change, appetite change, chills, fatigue and fever.   HENT:  Positive for congestion.    Respiratory:  Positive for cough and shortness of breath. Negative for chest tightness.    Cardiovascular:  Negative for chest pain and leg swelling.   Gastrointestinal:  Negative for abdominal distention, abdominal pain, constipation, diarrhea, nausea and vomiting.   All other systems reviewed and are negative.      Historical Information   The patient history was reviewed as follows:  Past Medical History:   Diagnosis Date   • Allergic    • Anxiety    • Anxiety and depression    • Asthma    • Clotting disorder (HCC)    • Factor V Leiden (HCC)    • Headache(784.0)    • Pulmonary embolism (HCC)    • Thrombophlebitis          Medications:     Current Outpatient  Medications:   •  azithromycin (ZITHROMAX) 250 mg tablet, Take 2 tablets today then 1 tablet daily x 4 days, Disp: 6 tablet, Rfl: 0  •  hydrOXYzine HCL (ATARAX) 50 mg tablet, take 1 tablet by mouth at bedtime, Disp: 90 tablet, Rfl: 2  •  levonorgestrel (KYLEENA) 19.5 MG intrauterine device, 1 each by Intrauterine route, Disp: , Rfl:   •  methylPREDNISolone 4 MG tablet therapy pack, Use as directed on package, Disp: 21 each, Rfl: 0  •  ondansetron (ZOFRAN-ODT) 4 mg disintegrating tablet, Take 1 tablet (4 mg total) by mouth every 6 (six) hours as needed for nausea or vomiting, Disp: 20 tablet, Rfl: 0  •  rizatriptan (Maxalt) 10 mg tablet, Take 1 tablet (10 mg total) by mouth as needed for migraine Take at the onset of migraine; if symptoms continue or return, may take another dose at least 2 hours after first dose. Take no more than 2 doses in a day., Disp: 18 tablet, Rfl: 2  •  Semaglutide-Weight Management (WEGOVY) 1.7 MG/0.75ML, Inject 0.75 mL (1.7 mg total) under the skin once a week, Disp: 3 mL, Rfl: 2  •  sertraline (ZOLOFT) 50 mg tablet, take 3 tablets by mouth at bedtime, Disp: 270 tablet, Rfl: 1  •  topiramate (TOPAMAX) 50 MG tablet, take 1 tablet by mouth at bedtime, Disp: 90 tablet, Rfl: 1  •  albuterol (PROVENTIL HFA,VENTOLIN HFA) 90 mcg/act inhaler, Inhale 2 puffs every 4 (four) hours as needed for wheezing or shortness of breath (Patient not taking: Reported on 11/20/2024), Disp: 18 g, Rfl: 6  •  busPIRone (BUSPAR) 15 mg tablet, Take 1 tablet (15 mg total) by mouth 3 (three) times a day, Disp: 270 tablet, Rfl: 3  •  EPINEPHrine (EPIPEN) 0.3 mg/0.3 mL SOAJ, Inject 0.3 mL (0.3 mg total) into a muscle once for 1 dose (Patient not taking: Reported on 9/10/2024), Disp: 0.6 mL, Rfl: 0  •  Fluticasone-Salmeterol (Advair Diskus) 250-50 mcg/dose inhaler, Inhale 1 puff 2 (two) times a day Rinse mouth after use. (Patient not taking: Reported on 11/20/2024), Disp: 60 blister, Rfl: 8  •  methylPREDNISolone 4 MG  "tablet therapy pack, Use as directed on package (Patient not taking: Reported on 11/20/2024), Disp: 21 each, Rfl: 0    Allergies   Allergen Reactions   • Amoxicillin Anaphylaxis   • Cinnamon - Food Allergy Anaphylaxis       OBJECTIVE  Vitals:   Vitals:    12/19/24 1252   BP: 112/80   BP Location: Left arm   Patient Position: Sitting   Cuff Size: Large   Pulse: 67   Resp: 12   Temp: 98.2 °F (36.8 °C)   TempSrc: Temporal   SpO2: 96%   Weight: 89.8 kg (198 lb)   Height: 5' 5\" (1.651 m)         Physical Exam               Sue Leyva MD,   [facility]  12/22/2024      "

## 2024-12-29 DIAGNOSIS — G43.109 MIGRAINE WITH AURA AND WITHOUT STATUS MIGRAINOSUS, NOT INTRACTABLE: ICD-10-CM

## 2024-12-30 DIAGNOSIS — R07.89 FEELING OF CHEST TIGHTNESS: ICD-10-CM

## 2024-12-30 RX ORDER — TOPIRAMATE 50 MG/1
50 TABLET, FILM COATED ORAL
Qty: 90 TABLET | Refills: 1 | Status: SHIPPED | OUTPATIENT
Start: 2024-12-30

## 2024-12-30 NOTE — ASSESSMENT & PLAN NOTE
Established Patient Office Visit      Patient Name: Stella Lopez  : 1958   MRN: [id number]   Care Team: Patient Care Team:  Silvino Carmona DO as PCP - General (Family Medicine)    Chief Complaint:    Chief Complaint   Patient presents with    Cough    Wheezing       History of Present Illness: Stella Lopez is a 66 y.o. female who is here today for chief complaint.    Cough  This is a new problem. The current episode started in the past 7 days. The problem has been worsening. The problem occurs every few minutes. The cough is Productive of yellow sputum. Associated symptoms include nasal congestion, postnasal drip and wheezing. Pertinent negatives include no chest pain, chills, ear congestion, ear pain, fever, headaches, heartburn, hemoptysis, myalgias, rash, rhinorrhea, sore throat, shortness of breath, sweats or weight loss. The symptoms are aggravated by cold air, lying down and pollens.     I last saw her in November, having similar symptoms. Also in October.     This patient is accompanied by their self who contributes to the history of their care.    The following portions of the patient's history were reviewed and updated as appropriate: allergies, current medications, past family history, past medical history, past social history, past surgical history and problem list.    Subjective      Review of Systems:   Review of Systems   Constitutional:  Negative for chills, fever and unexpected weight loss.   HENT:  Positive for postnasal drip. Negative for ear pain, rhinorrhea and sore throat.    Respiratory:  Positive for cough and wheezing. Negative for hemoptysis and shortness of breath.    Cardiovascular:  Negative for chest pain.   Musculoskeletal:  Negative for myalgias.   Skin:  Negative for rash.    - See HPI    Past Medical History:   Past Medical History:   Diagnosis Date    Allergic 1970    I had allergies most of my life    GERD (gastroesophageal reflux disease) 2020  Jaylin Mejia does have seasonal allergies  I did order [facility] allergy panel  A touch can use over-the-counter Claritin and Flonase as needed and also saline nasal spray     Meds helps    Hypertension 3/15/2010    Date is approx    Obesity 2005    Ever since menapose       Past Surgical History:   Past Surgical History:   Procedure Laterality Date    COLONOSCOPY  3/27/22    Polyps  removed non cancerous       Family History:   Family History   Problem Relation Age of Onset    Breast cancer Mother 31    Cancer Mother         She  had breast and passed away      Breast cancer Paternal Aunt     Ovarian cancer Neg Hx        Social History:   Social History     Socioeconomic History    Marital status:    Tobacco Use    Smoking status: Former     Current packs/day: 0.00     Average packs/day: 1 pack/day for 11.0 years (11.0 ttl pk-yrs)     Types: Cigarettes     Start date: 1974     Quit date: 1985     Years since quittin.6     Passive exposure: Never    Smokeless tobacco: Never    Tobacco comments:     Quit several yrs agoand haven't smoked since   Vaping Use    Vaping status: Never Used   Substance and Sexual Activity    Alcohol use: Yes     Alcohol/week: 1.0 standard drink of alcohol     Types: 1 Drinks containing 0.5 oz of alcohol per week     Comment: Occasionally    Drug use: Never    Sexual activity: Not Currently     Partners: Male     Birth control/protection: Pill     Comment: Na       Tobacco History:   Social History     Tobacco Use   Smoking Status Former    Current packs/day: 0.00    Average packs/day: 1 pack/day for 11.0 years (11.0 ttl pk-yrs)    Types: Cigarettes    Start date: 1974    Quit date: 1985    Years since quittin.6    Passive exposure: Never   Smokeless Tobacco Never   Tobacco Comments    Quit several yrs agoand haven't smoked since       Medications:   Outpatient Medications Prior to Visit   Medication Sig Dispense Refill    fluticasone (FLONASE) 50 MCG/ACT nasal spray SPRAY 1 SPRAY INTO THE NOSTRILS DAILY AS DIRECTED BY PROVIDER 48 mL 2    ipratropium (ATROVENT) 0.06 % nasal spray INSTILL 2 SPRAYS INTO EACH NOSTRIL  "EVERY 3-4 HOURS AS NEEDED.      losartan (COZAAR) 50 MG tablet TAKE 1 TABLET BY MOUTH EVERY DAY 90 tablet 0    simethicone (MYLICON,GAS-X) 125 MG capsule capsule Take 1 capsule by mouth Every 6 (Six) Hours As Needed for Flatulence. 30 capsule 0    Vitamin D, Cholecalciferol, 50 MCG (2000 UT) capsule Take 1 capsule by mouth Daily. Indications: Vitamin D Deficiency      albuterol sulfate  (90 Base) MCG/ACT inhaler Inhale 2 puffs Every 4 (Four) Hours As Needed for Wheezing or Shortness of Air. 8 g 1    Loratadine 10 MG capsule Take 1 capsule by mouth Daily.       No facility-administered medications prior to visit.        Allergies:   No Known Allergies    Objective   Objective     Physical Exam:  Vital Signs:   Vitals:    12/30/24 1051   BP: 126/84   Pulse: 79   SpO2: 94%   Weight: 105 kg (230 lb 12.8 oz)   Height: 153.7 cm (60.51\")     Body mass index is 44.32 kg/m².     Physical Exam  Nursing note reviewed  Const: NAD, A&Ox4, Pleasant, Cooperative  Eyes: EOMI, no conjunctivitis  ENT: No nasal discharge present, neck supple  Cardiac: Regular rate and rhythm, no cyanosis  Resp: Respiratory rate within normal limits, no increased work of breathing, no audible wheezing or retractions noted  GI: No distention or ascites  MSK: Motor and sensation grossly intact in bilateral upper extremities  Neurologic: CN II-XII grossly intact  Psych: Appropriate mood and behavior.  Skin: Warm, dry  Procedures/Radiology     Procedures  No radiology results for the last 7 days     Assessment & Plan   Assessment / Plan      Assessment/Plan:   Problems Addressed This Visit  Diagnoses and all orders for this visit:    1. Acute cough (Primary)  -     POCT SARS-CoV-2 + Flu Antigen DYLON  -     albuterol sulfate  (90 Base) MCG/ACT inhaler; Inhale 2 puffs Every 4 (Four) Hours As Needed for Wheezing or Shortness of Air.  Dispense: 8 g; Refill: 1  -     cefdinir (OMNICEF) 300 MG capsule; Take 1 capsule by mouth 2 (Two) Times a Day for " 10 days.  Dispense: 20 capsule; Refill: 0  -     fexofenadine (Allegra Allergy) 180 MG tablet; Take 1 tablet by mouth Daily.  Dispense: 90 tablet; Refill: 3  -     XR Chest PA & Lateral; Future      Problem List Items Addressed This Visit    None  Visit Diagnoses       Acute cough    -  Primary    Relevant Medications    albuterol sulfate  (90 Base) MCG/ACT inhaler    cefdinir (OMNICEF) 300 MG capsule    fexofenadine (Allegra Allergy) 180 MG tablet    Other Relevant Orders    POCT SARS-CoV-2 + Flu Antigen DYLON (Completed)    XR Chest PA & Lateral          Seems to be recurring, recommend CXR and labs before annual in January.    Patient Instructions   I would like you to have your labs done about a week prior to your next appointment.  You do not need an appointment, but I would advise to call our office a few days before you plan to have your labs done to confirm that orders are in and active.  We will discuss the results at your next scheduled visit.  -Lab services are available at any [facility] [place], operated by [facility] outpatient lab center, including across the street at [address] [place] [address]     If not improving, have CXR completed    Follow Up:   Return if symptoms worsen or fail to improve.    DO SHANELLE Chapman RD  [facility]  [address]  [facility] [address]  Fax [phone]  Phone [phone]    Disclaimer to patients: The 21st Century Cares Act makes medical notes like these available to patients in the interest of transparency. However, please be advised that this is still a medical document. It is intended as cato-ey-lwff communication. Many sections may include medical language or jargon, abbreviations, and additional verbiage that are unfamiliar or confusing. In some ways it may come across as blunt, direct, or may be summarized in order to clearly and concisely communicate the most crucial information to medical professionals. It may also  include mentions of conditions that are unlikely but considered as part of the differential diagnosis, including serious disorders. These are not always discussed at length at the time of appointment because their likelihood is so low, but may be included in a medical note to make it clear what has been considered and/or ruled out as part of a work-up. Medical documents are intended to carry relevant information, facts as evident, and the personal clinical opinion of the physician. If you have any questions regarding this medical document, please bring them to the attention of the physician at your next scheduled appointment.

## 2024-12-30 NOTE — TELEPHONE ENCOUNTER
Reason for call:   [x] Refill   [] Prior Auth  [] Other:     Office:   [x] PCP/Provider -   [] Specialty/Provider -     Medication: ondansetron (ZOFRAN-ODT) 4 mg disintegrating tablet Take 1 tablet (4 mg total) by mouth every 6 (six) hours as needed for nausea or vomiting,       Pharmacy: [id number]  [place]      Does the patient have enough for 3 days?   [x] Yes   [] No - Send as HP to POD

## 2024-12-31 RX ORDER — ONDANSETRON 4 MG/1
4 TABLET, ORALLY DISINTEGRATING ORAL EVERY 6 HOURS PRN
Qty: 20 TABLET | Refills: 2 | Status: SHIPPED | OUTPATIENT
Start: 2024-12-31

## 2025-01-29 DIAGNOSIS — F41.8 DEPRESSION WITH ANXIETY: ICD-10-CM

## 2025-01-29 RX ORDER — HYDROXYZINE HYDROCHLORIDE 50 MG/1
50 TABLET, FILM COATED ORAL
Qty: 90 TABLET | Refills: 2 | Status: SHIPPED | OUTPATIENT
Start: 2025-01-29

## 2025-02-03 ENCOUNTER — TELEPHONE (OUTPATIENT)
Dept: BARIATRICS | Facility: CLINIC | Age: 25
End: 2025-02-03

## 2025-02-12 ENCOUNTER — CLINICAL SUPPORT (OUTPATIENT)
Dept: BARIATRICS | Facility: CLINIC | Age: 25
End: 2025-02-12

## 2025-02-12 VITALS
OXYGEN SATURATION: 99 % | HEART RATE: 84 BPM | HEIGHT: 65 IN | BODY MASS INDEX: 30.75 KG/M2 | DIASTOLIC BLOOD PRESSURE: 68 MMHG | SYSTOLIC BLOOD PRESSURE: 110 MMHG | WEIGHT: 184.6 LBS

## 2025-02-12 DIAGNOSIS — E66.812 CLASS 2 OBESITY DUE TO EXCESS CALORIES WITHOUT SERIOUS COMORBIDITY WITH BODY MASS INDEX (BMI) OF 36.0 TO 36.9 IN ADULT: Primary | ICD-10-CM

## 2025-02-12 DIAGNOSIS — E66.09 CLASS 2 OBESITY DUE TO EXCESS CALORIES WITHOUT SERIOUS COMORBIDITY WITH BODY MASS INDEX (BMI) OF 36.0 TO 36.9 IN ADULT: Primary | ICD-10-CM

## 2025-02-12 NOTE — PROGRESS NOTES
Vitals:    02/12/25 1506   Weight: 83.7 kg (184 lb 9.6 oz)    Pt is taking wegovy 2.4mg tolerating well without any side effects

## 2025-02-13 ENCOUNTER — TELEPHONE (OUTPATIENT)
Dept: BARIATRICS | Facility: CLINIC | Age: 25
End: 2025-02-13

## 2025-02-15 DIAGNOSIS — F41.8 DEPRESSION WITH ANXIETY: ICD-10-CM

## 2025-02-18 NOTE — PROGRESS NOTES
Name: Misty Blackmon      : 2000      MRN: [id number]  Encounter Provider: OLEG Christensen  Encounter Date: 2025   Encounter department: [facility] [place]  :  Assessment & Plan  Candida vaginitis  -Diflucan x 2 ordered for clinically evident candida vaginitis.  -Unlikely to cause her irregular menses that she is experiencing.  -For prevention: Recommend acidophilus or yogurt daily, avoid irritating soaps, lubricants, or lotions, no tight fitted pants or underwear, avoid bubble baths, do not stay in wet swimsuit/moist clothing.     Orders:    fluconazole (DIFLUCAN) 150 mg tablet; Take 1 tablet every 3 days    Pelvic pain  -TVUS ordered for pelvic pain.   -Discussed GYN etiologies of pelvic pain including MSK, ovulation, ovarian cysts, and polyps.   -Will review treatment options based on TVUS results.   Orders:    US pelvis complete w transvaginal; Future    IUD check up  -TVUS ordered for new irregular cycles with Kyleena IUD.  -Discussed misplacement of IUD can cause pelvic pain and irregular menses.   -Will review treatment plan depending on TVUS results.   -Patient cannot have estrogen due to hx of PE and clotting disorder.    Orders:    US pelvis complete w transvaginal; Future    Irregular menses  -Discussed Wegovy (anecdotally) can cause irregular cycles.   -However, she has been on the same dose for an extended period prior to initiation of irregular cycles.   Orders:    US pelvis complete w transvaginal; Future        History of Present Illness   HPI  Misty Blackmon is a 25 y.o. female who presents for menstrual concerns.    Patient presents to the office with concerns of irregular menses.   LMP: 02/15/25  She had Kyleena IUD placed in . At her [facility] in 2024, occasional spotting/no menses.   She reports that irregular menses started 2 months ago and reports that she gets a period 2 times a month, which is what use to happen before the IUD.   Bleeding  "lasts approximately 3-5 days.   She reports the bleeding fluctuates between full flow to light spotting.   She changes a tampon every \"couple of hours\".   +pelvic pain; stabbing. She reports the pain varies from unilateral to mid pelvis.   Rating is 8/10; utilizes heat with moderate relief.   She is sexually active; no new partners. Recent STI testing 07/2024 negative.  Denies vaginitis symptoms    BMI: 31   She is currently on Wegovy for weight management.  She started medication in May 2024.   No recent dose adjustment  No other changes in medication or lifestyle.  Hx significant for Factor V Leiden, PE, and migrainewith aura.      History obtained from: patient    Review of Systems   Genitourinary:  Positive for menstrual problem and pelvic pain.     Medical History Reviewed by provider this encounter:     .  Current Outpatient Medications on File Prior to Visit   Medication Sig Dispense Refill    albuterol (PROVENTIL HFA,VENTOLIN HFA) 90 mcg/act inhaler Inhale 2 puffs every 4 (four) hours as needed for wheezing or shortness of breath 18 g 6    Fluticasone-Salmeterol (Advair Diskus) 250-50 mcg/dose inhaler Inhale 1 puff 2 (two) times a day Rinse mouth after use. 60 blister 8    hydrOXYzine HCL (ATARAX) 50 mg tablet take 1 tablet by mouth at bedtime 90 tablet 2    levonorgestrel (KYLEENA) 19.5 MG intrauterine device 1 each by Intrauterine route      ondansetron (ZOFRAN-ODT) 4 mg disintegrating tablet Take 1 tablet (4 mg total) by mouth every 6 (six) hours as needed for nausea or vomiting 20 tablet 2    rizatriptan (Maxalt) 10 mg tablet Take 1 tablet (10 mg total) by mouth as needed for migraine Take at the onset of migraine; if symptoms continue or return, may take another dose at least 2 hours after first dose. Take no more than 2 doses in a day. 18 tablet 2    sertraline (ZOLOFT) 50 mg tablet take 3 tablets by mouth at bedtime 270 tablet 1    topiramate (TOPAMAX) 50 MG tablet take 1 tablet by mouth at bedtime 90 " "tablet 1    busPIRone (BUSPAR) 15 mg tablet Take 1 tablet (15 mg total) by mouth 3 (three) times a day 270 tablet 3    EPINEPHrine (EPIPEN) 0.3 mg/0.3 mL SOAJ Inject 0.3 mL (0.3 mg total) into a muscle once for 1 dose (Patient not taking: Reported on 9/10/2024) 0.6 mL 0    methylPREDNISolone 4 MG tablet therapy pack Use as directed on package (Patient not taking: Reported on 11/20/2024) 21 each 0    methylPREDNISolone 4 MG tablet therapy pack Use as directed on package (Patient not taking: Reported on 2/20/2025) 21 each 0    [DISCONTINUED] Semaglutide-Weight Management (WEGOVY) 1.7 MG/0.75ML Inject 0.75 mL (1.7 mg total) under the skin once a week 3 mL 2     No current facility-administered medications on file prior to visit.      Social History     Tobacco Use    Smoking status: Never     Passive exposure: Past    Smokeless tobacco: Never   Vaping Use    Vaping status: Never Used   Substance and Sexual Activity    Alcohol use: Yes     Comment: social, 1 x per month    Drug use: Never    Sexual activity: Yes     Partners: Male     Birth control/protection: I.U.D.        Objective   /84 (BP Location: Left arm, Patient Position: Sitting, Cuff Size: Standard)   Ht 5' 5\" (1.651 m)   Wt 85.7 kg (189 lb)   LMP 02/15/2025 (Exact Date)   BMI 31.45 kg/m²      Physical Exam  Vitals and nursing note reviewed.   Constitutional:       General: She is not in acute distress.     Appearance: Normal appearance.   HENT:      Head: Normocephalic.   Eyes:      Conjunctiva/sclera: Conjunctivae normal.   Pulmonary:      Effort: No respiratory distress.   Abdominal:      General: Abdomen is flat.      Palpations: Abdomen is soft.   Genitourinary:     General: Normal vulva.      Exam position: Lithotomy position.      Pubic Area: No rash or pubic lice.       Labia:         Right: No rash or tenderness.         Left: No rash or tenderness.       Urethra: No urethral pain.      Vagina: Vaginal discharge present.      Cervix: " Normal.      Uterus: Normal.       Adnexa: Right adnexa normal and left adnexa normal.      Comments: Thick white curdlike discharge in posterior vaginal vault and adhered to vaginal walls. IUD strings apparent.  Musculoskeletal:         General: Normal range of motion.      Cervical back: Normal range of motion.      Right lower leg: No edema.      Left lower leg: No edema.   Lymphadenopathy:      Lower Body: No right inguinal adenopathy. No left inguinal adenopathy.   Skin:     General: Skin is warm and dry.   Neurological:      Mental Status: She is alert and oriented to person, place, and time.   Psychiatric:         Mood and Affect: Mood normal.         Behavior: Behavior normal.         Thought Content: Thought content normal.         Judgment: Judgment normal.

## 2025-02-20 ENCOUNTER — OFFICE VISIT (OUTPATIENT)
Dept: OBGYN CLINIC | Facility: CLINIC | Age: 25
End: 2025-02-20
Payer: COMMERCIAL

## 2025-02-20 VITALS
BODY MASS INDEX: 31.49 KG/M2 | WEIGHT: 189 LBS | SYSTOLIC BLOOD PRESSURE: 122 MMHG | DIASTOLIC BLOOD PRESSURE: 84 MMHG | HEIGHT: 65 IN

## 2025-02-20 DIAGNOSIS — R10.2 PELVIC PAIN: ICD-10-CM

## 2025-02-20 DIAGNOSIS — E66.811 CLASS 1 OBESITY DUE TO EXCESS CALORIES WITHOUT SERIOUS COMORBIDITY WITH BODY MASS INDEX (BMI) OF 33.0 TO 33.9 IN ADULT: ICD-10-CM

## 2025-02-20 DIAGNOSIS — N92.6 IRREGULAR MENSES: ICD-10-CM

## 2025-02-20 DIAGNOSIS — B37.31 CANDIDA VAGINITIS: Primary | ICD-10-CM

## 2025-02-20 DIAGNOSIS — Z30.431 IUD CHECK UP: ICD-10-CM

## 2025-02-20 DIAGNOSIS — E66.09 CLASS 1 OBESITY DUE TO EXCESS CALORIES WITHOUT SERIOUS COMORBIDITY WITH BODY MASS INDEX (BMI) OF 33.0 TO 33.9 IN ADULT: ICD-10-CM

## 2025-02-20 PROCEDURE — 99213 OFFICE O/P EST LOW 20 MIN: CPT

## 2025-02-20 RX ORDER — FLUCONAZOLE 150 MG/1
TABLET ORAL
Qty: 2 TABLET | Refills: 0 | Status: SHIPPED | OUTPATIENT
Start: 2025-02-20 | End: 2025-02-28

## 2025-02-27 ENCOUNTER — HOSPITAL ENCOUNTER (OUTPATIENT)
Dept: RADIOLOGY | Facility: HOSPITAL | Age: 25
Discharge: HOME | End: 2025-02-27
Payer: COMMERCIAL

## 2025-02-27 DIAGNOSIS — R10.2 PELVIC PAIN: ICD-10-CM

## 2025-02-27 DIAGNOSIS — N92.6 IRREGULAR MENSES: ICD-10-CM

## 2025-02-27 DIAGNOSIS — Z30.431 IUD CHECK UP: ICD-10-CM

## 2025-02-27 PROCEDURE — 76830 TRANSVAGINAL US NON-OB: CPT

## 2025-02-27 PROCEDURE — 76856 US EXAM PELVIC COMPLETE: CPT

## 2025-02-28 ENCOUNTER — RESULTS FOLLOW-UP (OUTPATIENT)
Dept: OBGYN CLINIC | Facility: CLINIC | Age: 25
End: 2025-02-28

## 2025-02-28 DIAGNOSIS — N83.201 RIGHT OVARIAN CYST: Primary | ICD-10-CM

## 2025-03-04 ENCOUNTER — OFFICE VISIT (OUTPATIENT)
Dept: BARIATRICS | Facility: CLINIC | Age: 25
End: 2025-03-04
Payer: COMMERCIAL

## 2025-03-04 VITALS
BODY MASS INDEX: 30.02 KG/M2 | WEIGHT: 180.2 LBS | DIASTOLIC BLOOD PRESSURE: 70 MMHG | HEIGHT: 65 IN | HEART RATE: 74 BPM | SYSTOLIC BLOOD PRESSURE: 110 MMHG | OXYGEN SATURATION: 98 %

## 2025-03-04 DIAGNOSIS — E66.3 OVERWEIGHT WITH BODY MASS INDEX (BMI) OF 29 TO 29.9 IN ADULT: Primary | ICD-10-CM

## 2025-03-04 PROCEDURE — 99214 OFFICE O/P EST MOD 30 MIN: CPT | Performed by: NURSE PRACTITIONER

## 2025-03-04 NOTE — PROGRESS NOTES
Assessment/Plan:     Overweight with body mass index (BMI) of 29 to 29.9 in adult  - Patient is pursuing Conservative Program and follow up visits with medical weight management provider  - Initial weight loss goal of 5-10% weight loss for improved health. Weight loss can improve patient's co-morbid conditions and/or prevent weight-related complications.  - Explained the importance of continuing lifestyle changes in addition to any anti-obesity medications.   - Labs reviewed from 11/2024 - will recheck CMP in 6 weeks     General Recommendations:  Nutrition:  Eat breakfast daily.  Do not skip meals.      Food log (ie.) www.myfitnesspal.com, sparkpeople.com, loseit.com, calorieking.com, etc.     Practice mindful eating.  Be sure to set aside time to eat, eat slowly, and savor your food.     Hydration:    At least 64oz of water daily.  No sugar sweetened beverages.  No juice (eat the fruit instead).     Exercise:  Studies have shown that the ideal exercise goal is somewhere between 150 to 300 minutes of moderate intensity exercise a week.  Start with exercising 10 minutes every other day and gradually increase physical activity with a goal of at least 150 minutes of moderate intensity exercise a week, divided over at least 3 days a week.  An example of this would be exercising 30 minutes a day, 5 days a week.  Resistance training can increase muscle mass and increase our resting metabolic rate.   FULL BODY resistance training is recommended 2-3 times a week.  Do not do this on consecutive days to allow for muscle recovery.     Aim for a bare minimum 5000 steps, even on days you do not exercise.     Monitoring:   Weigh yourself daily.  If this causes undue stress, then just weigh yourself once a week.  Weigh yourself the same time of the day with the same amount of clothing on.  Preferably this should be done after waking up, before you eat, and with no clothing or minimal clothing on.     Specific Goals:  Patient  lifestyle habits were reviewed the patient was congratulated on her continued weight loss.  Nutrition was discussed and she will continue to be mindful of her portions, focus on protein rich foods, and avoid skipping meals.  Activity was discussed and she will continue with regular physical activity 30 minutes 3-4 times a week.  Medications were discussed and patient will maintain on Wegovy 1.7 mg as she is seeing weight loss success and tolerating without any side effects.  Will follow-up in the office in 3 months.         Misty was seen today for follow-up.    Diagnoses and all orders for this visit:    Overweight with body mass index (BMI) of 29 to 29.9 in adult        Total time spent reviewing chart, interviewing patient, examining patient, discussing plan, answering all questions, and documentin minutes with >50% face-to-face time with the patient.    Follow up in approximately 3 months with Non-Surgical Physician/Advanced Practitioner.    Subjective:   Chief Complaint   Patient presents with    Follow-up     Pt here today for MWM f/u        Patient ID: Misty Blackmon  is a 25 y.o. female with excess weight/obesity here to pursue weight management.  Patient is pursuing Conservative Program.   Most recent notes and records were reviewed.    HPI    Wt Readings from Last 20 Encounters:   25 81.7 kg (180 lb 3.2 oz)   25 85.7 kg (189 lb)   25 83.7 kg (184 lb 9.6 oz)   24 89.8 kg (198 lb)   24 91 kg (200 lb 9.6 oz)   09/10/24 97.5 kg (215 lb)   24 98.7 kg (217 lb 9.6 oz)   24 99.2 kg (218 lb 12.8 oz)   24 104 kg (229 lb)   07/10/24 105 kg (230 lb 6.4 oz)   24 106 kg (233 lb 3.2 oz)   24 113 kg (249 lb)   24 113 kg (249 lb)   24 113 kg (249 lb)   04/15/24 113 kg (249 lb 3.2 oz)   04/10/24 114 kg (252 lb)   24 113 kg (250 lb 3.2 oz)   24 112 kg (246 lb 12.8 oz)   24 113 kg (249 lb 12.8 oz)   24 114 kg (251 lb)        Patient presents today to medical weight management office for follow up.  Patient has continued to lose weight with the help of Wegovy.  She remains on 1.7 weekly and denies any side effects.  She continues to see consistent weight loss and is very happy with her success.  She continues to focus on nutrition and is eating evenly throughout the day.  She is making sure she has protein rich snacks on hand and is avoiding skipping meals.  Patient continues to go to the gym 2-3 times a week and continues to drink at least 7 bottles of water daily.  Patient had been on Wegovy 2.4 mg but was reduced back to 2 headaches.  She is tolerating 1.7 without any headaches.  Patient is due to recheck kidney function.     Weight loss medication and dose: Wegovy 1.7 mg  Started weight and date: 247.2 lbs in 11/2023  Current weight: 180.2 lbs (200.6 lbs at last OV)  Difference: -67 lbs (-20.4 lbs since last OV)  Percentage of weight loss: -27%  Goal weight: 150-180 lbs     Starting BMI: 41.26 in 11/2023  Current BMI: 33.38     Waist Measurements:  11/2023: 49.5 in  3/2025: 36.25 in     Diet recall:  B: protein shake with coffee  S: protein bar OR bevita crackers  L: apples and PB OR salad OR yogurt and fruit  S: fruit or protein bar  D: pasta with chicken and vegetable (mom cooks)     Hydration: water - 7 bottles, coffee - cream and sugar  Alcohol: socially  Smoking: no  Exercise: 3-4 times a week at the gym (cardio and strength)  Occupation: teacher  Sleep: varies      The following portions of the patient's history were reviewed and updated as appropriate: allergies, current medications, past family history, past medical history, past social history, past surgical history, and problem list.    Family History   Problem Relation Age of Onset    Hypertension Mother     Hyperlipidemia Mother     Diabetes Mother     Factor V Leiden deficiency Mother     Heart disease Father     Depression Father     ADD / ADHD Brother     Ovarian  "cancer Maternal Grandmother     Diabetes Other     Arthritis Family     Asthma Family     Cancer Family     Cervical cancer Family     Heart failure Family     Hyperlipidemia Family     Hypertension Family         Review of Systems   Constitutional:  Negative for fatigue.   HENT:  Negative for sore throat.    Respiratory:  Negative for cough and shortness of breath.    Cardiovascular:  Negative for chest pain, palpitations and leg swelling.   Gastrointestinal:  Negative for abdominal pain, constipation, diarrhea and nausea.   Genitourinary:  Negative for dysuria.   Musculoskeletal:  Negative for arthralgias and back pain.   Skin:  Negative for rash.   Neurological:  Negative for headaches.   Psychiatric/Behavioral:  Negative for dysphoric mood. The patient is not nervous/anxious.        Objective:  /70 (BP Location: Left arm, Patient Position: Sitting, Cuff Size: Standard)   Pulse 74   Ht 5' 5\" (1.651 m)   Wt 81.7 kg (180 lb 3.2 oz)   LMP 02/28/2025 (Exact Date)   SpO2 98%   BMI 29.99 kg/m²     Physical Exam  Vitals and nursing note reviewed.   Constitutional:       Appearance: Normal appearance. She is obese.   HENT:      Head: Normocephalic.   Pulmonary:      Effort: Pulmonary effort is normal.   Neurological:      General: No focal deficit present.      Mental Status: She is alert and oriented to person, place, and time.   Psychiatric:         Mood and Affect: Mood normal.         Behavior: Behavior normal.         Thought Content: Thought content normal.         Judgment: Judgment normal.            Labs   Most recent labs reviewed   Lab Results   Component Value Date     11/24/2017    SODIUM 138 11/18/2024    K 4.3 11/18/2024     (H) 11/18/2024    CO2 19 (L) 11/18/2024    AGAP 9 09/06/2024    BUN 9 11/18/2024    CREATININE 1.11 (H) 11/18/2024    GLUC 77 11/18/2024    GLUF 122 (H) 05/16/2022    CALCIUM 9.6 09/06/2024    AST 13 11/18/2024    ALT 12 11/18/2024    ALKPHOS 70 12/28/2023    " PROT 6.9 11/24/2017    TP 6.7 11/18/2024    BILITOT 0.3 11/24/2017    TBILI 0.3 11/18/2024    EGFR 70 11/18/2024     Lab Results   Component Value Date    HGBA1C 5.2 11/18/2024     Lab Results   Component Value Date    UZR5WCWZTQOE 0.750 07/08/2020    TSH 2.000 11/18/2024     Lab Results   Component Value Date    CHOLESTEROL 194 11/18/2024     Lab Results   Component Value Date    HDL 41 11/18/2024     Lab Results   Component Value Date    TRIG 119 11/18/2024     Lab Results   Component Value Date    LDLCALC 131 (H) 11/18/2024

## 2025-03-04 NOTE — ASSESSMENT & PLAN NOTE
- Patient is pursuing Conservative Program and follow up visits with medical weight management provider  - Initial weight loss goal of 5-10% weight loss for improved health. Weight loss can improve patient's co-morbid conditions and/or prevent weight-related complications.  - Explained the importance of continuing lifestyle changes in addition to any anti-obesity medications.   - Labs reviewed from 11/2024 - will recheck CMP in 6 weeks     General Recommendations:  Nutrition:  Eat breakfast daily.  Do not skip meals.      Food log (ie.) www.myfitnesspal.com, sparkpeople.com, loseit.com, calorieking.com, etc.     Practice mindful eating.  Be sure to set aside time to eat, eat slowly, and savor your food.     Hydration:    At least 64oz of water daily.  No sugar sweetened beverages.  No juice (eat the fruit instead).     Exercise:  Studies have shown that the ideal exercise goal is somewhere between 150 to 300 minutes of moderate intensity exercise a week.  Start with exercising 10 minutes every other day and gradually increase physical activity with a goal of at least 150 minutes of moderate intensity exercise a week, divided over at least 3 days a week.  An example of this would be exercising 30 minutes a day, 5 days a week.  Resistance training can increase muscle mass and increase our resting metabolic rate.   FULL BODY resistance training is recommended 2-3 times a week.  Do not do this on consecutive days to allow for muscle recovery.     Aim for a bare minimum 5000 steps, even on days you do not exercise.     Monitoring:   Weigh yourself daily.  If this causes undue stress, then just weigh yourself once a week.  Weigh yourself the same time of the day with the same amount of clothing on.  Preferably this should be done after waking up, before you eat, and with no clothing or minimal clothing on.     Specific Goals:  Patient lifestyle habits were reviewed the patient was congratulated on her continued weight  loss.  Nutrition was discussed and she will continue to be mindful of her portions, focus on protein rich foods, and avoid skipping meals.  Activity was discussed and she will continue with regular physical activity 30 minutes 3-4 times a week.  Medications were discussed and patient will maintain on Wegovy 1.7 mg as she is seeing weight loss success and tolerating without any side effects.  Will follow-up in the office in 3 months.

## 2025-03-20 ENCOUNTER — TELEPHONE (OUTPATIENT)
Age: 25
End: 2025-03-20

## 2025-03-20 NOTE — TELEPHONE ENCOUNTER
Pt called to schedule Kyleena removal/insertion, as discussed during office visit with Yadira Lewis. Pt confirmed she would like the Kyleena re-inserted. Message sent to office to ensure availability of Kyleena. Thank you.  
- - -

## 2025-04-11 ENCOUNTER — PROCEDURE VISIT (OUTPATIENT)
Dept: OBGYN CLINIC | Facility: CLINIC | Age: 25
End: 2025-04-11
Payer: COMMERCIAL

## 2025-04-11 VITALS
HEIGHT: 65 IN | WEIGHT: 180 LBS | BODY MASS INDEX: 29.99 KG/M2 | DIASTOLIC BLOOD PRESSURE: 84 MMHG | SYSTOLIC BLOOD PRESSURE: 112 MMHG

## 2025-04-11 DIAGNOSIS — Z30.430 ENCOUNTER FOR IUD INSERTION: Primary | ICD-10-CM

## 2025-04-11 LAB — SL AMB POCT URINE HCG: NORMAL

## 2025-04-11 PROCEDURE — 58301 REMOVE INTRAUTERINE DEVICE: CPT

## 2025-04-11 PROCEDURE — 81025 URINE PREGNANCY TEST: CPT

## 2025-04-11 PROCEDURE — 58300 INSERT INTRAUTERINE DEVICE: CPT

## 2025-04-11 NOTE — PROGRESS NOTES
IUD Procedure    Date/Time: 4/11/2025 11:51 AM    Performed by: OLEG Christensen  Authorized by: Chiara Jacob MD    Time Out:     Time out: Immediately prior to the procedure a time out was called      Time out performed at:  4/11/2025 11:35 AM  Patient states understanding of procedure being performed: Yes    Patient's understanding of procedure matches consent: Yes    Procedure consent matches procedure scheduled: Yes    Relevant documents present and verified: Yes    Test results available and properly labeled: Yes    Radiology Images displayed and confirmed. If images not available, report reviewed: Yes    Required items: Required blood products, implants, devices and special equipment available    Patient identity confirmed:  Verbally with patient  Select procedure: IUD removal and insertion    IUD Insertion:     Pelvic exam performed: yes      Negative GC/chlamydia test: no      Negative urine pregnancy test: yes      Negative serum pregnancy test: no      Cervix cleaned and prepped: yes      Speculum placed in vagina: yes      Tenaculum applied to cervix: no      Allis applied to cervix: yes      IUD inserted with no complications: yes      Strings trimmed: yes      Uterus sounded: yes      Uterus sound depth (cm):  6    IUD type:  1 each levonorgestrel 19.5 MG  Post-procedure:     Patient tolerated procedure well: yes    Insertion Comments:      Pt for IUD insertion today. Pt has already been previously counseled on all risks/benefits of IUD usage/insertion.  I again reviewed with pt risks of insertion including infection, perforation and expulsion. Reviewed with pt increased ectopic risk, migration, PID, infertility, high risk pregnancy if a pregnancy were to occur. I reviewed possible irregular menses and side effects of Kyleena. We reviewed changes in menstrual cycles that may occur.   Pt aware 5 year coverage for birth control but can have it removed at any point if she desires.   Pt  tolerated procedure well. Aware nothing PV x 48 hours. Will call with any cramping that does not resolve, fevers/chills. Etc.      IUD Removal:     Reason for removal: patient request and mechanical complications      Removed without complications: yes      Tenaculum/Allis/Ring Forceps applied to cervix: yes      Strings visualized: yes      IUD intact: yes    Removal Comments:      Pt presents for IUD removal. Strings visualized and grasped. Pt was asked to bear down and IUD was removed easily without incidence.

## 2025-05-02 ENCOUNTER — OFFICE VISIT (OUTPATIENT)
Dept: FAMILY MEDICINE CLINIC | Facility: CLINIC | Age: 25
End: 2025-05-02
Payer: COMMERCIAL

## 2025-05-02 VITALS
TEMPERATURE: 98.4 F | SYSTOLIC BLOOD PRESSURE: 124 MMHG | DIASTOLIC BLOOD PRESSURE: 80 MMHG | OXYGEN SATURATION: 99 % | BODY MASS INDEX: 29.66 KG/M2 | WEIGHT: 178 LBS | HEART RATE: 72 BPM | HEIGHT: 65 IN | RESPIRATION RATE: 14 BRPM

## 2025-05-02 DIAGNOSIS — D68.51 FACTOR V LEIDEN MUTATION (HCC): ICD-10-CM

## 2025-05-02 DIAGNOSIS — N39.0 URINARY TRACT INFECTION WITHOUT HEMATURIA, SITE UNSPECIFIED: Primary | ICD-10-CM

## 2025-05-02 DIAGNOSIS — J45.20 MILD INTERMITTENT ASTHMA WITHOUT COMPLICATION: ICD-10-CM

## 2025-05-02 DIAGNOSIS — R30.0 DYSURIA: ICD-10-CM

## 2025-05-02 PROBLEM — M76.72 PERONEAL TENDONITIS OF LEFT LOWER LEG: Status: RESOLVED | Noted: 2023-12-19 | Resolved: 2025-05-02

## 2025-05-02 PROBLEM — M72.2 PLANTAR FASCIITIS OF LEFT FOOT: Status: RESOLVED | Noted: 2023-12-19 | Resolved: 2025-05-02

## 2025-05-02 LAB
SL AMB  POCT GLUCOSE, UA: NORMAL
SL AMB LEUKOCYTE ESTERASE,UA: NORMAL
SL AMB POCT BILIRUBIN,UA: NORMAL
SL AMB POCT BLOOD,UA: NORMAL
SL AMB POCT CLARITY,UA: CLEAR
SL AMB POCT COLOR,UA: YELLOW
SL AMB POCT KETONES,UA: NORMAL
SL AMB POCT NITRITE,UA: NORMAL
SL AMB POCT PH,UA: 6
SL AMB POCT SPECIFIC GRAVITY,UA: 1.01
SL AMB POCT URINE PROTEIN: NORMAL
SL AMB POCT UROBILINOGEN: NORMAL

## 2025-05-02 PROCEDURE — 99214 OFFICE O/P EST MOD 30 MIN: CPT | Performed by: STUDENT IN AN ORGANIZED HEALTH CARE EDUCATION/TRAINING PROGRAM

## 2025-05-02 PROCEDURE — 81003 URINALYSIS AUTO W/O SCOPE: CPT | Performed by: STUDENT IN AN ORGANIZED HEALTH CARE EDUCATION/TRAINING PROGRAM

## 2025-05-02 RX ORDER — FLUCONAZOLE 150 MG/1
150 TABLET ORAL ONCE
Qty: 1 TABLET | Refills: 0 | Status: SHIPPED | OUTPATIENT
Start: 2025-05-02 | End: 2025-05-02

## 2025-05-02 RX ORDER — CIPROFLOXACIN 500 MG/1
500 TABLET, FILM COATED ORAL EVERY 12 HOURS SCHEDULED
Qty: 10 TABLET | Refills: 0 | Status: SHIPPED | OUTPATIENT
Start: 2025-05-02 | End: 2025-05-07

## 2025-05-02 NOTE — PROGRESS NOTES
Name: Misty Blackmon      : 2000      MRN: [id number]  Encounter Provider: Garrett Damon DO  Encounter Date: 2025   Encounter department: [facility]  :  Assessment & Plan  Urinary tract infection without hematuria, site unspecified  Symptoms consistent with UTI, based on allergies recommend Cipro x 5 days, Diflucan x 1, if symptoms worsen or not improving reevaluation recommended.    Orders:  •  ciprofloxacin (CIPRO) 500 mg tablet; Take 1 tablet (500 mg total) by mouth every 12 (twelve) hours for 5 days  •  fluconazole (DIFLUCAN) 150 mg tablet; Take 1 tablet (150 mg total) by mouth once for 1 dose    Dysuria    Orders:  •  POCT urine dip auto non-scope  •  Urine culture    Factor V Leiden mutation (HCC)  History noted, follow-up PCP       Mild intermittent asthma without complication  Asymptomatic in office, continue inhaler therapies as needed             Depression Screening and Follow-up Plan: Patient was screened for depression during today's encounter. They screened negative with a PHQ-2 score of 0.        History of Present Illness   Follow-up UTI symptoms.      Review of Systems   Constitutional:  Negative for chills and fever.   HENT:  Negative for ear pain and sore throat.    Eyes:  Negative for pain and visual disturbance.   Respiratory:  Negative for cough and shortness of breath.    Cardiovascular:  Negative for chest pain and palpitations.   Gastrointestinal:  Negative for abdominal pain, constipation, diarrhea, nausea and vomiting.   Genitourinary:  Positive for dysuria. Negative for hematuria.   Musculoskeletal:  Negative for arthralgias and back pain.   Skin:  Negative for color change and rash.   Neurological:  Negative for seizures and syncope.   Psychiatric/Behavioral:  Negative for agitation, behavioral problems and confusion.    All other systems reviewed and are negative.      Objective   /80 (BP Location: Left arm, Patient Position: Sitting,  "Cuff Size: Standard)   Pulse 72   Temp 98.4 °F (36.9 °C) (Tympanic)   Resp 14   Ht 5' 5\" (1.651 m)   Wt 80.7 kg (178 lb)   LMP 03/25/2025 (Exact Date)   SpO2 99%   BMI 29.62 kg/m²      Physical Exam  Vitals and nursing note reviewed.   Constitutional:       General: She is not in acute distress.     Appearance: She is well-developed.   HENT:      Head: Normocephalic and atraumatic.   Eyes:      Conjunctiva/sclera: Conjunctivae normal.   Cardiovascular:      Rate and Rhythm: Normal rate and regular rhythm.      Heart sounds: No murmur heard.  Pulmonary:      Effort: Pulmonary effort is normal. No respiratory distress.      Breath sounds: Normal breath sounds.   Abdominal:      Palpations: Abdomen is soft.      Tenderness: There is no abdominal tenderness.   Musculoskeletal:         General: No swelling.      Cervical back: Neck supple.   Skin:     General: Skin is warm and dry.      Capillary Refill: Capillary refill takes less than 2 seconds.   Neurological:      Mental Status: She is alert.   Psychiatric:         Mood and Affect: Mood normal.         "

## 2025-05-05 ENCOUNTER — RESULTS FOLLOW-UP (OUTPATIENT)
Dept: FAMILY MEDICINE CLINIC | Facility: CLINIC | Age: 25
End: 2025-05-05

## 2025-05-05 LAB
BACTERIA UR CULT: NORMAL
Lab: NORMAL

## 2025-05-06 ENCOUNTER — RESULTS FOLLOW-UP (OUTPATIENT)
Dept: FAMILY MEDICINE CLINIC | Facility: CLINIC | Age: 25
End: 2025-05-06

## 2025-05-12 ENCOUNTER — TELEMEDICINE (OUTPATIENT)
Dept: FAMILY MEDICINE CLINIC | Facility: CLINIC | Age: 25
End: 2025-05-12
Payer: COMMERCIAL

## 2025-05-12 DIAGNOSIS — F41.8 DEPRESSION WITH ANXIETY: Primary | ICD-10-CM

## 2025-05-12 PROCEDURE — 99213 OFFICE O/P EST LOW 20 MIN: CPT | Performed by: FAMILY MEDICINE

## 2025-05-12 RX ORDER — ALPRAZOLAM 0.25 MG
0.25 TABLET ORAL DAILY PRN
Qty: 10 TABLET | Refills: 0 | Status: SHIPPED | OUTPATIENT
Start: 2025-05-12

## 2025-05-12 NOTE — PROGRESS NOTES
Virtual Regular VisitName: Misty Blackmon      : 2000      MRN: [id number]  Encounter Provider: Sue Leyva MD  Encounter Date: 2025   Encounter department: [facility]  :  Assessment & Plan  Depression with anxiety  At this time recommend referral to psychiatry.  Do not recommend increasing Zoloft.  Recommend patient to be using the Xanax only as a rescue.  10 tablets given to see if this can help her for the next 6 months..    Orders:  •  ALPRAZolam (XANAX) 0.25 mg tablet; Take 1 tablet (0.25 mg total) by mouth daily as needed for anxiety  •  Ambulatory referral to Psych Services; Future    Depression with anxiety           Assessment & Plan          History of Present Illness     HPI  25-year-old female presenting to the office via virtual camera.  Is working at school as a teacher.  Happening the last couple of months.  Has been having acute panic attacks where she automatically feels not like herself.  Buspar TID with no improvement.  She is taking her Zoloft as prescribed without any side effects.  She really strongly feels that she just needs something as needed    Review of Systems   Psychiatric/Behavioral:  The patient is nervous/anxious.        Objective   There were no vitals taken for this visit.    Physical Exam  Constitutional:       Appearance: She is well-developed.   HENT:      Head: Normocephalic and atraumatic.   Pulmonary:      Effort: Pulmonary effort is normal.   Musculoskeletal:         General: Normal range of motion.   Neurological:      Mental Status: She is alert and oriented to person, place, and time.   Psychiatric:         Behavior: Behavior normal.         Thought Content: Thought content normal.         Judgment: Judgment normal.         Administrative Statements   Encounter provider Sue Leyva MD    The Patient is located at Home and in the following state in which I hold an active license [place].    The patient was identified  by name and date of birth. Misty Blackmon was informed that this is a telemedicine visit and that the visit is being conducted through the Epic Embedded platform. She agrees to proceed..  My office door was closed. No one else was in the room.  She acknowledged consent and understanding of privacy and security of the video platform. The patient has agreed to participate and understands they can discontinue the visit at any time.    I have spent a total time of 15 minutes in caring for this patient on the day of the visit/encounter including Diagnostic results and Prognosis, not including the time spent for establishing the audio/video connection.

## 2025-05-23 ENCOUNTER — TELEPHONE (OUTPATIENT)
Dept: BARIATRICS | Facility: CLINIC | Age: 25
End: 2025-05-23

## 2025-05-23 NOTE — TELEPHONE ENCOUNTER
Patient is requesting a refill of her Wegovy 1.7 MG dose to be sent to the [facility] on [address] in [place], [place].     Not on current med list. Please review to see if the refill is appropriate.

## 2025-05-27 ENCOUNTER — TELEPHONE (OUTPATIENT)
Age: 25
End: 2025-05-27

## 2025-05-27 NOTE — TELEPHONE ENCOUNTER
Patient has been added to the Medication Management wait list with a referral.    Insurance: [employer]  Insurance Type:    Commercial [x]   Medicaid []   [facility] (if applicable)   Medicare []  Location Preference:   Provider Preference:   Virtual: Yes [x] No []  Were outside resources sent: Yes [] No [x]

## 2025-05-28 DIAGNOSIS — E66.811 CLASS 1 OBESITY DUE TO EXCESS CALORIES WITHOUT SERIOUS COMORBIDITY WITH BODY MASS INDEX (BMI) OF 33.0 TO 33.9 IN ADULT: Primary | ICD-10-CM

## 2025-05-28 DIAGNOSIS — E66.09 CLASS 1 OBESITY DUE TO EXCESS CALORIES WITHOUT SERIOUS COMORBIDITY WITH BODY MASS INDEX (BMI) OF 33.0 TO 33.9 IN ADULT: Primary | ICD-10-CM

## 2025-05-29 ENCOUNTER — HOSPITAL ENCOUNTER (OUTPATIENT)
Dept: RADIOLOGY | Facility: HOSPITAL | Age: 25
Discharge: HOME | End: 2025-05-29
Payer: COMMERCIAL

## 2025-05-29 DIAGNOSIS — N83.201 RIGHT OVARIAN CYST: ICD-10-CM

## 2025-05-29 PROCEDURE — 76830 TRANSVAGINAL US NON-OB: CPT

## 2025-05-29 PROCEDURE — 76856 US EXAM PELVIC COMPLETE: CPT

## 2025-05-30 DIAGNOSIS — F41.8 DEPRESSION WITH ANXIETY: ICD-10-CM

## 2025-05-30 DIAGNOSIS — G43.109 MIGRAINE WITH AURA AND WITHOUT STATUS MIGRAINOSUS, NOT INTRACTABLE: ICD-10-CM

## 2025-05-30 DIAGNOSIS — R07.89 FEELING OF CHEST TIGHTNESS: ICD-10-CM

## 2025-05-30 NOTE — TELEPHONE ENCOUNTER
Reason for call: pt needs medications sent to [facility]  [x] Refill   [] Prior Auth  [] Other:     Office:   [x] PCP/Provider -   [] Specialty/Provider -     Medication: hydrOXYzine HCL (ATARAX) 50 mg tablet     Dose/Frequency:  take 1 tablet by mouth at bedtime,     Quantity: 90 tablet    Medication: ondansetron (ZOFRAN-ODT) 4 mg disintegrating tablet     Dose/Frequency:  Take 1 tablet (4 mg total) by mouth every 6 (six) hours as needed for nausea or vomiting     Quantity: 20 tablet     Medication: rizatriptan (Maxalt) 10 mg tablet     Dose/Frequency: Take 1 tablet (10 mg total) by mouth as needed for migraine Take at the onset of migraine; if symptoms continue or return, may take another dose at least 2 hours after first dose. Take no more than 2 doses in a day.,     Quantity:  18 tablet     Medication: sertraline (ZOLOFT) 50 mg tablet     Dose/Frequency:  take 3 tablets by mouth at bedtime     Quantity: 270 tablet     Medication: topiramate (TOPAMAX) 50 MG tablet     Dose/Frequency: take 1 tablet by mouth at bedtime     Quantity:  90 tablet     Pharmacy: [facility]/pharmacy #[id number] - [place], [place] - [address] [facility]   Does the patient have enough for 3 days?   [x] Yes   [] No - Send as HP to POD

## 2025-06-01 RX ORDER — RIZATRIPTAN BENZOATE 10 MG/1
10 TABLET ORAL AS NEEDED
Qty: 18 TABLET | Refills: 0 | Status: SHIPPED | OUTPATIENT
Start: 2025-06-01

## 2025-06-01 RX ORDER — ONDANSETRON 4 MG/1
4 TABLET, ORALLY DISINTEGRATING ORAL EVERY 6 HOURS PRN
Qty: 20 TABLET | Refills: 0 | Status: SHIPPED | OUTPATIENT
Start: 2025-06-01

## 2025-06-01 RX ORDER — TOPIRAMATE 50 MG/1
50 TABLET, FILM COATED ORAL
Qty: 90 TABLET | Refills: 1 | Status: SHIPPED | OUTPATIENT
Start: 2025-06-01

## 2025-06-01 RX ORDER — HYDROXYZINE HYDROCHLORIDE 50 MG/1
50 TABLET, FILM COATED ORAL
Qty: 90 TABLET | Refills: 1 | Status: SHIPPED | OUTPATIENT
Start: 2025-06-01

## 2025-06-05 ENCOUNTER — RESULTS FOLLOW-UP (OUTPATIENT)
Dept: OBGYN CLINIC | Facility: CLINIC | Age: 25
End: 2025-06-05

## 2025-06-06 DIAGNOSIS — T78.40XS ALLERGIC REACTION, SEQUELA: ICD-10-CM

## 2025-06-06 DIAGNOSIS — F41.8 DEPRESSION WITH ANXIETY: ICD-10-CM

## 2025-06-06 RX ORDER — EPINEPHRINE 0.3 MG/.3ML
0.3 INJECTION SUBCUTANEOUS ONCE
Qty: 0.6 ML | Refills: 0 | Status: SHIPPED | OUTPATIENT
Start: 2025-06-06 | End: 2025-06-06

## 2025-06-06 NOTE — TELEPHONE ENCOUNTER
Patient took her last sertraline and does not have any left. The [employer] have . Please treat as high priority. Pharmacy confirmed and is on pended orders.

## 2025-06-08 ENCOUNTER — TELEPHONE (OUTPATIENT)
Dept: FAMILY MEDICINE CLINIC | Facility: CLINIC | Age: 25
End: 2025-06-08

## 2025-06-08 NOTE — TELEPHONE ENCOUNTER
Reason for call:   [] Refill   [x] Prior Auth  [] Other:     Office:   [x] PCP/Provider - Sue Leyva MD   [] Specialty/Provider -     Medication: sertraline (ZOLOFT) 50 mg tablet    Dose/Frequency: Take 3 tablets (150 mg total) by mouth daily at bedtime     Quantity: 270    Pharmacy: [facility]

## 2025-06-09 NOTE — TELEPHONE ENCOUNTER
PA for sertraline (ZOLOFT) 50 mg tablet  SUBMITTED to [employer]    via    [x]CMM-KEY: [id number]  []Surescripts-Case ID #    []Availity-Auth ID #  NDC #    []Faxed to plan   []Other website    []Phone call Case ID #      [x]PA sent as URGENT    All office notes, labs and other pertaining documents and studies sent. Clinical questions answered. Awaiting determination from insurance company.     Turnaround time for your insurance to make a decision on your Prior Authorization can take 7-21 business days.

## 2025-06-10 ENCOUNTER — APPOINTMENT (OUTPATIENT)
Dept: LAB | Facility: HOSPITAL | Age: 25
End: 2025-06-10
Attending: NURSE PRACTITIONER
Payer: COMMERCIAL

## 2025-06-12 ENCOUNTER — RESULTS FOLLOW-UP (OUTPATIENT)
Dept: BARIATRICS | Facility: CLINIC | Age: 25
End: 2025-06-12

## 2025-06-12 ENCOUNTER — TELEPHONE (OUTPATIENT)
Dept: BARIATRICS | Facility: CLINIC | Age: 25
End: 2025-06-12

## 2025-06-16 ENCOUNTER — OFFICE VISIT (OUTPATIENT)
Dept: BARIATRICS | Facility: CLINIC | Age: 25
End: 2025-06-16
Payer: COMMERCIAL

## 2025-06-16 ENCOUNTER — OFFICE VISIT (OUTPATIENT)
Dept: FAMILY MEDICINE CLINIC | Facility: CLINIC | Age: 25
End: 2025-06-16
Payer: COMMERCIAL

## 2025-06-16 VITALS
HEIGHT: 65 IN | SYSTOLIC BLOOD PRESSURE: 120 MMHG | OXYGEN SATURATION: 97 % | WEIGHT: 175 LBS | RESPIRATION RATE: 12 BRPM | BODY MASS INDEX: 29.16 KG/M2 | HEART RATE: 77 BPM | TEMPERATURE: 96.8 F | DIASTOLIC BLOOD PRESSURE: 88 MMHG

## 2025-06-16 VITALS
WEIGHT: 172.6 LBS | HEIGHT: 65 IN | DIASTOLIC BLOOD PRESSURE: 80 MMHG | HEART RATE: 97 BPM | OXYGEN SATURATION: 99 % | SYSTOLIC BLOOD PRESSURE: 110 MMHG | BODY MASS INDEX: 28.76 KG/M2

## 2025-06-16 DIAGNOSIS — E66.3 OVERWEIGHT WITH BODY MASS INDEX (BMI) OF 28 TO 28.9 IN ADULT: Primary | ICD-10-CM

## 2025-06-16 DIAGNOSIS — F41.8 DEPRESSION WITH ANXIETY: ICD-10-CM

## 2025-06-16 DIAGNOSIS — T78.40XS ALLERGIC REACTION, SEQUELA: Primary | ICD-10-CM

## 2025-06-16 PROCEDURE — 99214 OFFICE O/P EST MOD 30 MIN: CPT | Performed by: FAMILY MEDICINE

## 2025-06-16 PROCEDURE — 99214 OFFICE O/P EST MOD 30 MIN: CPT | Performed by: NURSE PRACTITIONER

## 2025-06-16 NOTE — PROGRESS NOTES
Assessment/Plan:     Overweight with body mass index (BMI) of 28 to 28.9 in adult  Patient has transitioned from class III obesity to overweight with the help of medical weight management and GLP-1 therapy.  - Patient is pursuing Conservative Program and follow up visits with medical weight management provider  - Initial weight loss goal of 5-10% weight loss for improved health. Weight loss can improve patient's co-morbid conditions and/or prevent weight-related complications.  - Explained the importance of continuing lifestyle changes in addition to any anti-obesity medications.   - Labs reviewed from 11/2024  and 6/2025     General Recommendations:  Nutrition:  Eat breakfast daily.  Do not skip meals.      Food log (ie.) www.[employer].com, sparkpeople.com, loseit.com, calorieking.com, etc.     Practice mindful eating.  Be sure to set aside time to eat, eat slowly, and savor your food.     Hydration:    At least 64oz of water daily.  No sugar sweetened beverages.  No juice (eat the fruit instead).     Exercise:  Studies have shown that the ideal exercise goal is somewhere between 150 to 300 minutes of moderate intensity exercise a week.  Start with exercising 10 minutes every other day and gradually increase physical activity with a goal of at least 150 minutes of moderate intensity exercise a week, divided over at least 3 days a week.  An example of this would be exercising 30 minutes a day, 5 days a week.  Resistance training can increase muscle mass and increase our resting metabolic rate.   FULL BODY resistance training is recommended 2-3 times a week.  Do not do this on consecutive days to allow for muscle recovery.     Aim for a bare minimum 5000 steps, even on days you do not exercise.     Monitoring:   Weigh yourself daily.  If this causes undue stress, then just weigh yourself once a week.  Weigh yourself the same time of the day with the same amount of clothing on.  Preferably this should be done after  waking up, before you eat, and with no clothing or minimal clothing on.     Specific Goals:  Patient lifestyle habits were reviewed the patient was congratulated on her continued weight loss.  Nutrition was discussed and she will continue to be mindful of her portions, focus on protein rich foods, and avoid skipping meals.  Discussed making sure she takes an adequate calories and protein to make sure she is supporting muscle mass.  Activity was discussed and she will continue with regular physical activity 30 minutes 3-4 times a week incorporating both cardiovascular and strength training activities.  Medications were discussed and patient will maintain on Wegovy 1.7 mg as she is seeing weight loss success and tolerating without any side effects.  Will follow-up in the office in 4 months.         Misty was seen today for follow-up.    Diagnoses and all orders for this visit:    Overweight with body mass index (BMI) of 28 to 28.9 in adult        Total time spent reviewing chart, interviewing patient, examining patient, discussing plan, answering all questions, and documentin minutes with >50% face-to-face time with the patient.    Follow up in approximately 4 months with Non-Surgical Physician/Advanced Practitioner.    Subjective:   Chief Complaint   Patient presents with    Follow-up       Patient ID: Misty Blackmon  is a 25 y.o. female with excess weight/obesity here to pursue weight management.  Patient is pursuing Conservative Program.   Most recent notes and records were reviewed.    HPI    Wt Readings from Last 20 Encounters:   25 78.3 kg (172 lb 9.6 oz)   25 80.7 kg (178 lb)   25 81.6 kg (180 lb)   25 81.7 kg (180 lb 3.2 oz)   25 85.7 kg (189 lb)   25 83.7 kg (184 lb 9.6 oz)   24 89.8 kg (198 lb)   24 91 kg (200 lb 9.6 oz)   09/10/24 97.5 kg (215 lb)   24 98.7 kg (217 lb 9.6 oz)   24 99.2 kg (218 lb 12.8 oz)   24 104 kg (229 lb)    07/10/24 105 kg (230 lb 6.4 oz)   07/01/24 106 kg (233 lb 3.2 oz)   06/27/24 113 kg (249 lb)   05/29/24 113 kg (249 lb)   04/17/24 113 kg (249 lb)   04/15/24 113 kg (249 lb 3.2 oz)   04/10/24 114 kg (252 lb)   03/22/24 113 kg (250 lb 3.2 oz)       Patient presents today to medical weight management office for follow up.  Patient continues on Wegovy 1.7 mg and is tolerating the medication without any side effects.  She continues to see weight loss on the medication but was off the medication 2 weeks recently due to pharmacy concerns.  She is now back on the medication without any side effects.  She continues to see the medication is helpful at her controlling her portions and appetite.  Her blood work was reviewed and her kidney function is normal.  She is making sure to drink adequate water and is staying consistent with regular eating throughout the day.    Weight loss medication and dose: Wegovy 1.7 mg  Started weight and date: 247.2 lbs in 11/2023  Current weight: 172.6 lbs (180.2 lbs at last OV)  Difference: -74.6 lbs (-7.6 lbs since last OV)  Percentage of weight loss: -30.1%  Goal weight: 150-180 lbs     Starting BMI: 41.26 in 11/2023  Current BMI: 28.72     Waist Measurements:  11/2023: 49.5 in  3/2025: 36.25 in  6/2025: 35.5 in    MWM Weights:  11/2023: 247.2 lbs   2/2024: 246.8 lbs (-0.4 lbs)  4/2024: 249.2 lbs (+2.4 lbs) *started on Wegovy  8/2024: 218.8 lbs (-30.4 lbs) *on Wegovy 1.7mg  11/2024: 200.6 lbs (-18.2 lbs)  3/2025: 180.2 lbs (-20.4 lbs)  6/2025: 172.6 lbs (-7.6 lbs)     Diet recall:  B: protein shake with coffee  S: protein bar OR bevita crackers  L: apples and PB OR salad OR yogurt and fruit  S: fruit or protein bar  D: pasta with chicken and vegetable (mom cooks)     Hydration: water - 7 bottles, coffee - cream and sugar  Alcohol: socially  Smoking: no  Exercise: 3-4 times a week at the gym (cardio and strength)  Occupation: teacher  Sleep: varies      The following portions of the  "patient's history were reviewed and updated as appropriate: allergies, current medications, past family history, past medical history, past social history, past surgical history, and problem list.    Family History[1]     Review of Systems   Constitutional:  Negative for fatigue.   HENT:  Negative for sore throat.    Respiratory:  Negative for cough and shortness of breath.    Cardiovascular:  Negative for chest pain, palpitations and leg swelling.   Gastrointestinal:  Negative for abdominal pain, constipation, diarrhea and nausea.   Genitourinary:  Negative for dysuria.   Musculoskeletal:  Negative for arthralgias and back pain.   Skin:  Negative for rash.   Neurological:  Negative for headaches.   Psychiatric/Behavioral:  Negative for dysphoric mood. The patient is not nervous/anxious.        Objective:  /80   Pulse 97   Ht 5' 5\" (1.651 m)   Wt 78.3 kg (172 lb 9.6 oz)   LMP  (LMP Unknown)   SpO2 99%   BMI 28.72 kg/m²     Physical Exam  Vitals and nursing note reviewed.   Constitutional:       Appearance: Normal appearance. She is obese.   HENT:      Head: Normocephalic.   Pulmonary:      Effort: Pulmonary effort is normal.     Neurological:      General: No focal deficit present.      Mental Status: She is alert and oriented to person, place, and time.     Psychiatric:         Mood and Affect: Mood normal.         Behavior: Behavior normal.         Thought Content: Thought content normal.         Judgment: Judgment normal.            Labs   Most recent labs reviewed   Lab Results   Component Value Date     11/24/2017    SODIUM 138 06/10/2025    K 3.7 06/10/2025     06/10/2025    CO2 24 06/10/2025    AGAP 6 06/10/2025    BUN 11 06/10/2025    CREATININE 0.95 06/10/2025    GLUC 107 06/10/2025    GLUF 122 (H) 05/16/2022    CALCIUM 8.9 06/10/2025    AST 12 (L) 06/10/2025    ALT 12 06/10/2025    ALKPHOS 54 06/10/2025    PROT 6.9 11/24/2017    TP 6.5 06/10/2025    BILITOT 0.3 11/24/2017    TBILI " 0.43 06/10/2025    EGFR 82 06/10/2025     Lab Results   Component Value Date    HGBA1C 5.2 11/18/2024     Lab Results   Component Value Date    HFM5NJITZBXG 0.750 07/08/2020    TSH 2.000 11/18/2024     Lab Results   Component Value Date    CHOLESTEROL 194 11/18/2024     Lab Results   Component Value Date    HDL 41 11/18/2024     Lab Results   Component Value Date    TRIG 119 11/18/2024     Lab Results   Component Value Date    LDLCALC 131 (H) 11/18/2024          [1]   Family History  Problem Relation Name Age of Onset    Hypertension Mother Marilee     Hyperlipidemia Mother Marilee     Diabetes Mother Marilee     Factor V Leiden deficiency Mother Marilee     Heart disease Father Julio C     Depression Father Julio C     ADD / ADHD Brother Caleb/ tawanna     Ovarian cancer Maternal Grandmother      Diabetes Other Grandparent     Arthritis Family      Asthma Family      Cancer Family      Cervical cancer Family      Heart failure Family      Hyperlipidemia Family      Hypertension Family

## 2025-06-16 NOTE — PROGRESS NOTES
Name: Misty Blackmon      : 2000      MRN: [id number]  Encounter Provider: Sue Leyva MD  Encounter Date: 2025   Encounter department: [facility]    Assessment & Plan  Allergic reaction, sequela  Filled out paperwork for the patient.       Depression with anxiety  Unfortunately is not fully controlled.  I do want her to reach out with psychiatry  Considering Abilify but I do not want to have any weight gain given that the patient has been doing very well on her weight loss journey                History of Present Illness     HPI  25-year-old female presenting to the office for follow-up and requiring us to fill out paperwork.  Patient states that she has not had severe Synnamon reactions but has been using Benadryl as needed when in contact.  Patient anxiety is not very well-controlled.  And would like to discuss options  Review of Systems   Constitutional:  Negative for activity change, appetite change, chills, fatigue and fever.   HENT:  Negative for congestion.    Respiratory:  Negative for cough, chest tightness and shortness of breath.    Cardiovascular:  Negative for chest pain and leg swelling.   Gastrointestinal:  Negative for abdominal distention, abdominal pain, constipation, diarrhea, nausea and vomiting.   All other systems reviewed and are negative.    Past Medical History[1]  Past Surgical History[2]  Family History[3]  Social History[4]  Medications[5]  Allergies   Allergen Reactions   • Amoxicillin Anaphylaxis   • Cinnamon - Food Allergy Anaphylaxis     Immunization History   Administered Date(s) Administered   • COVID-19 PFIZER VACCINE 0.3 ML IM 2021, 2021, 2022   • DTaP 5 1998, 1998, 1998, 1999, 2003   • HPV Quadrivalent 2009, 2010, 2010   • Hep B, adult 1998, 1998, 1998   • Hib (PRP-OMP) 1998, 1998, 1998, 1999   • IPV 1998, 1998,  "07/26/1999, 01/29/2003   • MMR 05/12/1999, 01/29/2003   • Meningococcal 09/08/2009   • Meningococcal, Unknown Serogroups 09/08/2009   • Tdap 09/08/2009   • Tuberculin Skin Test-PPD Intradermal 06/27/2022, 07/24/2023   • Varicella 03/11/1999, 09/08/2009     Objective   /88 (BP Location: Left arm, Patient Position: Sitting, Cuff Size: Standard)   Pulse 77   Temp (!) 96.8 °F (36 °C) (Temporal)   Resp 12   Ht 5' 5\" (1.651 m)   Wt 79.4 kg (175 lb)   LMP  (LMP Unknown)   SpO2 97%   BMI 29.12 kg/m²     Physical Exam  Constitutional:       Appearance: She is well-developed.   HENT:      Head: Normocephalic and atraumatic.   Pulmonary:      Effort: Pulmonary effort is normal.     Musculoskeletal:         General: Normal range of motion.     Neurological:      Mental Status: She is alert and oriented to person, place, and time.     Psychiatric:         Behavior: Behavior normal.         Thought Content: Thought content normal.         Judgment: Judgment normal.                [1]  Past Medical History:  Diagnosis Date   • Allergic    • Anxiety    • Anxiety and depression    • Asthma    • Clotting disorder (HCC)    • Factor V Leiden (HCC)    • Headache(784.0)    • Pulmonary embolism (HCC)    • Thrombophlebitis    [2]  Past Surgical History:  Procedure Laterality Date   • BLADDER SURGERY     • FL RETROGRADE PYELOGRAM  05/20/2022   • FL VCUG VOIDING URETHROCYSTOGRAM  8/7/2018   • NEPHRECTOMY Left 05/21/2020    Done laparoscopically at [facility]   • WA CYSTOURETHROSCOPY N/A 05/20/2022    Procedure: CYSTOSCOPY, retrograde pyelogram, and examination under anesthesia, dilation urethral stenosis;  Surgeon: Kamran Lott MD;  Location: [facility] MAIN OR;  Service: Urology   • URETER REVISION     • WISDOM TOOTH EXTRACTION      woke up during procedure.   [3]  Family History  Problem Relation Name Age of Onset   • Hypertension Mother Marilee    • Hyperlipidemia Mother Marilee    • Diabetes Mother Marilee    • Factor V Leiden " deficiency Mother Marilee    • Heart disease Father Julio C    • Depression Father Julio C    • ADD / ADHD Brother Caleb/ tawanna    • Ovarian cancer Maternal Grandmother     • Diabetes Other Grandparent    • Arthritis Family     • Asthma Family     • Cancer Family     • Cervical cancer Family     • Heart failure Family     • Hyperlipidemia Family     • Hypertension Family     [4]  Social History  Tobacco Use   • Smoking status: Never     Passive exposure: Past   • Smokeless tobacco: Never   Vaping Use   • Vaping status: Never Used   Substance and Sexual Activity   • Alcohol use: Yes     Comment: social, 1 x per month   • Drug use: Never   • Sexual activity: Yes     Partners: Male     Birth control/protection: I.U.D.     Comment: kyleena insert 4/11/2025   [5]  Current Outpatient Medications on File Prior to Visit   Medication Sig   • albuterol (PROVENTIL HFA,VENTOLIN HFA) 90 mcg/act inhaler Inhale 2 puffs every 4 (four) hours as needed for wheezing or shortness of breath   • ALPRAZolam (XANAX) 0.25 mg tablet Take 1 tablet (0.25 mg total) by mouth daily as needed for anxiety   • busPIRone (BUSPAR) 15 mg tablet Take 1 tablet (15 mg total) by mouth 3 (three) times a day   • EPINEPHrine (EPIPEN) 0.3 mg/0.3 mL SOAJ Inject 0.3 mL (0.3 mg total) into a muscle once for 1 dose   • Fluticasone-Salmeterol (Advair Diskus) 250-50 mcg/dose inhaler Inhale 1 puff 2 (two) times a day Rinse mouth after use.   • hydrOXYzine HCL (ATARAX) 50 mg tablet Take 1 tablet (50 mg total) by mouth daily at bedtime   • levonorgestrel (KYLEENA) 19.5 MG intrauterine device 1 each by Intrauterine route   • ondansetron (ZOFRAN-ODT) 4 mg disintegrating tablet Take 1 tablet (4 mg total) by mouth every 6 (six) hours as needed for nausea or vomiting   • rizatriptan (Maxalt) 10 mg tablet Take 1 tablet (10 mg total) by mouth as needed for migraine Take at the onset of migraine; if symptoms continue or return, may take another dose at least 2 hours after first  dose. Take no more than 2 doses in a day.   • Semaglutide-Weight Management (WEGOVY) 1.7 MG/0.75ML Inject 0.75 mL (1.7 mg total) under the skin once a week   • sertraline (ZOLOFT) 50 mg tablet Take 3 tablets (150 mg total) by mouth daily at bedtime   • topiramate (TOPAMAX) 50 MG tablet Take 1 tablet (50 mg total) by mouth daily at bedtime

## 2025-06-16 NOTE — ASSESSMENT & PLAN NOTE
Patient has transitioned from class III obesity to overweight with the help of medical weight management and GLP-1 therapy.  - Patient is pursuing Conservative Program and follow up visits with medical weight management provider  - Initial weight loss goal of 5-10% weight loss for improved health. Weight loss can improve patient's co-morbid conditions and/or prevent weight-related complications.  - Explained the importance of continuing lifestyle changes in addition to any anti-obesity medications.   - Labs reviewed from 11/2024  and 6/2025     General Recommendations:  Nutrition:  Eat breakfast daily.  Do not skip meals.      Food log (ie.) www.[employer].com, sparkpeople.com, loseit.com, [employer].com, etc.     Practice mindful eating.  Be sure to set aside time to eat, eat slowly, and savor your food.     Hydration:    At least 64oz of water daily.  No sugar sweetened beverages.  No juice (eat the fruit instead).     Exercise:  Studies have shown that the ideal exercise goal is somewhere between 150 to 300 minutes of moderate intensity exercise a week.  Start with exercising 10 minutes every other day and gradually increase physical activity with a goal of at least 150 minutes of moderate intensity exercise a week, divided over at least 3 days a week.  An example of this would be exercising 30 minutes a day, 5 days a week.  Resistance training can increase muscle mass and increase our resting metabolic rate.   FULL BODY resistance training is recommended 2-3 times a week.  Do not do this on consecutive days to allow for muscle recovery.     Aim for a bare minimum 5000 steps, even on days you do not exercise.     Monitoring:   Weigh yourself daily.  If this causes undue stress, then just weigh yourself once a week.  Weigh yourself the same time of the day with the same amount of clothing on.  Preferably this should be done after waking up, before you eat, and with no clothing or minimal clothing on.      Specific Goals:  Patient lifestyle habits were reviewed the patient was congratulated on her continued weight loss.  Nutrition was discussed and she will continue to be mindful of her portions, focus on protein rich foods, and avoid skipping meals.  Discussed making sure she takes an adequate calories and protein to make sure she is supporting muscle mass.  Activity was discussed and she will continue with regular physical activity 30 minutes 3-4 times a week incorporating both cardiovascular and strength training activities.  Medications were discussed and patient will maintain on Wegovy 1.7 mg as she is seeing weight loss success and tolerating without any side effects.  Will follow-up in the office in 4 months.

## 2025-06-19 ENCOUNTER — TELEPHONE (OUTPATIENT)
Age: 25
End: 2025-06-19

## 2025-06-19 NOTE — TELEPHONE ENCOUNTER
Reason for call:   [x] Prior Auth  [] Other:     Caller:  [x] Patient  [] Pharmacy  Name:   Address:   Callback Number:     Medication: Wegovy    Dose/Frequency: Inject 0.75ml (1.7mg total) under the skin once a week    Quantity: 3ml    Ordering Provider:   [] PCP/Provider -   [x] Speciality/Provider - MELI willis CRNP

## 2025-06-20 ENCOUNTER — TELEPHONE (OUTPATIENT)
Dept: BARIATRICS | Facility: CLINIC | Age: 25
End: 2025-06-20

## 2025-07-07 DIAGNOSIS — J45.30 MILD PERSISTENT ASTHMA WITHOUT COMPLICATION: ICD-10-CM

## 2025-07-07 DIAGNOSIS — F41.8 DEPRESSION WITH ANXIETY: ICD-10-CM

## 2025-07-07 RX ORDER — ALBUTEROL SULFATE 90 UG/1
2 INHALANT RESPIRATORY (INHALATION) EVERY 4 HOURS PRN
Qty: 18 G | Refills: 0 | Status: SHIPPED | OUTPATIENT
Start: 2025-07-07

## 2025-07-07 RX ORDER — ALPRAZOLAM 0.25 MG
0.25 TABLET ORAL DAILY PRN
Qty: 10 TABLET | Refills: 0 | Status: SHIPPED | OUTPATIENT
Start: 2025-07-07

## 2025-07-07 RX ORDER — FLUTICASONE PROPIONATE AND SALMETEROL 250; 50 UG/1; UG/1
1 POWDER RESPIRATORY (INHALATION) 2 TIMES DAILY
Qty: 60 BLISTER | Refills: 0 | Status: SHIPPED | OUTPATIENT
Start: 2025-07-07

## 2025-07-07 NOTE — TELEPHONE ENCOUNTER
Reason for call:   [x] Refill   [] Prior Auth  [] Other:     Office:   [x] PCP/Provider -   [] Specialty/Provider -         albuterol (PROVENTIL HFA,VENTOLIN HFA) 90 mcg/act inhaler  Inhale 2 puffs every 4 (four) hours as needed for wheezing or shortness of breath  Normal, Disp-18 g, R-0  Substitution to a formulary equivalent within the same pharmaceutical class is authorized.         Fluticasone-Salmeterol (Advair Diskus) 250-50 mcg/dose inhaler  Inhale 1 puff 2 (two) times a day Rinse mouth after use.  Normal, Disp-60 blister, R-0  Substitution to a formulary equivalent within the same pharmaceutical class is authorized.         ALPRAZolam (XANAX) 0.25 mg tablet  Take 1 tablet (0.25 mg total) by mouth daily as needed for anxiety  Normal, Disp-10 tablet, R-0      [id number] - [place], [place] - [address] [phone]         Local Pharmacy   Does the patient have enough for 3 days?   [] Yes   [x] No - Send as HP to POD    Mail Away Pharmacy   Does the patient have enough for 10 days?   [] Yes   [] No - Send as HP to POD

## 2025-07-31 DIAGNOSIS — G43.109 MIGRAINE WITH AURA AND WITHOUT STATUS MIGRAINOSUS, NOT INTRACTABLE: ICD-10-CM

## 2025-07-31 RX ORDER — RIZATRIPTAN BENZOATE 10 MG/1
10 TABLET ORAL AS NEEDED
Qty: 9 TABLET | Refills: 1 | Status: SHIPPED | OUTPATIENT
Start: 2025-07-31

## 2025-08-04 DIAGNOSIS — J45.30 MILD PERSISTENT ASTHMA WITHOUT COMPLICATION: ICD-10-CM

## 2025-08-05 RX ORDER — ALBUTEROL SULFATE 90 UG/1
INHALANT RESPIRATORY (INHALATION)
Qty: 18 G | Refills: 2 | Status: SHIPPED | OUTPATIENT
Start: 2025-08-05

## 2025-08-12 ENCOUNTER — OFFICE VISIT (OUTPATIENT)
Dept: FAMILY MEDICINE CLINIC | Facility: CLINIC | Age: 25
End: 2025-08-12
Payer: COMMERCIAL

## 2025-08-12 VITALS
DIASTOLIC BLOOD PRESSURE: 90 MMHG | HEIGHT: 65 IN | RESPIRATION RATE: 18 BRPM | SYSTOLIC BLOOD PRESSURE: 112 MMHG | WEIGHT: 168.4 LBS | OXYGEN SATURATION: 99 % | TEMPERATURE: 97.3 F | HEART RATE: 68 BPM | BODY MASS INDEX: 28.06 KG/M2

## 2025-08-12 DIAGNOSIS — H92.03 EAR PAIN, BILATERAL: Primary | ICD-10-CM

## 2025-08-12 PROCEDURE — 99213 OFFICE O/P EST LOW 20 MIN: CPT | Performed by: FAMILY MEDICINE

## 2025-08-12 RX ORDER — PREDNISONE 20 MG/1
40 TABLET ORAL DAILY
Qty: 10 TABLET | Refills: 0 | Status: SHIPPED | OUTPATIENT
Start: 2025-08-12 | End: 2025-08-17

## 2025-08-12 RX ORDER — AZITHROMYCIN 250 MG/1
TABLET, FILM COATED ORAL
Qty: 6 TABLET | Refills: 0 | Status: SHIPPED | OUTPATIENT
Start: 2025-08-12 | End: 2025-08-17

## (undated) DEVICE — UROCATCH BAG

## (undated) DEVICE — INVIEW CLEAR LEGGINGS: Brand: CONVERTORS

## (undated) DEVICE — SPECIMEN CONTAINER STERILE PEEL PACK

## (undated) DEVICE — GLOVE SRG BIOGEL ECLIPSE 7.5

## (undated) DEVICE — PREMIUM DRY TRAY LF: Brand: MEDLINE INDUSTRIES, INC.

## (undated) DEVICE — PACK TUR

## (undated) DEVICE — CATH URETERAL 5FR X 70 CM FLEX TIP POLYUR BARD

## (undated) DEVICE — STERILE SURGICAL LUBRICANT,  TUBE: Brand: SURGILUBE

## (undated) DEVICE — GUIDEWIRE STRGHT TIP 0.035 IN  SOLO PLUS

## (undated) DEVICE — CHLORHEXIDINE 4PCT 4 OZ

## (undated) DEVICE — GLOVE INDICATOR PI UNDERGLOVE SZ 8 BLUE